# Patient Record
Sex: FEMALE | Race: WHITE | NOT HISPANIC OR LATINO | Employment: OTHER | URBAN - METROPOLITAN AREA
[De-identification: names, ages, dates, MRNs, and addresses within clinical notes are randomized per-mention and may not be internally consistent; named-entity substitution may affect disease eponyms.]

---

## 2017-03-16 DIAGNOSIS — E03.9 HYPOTHYROIDISM (ACQUIRED): ICD-10-CM

## 2017-03-21 ENCOUNTER — HOSPITAL ENCOUNTER (OUTPATIENT)
Dept: LAB | Facility: MEDICAL CENTER | Age: 82
End: 2017-03-21
Attending: INTERNAL MEDICINE
Payer: MEDICARE

## 2017-03-21 DIAGNOSIS — E03.9 HYPOTHYROIDISM (ACQUIRED): ICD-10-CM

## 2017-03-21 LAB
T4 FREE SERPL-MCNC: 1.94 NG/DL (ref 0.53–1.43)
TSH SERPL DL<=0.005 MIU/L-ACNC: 3.45 UIU/ML (ref 0.3–3.7)

## 2017-03-21 PROCEDURE — 36415 COLL VENOUS BLD VENIPUNCTURE: CPT

## 2017-03-21 PROCEDURE — 84443 ASSAY THYROID STIM HORMONE: CPT

## 2017-03-21 PROCEDURE — 84439 ASSAY OF FREE THYROXINE: CPT

## 2017-04-03 ENCOUNTER — OFFICE VISIT (OUTPATIENT)
Dept: MEDICAL GROUP | Facility: CLINIC | Age: 82
End: 2017-04-03
Payer: MEDICARE

## 2017-04-03 VITALS
BODY MASS INDEX: 19.58 KG/M2 | OXYGEN SATURATION: 98 % | WEIGHT: 110.5 LBS | HEART RATE: 62 BPM | HEIGHT: 63 IN | RESPIRATION RATE: 18 BRPM | SYSTOLIC BLOOD PRESSURE: 120 MMHG | TEMPERATURE: 98.4 F | DIASTOLIC BLOOD PRESSURE: 62 MMHG

## 2017-04-03 DIAGNOSIS — H93.13 TINNITUS, BILATERAL: ICD-10-CM

## 2017-04-03 DIAGNOSIS — E03.9 HYPOTHYROIDISM (ACQUIRED): ICD-10-CM

## 2017-04-03 PROCEDURE — 1101F PT FALLS ASSESS-DOCD LE1/YR: CPT | Performed by: INTERNAL MEDICINE

## 2017-04-03 PROCEDURE — G8432 DEP SCR NOT DOC, RNG: HCPCS | Performed by: INTERNAL MEDICINE

## 2017-04-03 PROCEDURE — G8420 CALC BMI NORM PARAMETERS: HCPCS | Performed by: INTERNAL MEDICINE

## 2017-04-03 PROCEDURE — 1036F TOBACCO NON-USER: CPT | Performed by: INTERNAL MEDICINE

## 2017-04-03 PROCEDURE — 4040F PNEUMOC VAC/ADMIN/RCVD: CPT | Performed by: INTERNAL MEDICINE

## 2017-04-03 PROCEDURE — 99213 OFFICE O/P EST LOW 20 MIN: CPT | Performed by: INTERNAL MEDICINE

## 2017-04-03 RX ORDER — VINPOCETINE 100 %
POWDER (GRAM) MISCELLANEOUS
COMMUNITY
End: 2020-02-24

## 2017-04-03 RX ORDER — UBIDECARENONE 75 MG
100 CAPSULE ORAL DAILY
COMMUNITY
End: 2022-09-07

## 2017-04-03 RX ORDER — HUPERZINE SERRATE A 1 %
POWDER (GRAM) MISCELLANEOUS
COMMUNITY
End: 2019-08-26

## 2017-04-03 RX ORDER — CURCUMIN 100 %
POWDER (GRAM) MISCELLANEOUS
COMMUNITY

## 2017-04-03 NOTE — MR AVS SNAPSHOT
"        Ratna Cline   4/3/2017 2:20 PM   Office Visit   MRN: [id number]    Department:  [facility]   Dept Phone:  [phone]    Description:  Female : 1935   Provider:  Rk Hung D.O.           Reason for Visit     Follow-Up review thyroid results       Allergies as of 4/3/2017     Allergen Noted Reactions    Codeine 2013   Vomiting    Sulfa Drugs 2013   Nausea      You were diagnosed with     Hypothyroidism (acquired)   [236423]       Tinnitus, bilateral   [339904]         Vital Signs     Blood Pressure Pulse Temperature Respirations Height Weight    120/62 mmHg 62 36.9 °C (98.4 °F) 18 1.6 m (5' 2.99\") 50.122 kg (110 lb 8 oz)    Body Mass Index Oxygen Saturation Breastfeeding? Smoking Status          19.58 kg/m2 98% No Never Smoker         Basic Information     Date Of Birth Sex Race Ethnicity Preferred Language    1935 Female White Non- English      Problem List              ICD-10-CM Priority Class Noted - Resolved    Hypothyroidism (acquired) E03.9   2015 - Present    Tinnitus H93.19   2015 - Present    Dyslipidemia E78.5   2016 - Present      Health Maintenance        Date Due Completion Dates    IMM DTaP/Tdap/Td Vaccine (1 - Tdap) 1956 ---    COLONOSCOPY 1987 ---    IMM ZOSTER VACCINE 1997 ---    IMM PNEUMOCOCCAL 65+ (ADULT) LOW/MEDIUM RISK SERIES (2 of 2 - PPSV23) 10/21/2016 10/21/2015    MAMMOGRAM 2017 (Declined)    Override on 2016: Patient Declined (declined)    BONE DENSITY 2017, 2007            Current Immunizations     13-VALENT PCV PREVNAR 10/21/2015    Influenza Vaccine Adult HD 10/21/2015      Below and/or attached are the medications your provider expects you to take. Review all of your home medications and newly ordered medications with your provider and/or pharmacist. Follow medication instructions as directed by your provider and/or pharmacist. Please keep your " medication list with you and share with your provider. Update the information when medications are discontinued, doses are changed, or new medications (including over-the-counter products) are added; and carry medication information at all times in the event of emergency situations     Allergies:  CODEINE - Vomiting     SULFA DRUGS - Nausea               Medications  Valid as of: April 03, 2017 -  4:07 PM    Generic Name Brand Name Tablet Size Instructions for use    5-Methyltetrahydrofolate (Powder) Methyl Folate  by Does not apply route.        Benzonatate (Cap) TESSALON 100 MG Take 2 Caps by mouth 3 times a day as needed for Cough.        Calcium Ascorbate   Take  by mouth.        Coenzyme Q10 (Cap) coenzyme Q-10 30 MG Take 60 mg by mouth every day.        Cyanocobalamin (Tab) VITAMIN B-12 100 MCG Take 100 mcg by mouth every day.        Doxycycline Hyclate (Tab) VIBRAMYCIN 100 MG Take 1 Tab by mouth 2 times a day.        Estradiol (Cream) ESTRACE 0.1 MG/GM INSERT 2 GRAMS VAGINALLY EVERY DAY FOR 2 WEEKS THEN INSERT 2 GRAMS VAGINALLY TWICE WEEKLY        Eyebright   Take  by mouth.        Ginger (Zingiber officinalis)   Take  by mouth.        Ginkgo Biloba   Take  by mouth.        Huperzine Serrate A (Powder) Huperzine Serrate A 1 % by Does not apply route.        Levothyroxine Sodium (Tab) SYNTHROID 50 MCG Take 1 Tab by mouth Every morning on an empty stomach.        Magnesium Citrate (Solution) magnesium citrate  Take 300 mL by mouth Once.        Milk Thistle (Cap) Milk Thistle 1000 MG Take  by mouth.        Multiple Vitamins-Minerals   Take  by mouth.        Neomycin-Polymyxin-HC (Suspension) PEDIOTIC HC 3.5-84500-9 Place 4 Drops in ear 3 times a day.        Nutritional Supplements   Take  by mouth.        Nutritional Supplements   Take  by mouth.        Turmeric (Curcuma Longa) (Powder) Curcumin  by Does not apply route.        Vinpocetine (Powder) Vinpocetine  by Does not apply route.        .                    Medicines prescribed today were sent to:     [id number] - [place], [place] - [address]    [address] [place] [address]    Phone: [phone] Fax: [phone]    Open 24 Hours?: No      Medication refill instructions:       If your prescription bottle indicates you have medication refills left, it is not necessary to call your provider’s office. Please contact your pharmacy and they will refill your medication.    If your prescription bottle indicates you do not have any refills left, you may request refills at any time through one of the following ways: The online [employer] system (except Urgent Care), by calling your provider’s office, or by asking your pharmacy to contact your provider’s office with a refill request. Medication refills are processed only during regular business hours and may not be available until the next business day. Your provider may request additional information or to have a follow-up visit with you prior to refilling your medication.   *Please Note: Medication refills are assigned a new Rx number when refilled electronically. Your pharmacy may indicate that no refills were authorized even though a new prescription for the same medication is available at the pharmacy. Please request the medicine by name with the pharmacy before contacting your provider for a refill.        Your To Do List     Future Labs/Procedures Complete By Expires    FREE THYROXINE  As directed 4/4/2018    TSH  As directed 4/4/2018         [employer] Status: Patient Declined

## 2017-04-04 NOTE — PROGRESS NOTES
CC: Ratna Cline is a 81 y.o. female is suffering from   Chief Complaint   Patient presents with   • Follow-Up     review thyroid results          SUBJECTIVE:  1. Hypothyroidism (acquired)  Patient's here for follow-up, we reviewed the fact that she is hypothyroid, but states that she had not been taking her thyroid medication appropriately. I've asked her to go ahead and stay on a consistent dosage, patient's to recheck her thyroid function approximately one month    2. Tinnitus, bilateral  Patient with a history of tinnitus, and inform the patient that this is frequently associated with use of nonsteroidal anti-inflammatories. Patient feels this is due to her being on Nexium.        Past social, family, history:   Social History   Substance Use Topics   • Smoking status: Never Smoker    • Smokeless tobacco: Never Used   • Alcohol Use: No         MEDICATIONS:    Current outpatient prescriptions:   •  Milk Thistle 1000 MG Cap, Take  by mouth., Disp: , Rfl:   •  EYEBRIGHT PO, Take  by mouth., Disp: , Rfl:   •  Nutritional Supplements (PYCNOGENOL PO), Take  by mouth., Disp: , Rfl:   •  Deyanira, Zingiber officinalis, (DEYANIRA PO), Take  by mouth., Disp: , Rfl:   •  Huperzine Serrate A 1 % Powder, by Does not apply route., Disp: , Rfl:   •  5-Methyltetrahydrofolate (METHYL FOLATE) Powder, by Does not apply route., Disp: , Rfl:   •  Vinpocetine Powder, by Does not apply route., Disp: , Rfl:   •  cyanocobalamin (VITAMIN B-12) 100 MCG Tab, Take 100 mcg by mouth every day., Disp: , Rfl:   •  VITAMIN C, CALCIUM ASCORBATE, PO, Take  by mouth., Disp: , Rfl:   •  Turmeric, Curcuma Longa, (CURCUMIN) Powder, by Does not apply route., Disp: , Rfl:   •  magnesium citrate Solution, Take 300 mL by mouth Once., Disp: , Rfl:   •  coenzyme Q-10 30 MG capsule, Take 60 mg by mouth every day., Disp: , Rfl:   •  Ginkgo Biloba (GINKOBA PO), Take  by mouth., Disp: , Rfl:   •  Nutritional Supplements (CALCIUM D-GLUCARATE PO),  "Take  by mouth., Disp: , Rfl:   •  Multiple Vitamins-Minerals (GLUTAMINE MULTIVITAMIN/MINERAL PO), Take  by mouth., Disp: , Rfl:   •  levothyroxine (SYNTHROID) 50 MCG Tab, Take 1 Tab by mouth Every morning on an empty stomach., Disp: 90 Tab, Rfl: 3  •  estradiol (ESTRACE) 0.1 MG/GM vaginal cream, INSERT 2 GRAMS VAGINALLY EVERY DAY FOR 2 WEEKS THEN INSERT 2 GRAMS VAGINALLY TWICE WEEKLY, Disp: 1 Tube, Rfl: 3  •  neomycin-polymyxin-HC (PEDIOTIC HC) 3.5-30285-3 Suspension, Place 4 Drops in ear 3 times a day., Disp: 1 Bottle, Rfl: 0  •  benzonatate (TESSALON) 100 MG Cap, Take 2 Caps by mouth 3 times a day as needed for Cough., Disp: 30 Cap, Rfl: 0  •  doxycycline (VIBRAMYCIN) 100 MG Tab, Take 1 Tab by mouth 2 times a day., Disp: 20 Tab, Rfl: 0    PROBLEMS:  Patient Active Problem List    Diagnosis Date Noted   • Dyslipidemia 07/14/2016   • Hypothyroidism (acquired) 07/13/2015   • Tinnitus 07/13/2015       REVIEW OF SYSTEMS:  Gen.:  No Nausea, Vomiting, fever, Chills.  Heart: No chest pain.  Lungs:  No shortness of Breath.  Psychological: Terence unusual Anxiety depression     PHYSICAL EXAM   Constitutional: Alert, cooperative, not in acute distress.  Cardiovascular:  Rate Rhythm is regular without murmurs rubs clicks.     Thorax & Lungs: Clear to auscultation, no wheezing, rhonchi, or rales  HENT: Normocephalic, Atraumatic.  Eyes: PERRLA, EOMI, Conjunctiva normal.   Neck: Trachia is midline no swelling of the thyroid.   Lymphatic: No lymphadenopathy noted.   Neurologic: Alert & oriented x 3, cranial nerves II through XII are intact, Normal motor function, Normal sensory function, No focal deficits noted.   Psychiatric: Affect normal, Judgment normal, Mood normal.     VITAL SIGNS:/62 mmHg  Pulse 62  Temp(Src) 36.9 °C (98.4 °F)  Resp 18  Ht 1.6 m (5' 2.99\")  Wt 50.122 kg (110 lb 8 oz)  BMI 19.58 kg/m2  SpO2 98%  Breastfeeding? No    Labs: Reviewed    Assessment:                                                   "   Plan:    1. Hypothyroidism (acquired)  Recheck thyroid  - FREE THYROXINE; Future  - TSH; Future    2. Tinnitus, bilateral  Tinnitus likely secondary to the use of nonsteroidal anti-inflammatories, possibly due to the use of Nexium though unlikely

## 2017-05-26 ENCOUNTER — TELEPHONE (OUTPATIENT)
Dept: MEDICAL GROUP | Facility: CLINIC | Age: 82
End: 2017-05-26

## 2017-05-26 ENCOUNTER — HOSPITAL ENCOUNTER (OUTPATIENT)
Dept: LAB | Facility: MEDICAL CENTER | Age: 82
End: 2017-05-26
Attending: INTERNAL MEDICINE
Payer: MEDICARE

## 2017-05-26 DIAGNOSIS — E03.9 HYPOTHYROIDISM (ACQUIRED): ICD-10-CM

## 2017-05-26 LAB
T4 FREE SERPL-MCNC: 1.45 NG/DL (ref 0.53–1.43)
TSH SERPL DL<=0.005 MIU/L-ACNC: 3.83 UIU/ML (ref 0.3–3.7)

## 2017-05-26 PROCEDURE — 84439 ASSAY OF FREE THYROXINE: CPT

## 2017-05-26 PROCEDURE — 84443 ASSAY THYROID STIM HORMONE: CPT

## 2017-05-26 PROCEDURE — 36415 COLL VENOUS BLD VENIPUNCTURE: CPT

## 2017-05-26 RX ORDER — LEVOTHYROXINE SODIUM 0.07 MG/1
75 TABLET ORAL
Qty: 90 TAB | Refills: 3 | OUTPATIENT
Start: 2017-05-26 | End: 2017-05-30 | Stop reason: SDUPTHER

## 2017-05-27 NOTE — TELEPHONE ENCOUNTER
Telephone call the patient verbally notified her that her thyroid is low we will increase her from 50-75 µg prescription sent to the pharmacy she is to repeat her thyroid testing in 6 weeks.

## 2017-05-30 ENCOUNTER — OFFICE VISIT (OUTPATIENT)
Dept: MEDICAL GROUP | Facility: CLINIC | Age: 82
End: 2017-05-30
Payer: MEDICARE

## 2017-05-30 VITALS
RESPIRATION RATE: 18 BRPM | HEART RATE: 60 BPM | TEMPERATURE: 98.1 F | DIASTOLIC BLOOD PRESSURE: 60 MMHG | BODY MASS INDEX: 19.4 KG/M2 | HEIGHT: 63 IN | OXYGEN SATURATION: 97 % | SYSTOLIC BLOOD PRESSURE: 110 MMHG | WEIGHT: 109.5 LBS

## 2017-05-30 DIAGNOSIS — E03.9 HYPOTHYROIDISM (ACQUIRED): ICD-10-CM

## 2017-05-30 PROCEDURE — 1101F PT FALLS ASSESS-DOCD LE1/YR: CPT | Performed by: INTERNAL MEDICINE

## 2017-05-30 PROCEDURE — 1036F TOBACCO NON-USER: CPT | Performed by: INTERNAL MEDICINE

## 2017-05-30 PROCEDURE — G8432 DEP SCR NOT DOC, RNG: HCPCS | Performed by: INTERNAL MEDICINE

## 2017-05-30 PROCEDURE — 4040F PNEUMOC VAC/ADMIN/RCVD: CPT | Performed by: INTERNAL MEDICINE

## 2017-05-30 PROCEDURE — G8420 CALC BMI NORM PARAMETERS: HCPCS | Performed by: INTERNAL MEDICINE

## 2017-05-30 PROCEDURE — 99213 OFFICE O/P EST LOW 20 MIN: CPT | Performed by: INTERNAL MEDICINE

## 2017-05-30 RX ORDER — LEVOTHYROXINE SODIUM 0.07 MG/1
75 TABLET ORAL
Qty: 90 TAB | Refills: 3 | Status: SHIPPED | OUTPATIENT
Start: 2017-05-30 | End: 2017-10-13

## 2017-05-31 NOTE — PROGRESS NOTES
CC: Ratna Cline is a 81 y.o. female is suffering from   Chief Complaint   Patient presents with   • Follow-Up     labs, thyroid and IBS         SUBJECTIVE:  1. Hypothyroidism (acquired)  Patient's here with a history of hypothyroidism, we've discussed also possible IBS. Patient and I have reviewed her labs which actually look reasonably good, she is concerned also about possible problems with adrenal insufficiency        Past social, family, history:   Social History   Substance Use Topics   • Smoking status: Never Smoker    • Smokeless tobacco: Never Used   • Alcohol Use: No         MEDICATIONS:    Current outpatient prescriptions:   •  levothyroxine (SYNTHROID) 75 MCG Tab, Take 1 Tab by mouth Every morning on an empty stomach., Disp: 90 Tab, Rfl: 3  •  Milk Thistle 1000 MG Cap, Take  by mouth., Disp: , Rfl:   •  EYEBRIGHT PO, Take  by mouth., Disp: , Rfl:   •  Nutritional Supplements (PYCNOGENOL PO), Take  by mouth., Disp: , Rfl:   •  Deyanira, Zingiber officinalis, (DEYANIRA PO), Take  by mouth., Disp: , Rfl:   •  Huperzine Serrate A 1 % Powder, by Does not apply route., Disp: , Rfl:   •  5-Methyltetrahydrofolate (METHYL FOLATE) Powder, by Does not apply route., Disp: , Rfl:   •  Vinpocetine Powder, by Does not apply route., Disp: , Rfl:   •  cyanocobalamin (VITAMIN B-12) 100 MCG Tab, Take 100 mcg by mouth every day., Disp: , Rfl:   •  VITAMIN C, CALCIUM ASCORBATE, PO, Take  by mouth., Disp: , Rfl:   •  Turmeric, Curcuma Longa, (CURCUMIN) Powder, by Does not apply route., Disp: , Rfl:   •  magnesium citrate Solution, Take 300 mL by mouth Once., Disp: , Rfl:   •  coenzyme Q-10 30 MG capsule, Take 60 mg by mouth every day., Disp: , Rfl:   •  Ginkgo Biloba (GINKOBA PO), Take  by mouth., Disp: , Rfl:   •  Nutritional Supplements (CALCIUM D-GLUCARATE PO), Take  by mouth., Disp: , Rfl:   •  Multiple Vitamins-Minerals (GLUTAMINE MULTIVITAMIN/MINERAL PO), Take  by mouth., Disp: , Rfl:   •   "neomycin-polymyxin-HC (PEDIOTIC HC) 3.5-55757-2 Suspension, Place 4 Drops in ear 3 times a day., Disp: 1 Bottle, Rfl: 0  •  estradiol (ESTRACE) 0.1 MG/GM vaginal cream, INSERT 2 GRAMS VAGINALLY EVERY DAY FOR 2 WEEKS THEN INSERT 2 GRAMS VAGINALLY TWICE WEEKLY, Disp: 1 Tube, Rfl: 3  •  benzonatate (TESSALON) 100 MG Cap, Take 2 Caps by mouth 3 times a day as needed for Cough., Disp: 30 Cap, Rfl: 0  •  doxycycline (VIBRAMYCIN) 100 MG Tab, Take 1 Tab by mouth 2 times a day., Disp: 20 Tab, Rfl: 0    PROBLEMS:  Patient Active Problem List    Diagnosis Date Noted   • Dyslipidemia 07/14/2016   • Hypothyroidism (acquired) 07/13/2015   • Tinnitus 07/13/2015       REVIEW OF SYSTEMS:  Gen.:  No Nausea, Vomiting, fever, Chills.  Heart: No chest pain.  Lungs:  No shortness of Breath.  Psychological: Terence unusual Anxiety depression     PHYSICAL EXAM   Constitutional: Alert, cooperative, not in acute distress.  Cardiovascular:  Rate Rhythm is regular without murmurs rubs clicks.     Thorax & Lungs: Clear to auscultation, no wheezing, rhonchi, or rales  HENT: Normocephalic, Atraumatic.  Eyes: PERRLA, EOMI, Conjunctiva normal.   Neck: Trachia is midline no swelling of the thyroid.   Neurologic: Alert & oriented x 3, cranial nerves II through XII are intact, Normal motor function, Normal sensory function, No focal deficits noted.   Psychiatric: Affect normal, Judgment normal, Mood normal.     VITAL SIGNS:/60 mmHg  Pulse 60  Temp(Src) 36.7 °C (98.1 °F)  Resp 18  Ht 1.588 m (5' 2.52\")  Wt 49.669 kg (109 lb 8 oz)  BMI 19.70 kg/m2  SpO2 97%  Breastfeeding? No    Labs: Reviewed    Assessment:                                                     Plan:    1. Hypothyroidism (acquired)  Labs reviewed, will have patient check an a.m. cortisol, and asked her also to check CBC CMP  - COMP METABOLIC PANEL; Future  - CBC WITH DIFFERENTIAL; Future  - CORTISOL - AM  - levothyroxine (SYNTHROID) 75 MCG Tab; Take 1 Tab by mouth Every " morning on an empty stomach.  Dispense: 90 Tab; Refill: 3

## 2017-06-01 ENCOUNTER — HOSPITAL ENCOUNTER (OUTPATIENT)
Dept: LAB | Facility: MEDICAL CENTER | Age: 82
End: 2017-06-01
Attending: INTERNAL MEDICINE
Payer: MEDICARE

## 2017-06-01 DIAGNOSIS — E03.9 HYPOTHYROIDISM (ACQUIRED): ICD-10-CM

## 2017-06-01 LAB
ALBUMIN SERPL BCP-MCNC: 3.7 G/DL (ref 3.2–4.9)
ALBUMIN/GLOB SERPL: 1.4 G/DL
ALP SERPL-CCNC: 48 U/L (ref 30–99)
ALT SERPL-CCNC: 14 U/L (ref 2–50)
ANION GAP SERPL CALC-SCNC: 6 MMOL/L (ref 0–11.9)
AST SERPL-CCNC: 16 U/L (ref 12–45)
BASOPHILS # BLD AUTO: 1.4 % (ref 0–1.8)
BASOPHILS # BLD: 0.07 K/UL (ref 0–0.12)
BILIRUB SERPL-MCNC: 0.7 MG/DL (ref 0.1–1.5)
BUN SERPL-MCNC: 10 MG/DL (ref 8–22)
CALCIUM SERPL-MCNC: 9.7 MG/DL (ref 8.5–10.5)
CHLORIDE SERPL-SCNC: 100 MMOL/L (ref 96–112)
CO2 SERPL-SCNC: 29 MMOL/L (ref 20–33)
CORTIS SERPL-MCNC: 15.5 UG/DL (ref 0–23)
CREAT SERPL-MCNC: 0.73 MG/DL (ref 0.5–1.4)
EOSINOPHIL # BLD AUTO: 0.11 K/UL (ref 0–0.51)
EOSINOPHIL NFR BLD: 2.2 % (ref 0–6.9)
ERYTHROCYTE [DISTWIDTH] IN BLOOD BY AUTOMATED COUNT: 45.1 FL (ref 35.9–50)
GFR SERPL CREATININE-BSD FRML MDRD: >60 ML/MIN/1.73 M 2
GLOBULIN SER CALC-MCNC: 2.6 G/DL (ref 1.9–3.5)
GLUCOSE SERPL-MCNC: 78 MG/DL (ref 65–99)
HCT VFR BLD AUTO: 44 % (ref 37–47)
HGB BLD-MCNC: 14.6 G/DL (ref 12–16)
IMM GRANULOCYTES # BLD AUTO: 0.01 K/UL (ref 0–0.11)
IMM GRANULOCYTES NFR BLD AUTO: 0.2 % (ref 0–0.9)
LYMPHOCYTES # BLD AUTO: 1.07 K/UL (ref 1–4.8)
LYMPHOCYTES NFR BLD: 21.2 % (ref 22–41)
MCH RBC QN AUTO: 31.9 PG (ref 27–33)
MCHC RBC AUTO-ENTMCNC: 33.2 G/DL (ref 33.6–35)
MCV RBC AUTO: 96.3 FL (ref 81.4–97.8)
MONOCYTES # BLD AUTO: 0.53 K/UL (ref 0–0.85)
MONOCYTES NFR BLD AUTO: 10.5 % (ref 0–13.4)
NEUTROPHILS # BLD AUTO: 3.25 K/UL (ref 2–7.15)
NEUTROPHILS NFR BLD: 64.5 % (ref 44–72)
NRBC # BLD AUTO: 0 K/UL
NRBC BLD AUTO-RTO: 0 /100 WBC
PLATELET # BLD AUTO: 289 K/UL (ref 164–446)
PMV BLD AUTO: 9.8 FL (ref 9–12.9)
POTASSIUM SERPL-SCNC: 3.9 MMOL/L (ref 3.6–5.5)
PROT SERPL-MCNC: 6.3 G/DL (ref 6–8.2)
RBC # BLD AUTO: 4.57 M/UL (ref 4.2–5.4)
SODIUM SERPL-SCNC: 135 MMOL/L (ref 135–145)
WBC # BLD AUTO: 5 K/UL (ref 4.8–10.8)

## 2017-06-01 PROCEDURE — 82533 TOTAL CORTISOL: CPT

## 2017-06-01 PROCEDURE — 85025 COMPLETE CBC W/AUTO DIFF WBC: CPT

## 2017-06-01 PROCEDURE — 36415 COLL VENOUS BLD VENIPUNCTURE: CPT

## 2017-06-01 PROCEDURE — 80053 COMPREHEN METABOLIC PANEL: CPT

## 2017-08-11 ENCOUNTER — HOSPITAL ENCOUNTER (OUTPATIENT)
Dept: LAB | Facility: MEDICAL CENTER | Age: 82
End: 2017-08-11
Attending: INTERNAL MEDICINE
Payer: MEDICARE

## 2017-08-11 DIAGNOSIS — E03.9 HYPOTHYROIDISM (ACQUIRED): ICD-10-CM

## 2017-08-11 LAB
CORTIS SERPL-MCNC: 13.5 UG/DL (ref 0–23)
T4 FREE SERPL-MCNC: 1.43 NG/DL (ref 0.53–1.43)
TSH SERPL DL<=0.005 MIU/L-ACNC: 1.84 UIU/ML (ref 0.3–3.7)

## 2017-08-11 PROCEDURE — 84439 ASSAY OF FREE THYROXINE: CPT

## 2017-08-11 PROCEDURE — 84443 ASSAY THYROID STIM HORMONE: CPT

## 2017-08-11 PROCEDURE — 36415 COLL VENOUS BLD VENIPUNCTURE: CPT

## 2017-08-11 PROCEDURE — 82533 TOTAL CORTISOL: CPT

## 2017-08-15 ENCOUNTER — OFFICE VISIT (OUTPATIENT)
Dept: MEDICAL GROUP | Facility: CLINIC | Age: 82
End: 2017-08-15
Payer: MEDICARE

## 2017-08-15 VITALS
HEART RATE: 66 BPM | RESPIRATION RATE: 18 BRPM | DIASTOLIC BLOOD PRESSURE: 64 MMHG | HEIGHT: 63 IN | OXYGEN SATURATION: 98 % | SYSTOLIC BLOOD PRESSURE: 120 MMHG | TEMPERATURE: 98.6 F | BODY MASS INDEX: 19.08 KG/M2 | WEIGHT: 107.7 LBS

## 2017-08-15 DIAGNOSIS — R89.9 ABNORMAL LABORATORY TEST: ICD-10-CM

## 2017-08-15 PROCEDURE — 99213 OFFICE O/P EST LOW 20 MIN: CPT | Performed by: INTERNAL MEDICINE

## 2017-08-15 NOTE — MR AVS SNAPSHOT
"        Ratna Cline   8/15/2017 4:00 PM   Office Visit   MRN: [id number]    Department:  [facility]   Dept Phone:  [phone]    Description:  Female : 1935   Provider:  Rk Hung D.O.           Reason for Visit     Follow-Up           Allergies as of 8/15/2017     Allergen Noted Reactions    Codeine 2013   Vomiting    Sulfa Drugs 2013   Nausea      You were diagnosed with     Abnormal laboratory test   [[id number]]         Vital Signs     Blood Pressure Pulse Temperature Respirations Height Weight    120/64 mmHg 66 37 °C (98.6 °F) 18 1.588 m (5' 2.52\") 48.852 kg (107 lb 11.2 oz)    Body Mass Index Oxygen Saturation Breastfeeding? Smoking Status          19.37 kg/m2 98% No Never Smoker         Basic Information     Date Of Birth Sex Race Ethnicity Preferred Language    1935 Female White Non- English      Problem List              ICD-10-CM Priority Class Noted - Resolved    Hypothyroidism (acquired) E03.9   2015 - Present    Tinnitus H93.19   2015 - Present    Dyslipidemia E78.5   2016 - Present      Health Maintenance        Date Due Completion Dates    IMM DTaP/Tdap/Td Vaccine (1 - Tdap) 1956 ---    COLONOSCOPY 1987 ---    IMM ZOSTER VACCINE 1997 ---    IMM PNEUMOCOCCAL 65+ (ADULT) LOW/MEDIUM RISK SERIES (2 of 2 - PPSV23) 10/21/2016 10/21/2015    MAMMOGRAM 2017 (Declined)    Override on 2016: Patient Declined (declined)    IMM INFLUENZA (1) 2017 10/21/2015    BONE DENSITY 2017, 2007            Current Immunizations     13-VALENT PCV PREVNAR 10/21/2015    Influenza Vaccine Adult HD 10/21/2015      Below and/or attached are the medications your provider expects you to take. Review all of your home medications and newly ordered medications with your provider and/or pharmacist. Follow medication instructions as directed by your provider and/or pharmacist. Please keep your medication list " with you and share with your provider. Update the information when medications are discontinued, doses are changed, or new medications (including over-the-counter products) are added; and carry medication information at all times in the event of emergency situations     Allergies:  CODEINE - Vomiting     SULFA DRUGS - Nausea               Medications  Valid as of: August 15, 2017 -  5:28 PM    Generic Name Brand Name Tablet Size Instructions for use    5-Methyltetrahydrofolate (Powder) Methyl Folate  by Does not apply route.        Benzonatate (Cap) TESSALON 100 MG Take 2 Caps by mouth 3 times a day as needed for Cough.        Calcium Ascorbate   Take  by mouth.        Coenzyme Q10 (Cap) coenzyme Q-10 30 MG Take 60 mg by mouth every day.        Cyanocobalamin (Tab) VITAMIN B-12 100 MCG Take 100 mcg by mouth every day.        Doxycycline Hyclate (Tab) VIBRAMYCIN 100 MG Take 1 Tab by mouth 2 times a day.        Estradiol (Cream) ESTRACE 0.1 MG/GM INSERT 2 GRAMS VAGINALLY EVERY DAY FOR 2 WEEKS THEN INSERT 2 GRAMS VAGINALLY TWICE WEEKLY        Eyebright   Take  by mouth.        Ginger (Zingiber officinalis)   Take  by mouth.        Ginkgo Biloba   Take  by mouth.        Huperzine Serrate A (Powder) Huperzine Serrate A 1 % by Does not apply route.        Levothyroxine Sodium (Tab) SYNTHROID 75 MCG Take 1 Tab by mouth Every morning on an empty stomach.        Magnesium Citrate (Solution) magnesium citrate  Take 300 mL by mouth Once.        Milk Thistle (Cap) Milk Thistle 1000 MG Take  by mouth.        Multiple Vitamins-Minerals   Take  by mouth.        Neomycin-Polymyxin-HC (Suspension) PEDIOTIC HC 3.5-16848-5 Place 4 Drops in ear 3 times a day.        Nutritional Supplements   Take  by mouth.        Nutritional Supplements   Take  by mouth.        Turmeric (Curcuma Longa) (Powder) Curcumin  by Does not apply route.        Vinpocetine (Powder) Vinpocetine  by Does not apply route.        .                 Medicines  prescribed today were sent to:     [facility] - [place], [place] - [address]    [address] [place] [address]    Phone: [phone] Fax: [phone]    Open 24 Hours?: No      Medication refill instructions:       If your prescription bottle indicates you have medication refills left, it is not necessary to call your provider’s office. Please contact your pharmacy and they will refill your medication.    If your prescription bottle indicates you do not have any refills left, you may request refills at any time through one of the following ways: The online [employer] system (except Urgent Care), by calling your provider’s office, or by asking your pharmacy to contact your provider’s office with a refill request. Medication refills are processed only during regular business hours and may not be available until the next business day. Your provider may request additional information or to have a follow-up visit with you prior to refilling your medication.   *Please Note: Medication refills are assigned a new Rx number when refilled electronically. Your pharmacy may indicate that no refills were authorized even though a new prescription for the same medication is available at the pharmacy. Please request the medicine by name with the pharmacy before contacting your provider for a refill.        Your To Do List     Future Labs/Procedures Complete By Expires    CBC WITH DIFFERENTIAL  As directed 8/16/2018         [employer] Status: Patient Declined

## 2017-08-16 NOTE — PROGRESS NOTES
CC: Ratna Cline is a 81 y.o. female is suffering from   Chief Complaint   Patient presents with   • Follow-Up         SUBJECTIVE:  1. Abnormal laboratory test  Ratna is here for follow-up, has a history of a mildly abnormal CBC but is otherwise doing extraordinarily well, is suffering from life stresses associated with her  who is 89 years old.        Past social, family, history:   Social History   Substance Use Topics   • Smoking status: Never Smoker    • Smokeless tobacco: Never Used   • Alcohol Use: No         MEDICATIONS:    Current outpatient prescriptions:   •  levothyroxine (SYNTHROID) 75 MCG Tab, Take 1 Tab by mouth Every morning on an empty stomach., Disp: 90 Tab, Rfl: 3  •  Milk Thistle 1000 MG Cap, Take  by mouth., Disp: , Rfl:   •  EYEBRIGHT PO, Take  by mouth., Disp: , Rfl:   •  Nutritional Supplements (PYCNOGENOL PO), Take  by mouth., Disp: , Rfl:   •  Deyanira, Zingiber officinalis, (DEYANIRA PO), Take  by mouth., Disp: , Rfl:   •  Huperzine Serrate A 1 % Powder, by Does not apply route., Disp: , Rfl:   •  5-Methyltetrahydrofolate (METHYL FOLATE) Powder, by Does not apply route., Disp: , Rfl:   •  Vinpocetine Powder, by Does not apply route., Disp: , Rfl:   •  cyanocobalamin (VITAMIN B-12) 100 MCG Tab, Take 100 mcg by mouth every day., Disp: , Rfl:   •  VITAMIN C, CALCIUM ASCORBATE, PO, Take  by mouth., Disp: , Rfl:   •  Turmeric, Curcuma Longa, (CURCUMIN) Powder, by Does not apply route., Disp: , Rfl:   •  magnesium citrate Solution, Take 300 mL by mouth Once., Disp: , Rfl:   •  coenzyme Q-10 30 MG capsule, Take 60 mg by mouth every day., Disp: , Rfl:   •  Ginkgo Biloba (GINKOBA PO), Take  by mouth., Disp: , Rfl:   •  Nutritional Supplements (CALCIUM D-GLUCARATE PO), Take  by mouth., Disp: , Rfl:   •  Multiple Vitamins-Minerals (GLUTAMINE MULTIVITAMIN/MINERAL PO), Take  by mouth., Disp: , Rfl:   •  neomycin-polymyxin-HC (PEDIOTIC HC) 3.5-67400-1 Suspension, Place 4 Drops in ear  "3 times a day., Disp: 1 Bottle, Rfl: 0  •  estradiol (ESTRACE) 0.1 MG/GM vaginal cream, INSERT 2 GRAMS VAGINALLY EVERY DAY FOR 2 WEEKS THEN INSERT 2 GRAMS VAGINALLY TWICE WEEKLY, Disp: 1 Tube, Rfl: 3  •  doxycycline (VIBRAMYCIN) 100 MG Tab, Take 1 Tab by mouth 2 times a day., Disp: 20 Tab, Rfl: 0  •  benzonatate (TESSALON) 100 MG Cap, Take 2 Caps by mouth 3 times a day as needed for Cough., Disp: 30 Cap, Rfl: 0    PROBLEMS:  Patient Active Problem List    Diagnosis Date Noted   • Dyslipidemia 07/14/2016   • Hypothyroidism (acquired) 07/13/2015   • Tinnitus 07/13/2015       REVIEW OF SYSTEMS:  Gen.:  No Nausea, Vomiting, fever, Chills.  Heart: No chest pain.  Lungs:  No shortness of Breath.  Psychological: Terence unusual Anxiety depression     PHYSICAL EXAM   Constitutional: Alert, cooperative, not in acute distress.  Cardiovascular:  Rate Rhythm is regular without murmurs rubs clicks.     Thorax & Lungs: Clear to auscultation, no wheezing, rhonchi, or rales  HENT: Normocephalic, Atraumatic.  Eyes: PERRLA, EOMI, Conjunctiva normal.   Neck: Trachia is midline no swelling of the thyroid.   Lymphatic: No lymphadenopathy noted.   Neurologic: Alert & oriented x 3, cranial nerves II through XII are intact, Normal motor function, Normal sensory function, No focal deficits noted.   Psychiatric: Affect normal, Judgment normal, Mood normal.     VITAL SIGNS:/64 mmHg  Pulse 66  Temp(Src) 37 °C (98.6 °F)  Resp 18  Ht 1.588 m (5' 2.52\")  Wt 48.852 kg (107 lb 11.2 oz)  BMI 19.37 kg/m2  SpO2 98%  Breastfeeding? No    Labs: Reviewed    Assessment:                                                     Plan:    1. Abnormal laboratory test  Very mild changes on her CBC recheck in 6 months  - CBC WITH DIFFERENTIAL; Future          "

## 2017-09-07 ENCOUNTER — PATIENT OUTREACH (OUTPATIENT)
Dept: HEALTH INFORMATION MANAGEMENT | Facility: OTHER | Age: 82
End: 2017-09-07

## 2017-09-07 ENCOUNTER — TELEPHONE (OUTPATIENT)
Dept: HEALTH INFORMATION MANAGEMENT | Facility: OTHER | Age: 82
End: 2017-09-07

## 2017-09-07 DIAGNOSIS — Z12.39 SCREENING FOR BREAST CANCER: ICD-10-CM

## 2017-09-07 NOTE — TELEPHONE ENCOUNTER
Patient is over the recommended age and is showing overdue for Mammogram and Colonoscopy in Health Maintenance.    Please reply to this message as to whether these tests are appropriate and I will update the Health Maintenance Topic or contact the patient to schedule.

## 2017-09-07 NOTE — PROGRESS NOTES
Outcome: No Answer-Phone kept ringing    WebIZ Checked & Epic Updated:  yes    [facility] Verified: yes    Attempt # 1

## 2017-09-08 NOTE — TELEPHONE ENCOUNTER
Spoke to patient, per pt she will no longer do mammograms from now on. Please cancel the order and do not order for future at her age she is not worried about having this testing. The only thing she will have done from now on will be annual wellness exams. Thank you.

## 2017-09-15 NOTE — PROGRESS NOTES
Outcome: Requested Call Back    Please transfer to Patient Outreach Team at [phone] when patient returns call.    Attempt # 2

## 2017-09-19 NOTE — PROGRESS NOTES
Attempt #:3    WebIZ Checked & Epic Updated: yes  HealthConnect Verified: yes  Verify PCP: yes    Communication Preference Obtained: yes     Review Care Team: yes    Annual Wellness Visit Scheduling  1. Scheduling Status:Scheduled      Care Gap Scheduling (Attempt to Schedule EACH Overdue Care Gap!)     Health Maintenance Due   Topic Date Due   • IMM DTaP/Tdap/Td Vaccine (1 - Tdap) Scheduled   • COLONOSCOPY  Excluded    • IMM ZOSTER VACCINE  Scheduled   • IMM PNEUMOCOCCAL 65+ (ADULT) LOW/MEDIUM RISK SERIES (2 of 2 - PPSV23) Scheduled   • MAMMOGRAM  Excluded   • IMM INFLUENZA (1) Scheduled         MyChart Activation: declined  MyChart Renetta: no  Virtual Visits: no  Opt In to Text Messages: no

## 2017-09-22 ENCOUNTER — TELEPHONE (OUTPATIENT)
Dept: MEDICAL GROUP | Facility: CLINIC | Age: 82
End: 2017-09-22

## 2017-09-22 NOTE — LETTER
Request for Medical Records    Patient Name: Ratna Cline    : 1935      Dear Doctor Rj Boston M.D.    The above named patient receives primary care at the [facility] by Rk Hung D.O..  The patient informs us that you are her eye care Provider.    Please fax a copy of the most recent eye exam to [phone] or answer the  questions below and fax this sheet back to us at the above number.  Attached is a signed Release of Information.      Date of last eye exam: _____________    Retinal eye exam summary:        Please select the choice(s) that apply.    ____ No diabetic retinopathy    ____    Diabetic retinopathy present      Printed Name and Credentials: __________________________________    Signature of Eye Care Provider: _________________________________    We appreciate your assistance and collaboration in providing efficient patient care!    Kindest Regards,    [address] [place] [address] [phone]

## 2017-09-22 NOTE — TELEPHONE ENCOUNTER
Future Appointments       Provider Department Center    9/25/2017 3:20 PM Rk Hung D.O.; [facility]          ANNUAL WELLNESS VISIT PRE-VISIT PLANNING     1.  Reviewed note from last office visit with PCP: YES Last office visit: 8/15/17    2.  If any orders were placed at last visit, do we have Results/Consult Notes?        •  Labs - Labs ordered, NOT completed. Patient advised to complete prior to next appointment. CBC       •  Imaging - Imaging was not ordered at last office visit.        •  Referrals - No referrals were ordered at last office visit.     3.  Immunizations were updated in Epic using WebIZ?: Epic matches WebIZ       •  WebIZ Recommendations:  PPSV23   Zoster (Shingles)   Influenza w/preserv.   Tdap            •  Is patient due for Tdap? YES. Patient was notified of copay.       •  Is patient due for Shingles? YES. Patient was notified of copay.     4.  Patient is due for the following Health Maintenance Topics:   Health Maintenance Due   Topic Date Due   • IMM DTaP/Tdap/Td Vaccine (1 - Tdap) 09/14/1956   • COLONOSCOPY  09/14/1987   • IMM ZOSTER VACCINE  09/14/1997   • IMM PNEUMOCOCCAL 65+ (ADULT) LOW/MEDIUM RISK SERIES (2 of 2 - PPSV23) 10/21/2016   • MAMMOGRAM  07/14/2017   • IMM INFLUENZA (1) 09/01/2017           5.  Reviewed/Updated the following with patient:       •   Preferred Pharmacy? YES       •   Preferred Lab? YES       •   Medications? YES. Was Abstract Encounter opened and chart updated? YES       •   Social History? YES. Was Abstract Encounter opened and chart updated? YES       •   Family History? YES. Was Abstract Encounter opened and chart updated? YES    6.  Care Team Updated:       •   DME Company (gait device, O2, CPAP, etc.): NO       •   Other Specialists (eye doctor, derm, GYN, cardiology, endo, etc): YES       •   Last eye exam:     7. DPA/Advanced Directive:  Patient does not have an Advanced Directive.  A packet and  workshop information was given on Advanced Directives.    8.  Patient has the following Care Path diagnoses on Problem List:  NONE    9.  Specialty Comments was updated with diagnosis information provided by SCP: NO No comments regarding your specialty    10.  Patient was advised: “This is a free wellness visit. The provider will screen for medical conditions to help you stay healthy. If you have other concerns to address you may be asked to discuss these at a separate visit or there may be an additional fee.”     11.  Patient was informed to arrive 15 min prior to their scheduled appointment and bring in their medication bottles?  YES

## 2017-09-22 NOTE — LETTER
[facility]  Rk Hung D.O.  [address]  [place] [address]  Fax: [phone]   Authorization for Release/Disclosure of   Protected Health Information   Name: RATNA DUARTE : 1935 SSN: [id number]   Address: [address] [place] [address] Phone:    [phone] (home)    I authorize the entity listed below to release/disclose the PHI below to:   [facility]Rk Hung D.O. and Rk Hung D.O.   Provider or Entity Name: Rj Boston M.D.     Address   City, [facility], [facility]   Phone:      Fax: [phone]     Reason for request: continuity of care   Information to be released:    [  ] LAST COLONOSCOPY,  including any PATH REPORT and follow-up  [  ] LAST FIT/COLOGUARD RESULT [  ] LAST DEXA  [  ] LAST MAMMOGRAM  [  ] LAST PAP  [  ] LAST LABS [ X ] RETINA EXAM REPORT  [  ] IMMUNIZATION RECORDS  [  ] Release all info      [  ] Check here and initial the line next to each item to release ALL health information INCLUDING  _____ Care and treatment for drug and / or alcohol abuse  _____ HIV testing, infection status, or AIDS  _____ Genetic Testing    DATES OF SERVICE OR TIME PERIOD TO BE DISCLOSED: RECENT EYE EXAM  I understand and acknowledge that:  * This Authorization may be revoked at any time by you in writing, except if your health information has already been used or disclosed.  * Your health information that will be used or disclosed as a result of you signing this authorization could be re-disclosed by the recipient. If this occurs, your re-disclosed health information may no longer be protected by State or Federal laws.  * You may refuse to sign this Authorization. Your refusal will not affect your ability to obtain treatment.  * This Authorization becomes effective upon signing and will  on (date) __________.      If no date is indicated, this Authorization will  one (1) year from the signature date.    Name: Ratna Hector  Son    Signature:   Date:     9/22/2017       PLEASE FAX REQUESTED RECORDS BACK TO: [phone]

## 2017-09-25 ENCOUNTER — OFFICE VISIT (OUTPATIENT)
Dept: MEDICAL GROUP | Facility: CLINIC | Age: 82
End: 2017-09-25
Payer: MEDICARE

## 2017-09-25 VITALS
RESPIRATION RATE: 16 BRPM | SYSTOLIC BLOOD PRESSURE: 128 MMHG | DIASTOLIC BLOOD PRESSURE: 64 MMHG | BODY MASS INDEX: 18.61 KG/M2 | WEIGHT: 105 LBS | HEART RATE: 68 BPM | TEMPERATURE: 98.9 F | HEIGHT: 63 IN | OXYGEN SATURATION: 99 %

## 2017-09-25 DIAGNOSIS — E03.9 HYPOTHYROIDISM (ACQUIRED): ICD-10-CM

## 2017-09-25 DIAGNOSIS — Z23 NEED FOR 23-POLYVALENT PNEUMOCOCCAL POLYSACCHARIDE VACCINE: ICD-10-CM

## 2017-09-25 DIAGNOSIS — H93.13 TINNITUS, BILATERAL: ICD-10-CM

## 2017-09-25 DIAGNOSIS — R10.32 LEFT LOWER QUADRANT PAIN: ICD-10-CM

## 2017-09-25 DIAGNOSIS — E78.5 DYSLIPIDEMIA: ICD-10-CM

## 2017-09-25 DIAGNOSIS — Z23 NEED FOR INFLUENZA VACCINATION: ICD-10-CM

## 2017-09-25 PROCEDURE — 90732 PPSV23 VACC 2 YRS+ SUBQ/IM: CPT | Performed by: INTERNAL MEDICINE

## 2017-09-25 PROCEDURE — G0008 ADMIN INFLUENZA VIRUS VAC: HCPCS | Performed by: INTERNAL MEDICINE

## 2017-09-25 PROCEDURE — 90662 IIV NO PRSV INCREASED AG IM: CPT | Performed by: INTERNAL MEDICINE

## 2017-09-25 PROCEDURE — G0439 PPPS, SUBSEQ VISIT: HCPCS | Mod: 25 | Performed by: INTERNAL MEDICINE

## 2017-09-25 PROCEDURE — G0009 ADMIN PNEUMOCOCCAL VACCINE: HCPCS | Performed by: INTERNAL MEDICINE

## 2017-09-25 RX ORDER — AMOXICILLIN AND CLAVULANATE POTASSIUM 875; 125 MG/1; MG/1
1 TABLET, FILM COATED ORAL 2 TIMES DAILY
Qty: 14 TAB | Refills: 0 | Status: SHIPPED | OUTPATIENT
Start: 2017-09-25 | End: 2017-09-26

## 2017-09-25 RX ORDER — LICORICE,DEGLYCYRRHIZINATED
POWDER (GRAM) MISCELLANEOUS
COMMUNITY
End: 2019-08-26

## 2017-09-25 ASSESSMENT — PATIENT HEALTH QUESTIONNAIRE - PHQ9
5. POOR APPETITE OR OVEREATING: 0 - NOT AT ALL
CLINICAL INTERPRETATION OF PHQ2 SCORE: 1
SUM OF ALL RESPONSES TO PHQ QUESTIONS 1-9: 1

## 2017-09-25 ASSESSMENT — PAIN SCALES - GENERAL: PAINLEVEL: 1=MINIMAL PAIN

## 2017-09-25 NOTE — PROGRESS NOTES
Chief Complaint   Patient presents with   • Annual Wellness Visit              HPI:  Ratna is a 82 y.o. here for Medicare Annual Wellness Visit         Patient Active Problem List    Diagnosis Date Noted   • Dyslipidemia 07/14/2016   • Hypothyroidism (acquired) 07/13/2015   • Tinnitus 07/13/2015       Current Outpatient Prescriptions   Medication Sig Dispense Refill   • Simethicone (GAS RELIEF EXTRA STRENGTH PO) Take  by mouth.     • Calcium Carb-Cholecalciferol (CALCIUM 600 + D PO) Take  by mouth.     • PAPAYA PO Take  by mouth.     • Acetylcarnitine HCl (ACETYL L-CARNITINE PO) Take  by mouth.     • Rhodiola rosea (RHODIOLA PO) Take  by mouth.     • Licorice Deglycyrrhizinated Powder by Does not apply route.     • levothyroxine (SYNTHROID) 75 MCG Tab Take 1 Tab by mouth Every morning on an empty stomach. 90 Tab 3   • Milk Thistle 1000 MG Cap Take  by mouth.     • Vinpocetine Powder by Does not apply route.     • cyanocobalamin (VITAMIN B-12) 100 MCG Tab Take 100 mcg by mouth every day.     • magnesium citrate Solution Take 300 mL by mouth Once.     • Nutritional Supplements (CALCIUM D-GLUCARATE PO) Take  by mouth.     • Multiple Vitamins-Minerals (GLUTAMINE MULTIVITAMIN/MINERAL PO) Take  by mouth.     • EYEBRIGHT PO Take  by mouth.     • Nutritional Supplements (PYCNOGENOL PO) Take  by mouth.     • Deyanira, Zingiber officinalis, (DEYANIRA PO) Take  by mouth.     • Huperzine Serrate A 1 % Powder by Does not apply route.     • 5-Methyltetrahydrofolate (METHYL FOLATE) Powder by Does not apply route.     • VITAMIN C, CALCIUM ASCORBATE, PO Take  by mouth.     • Turmeric, Curcuma Longa, (CURCUMIN) Powder by Does not apply route.     • coenzyme Q-10 30 MG capsule Take 60 mg by mouth every day.     • Ginkgo Biloba (GINKOBA PO) Take  by mouth.     • neomycin-polymyxin-HC (PEDIOTIC HC) 3.5-91488-5 Suspension Place 4 Drops in ear 3 times a day. 1 Bottle 0   • estradiol (ESTRACE) 0.1 MG/GM vaginal cream INSERT 2 GRAMS VAGINALLY  EVERY DAY FOR 2 WEEKS THEN INSERT 2 GRAMS VAGINALLY TWICE WEEKLY 1 Tube 3   • benzonatate (TESSALON) 100 MG Cap Take 2 Caps by mouth 3 times a day as needed for Cough. 30 Cap 0   • doxycycline (VIBRAMYCIN) 100 MG Tab Take 1 Tab by mouth 2 times a day. 20 Tab 0     No current facility-administered medications for this visit.         Patient is taking medications as noted in medication list.  Current supplements as per medication list.     Allergies: Codeine and Sulfa drugs    Current social contact/activities: Ratna interacts with family, recent trip to [facility] with son      Is patient current with immunizations? No, due for FLU, PNEUMOVAX (PPSV23), TDAP and ZOSTAVAX (Shingles). Patient is interested in receiving FLU and PNEUMOVAX (PPSV23) today.    She  reports that she has never smoked. She has never used smokeless tobacco. She reports that she does not drink alcohol or use drugs.  Counseling given: Not Answered        DPA/Advanced directive: Patient does not have an Advanced Directive.  A packet and workshop information was given on Advanced Directives.    ROS:    Gait: Uses no assistive device    Ostomy: no    Other tubes: no    Amputations: no    Chronic oxygen use no    Last eye exam 2016    Wears hearing aids: no    : Denies incontinence.             Depression Screening    Little interest or pleasure in doing things?  0 - not at all  Feeling down, depressed, or hopeless? 1 - several days  Trouble falling or staying asleep, or sleeping too much?  0 - not at all  Feeling tired or having little energy?  0 - not at all  Poor appetite or overeating?  0 - not at all  Feeling bad about yourself - or that you are a failure or have let yourself or your family down? 0 - not at all  Trouble concentrating on things, such as reading the newspaper or watching television? 0 - not at all  Moving or speaking so slowly that other people could have noticed.  Or the opposite - being so fidgety or restless that you have  been moving around a lot more than usual?  0 - not at all  Thoughts that you would be better off dead, or of hurting yourself?  0 - not at all  Patient Health Questionnaire Score: 1      If depressive symptoms identified deferred to follow up visit unless specifically addressed in assessment and plan.    Interpretation of PHQ-9 Total Score   Score Severity   1-4 No Depression   5-9 Mild Depression   10-14 Moderate Depression   15-19 Moderately Severe Depression   20-27 Severe Depression      Screening for Cognitive Impairment    Three Minute Recall (apple, watch, charly)  3/3    Draw clock face with all 12 numbers set to the hand to show 10 minutes past 11 o'clock  1 5/5  If cognitive concerns identified, deferred for follow up unless specifically addressed in assessment and plan.    Fall Risk Assessment    Has the patient had two or more falls in the last year or any fall with injury in the last year?  No  If fall risk identified, deferred for follow up unless specifically addressed in assessment and plan.      Safety Assessment    Throw rugs on floor.  Yes  Handrails on all stairs.  No  Good lighting in all hallways.  Yes  Difficulty hearing.  Yes  Patient counseled about all safety risks that were identified.    Functional Assessment ADLs    Are there any barriers preventing you from cooking for yourself or meeting nutritional needs?  No.    Are there any barriers preventing you from driving safely or obtaining transportation?  No.    Are there any barriers preventing you from using a telephone or calling for help?  No.    Are there any barriers preventing you from shopping?  No.    Are there any barriers preventing you from taking care of your own finances?  No.    Are there any barriers preventing you from managing your medications?  No.    Are you currently engaging any exercise or physical activity?  Yes.  Ratna walks on the treadmill daily 20-40 mins     Health Maintenance Summary                IMM  DTaP/Tdap/Td Vaccine Overdue 9/14/1956     COLONOSCOPY Overdue 9/14/1987     IMM ZOSTER VACCINE Overdue 9/14/1997     IMM PNEUMOCOCCAL 65+ (ADULT) LOW/MEDIUM RISK SERIES Overdue 10/21/2016      Done 10/21/2015 Imm Admin: Pneumococcal Conjugate Vaccine (Prevnar/PCV-13)    MAMMOGRAM Overdue 7/14/2017      Patient Declined 7/14/2016 declined    IMM INFLUENZA Overdue 9/1/2017      Done 10/21/2015 Imm Admin: Influenza Vaccine Adult HD    BONE DENSITY Overdue 9/24/2017      Done 9/24/2012 DS-BONE DENSITY STUDY (DEXA)     Patient has more history with this topic...    Annual Wellness Visit Next Due 10/18/2017      Done 10/17/2016 Visit Dx: Medicare annual wellness visit, subsequent          Patient Care Team:  Rk Hung D.O. as PCP - General (Internal Medicine)  Rj Boston M.D. as Consulting Physician (Ophthalmology)  Luciano Kerr D.P.M. as Consulting Physician (Podiatry)  Nazanin Tejeda M.D. as Consulting Physician (Dermatology)      Social History   Substance Use Topics   • Smoking status: Never Smoker   • Smokeless tobacco: Never Used   • Alcohol use No     Family History   Problem Relation Age of Onset   • Cancer Mother      leukemia   • Genetic Mother      osteochondroma   • Stroke Neg Hx    • Hyperlipidemia Neg Hx    • Hypertension Neg Hx    • Heart Disease Neg Hx    • Diabetes Neg Hx    • Lung Disease Neg Hx      She  has a past medical history of Arthritis; Back pain; Bladder infection; Dyslipidemia (7/14/2016); History of hemorrhoids; History of measles; History of pneumonia; Hypothyroidism (acquired) (7/13/2015); Osteochondroma; Osteochondroma; Sinusitis; Thyroid disease; and Tinnitus (7/13/2015). She also has no past medical history of ASTHMA or Diabetes.   Past Surgical History:   Procedure Laterality Date   • HYSTERECTOMY RADICAL     • OTHER      jaw surgery to correct overbite   • TONSILLECTOMY           Exam:     Blood pressure 128/64, pulse 68, temperature 37.2 °C (98.9 °F), resp.  "rate 16, height 1.594 m (5' 2.75\"), weight 47.6 kg (105 lb), SpO2 99 %. Body mass index is 18.75 kg/m².    Hearing poor.    Dentition good  Alert, oriented in no acute distress.  Eye contact is good, speech goal directed, affect calm       Assessment and Plan. The following treatment and monitoring plan is recommended:     1. Dyslipidemia   Reasonably controlled no change in medical therapy    2. Hypothyroidism (acquired)   Stable    3. Tinnitus, bilateral   Patient understands she has poor hearing, does not want to undergo treatment with hearing aids at this time.    4. Left lower quadrant pain  Patient to be started on Augmentin  - amoxicillin-clavulanate (AUGMENTIN) 875-125 MG Tab; Take 1 Tab by mouth 2 times a day.  Dispense: 14 Tab; Refill: 0    5. Need for 23-polyvalent pneumococcal polysaccharide vaccine  Vaccination given  - PneumoVax PPV23 =>1yo    6. Need for influenza vaccination  Vaccination given  - INFLUENZA VACCINE, HIGH DOSE (65+ ONLY)      Services suggested: No services needed at this time    Health Care Screening recommendations as per orders if indicated.  Referrals offered: PT/OT/Nutrition counseling/Behavioral Health/Smoking cessation as per orders if indicated.    Discussion today about general wellness and lifestyle habits:    · Prevent falls and reduce trip hazards; Cautioned about securing or removing rugs.  · Have a working fire alarm and carbon monoxide detector;   · Engage in regular physical activity and social activities       Follow-up: No Follow-up on file.      "

## 2017-09-26 ENCOUNTER — TELEPHONE (OUTPATIENT)
Dept: MEDICAL GROUP | Facility: CLINIC | Age: 82
End: 2017-09-26

## 2017-09-26 DIAGNOSIS — R10.32 LLQ PAIN: ICD-10-CM

## 2017-09-26 RX ORDER — AMOXICILLIN AND CLAVULANATE POTASSIUM 600; 42.9 MG/5ML; MG/5ML
600 POWDER, FOR SUSPENSION ORAL 2 TIMES DAILY
Qty: 100 ML | Refills: 0 | Status: SHIPPED | OUTPATIENT
Start: 2017-09-26 | End: 2018-04-30

## 2017-09-26 NOTE — TELEPHONE ENCOUNTER
1. Name: Ratna Tawny Thompson                                           Call Back Number: [phone] (home)         Patient approves a detailed voicemail message: N\A    Patient came into the office today and reported that she is unable to swallow the amoxicillin-clavulanate (AUGMENTIN) 875-125 MG Tab because the pill is too large. She choked on the medication and is requesting a liquid formulation for the remainder of the treatment. Please send a new prescription to the pharmacy, thank you.

## 2017-09-29 NOTE — TELEPHONE ENCOUNTER
Patient came into the office today and reported that she started having diarrhea this morning with the liquid antibiotic. She reported experiencing an abdominal discomfort last night and would like to know what to do. Dr. Hung verbally advised to have patient stop the antibiotic and schedule a follow up if the diarrhea continues after stopping the antibiotic.     Patient stated that she has been on antibiotics for at least 3 days prior to adverse effects. Patient was advised to schedule an appointment with Dr Hung to follow up in office. Patient verbalized understanding.

## 2017-10-13 ENCOUNTER — OFFICE VISIT (OUTPATIENT)
Dept: MEDICAL GROUP | Facility: CLINIC | Age: 82
End: 2017-10-13
Payer: MEDICARE

## 2017-10-13 VITALS
OXYGEN SATURATION: 96 % | TEMPERATURE: 97.9 F | BODY MASS INDEX: 19.69 KG/M2 | HEART RATE: 79 BPM | WEIGHT: 107 LBS | SYSTOLIC BLOOD PRESSURE: 106 MMHG | HEIGHT: 62 IN | RESPIRATION RATE: 16 BRPM | DIASTOLIC BLOOD PRESSURE: 58 MMHG

## 2017-10-13 DIAGNOSIS — R10.30 LOWER ABDOMINAL PAIN: ICD-10-CM

## 2017-10-13 DIAGNOSIS — E03.9 HYPOTHYROIDISM (ACQUIRED): ICD-10-CM

## 2017-10-13 PROCEDURE — 99213 OFFICE O/P EST LOW 20 MIN: CPT | Performed by: INTERNAL MEDICINE

## 2017-10-13 RX ORDER — LEVOTHYROXINE SODIUM 0.05 MG/1
50 TABLET ORAL
Qty: 90 TAB | Refills: 3 | Status: SHIPPED | OUTPATIENT
Start: 2017-10-13 | End: 2018-05-08

## 2017-10-13 ASSESSMENT — PAIN SCALES - GENERAL: PAINLEVEL: 3=SLIGHT PAIN

## 2017-10-13 NOTE — PROGRESS NOTES
CC: Ratna Cline is a 82 y.o. female is suffering from   Chief Complaint   Patient presents with   • Thyroid Problem   • GI Problem     left side abdoman pain   • Patient Question     medication         SUBJECTIVE:  1. Hypothyroidism (acquired)  Patient's here for follow-up, we discussed that she has a history of problems with her thyroid. Heels or symptoms associated with her stomach have worsened with the use of 75 µg of Synthroid. Patient's last thyroid study appears to show very good control.  I have agreed to going in decrease her Synthroid from 75-50 µg.     2. Lower abdominal pain  Patient and I discussed that a CT scan has been ordered, patient chooses to cancel the study. Patient's concerned about the contrast dye. Feels she's not able to tolerated..         Past social, family, history:    Social History   Substance Use Topics   • Smoking status: Never Smoker   • Smokeless tobacco: Never Used   • Alcohol use No         MEDICATIONS:    Current Outpatient Prescriptions:   •  levothyroxine (SYNTHROID) 50 MCG Tab, Take 1 Tab by mouth Every morning on an empty stomach., Disp: 90 Tab, Rfl: 3  •  Simethicone (GAS RELIEF EXTRA STRENGTH PO), Take  by mouth., Disp: , Rfl:   •  Calcium Carb-Cholecalciferol (CALCIUM 600 + D PO), Take  by mouth., Disp: , Rfl:   •  PAPAYA PO, Take  by mouth., Disp: , Rfl:   •  Acetylcarnitine HCl (ACETYL L-CARNITINE PO), Take  by mouth., Disp: , Rfl:   •  Rhodiola rosea (RHODIOLA PO), Take  by mouth., Disp: , Rfl:   •  Milk Thistle 1000 MG Cap, Take  by mouth., Disp: , Rfl:   •  Deyanira, Zingiber officinalis, (DEYANIRA PO), Take  by mouth., Disp: , Rfl:   •  5-Methyltetrahydrofolate (METHYL FOLATE) Powder, by Does not apply route., Disp: , Rfl:   •  Vinpocetine Powder, by Does not apply route., Disp: , Rfl:   •  cyanocobalamin (VITAMIN B-12) 100 MCG Tab, Take 100 mcg by mouth every day., Disp: , Rfl:   •  magnesium citrate Solution, Take 300 mL by mouth Once., Disp: , Rfl:    •  Ginkgo Biloba (GINKOBA PO), Take  by mouth., Disp: , Rfl:   •  Nutritional Supplements (CALCIUM D-GLUCARATE PO), Take  by mouth., Disp: , Rfl:   •  Multiple Vitamins-Minerals (GLUTAMINE MULTIVITAMIN/MINERAL PO), Take  by mouth., Disp: , Rfl:   •  estradiol (ESTRACE) 0.1 MG/GM vaginal cream, INSERT 2 GRAMS VAGINALLY EVERY DAY FOR 2 WEEKS THEN INSERT 2 GRAMS VAGINALLY TWICE WEEKLY, Disp: 1 Tube, Rfl: 3  •  amoxicillin-clavulanate (AUGMENTIN) 600-42.9 MG/5ML Recon Susp suspension, Take 5 mL by mouth 2 times a day., Disp: 100 mL, Rfl: 0  •  Licorice Deglycyrrhizinated Powder, by Does not apply route., Disp: , Rfl:   •  EYEBRIGHT PO, Take  by mouth., Disp: , Rfl:   •  Nutritional Supplements (PYCNOGENOL PO), Take  by mouth., Disp: , Rfl:   •  Huperzine Serrate A 1 % Powder, by Does not apply route., Disp: , Rfl:   •  VITAMIN C, CALCIUM ASCORBATE, PO, Take  by mouth., Disp: , Rfl:   •  Turmeric, Curcuma Longa, (CURCUMIN) Powder, by Does not apply route., Disp: , Rfl:   •  coenzyme Q-10 30 MG capsule, Take 60 mg by mouth every day., Disp: , Rfl:   •  neomycin-polymyxin-HC (PEDIOTIC HC) 3.5-91742-5 Suspension, Place 4 Drops in ear 3 times a day., Disp: 1 Bottle, Rfl: 0  •  benzonatate (TESSALON) 100 MG Cap, Take 2 Caps by mouth 3 times a day as needed for Cough., Disp: 30 Cap, Rfl: 0    PROBLEMS:  Patient Active Problem List    Diagnosis Date Noted   • Dyslipidemia 07/14/2016   • Hypothyroidism (acquired) 07/13/2015   • Tinnitus 07/13/2015       REVIEW OF SYSTEMS:  Gen.:  No Nausea, Vomiting, fever, Chills.  Heart: No chest pain.  Lungs:  No shortness of Breath.  Psychological: Terence unusual Anxiety depression     PHYSICAL EXAM   Constitutional: Alert, cooperative, not in acute distress.  Cardiovascular:  Rate Rhythm is regular without murmurs rubs clicks.     Thorax & Lungs: Clear to auscultation, no wheezing, rhonchi, or rales  HENT: Normocephalic, Atraumatic.  Eyes: PERRLA, EOMI, Conjunctiva normal.   Neck: Trachia  "is midline no swelling of the thyroid.   Lymphatic: No lymphadenopathy noted.   Abdomin: Soft Mild/moderate left lower quadrant right lower quadrant discomfort to palpation.   Neurologic: Alert & oriented x 3, cranial nerves II through XII are intact, Normal motor function, Normal sensory function, No focal deficits noted.   Psychiatric: Affect normal, Judgment normal, Mood normal.     VITAL SIGNS:/58   Pulse 79   Temp 36.6 °C (97.9 °F)   Resp 16   Ht 1.575 m (5' 2\")   Wt 48.5 kg (107 lb)   SpO2 96%   Breastfeeding? No   BMI 19.57 kg/m²     Labs: Reviewed    Assessment:                                                     Plan:    1. Hypothyroidism (acquired)  Decrease Synthroid at patient request  -  Synthroid patient (SYNTHROID) 50 MCG Tab; Take 1 Tab by mouth Every morning on an empty stomach.  Dispense: 90 Tab; Refill: 3  - H.PYLORI STOOL ANTIGEN; Future  - FREE THYROXINE; Future  - TSH; Future    2. Lower abdominal pain  Patient wishes to cancel CT the abdomen, explained to the patient this may delay a diagnosis. 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          "

## 2017-10-17 ENCOUNTER — HOSPITAL ENCOUNTER (OUTPATIENT)
Facility: MEDICAL CENTER | Age: 82
End: 2017-10-17
Attending: INTERNAL MEDICINE
Payer: MEDICARE

## 2017-10-17 DIAGNOSIS — E03.9 HYPOTHYROIDISM (ACQUIRED): ICD-10-CM

## 2017-10-17 LAB — H PYLORI AG STL QL IA: NOT DETECTED

## 2017-10-17 PROCEDURE — 87338 HPYLORI STOOL AG IA: CPT

## 2017-11-01 RX ORDER — ESTRADIOL 0.1 MG/G
CREAM VAGINAL
Qty: 1 TUBE | Refills: 0 | Status: SHIPPED | OUTPATIENT
Start: 2017-11-01 | End: 2018-04-30

## 2017-11-02 ENCOUNTER — APPOINTMENT (OUTPATIENT)
Dept: RADIOLOGY | Facility: MEDICAL CENTER | Age: 82
End: 2017-11-02
Attending: INTERNAL MEDICINE
Payer: MEDICARE

## 2017-11-13 ENCOUNTER — OFFICE VISIT (OUTPATIENT)
Dept: URGENT CARE | Facility: PHYSICIAN GROUP | Age: 82
End: 2017-11-13
Payer: MEDICARE

## 2017-11-13 VITALS
WEIGHT: 104 LBS | BODY MASS INDEX: 19.14 KG/M2 | HEART RATE: 80 BPM | SYSTOLIC BLOOD PRESSURE: 108 MMHG | TEMPERATURE: 98.9 F | RESPIRATION RATE: 16 BRPM | OXYGEN SATURATION: 98 % | HEIGHT: 62 IN | DIASTOLIC BLOOD PRESSURE: 62 MMHG

## 2017-11-13 DIAGNOSIS — J30.1 ACUTE SEASONAL ALLERGIC RHINITIS DUE TO POLLEN: ICD-10-CM

## 2017-11-13 DIAGNOSIS — J06.9 ACUTE URI: ICD-10-CM

## 2017-11-13 PROCEDURE — 99214 OFFICE O/P EST MOD 30 MIN: CPT | Performed by: PHYSICIAN ASSISTANT

## 2017-11-13 RX ORDER — AZITHROMYCIN 250 MG/1
TABLET, FILM COATED ORAL
Qty: 6 TAB | Refills: 0 | Status: SHIPPED | OUTPATIENT
Start: 2017-11-13 | End: 2018-04-30

## 2017-11-13 RX ORDER — FLUTICASONE PROPIONATE 50 MCG
1 SPRAY, SUSPENSION (ML) NASAL DAILY
Qty: 16 G | Refills: 0 | Status: SHIPPED | OUTPATIENT
Start: 2017-11-13 | End: 2018-04-30

## 2017-11-13 ASSESSMENT — ENCOUNTER SYMPTOMS
HEADACHES: 0
CARDIOVASCULAR NEGATIVE: 1
COUGH: 1
HOARSE VOICE: 1
TROUBLE SWALLOWING: 1
SHORTNESS OF BREATH: 0
FEVER: 0
WHEEZING: 0
GASTROINTESTINAL NEGATIVE: 1
CHILLS: 0
DIZZINESS: 0
SWOLLEN GLANDS: 1
SORE THROAT: 1
MYALGIAS: 0

## 2017-11-13 NOTE — PROGRESS NOTES
Subjective:      Ratna Cline is a 82 y.o. female who presents with Pharyngitis (and ear pain left side, coughing up alot of mucus  )            Pharyngitis    This is a new problem. The current episode started in the past 7 days. The problem has been gradually worsening. The pain is worse on the left side. There has been no fever. The fever has been present for less than 1 day. Associated symptoms include congestion, coughing, ear pain, a hoarse voice, swollen glands and trouble swallowing. Pertinent negatives include no headaches or shortness of breath. She has had no exposure to strep or mono. Treatments tried: OTC cough.       PMH:  has a past medical history of Arthritis; Back pain; Bladder infection; Dyslipidemia (7/14/2016); History of hemorrhoids; History of measles; History of pneumonia; Hypothyroidism (acquired) (7/13/2015); Osteochondroma; Osteochondroma; Sinusitis; Thyroid disease; and Tinnitus (7/13/2015). She also has no past medical history of ASTHMA or Diabetes.  MEDS:   Current Outpatient Prescriptions:   •  estradiol (ESTRACE) 0.1 MG/GM vaginal cream, INSERT 2 GRAMS VAGINALLY EVERY DAY FOR 2 WEEKS THEN INSERT 2 GRAMS VAGINALLY TWICE WEEKLY, Disp: 1 Tube, Rfl: 0  •  levothyroxine (SYNTHROID) 50 MCG Tab, Take 1 Tab by mouth Every morning on an empty stomach., Disp: 90 Tab, Rfl: 3  •  amoxicillin-clavulanate (AUGMENTIN) 600-42.9 MG/5ML Recon Susp suspension, Take 5 mL by mouth 2 times a day., Disp: 100 mL, Rfl: 0  •  Simethicone (GAS RELIEF EXTRA STRENGTH PO), Take  by mouth., Disp: , Rfl:   •  Calcium Carb-Cholecalciferol (CALCIUM 600 + D PO), Take  by mouth., Disp: , Rfl:   •  PAPAYA PO, Take  by mouth., Disp: , Rfl:   •  Acetylcarnitine HCl (ACETYL L-CARNITINE PO), Take  by mouth., Disp: , Rfl:   •  Rhodiola rosea (RHODIOLA PO), Take  by mouth., Disp: , Rfl:   •  Licorice Deglycyrrhizinated Powder, by Does not apply route., Disp: , Rfl:   •  Milk Thistle 1000 MG Cap, Take  by mouth., Disp:  , Rfl:   •  EYEBRIGHT PO, Take  by mouth., Disp: , Rfl:   •  Nutritional Supplements (PYCNOGENOL PO), Take  by mouth., Disp: , Rfl:   •  Ginger, Zingiber officinalis, (GINGER PO), Take  by mouth., Disp: , Rfl:   •  Huperzine Serrate A 1 % Powder, by Does not apply route., Disp: , Rfl:   •  5-Methyltetrahydrofolate (METHYL FOLATE) Powder, by Does not apply route., Disp: , Rfl:   •  Vinpocetine Powder, by Does not apply route., Disp: , Rfl:   •  cyanocobalamin (VITAMIN B-12) 100 MCG Tab, Take 100 mcg by mouth every day., Disp: , Rfl:   •  VITAMIN C, CALCIUM ASCORBATE, PO, Take  by mouth., Disp: , Rfl:   •  Turmeric, Curcuma Longa, (CURCUMIN) Powder, by Does not apply route., Disp: , Rfl:   •  magnesium citrate Solution, Take 300 mL by mouth Once., Disp: , Rfl:   •  coenzyme Q-10 30 MG capsule, Take 60 mg by mouth every day., Disp: , Rfl:   •  Ginkgo Biloba (GINKOBA PO), Take  by mouth., Disp: , Rfl:   •  Nutritional Supplements (CALCIUM D-GLUCARATE PO), Take  by mouth., Disp: , Rfl:   •  Multiple Vitamins-Minerals (GLUTAMINE MULTIVITAMIN/MINERAL PO), Take  by mouth., Disp: , Rfl:   •  neomycin-polymyxin-HC (PEDIOTIC HC) 3.5-67210-9 Suspension, Place 4 Drops in ear 3 times a day., Disp: 1 Bottle, Rfl: 0  •  benzonatate (TESSALON) 100 MG Cap, Take 2 Caps by mouth 3 times a day as needed for Cough., Disp: 30 Cap, Rfl: 0  ALLERGIES:   Allergies   Allergen Reactions   • Codeine Vomiting   • Sulfa Drugs Nausea     SURGHX:   Past Surgical History:   Procedure Laterality Date   • HYSTERECTOMY RADICAL     • OTHER      jaw surgery to correct overbite   • TONSILLECTOMY       SOCHX:  reports that she has never smoked. She has never used smokeless tobacco. She reports that she does not drink alcohol or use drugs.  FH: family history includes Cancer in her mother; Genetic in her mother.    Review of Systems   Constitutional: Negative for chills and fever.   HENT: Positive for congestion, ear pain, hoarse voice, sore throat and  "trouble swallowing.    Respiratory: Positive for cough. Negative for shortness of breath and wheezing.    Cardiovascular: Negative.    Gastrointestinal: Negative.    Musculoskeletal: Negative for joint pain and myalgias.   Neurological: Negative for dizziness and headaches.       Medications, Allergies, and current problem list reviewed today in Epic     Objective:     /62   Pulse 80   Temp 37.2 °C (98.9 °F)   Resp 16   Ht 1.575 m (5' 2\")   Wt 47.2 kg (104 lb)   SpO2 98%   BMI 19.02 kg/m²      Physical Exam   Constitutional: She is oriented to person, place, and time. She appears well-developed and well-nourished. No distress.   HENT:   Head: Normocephalic and atraumatic.   Right Ear: Tympanic membrane and external ear normal.   Left Ear: Tympanic membrane and external ear normal.   Nose: Mucosal edema and rhinorrhea present.   Mouth/Throat: Oropharynx is clear and moist. No oropharyngeal exudate.   Eyes: Conjunctivae and EOM are normal. Right eye exhibits no discharge. Left eye exhibits no discharge.   Neck: Normal range of motion. Neck supple.   Cardiovascular: Normal rate, regular rhythm and normal heart sounds.    Pulmonary/Chest: Effort normal and breath sounds normal. No respiratory distress. She has no wheezes.   Musculoskeletal: Normal range of motion.   Lymphadenopathy:     She has no cervical adenopathy.   Neurological: She is alert and oriented to person, place, and time.   Skin: Skin is warm and dry. She is not diaphoretic.   Psychiatric: She has a normal mood and affect. Her behavior is normal. Judgment and thought content normal.   Nursing note and vitals reviewed.              Assessment/Plan:     1. Acute URI  azithromycin (ZITHROMAX) 250 MG Tab   2. Acute seasonal allergic rhinitis due to pollen  fluticasone (FLONASE) 50 MCG/ACT nasal spray     PO2 adequate, no signs of respiratory involvement. Respiratory exam within normal limits.  Z-hayley, Use as directed.  OTC meds and conservative " measures as discussed  Return to clinic or go to ED if symptoms worsen or persist. Indications for ED discussed at length. Patient voices understanding. Follow-up with your primary care provider in 3-5 days. Red flags discussed.    Please note that this dictation was created using voice recognition software. I have made every reasonable attempt to correct obvious errors, but I expect that there are errors of grammar and possibly content that I did not discover before finalizing the note.

## 2017-11-16 ENCOUNTER — OFFICE VISIT (OUTPATIENT)
Dept: MEDICAL GROUP | Facility: CLINIC | Age: 82
End: 2017-11-16
Payer: MEDICARE

## 2017-11-16 VITALS
RESPIRATION RATE: 16 BRPM | TEMPERATURE: 97.4 F | HEIGHT: 63 IN | BODY MASS INDEX: 18.78 KG/M2 | SYSTOLIC BLOOD PRESSURE: 100 MMHG | WEIGHT: 106 LBS | HEART RATE: 84 BPM | DIASTOLIC BLOOD PRESSURE: 56 MMHG | OXYGEN SATURATION: 95 %

## 2017-11-16 DIAGNOSIS — J40 BRONCHITIS: ICD-10-CM

## 2017-11-16 PROCEDURE — 99212 OFFICE O/P EST SF 10 MIN: CPT | Performed by: NURSE PRACTITIONER

## 2017-11-16 ASSESSMENT — ENCOUNTER SYMPTOMS
CHILLS: 0
MYALGIAS: 0
WHEEZING: 0
SINUS PAIN: 0
FEVER: 0
COUGH: 1
SHORTNESS OF BREATH: 0
SORE THROAT: 1
SPUTUM PRODUCTION: 1

## 2017-11-16 NOTE — PROGRESS NOTES
Chief Complaint   Patient presents with   • URI     productive cough/sore throat/hoarseness x week        HISTORY OF PRESENT ILLNESS: Patient is a 82 y.o. female established patient who presents today due to a week hx of productive coughing and sore throat hoarseness.    She actually went to urgent care Sunday and was given the zpak already. She is going to finish her azithromycin this coming Friday. She reports that she is here because her  got admitted to hospital due to pneumonia so that she would like someone to listen to her lung again to make sure every thing is ok. She does no have any new symptoms after that urgent care visit and her sore throat and hoarseness has actually gone better already.       Patient Active Problem List    Diagnosis Date Noted   • Dyslipidemia 07/14/2016   • Hypothyroidism (acquired) 07/13/2015   • Tinnitus 07/13/2015       Allergies:Codeine and Sulfa drugs    Current Outpatient Prescriptions   Medication Sig Dispense Refill   • azithromycin (ZITHROMAX) 250 MG Tab Z-hayley, Use as directed. 6 Tab 0   • fluticasone (FLONASE) 50 MCG/ACT nasal spray Spray 1 Spray in nose every day. 16 g 0   • estradiol (ESTRACE) 0.1 MG/GM vaginal cream INSERT 2 GRAMS VAGINALLY EVERY DAY FOR 2 WEEKS THEN INSERT 2 GRAMS VAGINALLY TWICE WEEKLY 1 Tube 0   • levothyroxine (SYNTHROID) 50 MCG Tab Take 1 Tab by mouth Every morning on an empty stomach. 90 Tab 3   • amoxicillin-clavulanate (AUGMENTIN) 600-42.9 MG/5ML Recon Susp suspension Take 5 mL by mouth 2 times a day. 100 mL 0   • Simethicone (GAS RELIEF EXTRA STRENGTH PO) Take  by mouth.     • Calcium Carb-Cholecalciferol (CALCIUM 600 + D PO) Take  by mouth.     • PAPAYA PO Take  by mouth.     • Acetylcarnitine HCl (ACETYL L-CARNITINE PO) Take  by mouth.     • Rhodiola rosea (RHODIOLA PO) Take  by mouth.     • Licorice Deglycyrrhizinated Powder by Does not apply route.     • Milk Thistle 1000 MG Cap Take  by mouth.     • EYEBRIGHT PO Take  by mouth.     •  Nutritional Supplements (PYCNOGENOL PO) Take  by mouth.     • Ginger, Zingiber officinalis, (GINGER PO) Take  by mouth.     • Huperzine Serrate A 1 % Powder by Does not apply route.     • 5-Methyltetrahydrofolate (METHYL FOLATE) Powder by Does not apply route.     • Vinpocetine Powder by Does not apply route.     • cyanocobalamin (VITAMIN B-12) 100 MCG Tab Take 100 mcg by mouth every day.     • VITAMIN C, CALCIUM ASCORBATE, PO Take  by mouth.     • Turmeric, Curcuma Longa, (CURCUMIN) Powder by Does not apply route.     • magnesium citrate Solution Take 300 mL by mouth Once.     • coenzyme Q-10 30 MG capsule Take 60 mg by mouth every day.     • Ginkgo Biloba (GINKOBA PO) Take  by mouth.     • Nutritional Supplements (CALCIUM D-GLUCARATE PO) Take  by mouth.     • Multiple Vitamins-Minerals (GLUTAMINE MULTIVITAMIN/MINERAL PO) Take  by mouth.     • neomycin-polymyxin-HC (PEDIOTIC HC) 3.5-58456-2 Suspension Place 4 Drops in ear 3 times a day. 1 Bottle 0   • benzonatate (TESSALON) 100 MG Cap Take 2 Caps by mouth 3 times a day as needed for Cough. 30 Cap 0     No current facility-administered medications for this visit.        Social History   Substance Use Topics   • Smoking status: Never Smoker   • Smokeless tobacco: Never Used   • Alcohol use No       Family Status   Relation Status   • Mother  at age 70   • Father  at age 80   • Neg Hx      Family History   Problem Relation Age of Onset   • Cancer Mother      leukemia   • Genetic Mother      osteochondroma   • Stroke Neg Hx    • Hyperlipidemia Neg Hx    • Hypertension Neg Hx    • Heart Disease Neg Hx    • Diabetes Neg Hx    • Lung Disease Neg Hx          ROS:  Review of Systems   Constitutional: Negative for chills and fever.   HENT: Positive for sore throat (better). Negative for congestion and sinus pain. Ear pain: pressure but no pain.    Respiratory: Positive for cough and sputum production. Negative for shortness of breath and wheezing.   "  Musculoskeletal: Negative for myalgias.        Objective:     Blood pressure 100/56, pulse 84, temperature 36.3 °C (97.4 °F), resp. rate 16, height 1.588 m (5' 2.5\"), weight 48.1 kg (106 lb), SpO2 95 %.     Physical Exam:  Physical Exam   Constitutional: She is oriented to person, place, and time and well-developed, well-nourished, and in no distress.   HENT:   Head: Normocephalic and atraumatic.   Right Ear: Tympanic membrane is bulging.   Left Ear: Hearing, tympanic membrane and external ear normal.   Nose: Right sinus exhibits no maxillary sinus tenderness and no frontal sinus tenderness. Left sinus exhibits no maxillary sinus tenderness and no frontal sinus tenderness.   Mouth/Throat: No oropharyngeal exudate, posterior oropharyngeal edema, posterior oropharyngeal erythema or tonsillar abscesses.   Eyes: Conjunctivae are normal.   Neck: Neck supple. No JVD present.   Cardiovascular: Normal rate.    Pulmonary/Chest: Effort normal and breath sounds normal. No respiratory distress. She has no wheezes. She has no rales.   Musculoskeletal: Normal range of motion. She exhibits no edema.   Neurological: She is alert and oriented to person, place, and time.   Vitals reviewed.        Assessment/Plan:  1. Bronchitis  Continue azithromycin to finish the whole course.   Lung sounds clear, no sign of respiratory distress, vital sign stable.     Differential diagnoses and indications for immediate follow-up discussed with patient.    Instructed to return to clinic or nearest emergency department for any change in condition, further concerns, or worsening of symptoms.    The patient demonstrated a good understanding and agreed with the treatment plan.       "

## 2017-12-27 ENCOUNTER — TELEPHONE (OUTPATIENT)
Dept: MEDICAL GROUP | Facility: CLINIC | Age: 82
End: 2017-12-27

## 2017-12-27 DIAGNOSIS — Z20.828 EXPOSURE TO INFLUENZA: ICD-10-CM

## 2017-12-27 RX ORDER — OSELTAMIVIR PHOSPHATE 75 MG/1
75 CAPSULE ORAL DAILY
Qty: 10 CAP | Refills: 0 | Status: SHIPPED | OUTPATIENT
Start: 2017-12-27 | End: 2018-04-30

## 2017-12-27 NOTE — TELEPHONE ENCOUNTER
Patient's  was seen in the office by Dr. 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

## 2017-12-27 NOTE — TELEPHONE ENCOUNTER
Please call Ratna I would like her on Tamiflu 75 mg each day for 10 days due to her  having the flu

## 2018-04-30 ENCOUNTER — OFFICE VISIT (OUTPATIENT)
Dept: OTHER | Facility: IMAGING CENTER | Age: 83
End: 2018-04-30
Payer: MEDICARE

## 2018-04-30 VITALS
RESPIRATION RATE: 12 BRPM | OXYGEN SATURATION: 97 % | HEIGHT: 63 IN | DIASTOLIC BLOOD PRESSURE: 60 MMHG | TEMPERATURE: 98.6 F | BODY MASS INDEX: 18.43 KG/M2 | WEIGHT: 104 LBS | HEART RATE: 70 BPM | SYSTOLIC BLOOD PRESSURE: 100 MMHG

## 2018-04-30 DIAGNOSIS — H93.13 TINNITUS OF BOTH EARS: ICD-10-CM

## 2018-04-30 DIAGNOSIS — E78.5 DYSLIPIDEMIA: ICD-10-CM

## 2018-04-30 DIAGNOSIS — E03.9 HYPOTHYROIDISM (ACQUIRED): ICD-10-CM

## 2018-04-30 PROBLEM — K58.1 IRRITABLE BOWEL SYNDROME WITH CONSTIPATION: Status: ACTIVE | Noted: 2018-04-30

## 2018-04-30 PROCEDURE — 99214 OFFICE O/P EST MOD 30 MIN: CPT | Performed by: INTERNAL MEDICINE

## 2018-04-30 ASSESSMENT — PAIN SCALES - GENERAL: PAINLEVEL: NO PAIN

## 2018-04-30 NOTE — ASSESSMENT & PLAN NOTE
Chronic condition. Dosage has been fluctuating, increased most recently. Stopped taking thyroid supplement in December as she was concerned of possible side effects from higher thyroid dose with her body weight. She is taking a thyroid support supplement from [employer] now. She would like to know if she should have thyroid antibodies checked - does have a family history of thyroid condition. Denies palpitations, skin changes, temperature intolerance, changes in bowel habits (does have chronic constipation due to IBS).

## 2018-04-30 NOTE — ASSESSMENT & PLAN NOTE
10 yrs duration. Bilateral. Started after Nexium prescription (per patient it was higher dose than usual). High pitched noise - sometimes able to ignore it. Some pressure in left ear. Some associated hearing loss - had hearing test several years ago with Dr. Radford. Some itchiness of outer and inner ear. No past ear trauma to loud noises.

## 2018-04-30 NOTE — PROGRESS NOTES
Chief Complaint   Patient presents with   • Hypothyroidism   • Hearing Loss   • Neck Pain   • Gastrophageal Reflux     barretts esophagus     Ratna Cline is a 82 y.o. female who usually sees Rk Hung D.O. presents today to establish care at [facility] and to discuss hypothyroidism and tinnitus.    HPI:  Tinnitus  10 yrs duration. Bilateral. Started after Nexium prescription (per patient it was higher dose than usual). High pitched noise - sometimes able to ignore it. Some pressure in left ear. Some associated hearing loss - had hearing test several years ago with Dr. Radford. Some itchiness of outer and inner ear. No past ear trauma to loud noises.     Hypothyroidism (acquired)  Chronic condition. Dosage has been fluctuating, increased most recently. Stopped taking thyroid supplement in December as she was concerned of possible side effects from higher thyroid dose with her body weight. Denies palpitations, skin changes, temperature intolerance, changes in bowel habits (does have chronic constipation due to IBS).      Healthcare maintenance:  Due for DEXA, last one was in 2012 showing osteopenia.  Patient states she used to have issues with sleep-this has overall improved. She goes to bed between 8 and 9 and denies daytime fatigue. States that her overall energy is good since she had started vitamin D, B complex vitamin, vitamin C. Patient does take multiple supplements.    Patient voiced concerns for her 's health - he has multiple medical problems, is on chronic anticoagulation, had pneumonia and the flu in the winter months.    Current medicines (including changes today)  Current Outpatient Prescriptions   Medication Sig Dispense Refill   • Non Formulary Request 1 Tab. thriple action thyroid     • Cholecalciferol (VITAMIN D PO) Take  by mouth.     • LUTEIN PO Take  by mouth.     • Calcium-Magnesium-Vitamin D (CALCIUM MAGNESIUM PO) Take  by mouth.     • Acetylcarnitine HCl (ACETYL L-CARNITINE  PO) Take  by mouth.     • Licorice Deglycyrrhizinated Powder by Does not apply route.     • Milk Thistle 1000 MG Cap Take  by mouth.     • Huperzine Serrate A 1 % Powder by Does not apply route.     • Vinpocetine Powder by Does not apply route.     • cyanocobalamin (VITAMIN B-12) 100 MCG Tab Take 100 mcg by mouth every day.     • Turmeric, Curcuma Longa, (CURCUMIN) Powder by Does not apply route.     • magnesium citrate Solution Take 300 mL by mouth Once.     • levothyroxine (SYNTHROID) 50 MCG Tab Take 1 Tab by mouth Every morning on an empty stomach. 90 Tab 3   • Simethicone (GAS RELIEF EXTRA STRENGTH PO) Take  by mouth.     • Calcium Carb-Cholecalciferol (CALCIUM 600 + D PO) Take  by mouth.     • PAPAYA PO Take  by mouth.     • Rhodiola rosea (RHODIOLA PO) Take  by mouth.     • Nutritional Supplements (PYCNOGENOL PO) Take  by mouth.     • VITAMIN C, CALCIUM ASCORBATE, PO Take  by mouth.     • coenzyme Q-10 30 MG capsule Take 60 mg by mouth every day.     • Ginkgo Biloba (GINKOBA PO) Take  by mouth.     • Nutritional Supplements (CALCIUM D-GLUCARATE PO) Take  by mouth.     • Multiple Vitamins-Minerals (GLUTAMINE MULTIVITAMIN/MINERAL PO) Take  by mouth.       No current facility-administered medications for this visit.      She  has a past medical history of Arthritis; Back pain; Bladder infection; Dyslipidemia (7/14/2016); History of hemorrhoids; History of measles; History of pneumonia; Hypothyroidism (acquired) (7/13/2015); Osteochondroma; Osteochondroma; Sinusitis; Thyroid disease; and Tinnitus (7/13/2015). She also has no past medical history of ASTHMA or Diabetes.  She  has a past surgical history that includes tonsillectomy; hysterectomy radical; and other.  Social History   Substance Use Topics   • Smoking status: Never Smoker   • Smokeless tobacco: Never Used   • Alcohol use No     Family History   Problem Relation Age of Onset   • Cancer Mother      leukemia   • Genetic Mother      osteochondroma   •  Stroke Neg Hx    • Hyperlipidemia Neg Hx    • Hypertension Neg Hx    • Heart Disease Neg Hx    • Diabetes Neg Hx    • Lung Disease Neg Hx      Family Status   Relation Status   • Mother  at age 70   • Father  at age 80   • Neg Hx      Social History     Social History Narrative   • No narrative on file     ROS  Constitutional: Negative for fever, chills, weight loss and malaise/fatigue.   HENT: Negative for ear pain, nosebleeds, congestion, sore throat and neck pain - occasional neck pain.   Eyes: Negative for blurred vision.   Respiratory: Negative for cough, sputum production, shortness of breath and wheezing.    Cardiovascular: Negative for chest pain, palpitations, orthopnea and leg swelling.   Gastrointestinal: Negative for nausea, vomiting and abdominal pain. Does have occasional heartburn. History of IBS with constipation. Feels she is also sensitive to gluten-improved when she takes a gluten enzyme prior to eating.  Genitourinary: Negative for dysuria, urgency and frequency.   Musculoskeletal: Negative for myalgias, back pain and joint pain. Right-sided neck pain overall improved since starting alpha lipoic acid, inositol and Bosellia.   Skin: Negative for rash and itching.   Neurological: Negative for dizziness, tingling, tremors, sensory change, focal weakness and headaches.   Endo/Heme/Allergies: Does not bruise/bleed easily.   Psychiatric/Behavioral: Negative for depression, anxiety, or memory loss.     All other systems reviewed and are negative except as in HPI.    Labs reviewed with patient:  Lab Results   Component Value Date/Time    WBC 5.0 2017 07:04 AM    RBC 4.57 2017 07:04 AM    HEMOGLOBIN 14.6 2017 07:04 AM    HEMATOCRIT 44.0 2017 07:04 AM    MCV 96.3 2017 07:04 AM    MCH 31.9 2017 07:04 AM    MCHC 33.2 (L) 2017 07:04 AM    MPV 9.8 2017 07:04 AM    NEUTSPOLYS 64.50 2017 07:04 AM    LYMPHOCYTES 21.20 (L) 2017 07:04 AM     "MONOCYTES 10.50 06/01/2017 07:04 AM    EOSINOPHILS 2.20 06/01/2017 07:04 AM    BASOPHILS 1.40 06/01/2017 07:04 AM      Lab Results   Component Value Date/Time    SODIUM 135 06/01/2017 07:04 AM    POTASSIUM 3.9 06/01/2017 07:04 AM    CHLORIDE 100 06/01/2017 07:04 AM    CO2 29 06/01/2017 07:04 AM    GLUCOSE 78 06/01/2017 07:04 AM    BUN 10 06/01/2017 07:04 AM    CREATININE 0.73 06/01/2017 07:04 AM    ALKPHOSPHAT 48 06/01/2017 07:04 AM    ASTSGOT 16 06/01/2017 07:04 AM    ALTSGPT 14 06/01/2017 07:04 AM    TBILIRUBIN 0.7 06/01/2017 07:04 AM     Lab Results   Component Value Date/Time    CHOLSTRLTOT 225 (H) 10/13/2016 07:04 AM    CHOLSTRLTOT 221 (H) 05/31/2016 06:13 AM     Lab Results   Component Value Date/Time     (H) 10/13/2016 07:04 AM     (H) 05/31/2016 06:13 AM     Lab Results   Component Value Date/Time    HDL 51 10/13/2016 07:04 AM    HDL 51 05/31/2016 06:13 AM     Lab Results   Component Value Date/Time    TRIGLYCERIDE 89 10/13/2016 07:04 AM    TRIGLYCERIDE 99 05/31/2016 06:13 AM     No results found for: HBA1C  Lab Results   Component Value Date/Time    TSHULTRASEN 1.840 08/11/2017 08:04 AM     Lab Results   Component Value Date/Time    FREET4 1.43 08/11/2017 08:04 AM    FREET4 1.45 (H) 05/26/2017 08:36 AM     Lab Results   Component Value Date/Time    25HYDROXY 33 12/12/2016 07:02 AM    25HYDROXY 41 07/02/2012 07:58 AM     No components found for: VITB12  No results found for: FOLATE  Lab Results   Component Value Date/Time    25HYDROXY 33 12/12/2016 07:02 AM    25HYDROXY 41 07/02/2012 07:58 AM        Objective:   Blood pressure 100/60, pulse 70, temperature 37 °C (98.6 °F), resp. rate 12, height 1.588 m (5' 2.5\"), weight 47.2 kg (104 lb), SpO2 97 %. Body mass index is 18.72 kg/m².  Physical Exam:  Constitutional: Alert, no distress.  Skin: Warm, dry, good turgor, no rashes in visible areas.  Eye: Equal, round and reactive, conjunctiva clear, lids normal.  ENMT: Lips without lesions, good " dentition, oropharynx clear. TM: normal  Neck: Trachea midline, no masses, no thyromegaly.  Respiratory: Unlabored respiratory effort, lungs clear to auscultation, no wheezes, no ronchi.  Cardiovascular: Normal S1, S2, no murmur, no edema.  Abdomen: Soft, mild diffuse tenderness, no masses, no hepatosplenomegaly.  Psych: Alert and oriented x3, normal affect and mood.    Assessment and Plan:   The following treatment plan was discussed  1. Hypothyroidism (acquired)   Patient has self-discontinued levothyroxine - advise rechecking baseline labs. Reasonable to check for autoimmune condition with family history of hypothyroidism which may progress toward hypothyroidism. Can continue thyroid supplement for now. TSH    FREE THYROXINE    THYROID PEROXIDASE  (TPO) AB   2. Tinnitus of both ears  May also have presbycusis. Pt states she is not interested in another hearing test now. Acknowledged that PPI's can exacerbate tinnitus and that we should avoid prescribing other medications that could contribute to it. Treatment should be based on effects of tinnitus on quality of life - at this point, she states it is more of an annoyance when she is aware of it. Does not seem to be getting worse. Discussed possibility of trying acupuncture to help ameliorate symptoms.    3. Dyslipidemia   No recent labs - recheck. Advised attending Food is [employer] lectures - intro lecture and healthy lifestyle, cooking for cardiovascular health. LIPID PROFILE     Records requested.  Followup: Return for Annual wellness visit, Lab review. To review supplements and GERD at another visit as well.     Spent 60 minutes in face-to-tace patient contact in which greater than 50% of the visit was spent in counseling and coordination of care including acknowledging her stress from worrying about her 's health and difficulty with interactions between other healthcare providers.  We also reviewed PMH, family history, and each of her chronic diagnoses  in regards to current management, specialty physicians, symptom control, and future planning.  Please refer to assessment and plan/discussion/recommendations for additional details.          I have worked with consultants from the vendor as well as technical experts from [employer] to optimize the interface. I have made every reasonable attempt to correct obvious errors, but I expect that there are errors of grammar and possibly content that I did not discover before finalizing the note.

## 2018-05-04 ENCOUNTER — HOSPITAL ENCOUNTER (OUTPATIENT)
Dept: LAB | Facility: MEDICAL CENTER | Age: 83
End: 2018-05-04
Attending: INTERNAL MEDICINE
Payer: MEDICARE

## 2018-05-04 DIAGNOSIS — E78.5 DYSLIPIDEMIA: ICD-10-CM

## 2018-05-04 DIAGNOSIS — E03.9 HYPOTHYROIDISM (ACQUIRED): ICD-10-CM

## 2018-05-04 LAB
CHOLEST SERPL-MCNC: 199 MG/DL (ref 100–199)
HDLC SERPL-MCNC: 56 MG/DL
LDLC SERPL CALC-MCNC: 123 MG/DL
T4 FREE SERPL-MCNC: 1.25 NG/DL (ref 0.53–1.43)
THYROPEROXIDASE AB SERPL-ACNC: 1 IU/ML (ref 0–9)
TRIGL SERPL-MCNC: 100 MG/DL (ref 0–149)
TSH SERPL DL<=0.005 MIU/L-ACNC: 7.08 UIU/ML (ref 0.38–5.33)

## 2018-05-04 PROCEDURE — 84439 ASSAY OF FREE THYROXINE: CPT

## 2018-05-04 PROCEDURE — 36415 COLL VENOUS BLD VENIPUNCTURE: CPT

## 2018-05-04 PROCEDURE — 80061 LIPID PANEL: CPT

## 2018-05-04 PROCEDURE — 84443 ASSAY THYROID STIM HORMONE: CPT

## 2018-05-04 PROCEDURE — 86376 MICROSOMAL ANTIBODY EACH: CPT

## 2018-05-08 ENCOUNTER — OFFICE VISIT (OUTPATIENT)
Dept: OTHER | Facility: IMAGING CENTER | Age: 83
End: 2018-05-08
Payer: MEDICARE

## 2018-05-08 VITALS
HEIGHT: 63 IN | DIASTOLIC BLOOD PRESSURE: 62 MMHG | SYSTOLIC BLOOD PRESSURE: 124 MMHG | WEIGHT: 105.38 LBS | TEMPERATURE: 99.1 F | HEART RATE: 72 BPM | BODY MASS INDEX: 18.67 KG/M2 | RESPIRATION RATE: 16 BRPM | OXYGEN SATURATION: 96 %

## 2018-05-08 DIAGNOSIS — K58.1 IRRITABLE BOWEL SYNDROME WITH CONSTIPATION: ICD-10-CM

## 2018-05-08 DIAGNOSIS — H93.13 TINNITUS OF BOTH EARS: ICD-10-CM

## 2018-05-08 DIAGNOSIS — K21.9 GASTROESOPHAGEAL REFLUX DISEASE, ESOPHAGITIS PRESENCE NOT SPECIFIED: ICD-10-CM

## 2018-05-08 DIAGNOSIS — E03.8 SUBCLINICAL HYPOTHYROIDISM: ICD-10-CM

## 2018-05-08 DIAGNOSIS — E78.5 DYSLIPIDEMIA: ICD-10-CM

## 2018-05-08 DIAGNOSIS — M85.80 OSTEOPENIA, UNSPECIFIED LOCATION: ICD-10-CM

## 2018-05-08 PROCEDURE — 99214 OFFICE O/P EST MOD 30 MIN: CPT | Performed by: INTERNAL MEDICINE

## 2018-05-08 NOTE — ASSESSMENT & PLAN NOTE
Off thyroid replacement since December - she was concerned about long-term side effects. Taking a thyroid supplement from [facility]. Does have history of chronic constipation but this has improved with another supplement she takes for colon health (Ultimate Colon), which has some probiotics and demulcent botanicals. Some fatigue.

## 2018-05-08 NOTE — ASSESSMENT & PLAN NOTE
Chronic condition x 10 yrs. Per patient, started after she was on high dose Nexium. High-pitched sound which can occasionally be bothersome and interfere with her ability to hear. Not interested in testing for hearing aids again. Still has occasional itchiness inside canal.

## 2018-05-08 NOTE — ASSESSMENT & PLAN NOTE
Chronic condition. Last DEXA scan was in 2012 which showed osteopenia. Patient states she had 2 rounds of Reclast after first DEXA. She also takes vitamin D and calcium supplements.

## 2018-05-08 NOTE — PATIENT INSTRUCTIONS
Use 4-7-8 Relaxing Breath Exercise:  1. Place tip of your tongue against the ridge behind your front teeth and exhale completely through your mouth.  2. Inhale through your nose for a count of 4.  3. Hold your breath for a count of 7.  4. Exhale through your mouth with a swooshing sound of the count of 8.  5. Repeat this cycle three more times for a total of four breaths.

## 2018-05-08 NOTE — PROGRESS NOTES
Chief Complaint   Patient presents with   • Follow-Up     labwork    • Thyroid Problem     discuss thyroid medication vs supplements     Subjective:   Ratna Cline is a 82 y.o. female here today for multiple problems as listed below.      Tinnitus  Chronic condition x 10 yrs. Per patient, started after she was on high dose Nexium. High-pitched sound which can occasionally be bothersome and interfere with her ability to hear. Not interested in testing for hearing aids again. Still has occasional itchiness inside canal.    Subclinical hypothyroidism  Off thyroid replacement since December - she was concerned about long-term side effects. Taking a thyroid supplement from "[employer]. Does have history of chronic constipation but this has improved with another supplement she takes for colon health (Ultimate Colon), which has some probiotics and demulcent botanicals. Some fatigue.      Osteopenia  Chronic condition. Last DEXA scan was in 2012 which showed osteopenia. Patient states she had 2 rounds of Reclast after first DEXA. She also takes vitamin D and calcium supplements.    Dyslipidemia  Chronic condition. 10-yr ASCVD risk score is %.  Patients most recent cholesterol was reviewed:  Lab Results   Component Value Date/Time    CHOLSTRLTOT 199 05/04/2018 07:01 AM    CHOLSTRLTOT 225 (H) 10/13/2016 07:04 AM     Lab Results   Component Value Date/Time     (H) 05/04/2018 07:01 AM     (H) 10/13/2016 07:04 AM     Lab Results   Component Value Date/Time    HDL 56 05/04/2018 07:01 AM    HDL 51 10/13/2016 07:04 AM     Lab Results   Component Value Date/Time    TRIGLYCERIDE 100 05/04/2018 07:01 AM    TRIGLYCERIDE 89 10/13/2016 07:04 AM       Irritable bowel syndrome with constipation  Chronic condition. Reports she had chronic constipation when she was younger - 2-3 bm/wk. Feeling of incomplete evacuation. Generalized left lower abdominal quadrant discomfort after eating. Since using a supplement called  Ultimate colon, she feels symptom are improved.     Current medicines (including changes today)  Current Outpatient Prescriptions   Medication Sig Dispense Refill   • Cholecalciferol (VITAMIN D PO) Take  by mouth.     • LUTEIN PO Take  by mouth.     • Calcium-Magnesium-Vitamin D (CALCIUM MAGNESIUM PO) Take  by mouth.     • Simethicone (GAS RELIEF EXTRA STRENGTH PO) Take  by mouth.     • Calcium Carb-Cholecalciferol (CALCIUM 600 + D PO) Take  by mouth.     • PAPAYA PO Take  by mouth.     • Acetylcarnitine HCl (ACETYL L-CARNITINE PO) Take  by mouth.     • Rhodiola rosea (RHODIOLA PO) Take  by mouth.     • Milk Thistle 1000 MG Cap Take  by mouth.     • Nutritional Supplements (PYCNOGENOL PO) Take  by mouth.     • Huperzine Serrate A 1 % Powder by Does not apply route.     • Vinpocetine Powder by Does not apply route.     • cyanocobalamin (VITAMIN B-12) 100 MCG Tab Take 100 mcg by mouth every day.     • VITAMIN C, CALCIUM ASCORBATE, PO Take  by mouth.     • Turmeric, Curcuma Longa, (CURCUMIN) Powder by Does not apply route.     • magnesium citrate Solution Take 300 mL by mouth Once.     • coenzyme Q-10 30 MG capsule Take 60 mg by mouth every day.     • Ginkgo Biloba (GINKOBA PO) Take  by mouth.     • Nutritional Supplements (CALCIUM D-GLUCARATE PO) Take  by mouth.     • Multiple Vitamins-Minerals (GLUTAMINE MULTIVITAMIN/MINERAL PO) Take  by mouth.     • [START ON 5/18/2018] triamcinolone acetonide (KENALOG) 0.025 % Cream Apply 0.05 Applications to affected area(s) 2 times a day for 5 days. 1 Tube 0   • Licorice Deglycyrrhizinated Powder by Does not apply route.       No current facility-administered medications for this visit.      She  has a past medical history of Arthritis; Back pain; Pisano's esophagus; Bladder infection; Dyslipidemia (7/14/2016); History of hemorrhoids; History of measles; History of pneumonia; Hypothyroidism (acquired) (7/13/2015); Osteochondroma; Osteochondroma; Sinusitis; Thyroid disease; and  "Tinnitus (7/13/2015). She also has no past medical history of ASTHMA or Diabetes.    ROS   No chest pain, no shortness of breath, no abdominal pain, no diarrhea/constipation, no urinary symptoms.     Objective:     Blood pressure 124/62, pulse 72, temperature 37.3 °C (99.1 °F), resp. rate 16, height 1.588 m (5' 2.5\"), weight 47.8 kg (105 lb 6.1 oz), SpO2 96 %, not currently breastfeeding. Body mass index is 18.97 kg/m².   Physical Exam:  Alert, oriented in no acute distress.  Eye contact is good, speech goal directed, affect calm  HEENT: conjunctiva non-injected, sclera non-icteric.  Pinna normal. TM pearly gray.   Oral mucous membranes pink and moist with no lesions.  Neck No adenopathy or masses in the neck or supraclavicular regions.  Lungs: clear to auscultation bilaterally with good excursion.  CV: regular rate and rhythm.  Abdomen: soft, nontender, No CVAT  Ext: no edema, color normal, vascularity normal, temperature normal    Assessment and Plan:   The following treatment plan was discussed   1. Subclinical hypothyroidism  As she does not have significant symptoms and TSH is not significantly elevated, will continue to monitor. There is also an absence of treatment trials showing a benefit of treatment in older patients in the analyses of large databases and the her legitimate concern that older patients may have complications from unintended overtreatment. Can continue taking thyroid supplement vitamins at this time.    2. Dyslipidemia  Discussed LDL goals and decreasing saturated fat which are found in meats that come from a cow or pig, and found in creams, cheeses, butter, irving, and fried foods. Patient is avoiding most of these. Advised attending Food is Medicine lectures - intro lecture and Cooking for Cardiovascular Health, which her  can benefit from as well. Due to her age, patient may not have benefit in statin therapy given as primary prevention (according to post-hoc analysis of the 2017 " ALLHAT trial).    3. Gastroesophageal reflux disease, esophagitis presence not specified  Reports history of Pisano's esophagus in the 1990's. Reflux symptoms are only occasional. To discuss surveillance and management based on patient's goals and past work-up when records obtained.    4. Osteopenia, unspecified location  Last DEXA on 2012. Rechecking DEXA now unlikely to  as patient prefers not to take prescription medications.   Reports she is currently supplementing vitamin D and calcium.  She reports she  is engaging in routine weightbearing exercise.     5. Tinnitus of both ears  Advised her to consider repeat hearing test to see if she could benefit from hearing aids as this in patients with presbycusis usually result in an improvement in tinnitus symptoms. Conflicting data on acupuncture but patient may consider trying a few sessions to see if she can get some improvement. To discuss other behavioral therapy options if she still finds it bothersome.     6. Irritable bowel syndrome with constipation  Improved on constipation supplement.     Followup: Return in about 2 months (around 7/8/2018) for follow-up with PCP.    Spent 25 minutes in face-to-tace patient contact in which greater than 50% of the visit was spent in counseling and coordination of care regarding guidelines for treatment of subclinical hypothyroidism and dyslipidemia.  Please refer to assessment and plan/discussion/recommendations for additional details.        Please note that dictation has been dictated using voice recognition soft ware. I have made every reasonable attempt to correct obvious errors, but I expect that there are errors of grammar and possibly content that I did not discover before finalizing the note.

## 2018-05-08 NOTE — ASSESSMENT & PLAN NOTE
Chronic condition. Not taking prescription cholesterol medication.   Patients most recent cholesterol was reviewed:  Lab Results   Component Value Date/Time    CHOLSTRLTOT 199 05/04/2018 07:01 AM    CHOLSTRLTOT 225 (H) 10/13/2016 07:04 AM     Lab Results   Component Value Date/Time     (H) 05/04/2018 07:01 AM     (H) 10/13/2016 07:04 AM     Lab Results   Component Value Date/Time    HDL 56 05/04/2018 07:01 AM    HDL 51 10/13/2016 07:04 AM     Lab Results   Component Value Date/Time    TRIGLYCERIDE 100 05/04/2018 07:01 AM    TRIGLYCERIDE 89 10/13/2016 07:04 AM

## 2018-05-16 ENCOUNTER — OFFICE VISIT (OUTPATIENT)
Dept: OTHER | Facility: IMAGING CENTER | Age: 83
End: 2018-05-16
Payer: MEDICARE

## 2018-05-16 VITALS
RESPIRATION RATE: 14 BRPM | HEIGHT: 63 IN | DIASTOLIC BLOOD PRESSURE: 54 MMHG | HEART RATE: 76 BPM | BODY MASS INDEX: 18.5 KG/M2 | SYSTOLIC BLOOD PRESSURE: 110 MMHG | OXYGEN SATURATION: 96 % | TEMPERATURE: 98 F | WEIGHT: 104.4 LBS

## 2018-05-16 DIAGNOSIS — L23.7 ALLERGIC CONTACT DERMATITIS DUE TO PLANTS, EXCEPT FOOD: ICD-10-CM

## 2018-05-16 PROCEDURE — 99213 OFFICE O/P EST LOW 20 MIN: CPT | Performed by: FAMILY MEDICINE

## 2018-05-16 RX ORDER — TRIAMCINOLONE ACETONIDE 0.25 MG/G
0.05 CREAM TOPICAL 2 TIMES DAILY
Qty: 1 TUBE | Refills: 0 | Status: SHIPPED | OUTPATIENT
Start: 2018-05-18 | End: 2018-05-23

## 2018-05-16 NOTE — ASSESSMENT & PLAN NOTE
Patient had 3 days history of increasing itch and burning over the skin around nasal ridge and one small area at forehead that is erythematous.  There are no weeping and no tenderness.  Over the last weekend, she was gardening in the yard ans has a itch at nose that she uses her gloved hand to rub over the bridge of her nose and she noted the glove was soiled with dirt from her garden.  She started to have itch over the rubbed surface and burning that afternoon.  She started off to clean the area with peroxide and rubbing alcohol and she reports burning sensation.  After that she uses Neosporin gel over the affected area at least twice a day since 5/13/2018.  She had some relief in the burning after Tuesday morning also noticing some additional paranasal related itch.

## 2018-05-16 NOTE — PROGRESS NOTES
Chief Complaint   Patient presents with   • Rash     working in the garden over the weekend, scratched nose and now it itches and burns      Subjective:   Ratna Cline is a 82 y.o. female here today for multiple problems as listed below.      Allergic contact dermatitis due to plants, except food  Patient had 3 days history of increasing itch and burning over the skin around nasal ridge and one small area at forehead that is erythematous.  There are no weeping and no tenderness.  Over the last weekend, she was gardening in the yard ans has a itch at nose that she uses her gloved hand to rub over the bridge of her nose and she noted the glove was soiled with dirt from her garden.  She started to have itch over the rubbed surface and burning that afternoon.  She started off to clean the area with peroxide and rubbing alcohol and she reports burning sensation.  After that she uses Neosporin gel over the affected area at least twice a day since 5/13/2018.  She had some relief in the burning after Tuesday morning also noticing some additional paranasal related itch.       Current medicines (including changes today)  Current Outpatient Prescriptions   Medication Sig Dispense Refill   • [START ON 5/18/2018] triamcinolone acetonide (KENALOG) 0.025 % Cream Apply 0.05 Applications to affected area(s) 2 times a day for 5 days. 1 Tube 0   • Cholecalciferol (VITAMIN D PO) Take  by mouth.     • LUTEIN PO Take  by mouth.     • Calcium-Magnesium-Vitamin D (CALCIUM MAGNESIUM PO) Take  by mouth.     • Simethicone (GAS RELIEF EXTRA STRENGTH PO) Take  by mouth.     • Calcium Carb-Cholecalciferol (CALCIUM 600 + D PO) Take  by mouth.     • PAPAYA PO Take  by mouth.     • Acetylcarnitine HCl (ACETYL L-CARNITINE PO) Take  by mouth.     • Rhodiola rosea (RHODIOLA PO) Take  by mouth.     • Licorice Deglycyrrhizinated Powder by Does not apply route.     • Milk Thistle 1000 MG Cap Take  by mouth.     • Nutritional Supplements  "(PYCNOGENOL PO) Take  by mouth.     • Huperzine Serrate A 1 % Powder by Does not apply route.     • Vinpocetine Powder by Does not apply route.     • cyanocobalamin (VITAMIN B-12) 100 MCG Tab Take 100 mcg by mouth every day.     • VITAMIN C, CALCIUM ASCORBATE, PO Take  by mouth.     • Turmeric, Curcuma Longa, (CURCUMIN) Powder by Does not apply route.     • magnesium citrate Solution Take 300 mL by mouth Once.     • coenzyme Q-10 30 MG capsule Take 60 mg by mouth every day.     • Ginkgo Biloba (GINKOBA PO) Take  by mouth.     • Nutritional Supplements (CALCIUM D-GLUCARATE PO) Take  by mouth.     • Multiple Vitamins-Minerals (GLUTAMINE MULTIVITAMIN/MINERAL PO) Take  by mouth.       No current facility-administered medications for this visit.      She  has a past medical history of Arthritis; Back pain; Pisano's esophagus; Bladder infection; Dyslipidemia (7/14/2016); History of hemorrhoids; History of measles; History of pneumonia; Hypothyroidism (acquired) (7/13/2015); Osteochondroma; Osteochondroma; Sinusitis; Thyroid disease; and Tinnitus (7/13/2015). She also has no past medical history of ASTHMA or Diabetes.    ROS  No chest pain, no shortness of breath, no abdominal pain, no diarrhea/constipation, no urinary symptoms.     Objective:     Blood pressure 110/54, pulse 76, temperature 36.7 °C (98 °F), resp. rate 14, height 1.588 m (5' 2.5\"), weight 47.4 kg (104 lb 6.4 oz), SpO2 96 %, not currently breastfeeding. Body mass index is 18.79 kg/m².   Physical Exam:  Alert, oriented in no acute distress.  Eye contact is good, speech goal directed, affect calm  HEENT: conjunctiva non-injected, sclera non-icteric.  Pinna normal. TM pearly gray. Oral mucous membranes pink and moist with no lesions.  Nasal ridge erythema 47 mm x 3 mm without blister or weeping without excoriation.  Neck No adenopathy or masses in the neck or supraclavicular regions.  Lungs: clear to auscultation bilaterally with good excursion.  CV: regular " rate and rhythm.  Abdomen: soft, nontender, No CVAT  Ext: no edema, color normal, vascularity normal, temperature normal    Assessment and Plan:   The following treatment plan was discussed  1. Allergic contact dermatitis due to plants, except food  triamcinolone acetonide (KENALOG) 0.025 % Cream    Recommend stopping use of peroxide and rubbing ETOH use.  Stop Neosporin use at affected area as well. Use three times daily the glycyrric acid compounding with calendula and chammomile as well as barrier cream such as look no x ma brand. Can add use low potency steriodal cream triamcinolone twice a day for up to 5 days if the symptoms are not improving by 5/18/2018.  Discussed of her vitamin use and reduce B6 and folate over-supplementation of 2000% and 400% of daily recommended values perspectively.     Followup: Return in about 1 week (around 5/23/2018), or if symptoms worsen or fail to improve.    Spent 25 minutes in face-to-tace patient contact in which greater than 50% of the visit was spent in counseling and coordination of care including  medication management options.  Concerns and potential risks related to topical steroid medications.  Answering patient questions about her vitamin supplementations.  Discussing the nature of current skin irritations with her sinusitis and ENT disease.  Discussing in depth the importance of primary prevention of skin thinning and sensitivity toward topical antibiotics.  Patient is also educated about lifestyle modifications which may improve her atopic skin and related mucosal reactions.  Please refer to assessment and plan/discussion/recommendations for additional details.        Please note that dictation has been dictated using voice recognition soft ware. I have made every reasonable attempt to correct obvious errors, but I expect that there are errors of grammar and possibly content that I did not discover before finalizing the note.

## 2018-05-17 NOTE — ASSESSMENT & PLAN NOTE
Chronic condition. Reports she had chronic constipation when she was younger - 2-3 bm/wk. Feeling of incomplete evacuation. Generalized left lower abdominal quadrant discomfort after eating. Since using a supplement called Ultimate colon, she feels symptom are improved.

## 2018-05-21 ENCOUNTER — APPOINTMENT (OUTPATIENT)
Dept: OTHER | Facility: IMAGING CENTER | Age: 83
End: 2018-05-21

## 2018-06-01 ENCOUNTER — OFFICE VISIT (OUTPATIENT)
Dept: OTHER | Facility: IMAGING CENTER | Age: 83
End: 2018-06-01
Payer: MEDICARE

## 2018-06-01 VITALS
WEIGHT: 104.6 LBS | OXYGEN SATURATION: 97 % | SYSTOLIC BLOOD PRESSURE: 114 MMHG | HEIGHT: 63 IN | DIASTOLIC BLOOD PRESSURE: 68 MMHG | TEMPERATURE: 98.1 F | BODY MASS INDEX: 18.54 KG/M2 | HEART RATE: 68 BPM | RESPIRATION RATE: 14 BRPM

## 2018-06-01 DIAGNOSIS — E03.8 SUBCLINICAL HYPOTHYROIDISM: ICD-10-CM

## 2018-06-01 DIAGNOSIS — R10.32 LLQ ABDOMINAL PAIN: ICD-10-CM

## 2018-06-01 DIAGNOSIS — K58.1 IRRITABLE BOWEL SYNDROME WITH CONSTIPATION: ICD-10-CM

## 2018-06-01 LAB
APPEARANCE UR: NORMAL
BILIRUB UR STRIP-MCNC: NORMAL MG/DL
COLOR UR AUTO: YELLOW
GLUCOSE UR STRIP.AUTO-MCNC: NORMAL MG/DL
KETONES UR STRIP.AUTO-MCNC: NORMAL MG/DL
LEUKOCYTE ESTERASE UR QL STRIP.AUTO: NORMAL
NITRITE UR QL STRIP.AUTO: NORMAL
PH UR STRIP.AUTO: 7 [PH] (ref 5–8)
PROT UR QL STRIP: NORMAL MG/DL
RBC UR QL AUTO: NORMAL
SP GR UR STRIP.AUTO: 1.01
UROBILINOGEN UR STRIP-MCNC: 0.2 MG/DL

## 2018-06-01 PROCEDURE — 81002 URINALYSIS NONAUTO W/O SCOPE: CPT | Performed by: FAMILY MEDICINE

## 2018-06-01 PROCEDURE — 99214 OFFICE O/P EST MOD 30 MIN: CPT | Performed by: FAMILY MEDICINE

## 2018-06-01 NOTE — PROGRESS NOTES
SUBJECTIVE:        Chief Complaint   Patient presents with   • GI Problem     ongoing for 20 years, this week has been bad, left lower quadrant pain, treating with Lomatium dissectum and prune juice with some improvement, struggles with constipation       HPI:     Ratna Cline is a 82 y.o. female here for symptoms of LLQ abdominal pain. Patient with hx of IBS with constipation and Pisano's. Patient of Dr. Lowe.   States she has been evaluated and treated for this in the past. Has declined abdominal CT previously.   Has had symptoms for past 20 years on and off.   Has had worse symptoms this last week.   She has issues with constipation and has been taking prune juice.   Having gas and some discomfort of left lower quadrant.    Started probiotic therapy a while ago.   Taking lomatium dissectum- feels like it has helped which is an antibacterial supplement.   Relieved pain some, but still has some discomfort.   Has helped about 60% with her symptoms. Feels her symptoms are overall improving.   However, she was unsure if she was doing the correct thing so she thought she should come in and get it checked out.   No nausea/vomiting. At times feels tired. Stable appetite.   Usually has improvement after BM, but this week it was more persistent. No fevers/chills, but feels always a bit cold.   LLQ discomfort 5-6/10. She has had antibiotic therapy in past which helped.   Saw Dr. Clark in the past. Had injection therapy for this issue in the past but had reaction on 4th day.   Hx of hysterectomy, no other abdominal surgery.   No burning with urination currently. Hx of UTI in past. No hematuria.   Her current LLQ symptoms are similar to symptoms of constipation she used to have previously.   Has had history of colonoscopy in past but not sure about having divericulosis.   1 week with symptoms this current episode.   Took tea bags with curcumin. Feels this triggered her current symptoms.   Yesterday had a bowel  movements. Sometimes may have 1-3 a day. Appeared normal. Normal color, but sometimes lighter color. Not dark. No blood.   Normal size. Took ultimate colon relief, had diarrhea with it so stopped.   States she gets LLQ pain with constipation.   Hx of low thyroid but gave up her medication. Her last TSH was slightly elevated which was reviewed with her PCP.   Started a homeopathy supplement.   Saw GI in past- was prescribed nexium, then got tinnitus after one week. She has declined abdominal CT imaging in the past. No known history of diverticular disease.   2004- abdominal u/s completed.       ROS:  Denies any recent fevers or chills. No nausea or vomiting. No chest pains or shortness of breath. No lower extremity edema.    Current Outpatient Prescriptions on File Prior to Visit   Medication Sig Dispense Refill   • Cholecalciferol (VITAMIN D PO) Take  by mouth.     • LUTEIN PO Take  by mouth.     • Calcium-Magnesium-Vitamin D (CALCIUM MAGNESIUM PO) Take  by mouth.     • Simethicone (GAS RELIEF EXTRA STRENGTH PO) Take  by mouth.     • Calcium Carb-Cholecalciferol (CALCIUM 600 + D PO) Take  by mouth.     • PAPAYA PO Take  by mouth.     • Acetylcarnitine HCl (ACETYL L-CARNITINE PO) Take  by mouth.     • Rhodiola rosea (RHODIOLA PO) Take  by mouth.     • Licorice Deglycyrrhizinated Powder by Does not apply route.     • Milk Thistle 1000 MG Cap Take  by mouth.     • Nutritional Supplements (PYCNOGENOL PO) Take  by mouth.     • Huperzine Serrate A 1 % Powder by Does not apply route.     • Vinpocetine Powder by Does not apply route.     • cyanocobalamin (VITAMIN B-12) 100 MCG Tab Take 100 mcg by mouth every day.     • VITAMIN C, CALCIUM ASCORBATE, PO Take  by mouth.     • Turmeric, Curcuma Longa, (CURCUMIN) Powder by Does not apply route.     • magnesium citrate Solution Take 300 mL by mouth Once.     • coenzyme Q-10 30 MG capsule Take 60 mg by mouth every day.     • Ginkgo Biloba (GINKOBA PO) Take  by mouth.     •  "Nutritional Supplements (CALCIUM D-GLUCARATE PO) Take  by mouth.     • Multiple Vitamins-Minerals (GLUTAMINE MULTIVITAMIN/MINERAL PO) Take  by mouth.       No current facility-administered medications on file prior to visit.        Allergies   Allergen Reactions   • Codeine Vomiting   • Sulfa Drugs Nausea       Patient Active Problem List    Diagnosis Date Noted   • Allergic contact dermatitis due to plants, except food 05/16/2018   • Osteopenia 05/08/2018   • Irritable bowel syndrome with constipation 04/30/2018   • Dyslipidemia 07/14/2016   • Subclinical hypothyroidism 07/13/2015   • Tinnitus 07/13/2015       Past Medical History:   Diagnosis Date   • Arthritis    • Back pain    • Pisano's esophagus     Dr. Anaya, in late 1990's   • Bladder infection    • Dyslipidemia 7/14/2016   • History of hemorrhoids    • History of measles    • History of pneumonia    • Hypothyroidism (acquired) 7/13/2015   • Osteochondroma    • Osteochondroma    • Sinusitis    • Thyroid disease    • Tinnitus 7/13/2015       OBJECTIVE:   /68   Pulse 68   Temp 36.7 °C (98.1 °F)   Resp 14   Ht 1.588 m (5' 2.5\")   Wt 47.4 kg (104 lb 9.6 oz)   SpO2 97%   Breastfeeding? No   BMI 18.83 kg/m²   General: Well-developed well-nourished female, no acute distress  Neck: supple, no lymphadenopathy- cervical or supraclavicular, no thyromegaly  Cardiovascular: regular rate and rhythm, no murmurs, gallops, rubs  Lungs: clear to auscultation bilaterally, no wheezes, crackles, or rhonchi  Abdomen: +bowel sounds, soft, very minimal discomfort with palpation of LLQ, nondistended, no rebound, no guarding, no hepatosplenomegaly, no palpable masses, no cvat  Extremities: no cyanosis, clubbing, edema  Skin: Warm and dry  Psych: appropriate mood and affect    Urine: small leuk    ASSESSMENT/PLAN:    82 y.o.female with irritable bowel syndrome with constipation with long term hx of LLQ abdominal pain.     1. LLQ abdominal pain- long term history of " symptoms intermittently. Recent episode for past week with some improvements. Declined abdominal CT scan.  Consider due to inflammatory condition, due to component of diverticular disease or colitis although no clear history. Prior colonoscopy report not available. Hx of LLQ pain with constipation which could contribute although patient appears to be having some regular bowel movements lately. Unclear if component associated with IBS. Currently notes some improvement in symptoms.   -Will continue to monitor carefully as patient appears stable, vitals stable and has been improved. Modify diet, she will continue her current therapy with Lomatium dissectum as this has been helping. Continue probiotic therapy.   If any worsening symptoms, patient advised to contact office or follow up sooner. May consider antibiotic therapy if needed. Obtain KUB as patient is agreeable and declined CT. Obtain labs. Consider referral to GI, obtain prior records if possible. Adequate fluids and fiber recommended as tolerated.   POCT Urinalysis    [id number] VIEW    CBC WITH DIFFERENTIAL   2. Irritable bowel syndrome with constipation- as above.     3. Subclinical hypothyroidism- patient reviewed prior labs within past month with PCP.   -Consider recheck and restarting medication therapy. Recommend follow up with PCP.         Return in about 1 week (around 6/8/2018).    This medical record contains text that has been entered with the assistance of computer voice recognition and dictation software.  Therefore, it may contain unintended errors in text, spelling, punctuation, or grammar.

## 2018-06-04 ENCOUNTER — TELEPHONE (OUTPATIENT)
Dept: OTHER | Facility: IMAGING CENTER | Age: 83
End: 2018-06-04

## 2018-06-04 NOTE — TELEPHONE ENCOUNTER
Phone Number Called: [phone] (home)     Message: Dr Dao verbally requested to call the patient for follow up on abdominal pain. Spoke with patient and she stated that she is feeling better today with no pain or discomfort which has improved from yesterday's discomfort. She has not had the blood work nor the X-Ray done yet but will plan on completing these tests tomorrow. Still taking the Lomatium dissectum. She stated that this problem has been going on since 1988 after taking an oral progesterone pill, Periodically the same pain in the same location. Encouraged patient to contact the office if her symptoms worsen or persist.     Left Message for patient to call back: N\A

## 2018-06-05 ENCOUNTER — HOSPITAL ENCOUNTER (OUTPATIENT)
Dept: LAB | Facility: MEDICAL CENTER | Age: 83
End: 2018-06-05
Attending: FAMILY MEDICINE
Payer: MEDICARE

## 2018-06-05 ENCOUNTER — HOSPITAL ENCOUNTER (OUTPATIENT)
Dept: RADIOLOGY | Facility: MEDICAL CENTER | Age: 83
End: 2018-06-05
Attending: FAMILY MEDICINE
Payer: MEDICARE

## 2018-06-05 DIAGNOSIS — R10.32 LLQ ABDOMINAL PAIN: ICD-10-CM

## 2018-06-05 LAB
BASOPHILS # BLD AUTO: 0.7 % (ref 0–1.8)
BASOPHILS # BLD: 0.06 K/UL (ref 0–0.12)
EOSINOPHIL # BLD AUTO: 0.06 K/UL (ref 0–0.51)
EOSINOPHIL NFR BLD: 0.7 % (ref 0–6.9)
ERYTHROCYTE [DISTWIDTH] IN BLOOD BY AUTOMATED COUNT: 44.9 FL (ref 35.9–50)
HCT VFR BLD AUTO: 41.5 % (ref 37–47)
HGB BLD-MCNC: 13.8 G/DL (ref 12–16)
IMM GRANULOCYTES # BLD AUTO: 0.01 K/UL (ref 0–0.11)
IMM GRANULOCYTES NFR BLD AUTO: 0.1 % (ref 0–0.9)
LYMPHOCYTES # BLD AUTO: 1.23 K/UL (ref 1–4.8)
LYMPHOCYTES NFR BLD: 13.4 % (ref 22–41)
MCH RBC QN AUTO: 31.7 PG (ref 27–33)
MCHC RBC AUTO-ENTMCNC: 33.3 G/DL (ref 33.6–35)
MCV RBC AUTO: 95.2 FL (ref 81.4–97.8)
MONOCYTES # BLD AUTO: 1.1 K/UL (ref 0–0.85)
MONOCYTES NFR BLD AUTO: 12 % (ref 0–13.4)
NEUTROPHILS # BLD AUTO: 6.69 K/UL (ref 2–7.15)
NEUTROPHILS NFR BLD: 73.1 % (ref 44–72)
NRBC # BLD AUTO: 0 K/UL
NRBC BLD-RTO: 0 /100 WBC
PLATELET # BLD AUTO: 237 K/UL (ref 164–446)
PMV BLD AUTO: 10.3 FL (ref 9–12.9)
RBC # BLD AUTO: 4.36 M/UL (ref 4.2–5.4)
WBC # BLD AUTO: 9.2 K/UL (ref 4.8–10.8)

## 2018-06-05 PROCEDURE — 85025 COMPLETE CBC W/AUTO DIFF WBC: CPT

## 2018-06-05 PROCEDURE — 74018 RADEX ABDOMEN 1 VIEW: CPT

## 2018-06-05 PROCEDURE — 36415 COLL VENOUS BLD VENIPUNCTURE: CPT

## 2018-06-06 ENCOUNTER — TELEPHONE (OUTPATIENT)
Dept: OTHER | Facility: IMAGING CENTER | Age: 83
End: 2018-06-06

## 2018-06-06 ENCOUNTER — APPOINTMENT (OUTPATIENT)
Dept: OTHER | Facility: IMAGING CENTER | Age: 83
End: 2018-06-06

## 2018-06-06 DIAGNOSIS — R10.32 LLQ ABDOMINAL PAIN: ICD-10-CM

## 2018-06-06 RX ORDER — CIPROFLOXACIN 500 MG/1
500 TABLET, FILM COATED ORAL 2 TIMES DAILY
Qty: 14 TAB | Refills: 0 | Status: SHIPPED | OUTPATIENT
Start: 2018-06-06 | End: 2018-06-18

## 2018-06-06 RX ORDER — METRONIDAZOLE 500 MG/1
500 TABLET ORAL 3 TIMES DAILY
Qty: 21 TAB | Refills: 0 | Status: SHIPPED | OUTPATIENT
Start: 2018-06-06 | End: 2018-06-18

## 2018-06-06 NOTE — TELEPHONE ENCOUNTER
Spoke with pt. Pt declines CT at this time. Notified of antibiotic rx from Dr. Dao. Advised to follow bland diet and take probiotic along with antibiotics per Dr. Dao's recommendation. Instructed pt to call with any issues. 1 week follow up scheduled.

## 2018-06-06 NOTE — TELEPHONE ENCOUNTER
Patient here with , requests results.   KUB with unclear findings- recommend further imaging with abdominal CT to clarify which patient previously declined.   Labs with WBC upper end of normal but increased from patient's baseline with increase in neutrophils.   Patient on herbal therapy, but symptoms recur when she missed doses.   Will treat with antibiotic therapy at this time for possible diverticular etiology.

## 2018-06-18 ENCOUNTER — OFFICE VISIT (OUTPATIENT)
Dept: OTHER | Facility: IMAGING CENTER | Age: 83
End: 2018-06-18
Payer: MEDICARE

## 2018-06-18 VITALS
OXYGEN SATURATION: 97 % | BODY MASS INDEX: 18.48 KG/M2 | HEART RATE: 66 BPM | HEIGHT: 63 IN | DIASTOLIC BLOOD PRESSURE: 64 MMHG | WEIGHT: 104.28 LBS | SYSTOLIC BLOOD PRESSURE: 120 MMHG | TEMPERATURE: 98.4 F | RESPIRATION RATE: 12 BRPM

## 2018-06-18 DIAGNOSIS — E03.8 SUBCLINICAL HYPOTHYROIDISM: ICD-10-CM

## 2018-06-18 DIAGNOSIS — M85.80 OSTEOPENIA, UNSPECIFIED LOCATION: ICD-10-CM

## 2018-06-18 DIAGNOSIS — E55.9 VITAMIN D DEFICIENCY: ICD-10-CM

## 2018-06-18 DIAGNOSIS — K58.1 IRRITABLE BOWEL SYNDROME WITH CONSTIPATION: ICD-10-CM

## 2018-06-18 DIAGNOSIS — H93.13 TINNITUS OF BOTH EARS: ICD-10-CM

## 2018-06-18 PROCEDURE — 99214 OFFICE O/P EST MOD 30 MIN: CPT | Performed by: INTERNAL MEDICINE

## 2018-06-18 RX ORDER — 3,5-DIIODOTHYRONINE 100 %
POWDER (GRAM) MISCELLANEOUS
COMMUNITY
End: 2019-08-26

## 2018-06-18 NOTE — ASSESSMENT & PLAN NOTE
Patient was previously on thyroid replacement therapy last December-had stopped it at the time as she was concerned about long-term side effects of the medication. Recent labs checked at her baseline on medications

## 2018-06-18 NOTE — PROGRESS NOTES
Chief Complaint   Patient presents with   • Follow-Up   • GI Problem     worse with antibiotics, but improved with DGL and Licorice Deglycyrrhizinated Powder   • Ringing in Ear     treating with ginko biloba, worse over the last few days      Subjective:   Ratna Cline is a 82 y.o. female here today for multiple problems as listed below.      Patient reports LLQ abdominal pain has resolved. She did take cipro x 3 days but felt worsening in abdominal pain and had new discomfort (bilateral lower quadrant discomfort worse with standing and sitting). Patient stopped the medication. She only took the flagyl for 1 day. She notes that she was concerned about the possible side effects on the drug inserts. She did start taking a supplement with DGL licorice and L-glutamine - reports that after 2 days, symptoms subsided. She denies pain today, no nausea/vomiting. Does have some mild gas/bloating but may be related to what she has been eating.  Plans to stop taking the supplements today.     Continues to have tinnitus - possibly worsened today. Has had 2 group acupuncture treatments.  Started taking ginkgo biloba as she had read somewhere that it may help with her symptoms.     Irritable bowel syndrome with constipation  Chronic condition. She has been using prune juice which seems to keep her regular - having a bm 2-3x a day. Using some preparation H at night which has helped some rectal discomfort - may have had hemorrhoids in the past. Not currently using ultimate colon supplement.     Osteopenia  Last DEXA scan was in 2012 which showed osteopenia.  Previously had 2 rounds of Reclast.  Currently taking vitamin D and calcium supplements.  No recent vitamin D level has been checked.  Was low normal in 2016.    Subclinical hypothyroidism  Patient was previously on thyroid replacement therapy last December-had stopped it at the time as she was concerned about long-term side effects of the medication. Recent labs  checked at her baseline on medications     Last thyroid labs:  Lab Results   Component Value Date/Time    TSHULTRASEN 7.080 (H) 05/04/2018 07:01 AM     Lab Results   Component Value Date/Time    FREET4 1.25 05/04/2018 07:01 AM    FREET4 1.43 08/11/2017 08:04 AM       Current medicines (including changes today)  Current Outpatient Prescriptions   Medication Sig Dispense Refill   • BIOTIN PO Take  by mouth.     • MISC NATURAL PRODUCTS PO Take  by mouth.     • MISC NATURAL PRODUCTS PO Take  by mouth.     • SELENIUM PO Take  by mouth.     • 3,5-Diiodothyronine (DIIODO-L-THYRONINE 3,5) Powder by Does not apply route.     • ALPHA LIPOIC ACID PO Take  by mouth.     • Riboflavin (VITAMIN B2 PO) Take  by mouth.     • Misc Natural Products (CORTISOL PO) Take  by mouth.     • Calcium Carb-Cholecalciferol (CALCIUM 600 + D PO) Take  by mouth.     • Licorice Deglycyrrhizinated Powder by Does not apply route.     • Milk Thistle 1000 MG Cap Take  by mouth.     • Huperzine Serrate A 1 % Powder by Does not apply route.     • Vinpocetine Powder by Does not apply route.     • Turmeric, Curcuma Longa, (CURCUMIN) Powder by Does not apply route.     • magnesium citrate Solution Take 300 mL by mouth Once.     • Ginkgo Biloba (GINKOBA PO) Take  by mouth.     • Nutritional Supplements (CALCIUM D-GLUCARATE PO) Take  by mouth.     • Multiple Vitamins-Minerals (GLUTAMINE MULTIVITAMIN/MINERAL PO) Take  by mouth.     • MISC NATURAL PRODUCTS PO Take  by mouth.     • Cholecalciferol (VITAMIN D PO) Take  by mouth.     • LUTEIN PO Take  by mouth.     • Calcium-Magnesium-Vitamin D (CALCIUM MAGNESIUM PO) Take  by mouth.     • Simethicone (GAS RELIEF EXTRA STRENGTH PO) Take  by mouth.     • PAPAYA PO Take  by mouth.     • Acetylcarnitine HCl (ACETYL L-CARNITINE PO) Take  by mouth.     • Rhodiola rosea (RHODIOLA PO) Take  by mouth.     • Nutritional Supplements (PYCNOGENOL PO) Take  by mouth.     • cyanocobalamin (VITAMIN B-12) 100 MCG Tab Take 100 mcg  "by mouth every day.     • VITAMIN C, CALCIUM ASCORBATE, PO Take  by mouth.     • coenzyme Q-10 30 MG capsule Take 60 mg by mouth every day.       No current facility-administered medications for this visit.      She  has a past medical history of Arthritis; Back pain; Pisano's esophagus; Bladder infection; Dyslipidemia (7/14/2016); History of hemorrhoids; History of measles; History of pneumonia; Hypothyroidism (acquired) (7/13/2015); Osteochondroma; Osteochondroma; Sinusitis; Thyroid disease; and Tinnitus (7/13/2015). She also has no past medical history of ASTHMA or Diabetes.    ROS  No chest pain, no shortness of breath, no abdominal pain, no diarrhea/constipation, no urinary symptoms.     Objective:     Blood pressure 120/64, pulse 66, temperature 36.9 °C (98.4 °F), resp. rate 12, height 1.588 m (5' 2.5\"), weight 47.3 kg (104 lb 4.4 oz), SpO2 97 %, not currently breastfeeding. Body mass index is 18.77 kg/m².   Physical Exam:  Alert, oriented in no acute distress.  Eye contact is good, speech goal directed, affect calm  HEENT: conjunctiva non-injected, sclera non-icteric.  Neck No adenopathy or masses in the neck or supraclavicular regions.  Lungs: clear to auscultation bilaterally with good excursion.  CV: regular rate and rhythm.  Abdomen: soft, nontender, No CVAT  Ext: no edema, color normal, vascularity normal, temperature normal    Assessment and Plan:   The following treatment plan was discussed  1. Tinnitus of both ears  Slightly worsened. Difficult to say if cipro has contributed as this has been a chronic condition and there have been reports of tinnitus as an adverse effect. Reviewed indications for ginkgo and use in treatment for tinnitus in [employer] database, and it has not been reported as effective, so she may consider stopping it for this indication. Also discussed common side effects of ginkgo.     2. Irritable bowel syndrome with constipation  Constipation is overall managed with intake " of prune juice. Patient feels her diet could improve - has not scheduled her attendance in [employer] courses but plans to. Discussed addition of soluble fiber to her diet such as flax seeds or psyllium husks - may also have beneficial effects on cholesterol. Soluble fibers should also be taken with plenty of water.   - COMP METABOLIC PANEL; Future    3. Vitamin D deficiency  Low normal in 2016. Recheck.   - VITAMIN D,25 HYDROXY; Future    4. Subclinical hypothyroidism  As discussed previously, TSH is mildly elevated and no significant hypothyroid symptoms. Plan to recheck in 6 months and monitor symptoms.    5. Osteopenia, unspecified location  Check vitamin D levels. Encouraged regular physical activity.   - VITAMIN D,25 HYDROXY; Future    Followup: Return in about 2 months (around 8/18/2018) for follow-up with PCP. RTO if abdominal pain recurs or is worsening.          Please note that dictation has been dictated using voice recognition soft ware. I have made every reasonable attempt to correct obvious errors, but I expect that there are errors of grammar and possibly content that I did not discover before finalizing the note.

## 2018-06-18 NOTE — ASSESSMENT & PLAN NOTE
Chronic condition. She has been using prune juice which seems to keep her regular - having a bm 2-3x a day. Using some preparation H at night which has helped some rectal discomfort - may have had hemorrhoids in the past. Not currently using ultimate colon supplement.

## 2018-06-18 NOTE — ASSESSMENT & PLAN NOTE
Last DEXA scan was in 2012 which showed osteopenia.  Previously had 2 rounds of Reclast.  Currently taking vitamin D and calcium supplements.  No recent vitamin D level has been checked.  Was low normal in 2016.

## 2018-06-22 ENCOUNTER — HOSPITAL ENCOUNTER (OUTPATIENT)
Dept: LAB | Facility: MEDICAL CENTER | Age: 83
End: 2018-06-22
Attending: INTERNAL MEDICINE
Payer: MEDICARE

## 2018-06-22 DIAGNOSIS — E55.9 VITAMIN D DEFICIENCY: ICD-10-CM

## 2018-06-22 DIAGNOSIS — K58.1 IRRITABLE BOWEL SYNDROME WITH CONSTIPATION: ICD-10-CM

## 2018-06-22 DIAGNOSIS — M85.80 OSTEOPENIA, UNSPECIFIED LOCATION: ICD-10-CM

## 2018-06-22 DIAGNOSIS — E03.9 HYPOTHYROIDISM (ACQUIRED): ICD-10-CM

## 2018-06-22 LAB
25(OH)D3 SERPL-MCNC: 33 NG/ML (ref 30–100)
ALBUMIN SERPL BCP-MCNC: 3.9 G/DL (ref 3.2–4.9)
ALBUMIN/GLOB SERPL: 1.5 G/DL
ALP SERPL-CCNC: 56 U/L (ref 30–99)
ALT SERPL-CCNC: 15 U/L (ref 2–50)
ANION GAP SERPL CALC-SCNC: 8 MMOL/L (ref 0–11.9)
AST SERPL-CCNC: 18 U/L (ref 12–45)
BILIRUB SERPL-MCNC: 0.3 MG/DL (ref 0.1–1.5)
BUN SERPL-MCNC: 15 MG/DL (ref 8–22)
CALCIUM SERPL-MCNC: 9.3 MG/DL (ref 8.5–10.5)
CHLORIDE SERPL-SCNC: 101 MMOL/L (ref 96–112)
CO2 SERPL-SCNC: 27 MMOL/L (ref 20–33)
CREAT SERPL-MCNC: 0.91 MG/DL (ref 0.5–1.4)
GLOBULIN SER CALC-MCNC: 2.6 G/DL (ref 1.9–3.5)
GLUCOSE SERPL-MCNC: 115 MG/DL (ref 65–99)
POTASSIUM SERPL-SCNC: 4.1 MMOL/L (ref 3.6–5.5)
PROT SERPL-MCNC: 6.5 G/DL (ref 6–8.2)
SODIUM SERPL-SCNC: 136 MMOL/L (ref 135–145)
T4 FREE SERPL-MCNC: 1.42 NG/DL (ref 0.53–1.43)
TSH SERPL DL<=0.005 MIU/L-ACNC: 9.22 UIU/ML (ref 0.38–5.33)

## 2018-06-22 PROCEDURE — 82306 VITAMIN D 25 HYDROXY: CPT

## 2018-06-22 PROCEDURE — 84443 ASSAY THYROID STIM HORMONE: CPT

## 2018-06-22 PROCEDURE — 80053 COMPREHEN METABOLIC PANEL: CPT

## 2018-06-22 PROCEDURE — 84439 ASSAY OF FREE THYROXINE: CPT

## 2018-06-22 PROCEDURE — 36415 COLL VENOUS BLD VENIPUNCTURE: CPT

## 2018-06-27 ENCOUNTER — TELEPHONE (OUTPATIENT)
Dept: OTHER | Facility: IMAGING CENTER | Age: 83
End: 2018-06-27

## 2018-06-27 NOTE — TELEPHONE ENCOUNTER
Pt notified. Reports she started taking 500 IU of vitamin D3 daily. She has only been taking this for 4 days. Pt cannot recall if she had adequate hydration the day of the lab draw, but she reports she was not fasting. Her current DGL dose is 800mg TID.

## 2018-06-27 NOTE — TELEPHONE ENCOUNTER
----- Message from Destiny Lowe D.O. sent at 6/26/2018  3:57 PM PDT -----  Vitamin D levels are low normal - advise what current dosage in D3 supplement and/or MVI before recommending how much to increase. Kidney function tests are slightly reduced this lab - not seen on prior lab, may possibly be due to dehydration day of lab draw. Not advised to take excessive DGL (>5g/day) continuously. Other labs/electrolytes look okay.

## 2018-08-16 ENCOUNTER — HOSPITAL ENCOUNTER (OUTPATIENT)
Dept: LAB | Facility: MEDICAL CENTER | Age: 83
End: 2018-08-16
Attending: FAMILY MEDICINE
Payer: MEDICARE

## 2018-08-16 LAB
25(OH)D3 SERPL-MCNC: 44 NG/ML (ref 30–100)
ALBUMIN SERPL BCP-MCNC: 4.3 G/DL (ref 3.2–4.9)
ALBUMIN/GLOB SERPL: 1.6 G/DL
ALP SERPL-CCNC: 58 U/L (ref 30–99)
ALT SERPL-CCNC: 19 U/L (ref 2–50)
ANION GAP SERPL CALC-SCNC: 6 MMOL/L (ref 0–11.9)
AST SERPL-CCNC: 26 U/L (ref 12–45)
BASOPHILS # BLD AUTO: 0.8 % (ref 0–1.8)
BASOPHILS # BLD: 0.04 K/UL (ref 0–0.12)
BILIRUB SERPL-MCNC: 0.7 MG/DL (ref 0.1–1.5)
BUN SERPL-MCNC: 14 MG/DL (ref 8–22)
CALCIUM SERPL-MCNC: 9.8 MG/DL (ref 8.5–10.5)
CHLORIDE SERPL-SCNC: 103 MMOL/L (ref 96–112)
CO2 SERPL-SCNC: 29 MMOL/L (ref 20–33)
CREAT SERPL-MCNC: 0.9 MG/DL (ref 0.5–1.4)
CRP SERPL HS-MCNC: 2.2 MG/L (ref 0–7.5)
EOSINOPHIL # BLD AUTO: 0.11 K/UL (ref 0–0.51)
EOSINOPHIL NFR BLD: 2.3 % (ref 0–6.9)
ERYTHROCYTE [DISTWIDTH] IN BLOOD BY AUTOMATED COUNT: 45.9 FL (ref 35.9–50)
EST. AVERAGE GLUCOSE BLD GHB EST-MCNC: 120 MG/DL
ESTRADIOL SERPL-MCNC: <20 PG/ML
GLOBULIN SER CALC-MCNC: 2.7 G/DL (ref 1.9–3.5)
GLUCOSE SERPL-MCNC: 91 MG/DL (ref 65–99)
HBA1C MFR BLD: 5.8 % (ref 0–5.6)
HCT VFR BLD AUTO: 42.7 % (ref 37–47)
HCYS SERPL-SCNC: 10.3 UMOL/L
HGB BLD-MCNC: 14.4 G/DL (ref 12–16)
IMM GRANULOCYTES # BLD AUTO: 0.02 K/UL (ref 0–0.11)
IMM GRANULOCYTES NFR BLD AUTO: 0.4 % (ref 0–0.9)
LYMPHOCYTES # BLD AUTO: 1.01 K/UL (ref 1–4.8)
LYMPHOCYTES NFR BLD: 20.8 % (ref 22–41)
MCH RBC QN AUTO: 32.1 PG (ref 27–33)
MCHC RBC AUTO-ENTMCNC: 33.7 G/DL (ref 33.6–35)
MCV RBC AUTO: 95.1 FL (ref 81.4–97.8)
MONOCYTES # BLD AUTO: 0.51 K/UL (ref 0–0.85)
MONOCYTES NFR BLD AUTO: 10.5 % (ref 0–13.4)
NEUTROPHILS # BLD AUTO: 3.17 K/UL (ref 2–7.15)
NEUTROPHILS NFR BLD: 65.2 % (ref 44–72)
NRBC # BLD AUTO: 0 K/UL
NRBC BLD-RTO: 0 /100 WBC
PLATELET # BLD AUTO: 247 K/UL (ref 164–446)
PMV BLD AUTO: 10.7 FL (ref 9–12.9)
POTASSIUM SERPL-SCNC: 4.4 MMOL/L (ref 3.6–5.5)
PROGEST SERPL-MCNC: 0.13 NG/ML
PROT SERPL-MCNC: 7 G/DL (ref 6–8.2)
RBC # BLD AUTO: 4.49 M/UL (ref 4.2–5.4)
SODIUM SERPL-SCNC: 138 MMOL/L (ref 135–145)
T3FREE SERPL-MCNC: 3.47 PG/ML (ref 2.4–4.2)
T4 FREE SERPL-MCNC: 1.11 NG/DL (ref 0.53–1.43)
THYROPEROXIDASE AB SERPL-ACNC: 1.2 IU/ML (ref 0–9)
TSH SERPL DL<=0.005 MIU/L-ACNC: 5.3 UIU/ML (ref 0.38–5.33)
URATE SERPL-MCNC: 5.4 MG/DL (ref 1.9–8.2)
WBC # BLD AUTO: 4.9 K/UL (ref 4.8–10.8)

## 2018-08-16 PROCEDURE — 83090 ASSAY OF HOMOCYSTEINE: CPT

## 2018-08-16 PROCEDURE — 80053 COMPREHEN METABOLIC PANEL: CPT

## 2018-08-16 PROCEDURE — 84443 ASSAY THYROID STIM HORMONE: CPT

## 2018-08-16 PROCEDURE — 83036 HEMOGLOBIN GLYCOSYLATED A1C: CPT

## 2018-08-16 PROCEDURE — 86376 MICROSOMAL ANTIBODY EACH: CPT

## 2018-08-16 PROCEDURE — 84305 ASSAY OF SOMATOMEDIN: CPT

## 2018-08-16 PROCEDURE — 86141 C-REACTIVE PROTEIN HS: CPT

## 2018-08-16 PROCEDURE — 83525 ASSAY OF INSULIN: CPT

## 2018-08-16 PROCEDURE — 84481 FREE ASSAY (FT-3): CPT

## 2018-08-16 PROCEDURE — 36415 COLL VENOUS BLD VENIPUNCTURE: CPT

## 2018-08-16 PROCEDURE — 82306 VITAMIN D 25 HYDROXY: CPT

## 2018-08-16 PROCEDURE — 82670 ASSAY OF TOTAL ESTRADIOL: CPT

## 2018-08-16 PROCEDURE — 84270 ASSAY OF SEX HORMONE GLOBUL: CPT

## 2018-08-16 PROCEDURE — 84140 ASSAY OF PREGNENOLONE: CPT

## 2018-08-16 PROCEDURE — 85025 COMPLETE CBC W/AUTO DIFF WBC: CPT

## 2018-08-16 PROCEDURE — 84403 ASSAY OF TOTAL TESTOSTERONE: CPT

## 2018-08-16 PROCEDURE — 84550 ASSAY OF BLOOD/URIC ACID: CPT

## 2018-08-16 PROCEDURE — 84144 ASSAY OF PROGESTERONE: CPT

## 2018-08-16 PROCEDURE — 84482 T3 REVERSE: CPT

## 2018-08-16 PROCEDURE — 82679 ASSAY OF ESTRONE: CPT

## 2018-08-16 PROCEDURE — 86800 THYROGLOBULIN ANTIBODY: CPT

## 2018-08-16 PROCEDURE — 84439 ASSAY OF FREE THYROXINE: CPT

## 2018-08-17 LAB
IGF-I SERPL-MCNC: 94 NG/ML (ref 18–200)
IGF-I Z-SCORE SERPL: 0.2
INSULIN P FAST SERPL-ACNC: 19 UIU/ML (ref 3–19)
THYROGLOB AB SERPL-ACNC: <0.9 IU/ML (ref 0–4)

## 2018-08-18 LAB — ESTRONE SERPL-MCNC: 13.9 PG/ML

## 2018-08-20 LAB
PREG SERPL-MCNC: 48 NG/DL (ref 15–132)
SHBG SERPL-SCNC: 119 NMOL/L (ref 30–135)
TESTOST FREE SERPL-MCNC: 1.1 PG/ML (ref 0.6–3.8)
TESTOST SERPL-MCNC: 16 NG/DL (ref 5–32)

## 2018-08-21 LAB — T3REVERSE SERPL-MCNC: 21.4 NG/DL (ref 9–27)

## 2018-09-09 ENCOUNTER — OFFICE VISIT (OUTPATIENT)
Dept: URGENT CARE | Facility: PHYSICIAN GROUP | Age: 83
End: 2018-09-09
Payer: MEDICARE

## 2018-09-09 VITALS
DIASTOLIC BLOOD PRESSURE: 64 MMHG | WEIGHT: 107 LBS | SYSTOLIC BLOOD PRESSURE: 108 MMHG | OXYGEN SATURATION: 97 % | TEMPERATURE: 99.1 F | HEART RATE: 69 BPM | RESPIRATION RATE: 15 BRPM | BODY MASS INDEX: 18.96 KG/M2 | HEIGHT: 63 IN

## 2018-09-09 DIAGNOSIS — H60.392 OTHER INFECTIVE ACUTE OTITIS EXTERNA OF LEFT EAR: ICD-10-CM

## 2018-09-09 PROCEDURE — 99214 OFFICE O/P EST MOD 30 MIN: CPT | Performed by: NURSE PRACTITIONER

## 2018-09-09 RX ORDER — CIPROFLOXACIN AND DEXAMETHASONE 3; 1 MG/ML; MG/ML
4 SUSPENSION/ DROPS AURICULAR (OTIC) 2 TIMES DAILY
Qty: 1 BOTTLE | Refills: 0 | Status: SHIPPED | OUTPATIENT
Start: 2018-09-09 | End: 2018-09-16

## 2018-09-09 ASSESSMENT — ENCOUNTER SYMPTOMS
SINUS PAIN: 0
CHILLS: 0
FEVER: 0
WEAKNESS: 0
DIAPHORESIS: 0

## 2018-09-09 NOTE — PROGRESS NOTES
"Subjective:      Ratna Cline is a 82 y.o. female who presents with Otalgia (left ear pain x 1 day )            Patient comes in today with left otalgia since yesterday.  She notes a \"greasy\" yellow drainage from the ear.  She denies any fever, chills, nasal congestion or sore throat.  She drove home from [facility] yesterday and elevation changes worsened the pain.  No dizziness or tinnitus.          Review of Systems   Constitutional: Negative for chills, diaphoresis, fever and malaise/fatigue.   HENT: Positive for ear discharge and ear pain. Negative for congestion and sinus pain.    Neurological: Negative for weakness.     Medications, Allergies, and current problem list reviewed today in Epic     Objective:     /64   Pulse 69   Temp 37.3 °C (99.1 °F)   Resp 15   Ht 1.588 m (5' 2.5\")   Wt 48.5 kg (107 lb)   SpO2 97%   BMI 19.26 kg/m²      Physical Exam   Constitutional: She is oriented to person, place, and time. She appears well-developed and well-nourished. No distress.   HENT:   Head: Normocephalic.   Right Ear: External ear normal.   Nose: Nose normal.   Mouth/Throat: Oropharynx is clear and moist. No oropharyngeal exudate.   Left ear canal demonstrates purulent green discharge with foul smell.  No ear canal swelling; minimal erythema.  TM is intact with faint erythema ringing edges.  No effusion or purulence behind the TM.  Landmarks intact.     Eyes: Pupils are equal, round, and reactive to light. Conjunctivae are normal. Right eye exhibits no discharge. Left eye exhibits no discharge. No scleral icterus.   Neck: Neck supple. No JVD present. No tracheal deviation present. No thyromegaly present.   Cardiovascular: Normal rate, regular rhythm and normal heart sounds.  Exam reveals no gallop and no friction rub.    No murmur heard.  Pulmonary/Chest: Effort normal and breath sounds normal. No stridor. No respiratory distress. She has no wheezes. She has no rales. She exhibits no " tenderness.   Lymphadenopathy:     She has no cervical adenopathy.   Neurological: She is alert and oriented to person, place, and time.   Skin: Skin is warm and dry. No rash noted. She is not diaphoretic. No erythema.   Vitals reviewed.              Assessment/Plan:     1. Other infective acute otitis externa of left ear  - ciprofloxacin/dexamethasone (CIPRODEX) 0.3-0.1 % Suspension; Place 4 Drops in left ear 2 times a day for 7 days.  Dispense: 1 Bottle; Refill: 0    Discussed exam findings with patient.  Differential reviewed.  Take full course of antibiotics.  OTC NSAIDs or tylenol prn pain.  Warm compress prn symptom management.  Maintain adequate po hydration.  RTC in 2-3 days if symptoms persist, sooner if worse.  ED precautions discussed.  Patient verbalized understanding of and agreed with plan of care.

## 2018-09-17 ENCOUNTER — OFFICE VISIT (OUTPATIENT)
Dept: URGENT CARE | Facility: PHYSICIAN GROUP | Age: 83
End: 2018-09-17
Payer: MEDICARE

## 2018-09-17 VITALS
HEIGHT: 63 IN | WEIGHT: 107 LBS | BODY MASS INDEX: 18.96 KG/M2 | HEART RATE: 90 BPM | TEMPERATURE: 99 F | SYSTOLIC BLOOD PRESSURE: 90 MMHG | DIASTOLIC BLOOD PRESSURE: 56 MMHG | OXYGEN SATURATION: 98 %

## 2018-09-17 DIAGNOSIS — H92.02 LEFT EAR PAIN: ICD-10-CM

## 2018-09-17 DIAGNOSIS — R30.0 DYSURIA: ICD-10-CM

## 2018-09-17 DIAGNOSIS — R35.0 URINARY FREQUENCY: ICD-10-CM

## 2018-09-17 DIAGNOSIS — H65.92 LEFT NON-SUPPURATIVE OTITIS MEDIA: Primary | ICD-10-CM

## 2018-09-17 LAB
APPEARANCE UR: CLEAR
BILIRUB UR STRIP-MCNC: NEGATIVE MG/DL
COLOR UR AUTO: YELLOW
GLUCOSE UR STRIP.AUTO-MCNC: NEGATIVE MG/DL
KETONES UR STRIP.AUTO-MCNC: NEGATIVE MG/DL
LEUKOCYTE ESTERASE UR QL STRIP.AUTO: NEGATIVE
NITRITE UR QL STRIP.AUTO: NEGATIVE
PH UR STRIP.AUTO: 6 [PH] (ref 5–8)
PROT UR QL STRIP: NEGATIVE MG/DL
RBC UR QL AUTO: NEGATIVE
SP GR UR STRIP.AUTO: 1.02
UROBILINOGEN UR STRIP-MCNC: 0.2 MG/DL

## 2018-09-17 PROCEDURE — 99214 OFFICE O/P EST MOD 30 MIN: CPT | Performed by: PHYSICIAN ASSISTANT

## 2018-09-17 PROCEDURE — 81002 URINALYSIS NONAUTO W/O SCOPE: CPT | Performed by: PHYSICIAN ASSISTANT

## 2018-09-17 RX ORDER — AMOXICILLIN 875 MG/1
875 TABLET, COATED ORAL 2 TIMES DAILY
Qty: 14 TAB | Refills: 0 | Status: SHIPPED | OUTPATIENT
Start: 2018-09-17 | End: 2018-09-24

## 2018-09-17 ASSESSMENT — ENCOUNTER SYMPTOMS
COUGH: 0
BLOOD IN STOOL: 0
SHORTNESS OF BREATH: 0
CHILLS: 0
NAUSEA: 0
ABDOMINAL PAIN: 0
FEVER: 0
DIARRHEA: 0
VOMITING: 0
CONSTIPATION: 0

## 2018-09-17 NOTE — PROGRESS NOTES
Subjective:   Ratna Cline is a 83 y.o. female who presents for Otalgia (Rt ear, stabbing pain and fullness for over 1 wk. Tx on 9/9 - symptoms never completely resolved. )    Pt was tx with a 7 day course of Ciprodex on 9/9/18. Today pt presents with L ear fullness. Pt is having tenderness around ear and posterior scalp 4/10. Pt has tried taking OTC supplements like white willow for pain with improvement. Pt states pain was constant on Saturday. Pain has improved today but still intermittently present. She denies any rash or lesions. No pain associated with canal. Denies fever, chill, n/v/d. No of recurrent ear infection. Pt does have hx of seasonal allergies. Denies hx DM or shingles.       Review of Systems   Constitutional: Negative for chills, fever and malaise/fatigue.   HENT: Positive for hearing loss. Negative for ear discharge and ear pain.    Respiratory: Negative for cough and shortness of breath.    Gastrointestinal: Negative for abdominal pain, blood in stool, constipation, diarrhea, melena, nausea and vomiting.   Genitourinary: Positive for frequency. Negative for dysuria and hematuria.   Skin: Negative for itching and rash.   All other systems reviewed and are negative.      PMH:  has a past medical history of Arthritis; Back pain; Pisano's esophagus; Bladder infection; Dyslipidemia (7/14/2016); History of hemorrhoids; History of measles; History of pneumonia; Hypothyroidism (acquired) (7/13/2015); Osteochondroma; Osteochondroma; Sinusitis; Thyroid disease; and Tinnitus (7/13/2015). She also has no past medical history of ASTHMA or Diabetes.  MEDS:   Current Outpatient Prescriptions:   •  amoxicillin (AMOXIL) 875 MG tablet, Take 1 Tab by mouth 2 times a day for 7 days., Disp: 14 Tab, Rfl: 0  •  BIOTIN PO, Take  by mouth., Disp: , Rfl:   •  MISC NATURAL PRODUCTS PO, Take  by mouth., Disp: , Rfl:   •  MISC NATURAL PRODUCTS PO, Take  by mouth., Disp: , Rfl:   •  SELENIUM PO, Take  by mouth.,  Disp: , Rfl:   •  3,5-Diiodothyronine (DIIODO-L-THYRONINE 3,5) Powder, by Does not apply route., Disp: , Rfl:   •  ALPHA LIPOIC ACID PO, Take  by mouth., Disp: , Rfl:   •  Riboflavin (VITAMIN B2 PO), Take  by mouth., Disp: , Rfl:   •  Misc Natural Products (CORTISOL PO), Take  by mouth., Disp: , Rfl:   •  MISC NATURAL PRODUCTS PO, Take  by mouth., Disp: , Rfl:   •  Cholecalciferol (VITAMIN D PO), Take  by mouth., Disp: , Rfl:   •  LUTEIN PO, Take  by mouth., Disp: , Rfl:   •  Calcium-Magnesium-Vitamin D (CALCIUM MAGNESIUM PO), Take  by mouth., Disp: , Rfl:   •  Simethicone (GAS RELIEF EXTRA STRENGTH PO), Take  by mouth., Disp: , Rfl:   •  Calcium Carb-Cholecalciferol (CALCIUM 600 + D PO), Take  by mouth., Disp: , Rfl:   •  PAPAYA PO, Take  by mouth., Disp: , Rfl:   •  Acetylcarnitine HCl (ACETYL L-CARNITINE PO), Take  by mouth., Disp: , Rfl:   •  Rhodiola rosea (RHODIOLA PO), Take  by mouth., Disp: , Rfl:   •  Licorice Deglycyrrhizinated Powder, by Does not apply route., Disp: , Rfl:   •  Milk Thistle 1000 MG Cap, Take  by mouth., Disp: , Rfl:   •  Nutritional Supplements (PYCNOGENOL PO), Take  by mouth., Disp: , Rfl:   •  Huperzine Serrate A 1 % Powder, by Does not apply route., Disp: , Rfl:   •  Vinpocetine Powder, by Does not apply route., Disp: , Rfl:   •  cyanocobalamin (VITAMIN B-12) 100 MCG Tab, Take 100 mcg by mouth every day., Disp: , Rfl:   •  VITAMIN C, CALCIUM ASCORBATE, PO, Take  by mouth., Disp: , Rfl:   •  Turmeric, Curcuma Longa, (CURCUMIN) Powder, by Does not apply route., Disp: , Rfl:   •  magnesium citrate Solution, Take 300 mL by mouth Once., Disp: , Rfl:   •  coenzyme Q-10 30 MG capsule, Take 60 mg by mouth every day., Disp: , Rfl:   •  Ginkgo Biloba (GINKOBA PO), Take  by mouth., Disp: , Rfl:   •  Nutritional Supplements (CALCIUM D-GLUCARATE PO), Take  by mouth., Disp: , Rfl:   •  Multiple Vitamins-Minerals (GLUTAMINE MULTIVITAMIN/MINERAL PO), Take  by mouth., Disp: , Rfl:   ALLERGIES:  "  Allergies   Allergen Reactions   • Codeine Vomiting   • Sulfa Drugs Nausea     SURGHX:   Past Surgical History:   Procedure Laterality Date   • HYSTERECTOMY RADICAL     • OTHER      jaw surgery to correct overbite   • TONSILLECTOMY       SOCHX:  reports that she has never smoked. She has never used smokeless tobacco. She reports that she does not drink alcohol or use drugs.  Family History   Problem Relation Age of Onset   • Cancer Mother         leukemia   • Genetic Mother         osteochondroma   • Stroke Neg Hx    • Hyperlipidemia Neg Hx    • Hypertension Neg Hx    • Heart Disease Neg Hx    • Diabetes Neg Hx    • Lung Disease Neg Hx         Objective:   BP (!) 90/56   Pulse 90   Temp 37.2 °C (99 °F)   Ht 1.588 m (5' 2.5\")   Wt 48.5 kg (107 lb)   SpO2 98%   BMI 19.26 kg/m²     Physical Exam   Constitutional: She is oriented to person, place, and time. She appears well-developed and well-nourished. No distress.   HENT:   Head: Normocephalic and atraumatic.       Right Ear: Tympanic membrane, external ear and ear canal normal. No tenderness. Tympanic membrane is not perforated, not erythematous and not retracted.   Left Ear: External ear and ear canal normal. No tenderness. Tympanic membrane is erythematous and retracted. Tympanic membrane is not perforated.   Clear fluid behind bilateral TMs   Eyes: Pupils are equal, round, and reactive to light. Conjunctivae are normal.   Cardiovascular: Normal rate and regular rhythm.    Pulmonary/Chest: Effort normal and breath sounds normal. No respiratory distress. She has no wheezes. She has no rales.   Neurological: She is alert and oriented to person, place, and time.   Skin: Skin is warm and dry. No rash noted. No erythema.   Psychiatric: She has a normal mood and affect. Her behavior is normal.   Vitals reviewed.     Repeat BP: 110/60      Lab Results   Component Value Date/Time    POCCOLOR yellow 09/17/2018 08:48 AM    POCAPPEAR clear 09/17/2018 08:48 AM    " POCLEUKEST negative 09/17/2018 08:48 AM    POCNITRITE negative 09/17/2018 08:48 AM    POCUROBILIGE 0.2 09/17/2018 08:48 AM    POCPROTEIN negative 09/17/2018 08:48 AM    POCURPH 6.0 09/17/2018 08:48 AM    POCBLOOD negative 09/17/2018 08:48 AM    POCSPGRV 1.020 09/17/2018 08:48 AM    POCKETONES negative 09/17/2018 08:48 AM    POCBILIRUBIN negative 09/17/2018 08:48 AM    POCGLUCUA negative 09/17/2018 08:48 AM         Assessment/Plan:     1. Left non-suppurative otitis media  amoxicillin (AMOXIL) 875 MG tablet   2. Left ear pain  amoxicillin (AMOXIL) 875 MG tablet   3. Dysuria     4. Urinary frequency  POCT Urinalysis       Patient directed to take full course of abx regardless of sx resolution. If sx worsen or persist patient directed to return to clinic for reevaluation. Monitor area for changes or rash. Supportive care reviewed including: ibuprofen as needed for pain.      Differential diagnosis including: Otitis media/estachian tube dysfunction v zoster v occipital neuralgia. Follow-up with primary care provider within 7-10 days. Red flags and emergency room precautions discussed.  Patient appears understanding of information.

## 2018-09-26 ENCOUNTER — APPOINTMENT (RX ONLY)
Dept: URBAN - METROPOLITAN AREA CLINIC 4 | Facility: CLINIC | Age: 83
Setting detail: DERMATOLOGY
End: 2018-09-26

## 2018-09-26 DIAGNOSIS — Z85.828 PERSONAL HISTORY OF OTHER MALIGNANT NEOPLASM OF SKIN: ICD-10-CM

## 2018-09-26 DIAGNOSIS — L81.4 OTHER MELANIN HYPERPIGMENTATION: ICD-10-CM

## 2018-09-26 DIAGNOSIS — L57.0 ACTINIC KERATOSIS: ICD-10-CM

## 2018-09-26 DIAGNOSIS — L82.1 OTHER SEBORRHEIC KERATOSIS: ICD-10-CM

## 2018-09-26 PROCEDURE — 99203 OFFICE O/P NEW LOW 30 MIN: CPT | Mod: 25

## 2018-09-26 PROCEDURE — ? COUNSELING

## 2018-09-26 PROCEDURE — ? LIQUID NITROGEN

## 2018-09-26 PROCEDURE — 17000 DESTRUCT PREMALG LESION: CPT

## 2018-09-26 ASSESSMENT — LOCATION SIMPLE DESCRIPTION DERM
LOCATION SIMPLE: RIGHT UPPER BACK
LOCATION SIMPLE: LEFT UPPER BACK
LOCATION SIMPLE: LEFT UPPER ARM
LOCATION SIMPLE: RIGHT CHEEK
LOCATION SIMPLE: LEFT ZYGOMA

## 2018-09-26 ASSESSMENT — LOCATION DETAILED DESCRIPTION DERM
LOCATION DETAILED: LEFT ANTERIOR DISTAL UPPER ARM
LOCATION DETAILED: LEFT INFERIOR UPPER BACK
LOCATION DETAILED: RIGHT MID-UPPER BACK
LOCATION DETAILED: RIGHT SUPERIOR UPPER BACK
LOCATION DETAILED: RIGHT MEDIAL BUCCAL CHEEK
LOCATION DETAILED: LEFT LATERAL ZYGOMA

## 2018-09-26 ASSESSMENT — LOCATION ZONE DERM
LOCATION ZONE: FACE
LOCATION ZONE: ARM
LOCATION ZONE: TRUNK

## 2018-10-01 ENCOUNTER — PATIENT OUTREACH (OUTPATIENT)
Dept: HEALTH INFORMATION MANAGEMENT | Facility: OTHER | Age: 83
End: 2018-10-01

## 2018-10-01 NOTE — PROGRESS NOTES
Outcome: Left Message    Please transfer to Patient Outreach Team at [phone] when patient returns call.    Web[employer] Checked & Epic Updated:  yes    [facility] Verified: yes    Attempt # 1

## 2018-10-03 ENCOUNTER — OFFICE VISIT (OUTPATIENT)
Dept: URGENT CARE | Facility: PHYSICIAN GROUP | Age: 83
End: 2018-10-03
Payer: MEDICARE

## 2018-10-03 ENCOUNTER — HOSPITAL ENCOUNTER (OUTPATIENT)
Dept: RADIOLOGY | Facility: MEDICAL CENTER | Age: 83
End: 2018-10-03
Attending: PHYSICIAN ASSISTANT
Payer: MEDICARE

## 2018-10-03 VITALS
OXYGEN SATURATION: 97 % | HEIGHT: 62 IN | TEMPERATURE: 99.7 F | WEIGHT: 108 LBS | HEART RATE: 92 BPM | BODY MASS INDEX: 19.88 KG/M2 | SYSTOLIC BLOOD PRESSURE: 100 MMHG | DIASTOLIC BLOOD PRESSURE: 74 MMHG

## 2018-10-03 DIAGNOSIS — R35.0 FREQUENCY OF URINATION: ICD-10-CM

## 2018-10-03 DIAGNOSIS — R10.32 LLQ PAIN: ICD-10-CM

## 2018-10-03 DIAGNOSIS — K57.92 DIVERTICULITIS: ICD-10-CM

## 2018-10-03 LAB
APPEARANCE UR: CLEAR
BILIRUB UR STRIP-MCNC: NEGATIVE MG/DL
COLOR UR AUTO: YELLOW
GLUCOSE UR STRIP.AUTO-MCNC: NEGATIVE MG/DL
KETONES UR STRIP.AUTO-MCNC: NEGATIVE MG/DL
LEUKOCYTE ESTERASE UR QL STRIP.AUTO: NORMAL
NITRITE UR QL STRIP.AUTO: NEGATIVE
PH UR STRIP.AUTO: 8 [PH] (ref 5–8)
PROT UR QL STRIP: NEGATIVE MG/DL
RBC UR QL AUTO: NEGATIVE
SP GR UR STRIP.AUTO: 1.01
UROBILINOGEN UR STRIP-MCNC: 0.2 MG/DL

## 2018-10-03 PROCEDURE — 81002 URINALYSIS NONAUTO W/O SCOPE: CPT | Performed by: PHYSICIAN ASSISTANT

## 2018-10-03 PROCEDURE — 74176 CT ABD & PELVIS W/O CONTRAST: CPT

## 2018-10-03 PROCEDURE — 99214 OFFICE O/P EST MOD 30 MIN: CPT | Performed by: PHYSICIAN ASSISTANT

## 2018-10-03 RX ORDER — AMOXICILLIN AND CLAVULANATE POTASSIUM 875; 125 MG/1; MG/1
1 TABLET, FILM COATED ORAL 2 TIMES DAILY
Qty: 20 TAB | Refills: 0 | Status: SHIPPED | OUTPATIENT
Start: 2018-10-03 | End: 2018-10-13

## 2018-10-03 RX ORDER — METRONIDAZOLE 500 MG/1
500 TABLET ORAL 3 TIMES DAILY
Qty: 30 TAB | Refills: 0 | Status: SHIPPED | OUTPATIENT
Start: 2018-10-03 | End: 2018-10-03 | Stop reason: CLARIF

## 2018-10-03 ASSESSMENT — ENCOUNTER SYMPTOMS
ANOREXIA: 0
HEMATOCHEZIA: 0
BLOOD IN STOOL: 0
DIARRHEA: 0
HEADACHES: 0
ABDOMINAL PAIN: 1
RESPIRATORY NEGATIVE: 1
NAUSEA: 1
CARDIOVASCULAR NEGATIVE: 1
CONSTIPATION: 0
FLANK PAIN: 0
MYALGIAS: 0
VOMITING: 0
FEVER: 0

## 2018-10-03 NOTE — PROGRESS NOTES
Subjective:      Ratna Cline is a 83 y.o. female who presents with LLQ Pain (Lower back pain, urinary frequency - onset yesterday )            LLQ Pain   This is a new problem. The current episode started yesterday. The onset quality is sudden. The problem occurs 2 to 4 times per day. The problem has been unchanged. The pain is located in the LLQ. The quality of the pain is sharp. The abdominal pain does not radiate. Associated symptoms include frequency and nausea. Pertinent negatives include no anorexia, constipation, diarrhea, dysuria, fever, headaches, hematochezia, hematuria, melena, myalgias or vomiting. The pain is aggravated by movement. The pain is relieved by nothing. She has tried nothing for the symptoms. The treatment provided no relief. Her past medical history is significant for abdominal surgery and irritable bowel syndrome. There is no history of pancreatitis or PUD.   History of IBS with constipation well-controlled with prune juice. Patient states in the last 2 days she has had increasing left lower quadrant pain. She does have a history of diverticulitis. No previous CT scan in her chart.    PMH:  has a past medical history of Arthritis; Back pain; Pisano's esophagus; Bladder infection; Dyslipidemia (7/14/2016); History of hemorrhoids; History of measles; History of pneumonia; Hypothyroidism (acquired) (7/13/2015); Osteochondroma; Osteochondroma; Sinusitis; Thyroid disease; and Tinnitus (7/13/2015). She also has no past medical history of ASTHMA or Diabetes.  MEDS:   Current Outpatient Prescriptions:   •  BIOTIN PO, Take  by mouth., Disp: , Rfl:   •  MISC NATURAL PRODUCTS PO, Take  by mouth., Disp: , Rfl:   •  MISC NATURAL PRODUCTS PO, Take  by mouth., Disp: , Rfl:   •  SELENIUM PO, Take  by mouth., Disp: , Rfl:   •  3,5-Diiodothyronine (DIIODO-L-THYRONINE 3,5) Powder, by Does not apply route., Disp: , Rfl:   •  ALPHA LIPOIC ACID PO, Take  by mouth., Disp: , Rfl:   •  Riboflavin  (VITAMIN B2 PO), Take  by mouth., Disp: , Rfl:   •  Misc Natural Products (CORTISOL PO), Take  by mouth., Disp: , Rfl:   •  MISC NATURAL PRODUCTS PO, Take  by mouth., Disp: , Rfl:   •  Cholecalciferol (VITAMIN D PO), Take  by mouth., Disp: , Rfl:   •  LUTEIN PO, Take  by mouth., Disp: , Rfl:   •  Calcium-Magnesium-Vitamin D (CALCIUM MAGNESIUM PO), Take  by mouth., Disp: , Rfl:   •  Simethicone (GAS RELIEF EXTRA STRENGTH PO), Take  by mouth., Disp: , Rfl:   •  Calcium Carb-Cholecalciferol (CALCIUM 600 + D PO), Take  by mouth., Disp: , Rfl:   •  PAPAYA PO, Take  by mouth., Disp: , Rfl:   •  Acetylcarnitine HCl (ACETYL L-CARNITINE PO), Take  by mouth., Disp: , Rfl:   •  Rhodiola rosea (RHODIOLA PO), Take  by mouth., Disp: , Rfl:   •  Licorice Deglycyrrhizinated Powder, by Does not apply route., Disp: , Rfl:   •  Milk Thistle 1000 MG Cap, Take  by mouth., Disp: , Rfl:   •  Nutritional Supplements (PYCNOGENOL PO), Take  by mouth., Disp: , Rfl:   •  Huperzine Serrate A 1 % Powder, by Does not apply route., Disp: , Rfl:   •  Vinpocetine Powder, by Does not apply route., Disp: , Rfl:   •  cyanocobalamin (VITAMIN B-12) 100 MCG Tab, Take 100 mcg by mouth every day., Disp: , Rfl:   •  VITAMIN C, CALCIUM ASCORBATE, PO, Take  by mouth., Disp: , Rfl:   •  Turmeric, Curcuma Longa, (CURCUMIN) Powder, by Does not apply route., Disp: , Rfl:   •  magnesium citrate Solution, Take 300 mL by mouth Once., Disp: , Rfl:   •  coenzyme Q-10 30 MG capsule, Take 60 mg by mouth every day., Disp: , Rfl:   •  Ginkgo Biloba (GINKOBA PO), Take  by mouth., Disp: , Rfl:   •  Nutritional Supplements (CALCIUM D-GLUCARATE PO), Take  by mouth., Disp: , Rfl:   •  Multiple Vitamins-Minerals (GLUTAMINE MULTIVITAMIN/MINERAL PO), Take  by mouth., Disp: , Rfl:   ALLERGIES:   Allergies   Allergen Reactions   • Codeine Vomiting   • Sulfa Drugs Nausea     SURGHX:   Past Surgical History:   Procedure Laterality Date   • HYSTERECTOMY RADICAL     • OTHER      jaw  "surgery to correct overbite   • TONSILLECTOMY       SOCHX:  reports that she has never smoked. She has never used smokeless tobacco. She reports that she does not drink alcohol or use drugs.  FH: family history includes Cancer in her mother; Genetic in her mother.      Review of Systems   Constitutional: Negative for fever.   HENT: Negative.    Respiratory: Negative.    Cardiovascular: Negative.    Gastrointestinal: Positive for abdominal pain and nausea. Negative for anorexia, blood in stool, constipation, diarrhea, hematochezia, melena and vomiting.   Genitourinary: Positive for frequency. Negative for dysuria, flank pain, hematuria and urgency.   Musculoskeletal: Negative for joint pain and myalgias.   Neurological: Negative for headaches.       Medications, Allergies, and current problem list reviewed today in Epic     Objective:     /74 (BP Location: Left arm, Patient Position: Sitting, BP Cuff Size: Adult)   Pulse 92   Temp 37.6 °C (99.7 °F) (Temporal)   Ht 1.575 m (5' 2\")   Wt 49 kg (108 lb)   SpO2 97%   BMI 19.75 kg/m²      Physical Exam   Constitutional: She is oriented to person, place, and time. She appears well-developed and well-nourished. No distress.   HENT:   Head: Normocephalic and atraumatic.   Right Ear: Tympanic membrane and external ear normal.   Left Ear: Tympanic membrane and external ear normal.   Nose: Nose normal.   Mouth/Throat: Oropharynx is clear and moist. No oropharyngeal exudate.   Eyes: Pupils are equal, round, and reactive to light. Conjunctivae and EOM are normal. Right eye exhibits no discharge. Left eye exhibits no discharge.   Neck: Normal range of motion. Neck supple.   Cardiovascular: Normal rate, regular rhythm and normal heart sounds.    Pulmonary/Chest: Effort normal and breath sounds normal. No respiratory distress. She has no wheezes.   Abdominal: Soft. Normal appearance and bowel sounds are normal. There is tenderness in the left lower quadrant. There is " guarding. There is no rigidity, no rebound, no CVA tenderness, no tenderness at McBurney's point and negative Mason's sign.       Musculoskeletal: Normal range of motion.   Lymphadenopathy:     She has no cervical adenopathy.   Neurological: She is alert and oriented to person, place, and time.   Skin: Skin is warm and dry. She is not diaphoretic.   Psychiatric: She has a normal mood and affect. Her behavior is normal. Judgment and thought content normal.   Nursing note and vitals reviewed.         Urinalysis: Negative    Impression         Findings most consistent with diverticulitis involving the distal descending colon without evidence of abscess or free air. An occult mass is difficult to entirely exclude. Recommend follow-up examination (CT, barium enema or colonoscopy) in 4-6 weeks   after acute illness resolves.   Reading Provider Reading Date   Basil Ramey M.D. Oct 3, 2018        Assessment/Plan:     1. Frequency of urination  POCT Urinalysis   2. LLQ pain  CT-ABDOMEN-PELVIS W/O   3. Diverticulitis  amoxicillin-clavulanate (AUGMENTIN) 875-125 MG Tab    DISCONTINUED: metroNIDAZOLE (FLAGYL) 500 MG Tab     83-year-old female with sharp left lower quadrant pain. History of diverticulitis. A CT scan was ordered which showed findings consistent with diverticulitis without perforation or abscess. An occult mass cannot be excluded and a repeat exam or close follow-up is recommended in 4-6 weeks. These results were relayed to the patient. Patient states she has not tolerated Cipro or Metro in the past and is requesting something else. Per up-to-date Augmentin twice a day ×10-14 days is a appropriate substitute. This was sent to her pharmacy. She is instructed to follow up or go to ER with any acute changes or worsening symptoms. If she is not improving on treatment she will immediately. Dietary restrictions discussed at length.  OTC meds and conservative measures as discussed  Return to clinic or go to ED if  symptoms worsen or persist. Indications for ED discussed at length. Patient voices understanding. Follow-up with your primary care provider in 3-5 days. Red flags discussed. All side effects of medication discussed including allergic response, GI upset, tendon injury, etc.    Please note that this dictation was created using voice recognition software. I have made every reasonable attempt to correct obvious errors, but I expect that there are errors of grammar and possibly content that I did not discover before finalizing the note.

## 2018-12-20 ENCOUNTER — HOSPITAL ENCOUNTER (OUTPATIENT)
Dept: LAB | Facility: MEDICAL CENTER | Age: 83
End: 2018-12-20
Attending: FAMILY MEDICINE
Payer: MEDICARE

## 2018-12-20 LAB
ALBUMIN SERPL BCP-MCNC: 4.1 G/DL (ref 3.2–4.9)
ALBUMIN/GLOB SERPL: 1.4 G/DL
ALP SERPL-CCNC: 58 U/L (ref 30–99)
ALT SERPL-CCNC: 13 U/L (ref 2–50)
ANION GAP SERPL CALC-SCNC: 7 MMOL/L (ref 0–11.9)
AST SERPL-CCNC: 16 U/L (ref 12–45)
BASOPHILS # BLD AUTO: 0.9 % (ref 0–1.8)
BASOPHILS # BLD: 0.06 K/UL (ref 0–0.12)
BILIRUB SERPL-MCNC: 0.6 MG/DL (ref 0.1–1.5)
BUN SERPL-MCNC: 15 MG/DL (ref 8–22)
CALCIUM SERPL-MCNC: 10.3 MG/DL (ref 8.5–10.5)
CHLORIDE SERPL-SCNC: 102 MMOL/L (ref 96–112)
CHOLEST SERPL-MCNC: 219 MG/DL (ref 100–199)
CO2 SERPL-SCNC: 28 MMOL/L (ref 20–33)
CREAT SERPL-MCNC: 0.88 MG/DL (ref 0.5–1.4)
CRP SERPL HS-MCNC: 2.1 MG/L (ref 0–7.5)
EOSINOPHIL # BLD AUTO: 0.1 K/UL (ref 0–0.51)
EOSINOPHIL NFR BLD: 1.6 % (ref 0–6.9)
ERYTHROCYTE [DISTWIDTH] IN BLOOD BY AUTOMATED COUNT: 47.2 FL (ref 35.9–50)
EST. AVERAGE GLUCOSE BLD GHB EST-MCNC: 105 MG/DL
GLOBULIN SER CALC-MCNC: 2.9 G/DL (ref 1.9–3.5)
GLUCOSE SERPL-MCNC: 87 MG/DL (ref 65–99)
HBA1C MFR BLD: 5.3 % (ref 0–5.6)
HCT VFR BLD AUTO: 46.2 % (ref 37–47)
HDLC SERPL-MCNC: 52 MG/DL
HGB BLD-MCNC: 15 G/DL (ref 12–16)
IMM GRANULOCYTES # BLD AUTO: 0.01 K/UL (ref 0–0.11)
IMM GRANULOCYTES NFR BLD AUTO: 0.2 % (ref 0–0.9)
LDLC SERPL CALC-MCNC: 149 MG/DL
LYMPHOCYTES # BLD AUTO: 0.98 K/UL (ref 1–4.8)
LYMPHOCYTES NFR BLD: 15.3 % (ref 22–41)
MCH RBC QN AUTO: 31 PG (ref 27–33)
MCHC RBC AUTO-ENTMCNC: 32.5 G/DL (ref 33.6–35)
MCV RBC AUTO: 95.5 FL (ref 81.4–97.8)
MONOCYTES # BLD AUTO: 0.56 K/UL (ref 0–0.85)
MONOCYTES NFR BLD AUTO: 8.7 % (ref 0–13.4)
NEUTROPHILS # BLD AUTO: 4.7 K/UL (ref 2–7.15)
NEUTROPHILS NFR BLD: 73.3 % (ref 44–72)
NRBC # BLD AUTO: 0 K/UL
NRBC BLD-RTO: 0 /100 WBC
PLATELET # BLD AUTO: 260 K/UL (ref 164–446)
PMV BLD AUTO: 10.1 FL (ref 9–12.9)
POTASSIUM SERPL-SCNC: 4.5 MMOL/L (ref 3.6–5.5)
PROT SERPL-MCNC: 7 G/DL (ref 6–8.2)
RBC # BLD AUTO: 4.84 M/UL (ref 4.2–5.4)
SODIUM SERPL-SCNC: 137 MMOL/L (ref 135–145)
T3FREE SERPL-MCNC: 4.61 PG/ML (ref 2.4–4.2)
T4 FREE SERPL-MCNC: 1.31 NG/DL (ref 0.53–1.43)
TRIGL SERPL-MCNC: 89 MG/DL (ref 0–149)
TSH SERPL DL<=0.005 MIU/L-ACNC: 6.99 UIU/ML (ref 0.38–5.33)
WBC # BLD AUTO: 6.4 K/UL (ref 4.8–10.8)

## 2018-12-20 PROCEDURE — 84439 ASSAY OF FREE THYROXINE: CPT

## 2018-12-20 PROCEDURE — 85025 COMPLETE CBC W/AUTO DIFF WBC: CPT

## 2018-12-20 PROCEDURE — 36415 COLL VENOUS BLD VENIPUNCTURE: CPT

## 2018-12-20 PROCEDURE — 84481 FREE ASSAY (FT-3): CPT

## 2018-12-20 PROCEDURE — 80053 COMPREHEN METABOLIC PANEL: CPT

## 2018-12-20 PROCEDURE — 84443 ASSAY THYROID STIM HORMONE: CPT

## 2018-12-20 PROCEDURE — 86141 C-REACTIVE PROTEIN HS: CPT

## 2018-12-20 PROCEDURE — 83036 HEMOGLOBIN GLYCOSYLATED A1C: CPT

## 2018-12-20 PROCEDURE — 80061 LIPID PANEL: CPT

## 2019-01-22 ENCOUNTER — APPOINTMENT (RX ONLY)
Dept: URBAN - METROPOLITAN AREA CLINIC 20 | Facility: CLINIC | Age: 84
Setting detail: DERMATOLOGY
End: 2019-01-22

## 2019-01-22 DIAGNOSIS — D18.0 HEMANGIOMA: ICD-10-CM

## 2019-01-22 DIAGNOSIS — L57.0 ACTINIC KERATOSIS: ICD-10-CM

## 2019-01-22 DIAGNOSIS — Z71.89 OTHER SPECIFIED COUNSELING: ICD-10-CM

## 2019-01-22 DIAGNOSIS — L81.4 OTHER MELANIN HYPERPIGMENTATION: ICD-10-CM

## 2019-01-22 DIAGNOSIS — Z85.828 PERSONAL HISTORY OF OTHER MALIGNANT NEOPLASM OF SKIN: ICD-10-CM

## 2019-01-22 DIAGNOSIS — D22 MELANOCYTIC NEVI: ICD-10-CM

## 2019-01-22 DIAGNOSIS — L82.1 OTHER SEBORRHEIC KERATOSIS: ICD-10-CM

## 2019-01-22 PROBLEM — D22.5 MELANOCYTIC NEVI OF TRUNK: Status: ACTIVE | Noted: 2019-01-22

## 2019-01-22 PROBLEM — D18.01 HEMANGIOMA OF SKIN AND SUBCUTANEOUS TISSUE: Status: ACTIVE | Noted: 2019-01-22

## 2019-01-22 PROBLEM — D22.61 MELANOCYTIC NEVI OF RIGHT UPPER LIMB, INCLUDING SHOULDER: Status: ACTIVE | Noted: 2019-01-22

## 2019-01-22 PROBLEM — D23.61 OTHER BENIGN NEOPLASM OF SKIN OF RIGHT UPPER LIMB, INCLUDING SHOULDER: Status: ACTIVE | Noted: 2019-01-22

## 2019-01-22 PROBLEM — D22.62 MELANOCYTIC NEVI OF LEFT UPPER LIMB, INCLUDING SHOULDER: Status: ACTIVE | Noted: 2019-01-22

## 2019-01-22 PROCEDURE — 99213 OFFICE O/P EST LOW 20 MIN: CPT | Mod: 25

## 2019-01-22 PROCEDURE — ? COUNSELING

## 2019-01-22 PROCEDURE — ? LIQUID NITROGEN

## 2019-01-22 PROCEDURE — ? ADDITIONAL NOTES

## 2019-01-22 PROCEDURE — ? OBSERVATION AND MEASURE

## 2019-01-22 PROCEDURE — 17000 DESTRUCT PREMALG LESION: CPT

## 2019-01-22 PROCEDURE — 17003 DESTRUCT PREMALG LES 2-14: CPT

## 2019-01-22 PROCEDURE — ? SUNSCREEN RECOMMENDATIONS

## 2019-01-22 ASSESSMENT — LOCATION DETAILED DESCRIPTION DERM
LOCATION DETAILED: RIGHT SUPERIOR CENTRAL MALAR CHEEK
LOCATION DETAILED: LEFT LATERAL ZYGOMA
LOCATION DETAILED: RIGHT POSTERIOR SHOULDER
LOCATION DETAILED: LEFT PROXIMAL DORSAL FOREARM
LOCATION DETAILED: RIGHT MEDIAL UPPER BACK
LOCATION DETAILED: SUPERIOR THORACIC SPINE
LOCATION DETAILED: RIGHT PROXIMAL DORSAL FOREARM
LOCATION DETAILED: RIGHT RADIAL DORSAL HAND
LOCATION DETAILED: LEFT VENTRAL PROXIMAL FOREARM
LOCATION DETAILED: RIGHT INFERIOR UPPER BACK
LOCATION DETAILED: INFERIOR THORACIC SPINE
LOCATION DETAILED: RIGHT VENTRAL DISTAL FOREARM
LOCATION DETAILED: RIGHT INFERIOR MEDIAL FOREHEAD
LOCATION DETAILED: LEFT RADIAL DORSAL HAND

## 2019-01-22 ASSESSMENT — LOCATION SIMPLE DESCRIPTION DERM
LOCATION SIMPLE: RIGHT FOREHEAD
LOCATION SIMPLE: LEFT ZYGOMA
LOCATION SIMPLE: RIGHT SHOULDER
LOCATION SIMPLE: UPPER BACK
LOCATION SIMPLE: RIGHT FOREARM
LOCATION SIMPLE: LEFT HAND
LOCATION SIMPLE: RIGHT HAND
LOCATION SIMPLE: RIGHT UPPER BACK
LOCATION SIMPLE: RIGHT CHEEK
LOCATION SIMPLE: LEFT FOREARM

## 2019-01-22 ASSESSMENT — LOCATION ZONE DERM
LOCATION ZONE: ARM
LOCATION ZONE: TRUNK
LOCATION ZONE: HAND
LOCATION ZONE: FACE

## 2019-01-22 NOTE — PROCEDURE: ADDITIONAL NOTES
Additional Notes: Appears benign in nature. Advised pt to RTC if lesion grows/changes.
Detail Level: Detailed

## 2019-01-22 NOTE — PROCEDURE: LIQUID NITROGEN
Consent: The patient's consent was obtained including but not limited to risks of crusting, scabbing, blistering, scarring, darker or lighter pigmentary change, recurrence, incomplete removal and infection. RTC in 2 months if lesion(s) persistent.
Render Post-Care Instructions In Note?: yes
Number Of Freeze-Thaw Cycles: 2 freeze-thaw cycles
Detail Level: Detailed
Post-Care Instructions: I reviewed with the patient in detail post-care instructions. Patient is to wear sunprotection, and avoid picking at any of the treated lesions. Pt may apply Vaseline to crusted or scabbing areas.
Duration Of Freeze Thaw-Cycle (Seconds): 10

## 2019-04-18 ENCOUNTER — OFFICE VISIT (OUTPATIENT)
Dept: URGENT CARE | Facility: PHYSICIAN GROUP | Age: 84
End: 2019-04-18
Payer: MEDICARE

## 2019-04-18 VITALS
HEIGHT: 62 IN | RESPIRATION RATE: 16 BRPM | WEIGHT: 104 LBS | TEMPERATURE: 98.2 F | SYSTOLIC BLOOD PRESSURE: 106 MMHG | HEART RATE: 74 BPM | OXYGEN SATURATION: 98 % | BODY MASS INDEX: 19.14 KG/M2 | DIASTOLIC BLOOD PRESSURE: 68 MMHG

## 2019-04-18 DIAGNOSIS — J06.9 UPPER RESPIRATORY TRACT INFECTION, UNSPECIFIED TYPE: ICD-10-CM

## 2019-04-18 PROCEDURE — 99214 OFFICE O/P EST MOD 30 MIN: CPT | Performed by: PHYSICIAN ASSISTANT

## 2019-04-18 RX ORDER — BENZONATATE 100 MG/1
100 CAPSULE ORAL 3 TIMES DAILY PRN
Qty: 30 CAP | Refills: 0 | Status: SHIPPED | OUTPATIENT
Start: 2019-04-18 | End: 2019-06-03

## 2019-04-18 RX ORDER — LEVOTHYROXINE, LIOTHYRONINE 9.5; 2.25 UG/1; UG/1
TABLET ORAL
COMMUNITY
Start: 2019-03-13 | End: 2019-07-02

## 2019-04-18 ASSESSMENT — ENCOUNTER SYMPTOMS
DIARRHEA: 0
EYE DISCHARGE: 0
FEVER: 0
HEADACHES: 0
WHEEZING: 0
NECK PAIN: 0
VOMITING: 0
COUGH: 1
MYALGIAS: 0
SORE THROAT: 1
SWOLLEN GLANDS: 0
SPUTUM PRODUCTION: 1
EYE REDNESS: 0
CHILLS: 0
SHORTNESS OF BREATH: 0
RHINORRHEA: 1

## 2019-04-18 NOTE — PROGRESS NOTES
"Subjective:      Ratna Cline is a 83 y.o. female who presents with Cough (sore throat, congestion X 4 days )            URI    This is a new problem. Episode onset: 4 days ago. There has been no fever. Associated symptoms include congestion, coughing, rhinorrhea and a sore throat. Pertinent negatives include no diarrhea, dysuria, headaches, neck pain, rash, swollen glands, vomiting or wheezing. Treatments tried: Nyquil  The treatment provided mild relief.       Review of Systems   Constitutional: Positive for malaise/fatigue. Negative for chills and fever.   HENT: Positive for congestion, rhinorrhea and sore throat. Negative for ear discharge.    Eyes: Negative for discharge and redness.   Respiratory: Positive for cough and sputum production. Negative for shortness of breath and wheezing.    Gastrointestinal: Negative for diarrhea and vomiting.   Genitourinary: Negative for dysuria.   Musculoskeletal: Negative for myalgias and neck pain.   Skin: Negative for itching and rash.   Neurological: Negative for headaches.   All other systems reviewed and are negative.         Objective:     /68   Pulse 74   Temp 36.8 °C (98.2 °F)   Resp 16   Ht 1.575 m (5' 2\")   Wt 47.2 kg (104 lb)   SpO2 98%   BMI 19.02 kg/m²    PMH:  has a past medical history of Arthritis; Back pain; Pisano's esophagus; Bladder infection; Dyslipidemia (7/14/2016); History of hemorrhoids; History of measles; History of pneumonia; Hypothyroidism (acquired) (7/13/2015); Osteochondroma; Osteochondroma; Sinusitis; Thyroid disease; and Tinnitus (7/13/2015). She also has no past medical history of ASTHMA or Diabetes.  MEDS:   Current Outpatient Prescriptions:   •  NP THYROID 15 MG Tab, , Disp: , Rfl:   •  benzonatate (TESSALON) 100 MG Cap, Take 1 Cap by mouth 3 times a day as needed for Cough., Disp: 30 Cap, Rfl: 0  •  BIOTIN PO, Take  by mouth., Disp: , Rfl:   •  MISC NATURAL PRODUCTS PO, Take  by mouth., Disp: , Rfl:   •  MISC " NATURAL PRODUCTS PO, Take  by mouth., Disp: , Rfl:   •  SELENIUM PO, Take  by mouth., Disp: , Rfl:   •  3,5-Diiodothyronine (DIIODO-L-THYRONINE 3,5) Powder, by Does not apply route., Disp: , Rfl:   •  ALPHA LIPOIC ACID PO, Take  by mouth., Disp: , Rfl:   •  Riboflavin (VITAMIN B2 PO), Take  by mouth., Disp: , Rfl:   •  Misc Natural Products (CORTISOL PO), Take  by mouth., Disp: , Rfl:   •  MISC NATURAL PRODUCTS PO, Take  by mouth., Disp: , Rfl:   •  Cholecalciferol (VITAMIN D PO), Take  by mouth., Disp: , Rfl:   •  LUTEIN PO, Take  by mouth., Disp: , Rfl:   •  Calcium-Magnesium-Vitamin D (CALCIUM MAGNESIUM PO), Take  by mouth., Disp: , Rfl:   •  Simethicone (GAS RELIEF EXTRA STRENGTH PO), Take  by mouth., Disp: , Rfl:   •  Calcium Carb-Cholecalciferol (CALCIUM 600 + D PO), Take  by mouth., Disp: , Rfl:   •  PAPAYA PO, Take  by mouth., Disp: , Rfl:   •  Acetylcarnitine HCl (ACETYL L-CARNITINE PO), Take  by mouth., Disp: , Rfl:   •  Rhodiola rosea (RHODIOLA PO), Take  by mouth., Disp: , Rfl:   •  Licorice Deglycyrrhizinated Powder, by Does not apply route., Disp: , Rfl:   •  Milk Thistle 1000 MG Cap, Take  by mouth., Disp: , Rfl:   •  Nutritional Supplements (PYCNOGENOL PO), Take  by mouth., Disp: , Rfl:   •  Huperzine Serrate A 1 % Powder, by Does not apply route., Disp: , Rfl:   •  Vinpocetine Powder, by Does not apply route., Disp: , Rfl:   •  cyanocobalamin (VITAMIN B-12) 100 MCG Tab, Take 100 mcg by mouth every day., Disp: , Rfl:   •  VITAMIN C, CALCIUM ASCORBATE, PO, Take  by mouth., Disp: , Rfl:   •  Turmeric, Curcuma Longa, (CURCUMIN) Powder, by Does not apply route., Disp: , Rfl:   •  magnesium citrate Solution, Take 300 mL by mouth Once., Disp: , Rfl:   •  coenzyme Q-10 30 MG capsule, Take 60 mg by mouth every day., Disp: , Rfl:   •  Ginkgo Biloba (GINKOBA PO), Take  by mouth., Disp: , Rfl:   •  Nutritional Supplements (CALCIUM D-GLUCARATE PO), Take  by mouth., Disp: , Rfl:   •  Multiple Vitamins-Minerals  (GLUTAMINE MULTIVITAMIN/MINERAL PO), Take  by mouth., Disp: , Rfl:   ALLERGIES:   Allergies   Allergen Reactions   • Codeine Vomiting   • Sulfa Drugs Nausea     SURGHX:   Past Surgical History:   Procedure Laterality Date   • HYSTERECTOMY RADICAL     • OTHER      jaw surgery to correct overbite   • TONSILLECTOMY       SOCHX:  reports that she has never smoked. She has never used smokeless tobacco. She reports that she does not drink alcohol or use drugs.  FH: Family history was reviewed, no pertinent findings to report    Physical Exam   Constitutional: She is oriented to person, place, and time. She appears well-developed and well-nourished.   HENT:   Head: Normocephalic and atraumatic.   Mouth/Throat: No oropharyngeal exudate.   Ears- Canals clear- TM- with clear fluid effusions bilaterally.   Pos. PND, with slight erythema- without tonsillar edema or exudate.   Mild discharge noted bilaterally- to nares.      Eyes: Pupils are equal, round, and reactive to light. EOM are normal.   Neck: Normal range of motion. Neck supple.   Cardiovascular: Normal rate and regular rhythm.    No murmur heard.  Pulmonary/Chest: Effort normal and breath sounds normal. No respiratory distress.   Musculoskeletal: Normal range of motion. She exhibits no edema or tenderness.   Lymphadenopathy:     She has no cervical adenopathy.   Neurological: She is alert and oriented to person, place, and time.   Skin: Skin is warm. No rash noted.   Psychiatric: She has a normal mood and affect. Her behavior is normal.   Vitals reviewed.              Assessment/Plan:     1. Upper respiratory tract infection, unspecified type  - benzonatate (TESSALON) 100 MG Cap; Take 1 Cap by mouth 3 times a day as needed for Cough.  Dispense: 30 Cap; Refill: 0    It was explained to the pt. Today that due to the viral nature of the pt's illness, we will treat symptomatically today. Encouraged OTC supportive meds PRN. Humidification, increase fluids, avoid night time  dairy.   Patient given precautionary s/sx that mandate immediate follow up and evaluation in the ED. Advised of risks of not doing so.    DDX, Supportive care, and indications for immediate follow-up discussed with patient.    Instructed to return to clinic or nearest emergency department if we are not available for any change in condition, further concerns, or worsening of symptoms.    The patient demonstrated a good understanding and agreed with the treatment plan.

## 2019-06-03 ENCOUNTER — OFFICE VISIT (OUTPATIENT)
Dept: MEDICAL GROUP | Facility: PHYSICIAN GROUP | Age: 84
End: 2019-06-03
Payer: MEDICARE

## 2019-06-03 ENCOUNTER — HOSPITAL ENCOUNTER (OUTPATIENT)
Dept: LAB | Facility: MEDICAL CENTER | Age: 84
End: 2019-06-03
Attending: FAMILY MEDICINE
Payer: MEDICARE

## 2019-06-03 VITALS
OXYGEN SATURATION: 98 % | DIASTOLIC BLOOD PRESSURE: 52 MMHG | SYSTOLIC BLOOD PRESSURE: 98 MMHG | BODY MASS INDEX: 19.32 KG/M2 | TEMPERATURE: 97.8 F | HEIGHT: 62 IN | HEART RATE: 74 BPM | WEIGHT: 105 LBS

## 2019-06-03 DIAGNOSIS — R53.83 OTHER FATIGUE: ICD-10-CM

## 2019-06-03 DIAGNOSIS — E03.8 SUBCLINICAL HYPOTHYROIDISM: ICD-10-CM

## 2019-06-03 DIAGNOSIS — E78.5 DYSLIPIDEMIA: ICD-10-CM

## 2019-06-03 DIAGNOSIS — K22.70 BARRETT'S ESOPHAGUS WITHOUT DYSPLASIA: ICD-10-CM

## 2019-06-03 DIAGNOSIS — J30.2 SEASONAL ALLERGIES: ICD-10-CM

## 2019-06-03 PROBLEM — K90.41 NON-CELIAC GLUTEN SENSITIVITY: Status: ACTIVE | Noted: 2019-06-03

## 2019-06-03 PROBLEM — D16.9 OSTEOCHONDROMA: Status: ACTIVE | Noted: 2019-06-03

## 2019-06-03 PROBLEM — Z80.6 FAMILY HISTORY OF LEUKEMIA: Status: ACTIVE | Noted: 2019-06-03

## 2019-06-03 LAB
ALBUMIN SERPL BCP-MCNC: 4.1 G/DL (ref 3.2–4.9)
ALBUMIN/GLOB SERPL: 1.6 G/DL
ALP SERPL-CCNC: 49 U/L (ref 30–99)
ALT SERPL-CCNC: 17 U/L (ref 2–50)
ANION GAP SERPL CALC-SCNC: 6 MMOL/L (ref 0–11.9)
AST SERPL-CCNC: 21 U/L (ref 12–45)
BASOPHILS # BLD AUTO: 0.6 % (ref 0–1.8)
BASOPHILS # BLD: 0.04 K/UL (ref 0–0.12)
BILIRUB SERPL-MCNC: 0.5 MG/DL (ref 0.1–1.5)
BUN SERPL-MCNC: 18 MG/DL (ref 8–22)
CALCIUM SERPL-MCNC: 9.5 MG/DL (ref 8.5–10.5)
CHLORIDE SERPL-SCNC: 100 MMOL/L (ref 96–112)
CO2 SERPL-SCNC: 31 MMOL/L (ref 20–33)
CREAT SERPL-MCNC: 0.79 MG/DL (ref 0.5–1.4)
EOSINOPHIL # BLD AUTO: 0.06 K/UL (ref 0–0.51)
EOSINOPHIL NFR BLD: 1 % (ref 0–6.9)
ERYTHROCYTE [DISTWIDTH] IN BLOOD BY AUTOMATED COUNT: 46.6 FL (ref 35.9–50)
GLOBULIN SER CALC-MCNC: 2.5 G/DL (ref 1.9–3.5)
GLUCOSE SERPL-MCNC: 74 MG/DL (ref 65–99)
HCT VFR BLD AUTO: 44.9 % (ref 37–47)
HGB BLD-MCNC: 14.8 G/DL (ref 12–16)
IMM GRANULOCYTES # BLD AUTO: 0.02 K/UL (ref 0–0.11)
IMM GRANULOCYTES NFR BLD AUTO: 0.3 % (ref 0–0.9)
LYMPHOCYTES # BLD AUTO: 0.92 K/UL (ref 1–4.8)
LYMPHOCYTES NFR BLD: 14.9 % (ref 22–41)
MCH RBC QN AUTO: 31.7 PG (ref 27–33)
MCHC RBC AUTO-ENTMCNC: 33 G/DL (ref 33.6–35)
MCV RBC AUTO: 96.1 FL (ref 81.4–97.8)
MONOCYTES # BLD AUTO: 0.75 K/UL (ref 0–0.85)
MONOCYTES NFR BLD AUTO: 12.2 % (ref 0–13.4)
NEUTROPHILS # BLD AUTO: 4.38 K/UL (ref 2–7.15)
NEUTROPHILS NFR BLD: 71 % (ref 44–72)
NRBC # BLD AUTO: 0 K/UL
NRBC BLD-RTO: 0 /100 WBC
PLATELET # BLD AUTO: 261 K/UL (ref 164–446)
PMV BLD AUTO: 10.1 FL (ref 9–12.9)
POTASSIUM SERPL-SCNC: 4.3 MMOL/L (ref 3.6–5.5)
PROT SERPL-MCNC: 6.6 G/DL (ref 6–8.2)
RBC # BLD AUTO: 4.67 M/UL (ref 4.2–5.4)
SODIUM SERPL-SCNC: 137 MMOL/L (ref 135–145)
T3FREE SERPL-MCNC: 9.76 PG/ML (ref 2.4–4.2)
T4 FREE SERPL-MCNC: 2.89 NG/DL (ref 0.53–1.43)
TSH SERPL DL<=0.005 MIU/L-ACNC: 3.68 UIU/ML (ref 0.38–5.33)
WBC # BLD AUTO: 6.2 K/UL (ref 4.8–10.8)

## 2019-06-03 PROCEDURE — 84481 FREE ASSAY (FT-3): CPT

## 2019-06-03 PROCEDURE — 36415 COLL VENOUS BLD VENIPUNCTURE: CPT

## 2019-06-03 PROCEDURE — 99214 OFFICE O/P EST MOD 30 MIN: CPT | Performed by: FAMILY MEDICINE

## 2019-06-03 PROCEDURE — 85025 COMPLETE CBC W/AUTO DIFF WBC: CPT

## 2019-06-03 PROCEDURE — 84439 ASSAY OF FREE THYROXINE: CPT

## 2019-06-03 PROCEDURE — 84443 ASSAY THYROID STIM HORMONE: CPT

## 2019-06-03 PROCEDURE — 80053 COMPREHEN METABOLIC PANEL: CPT

## 2019-06-03 RX ORDER — LEVOTHYROXINE SODIUM 0.03 MG/1
25 TABLET ORAL
COMMUNITY
End: 2019-07-02

## 2019-06-03 ASSESSMENT — PATIENT HEALTH QUESTIONNAIRE - PHQ9: CLINICAL INTERPRETATION OF PHQ2 SCORE: 0

## 2019-06-03 NOTE — PROGRESS NOTES
"CC: Hypothyroidism    HISTORY OF THE PRESENT ILLNESS: Patient is a 83 y.o. female. This pleasant patient is here today to establish care and discuss health problems below.    Hypothyroidism: Chronic issue for the patient.  She was previously seeing a physician who was prescribing a \"holistic thyroid supplement\".  She actually ran out of the prescription for that supplements, and has since that time been using an old prescription for Synthroid, which she thinks is 25 mg.  She is been using this prescription for about 3 weeks.  She does report some brain fog and fatigue.  However, as below she lost her  just under 2 weeks ago.  She would like to get her thyroid checked today.    Seasonal allergies: Patient concerned today with some sinus drainage and mucus production.  She took some NyQuil last night and it did seem to help quite a lot.  No cough, shortness of breath.    Pisano's esophagus: Noted on health history.  Patient does report fairly frequent GERD symptoms, mostly at night around 1 AM.  Happening several days of the week.  She is not currently taking any medications for this.  She has not seen gastroenterology in she thinks maybe 20 years.    Hyperlipidemia: Chronic issue for the patient.  She desires not to get her lipids checked today, she does not plan to get on a cholesterol medication at her age.    Of note, patient does feel quite out of sorts today.  She recently lost her  less than 2 weeks ago to likely dementia.  She is been dealing with a lot of paperwork related to insurance and financial issues.  She is unsure of her fatigue as noted above is related to hypothyroid or all the stress of losing her spouse.    Discussion also had today, about continuing preventative care and cancer screening in her age group.  At this time, patient reports she no longer would like to undergo routine cancer screening including colonoscopy, mammography, EGD due to Pisano's esophagus.      Allergies: " Codeine; Soy allergy; and Sulfa drugs    Current Outpatient Prescriptions Ordered in [facility]   Medication Sig Dispense Refill   • levothyroxine (SYNTHROID) 25 MCG Tab Take 25 mcg by mouth Every morning on an empty stomach.     • NP THYROID 15 MG Tab      • BIOTIN PO Take  by mouth.     • MISC NATURAL PRODUCTS PO Take  by mouth.     • MISC NATURAL PRODUCTS PO Take  by mouth.     • SELENIUM PO Take  by mouth.     • 3,5-Diiodothyronine (DIIODO-L-THYRONINE 3,5) Powder by Does not apply route.     • ALPHA LIPOIC ACID PO Take  by mouth.     • Riboflavin (VITAMIN B2 PO) Take  by mouth.     • Misc Natural Products (CORTISOL PO) Take  by mouth.     • MISC NATURAL PRODUCTS PO Take  by mouth.     • Cholecalciferol (VITAMIN D PO) Take  by mouth.     • LUTEIN PO Take  by mouth.     • Calcium-Magnesium-Vitamin D (CALCIUM MAGNESIUM PO) Take  by mouth.     • Simethicone (GAS RELIEF EXTRA STRENGTH PO) Take  by mouth.     • Calcium Carb-Cholecalciferol (CALCIUM 600 + D PO) Take  by mouth.     • PAPAYA PO Take  by mouth.     • Acetylcarnitine HCl (ACETYL L-CARNITINE PO) Take  by mouth.     • Rhodiola rosea (RHODIOLA PO) Take  by mouth.     • Licorice Deglycyrrhizinated Powder by Does not apply route.     • Milk Thistle 1000 MG Cap Take  by mouth.     • Nutritional Supplements (PYCNOGENOL PO) Take  by mouth.     • Huperzine Serrate A 1 % Powder by Does not apply route.     • Vinpocetine Powder by Does not apply route.     • cyanocobalamin (VITAMIN B-12) 100 MCG Tab Take 100 mcg by mouth every day.     • VITAMIN C, CALCIUM ASCORBATE, PO Take  by mouth.     • Turmeric, Curcuma Longa, (CURCUMIN) Powder by Does not apply route.     • magnesium citrate Solution Take 300 mL by mouth Once.     • coenzyme Q-10 30 MG capsule Take 60 mg by mouth every day.     • Ginkgo Biloba (GINKOBA PO) Take  by mouth.     • Nutritional Supplements (CALCIUM D-GLUCARATE PO) Take  by mouth.     • Multiple Vitamins-Minerals (GLUTAMINE MULTIVITAMIN/MINERAL PO) Take   "by mouth.       No current Epic-ordered facility-administered medications on file.        Past Medical History:   Diagnosis Date   • Arthritis    • Back pain    • Pisano's esophagus     Dr. Anaya, in late 1990's   • Bladder infection    • Dyslipidemia 7/14/2016   • History of hemorrhoids    • History of measles    • History of pneumonia    • Hypothyroidism (acquired) 7/13/2015   • Osteochondroma    • Osteochondroma    • Sinusitis    • Thyroid disease    • Tinnitus 7/13/2015       Past Surgical History:   Procedure Laterality Date   • HYSTERECTOMY RADICAL     • OTHER      jaw surgery to correct overbite   • TONSILLECTOMY         Social History   Substance Use Topics   • Smoking status: Never Smoker   • Smokeless tobacco: Never Used   • Alcohol use No       Social History     Social History Narrative   • No narrative on file       Family History   Problem Relation Age of Onset   • Cancer Mother         leukemia   • Genetic Mother         osteochondroma   • Stroke Neg Hx    • Hyperlipidemia Neg Hx    • Hypertension Neg Hx    • Heart Disease Neg Hx    • Diabetes Neg Hx    • Lung Disease Neg Hx        ROS:   See HPI.    Exam: BP (!) 98/52 (BP Location: Left arm, Patient Position: Sitting, BP Cuff Size: Adult)   Pulse 74   Temp 36.6 °C (97.8 °F) (Temporal)   Ht 1.575 m (5' 2\")   Wt 47.6 kg (105 lb)   SpO2 98%  Body mass index is 19.2 kg/m².    General: Well appearing, NAD  HEENT: Normocephalic. Conjunctiva clear, lids without ptosis, pupils equal and reactive to light accommodation, ears normal shape and contour, canals are clear bilaterally, tympanic membranes without bulging or erythema and normal cone of light,  oropharynx is without erythema, edema or exudates but with some postnasal drip noted.  Neck: Supple without JVD. No thyromegaly or nodules  Pulmonary: Clear to ausculation.  Normal effort. No rales, ronchi, or wheezing.  Cardiovascular: Regular rate and rhythm without murmur, rubs or gallop. "   Abdomen: Soft, nontender, nondistended. Normal bowel sounds. Liver and spleen are not palpable. No rebound or guarding  Neurologic:  normal gait  Lymph: No cervical, supraclavicular lymph nodes are palpable  Skin: Warm and dry.  No obvious lesions.  Musculoskeletal:  No extremity cyanosis, clubbing, or edema.  Psych: Normal mood and affect. Alert and oriented. Judgment and insight is normal.      Please note that this dictation was created using voice recognition software. I have made every reasonable attempt to correct obvious errors, but I expect that there are errors of grammar and possibly content that I did not discover before finalizing the note.      Assessment/Plan  Ratna was seen today for hypothyroidism.    Diagnoses and all orders for this visit:    Seasonal allergies  New uncontrolled problem for the patient.  Recommend trialing Zyrtec over-the-counter.  She did have questions today about which allergy medications and sinus medications were appropriate for her age group and this was discussed with her today.    Subclinical hypothyroidism  Chronic problem for the patient.  Upon review of her chart, she did have thyroid labs in December 2018 which actually showed elevated free T3 and TSH.  Now that she is on the Synthroid, we will go ahead and recheck labs.  -     T3 FREE; Future  -     TSH+FREE T4    Other fatigue  Fairly new issue, but may be due to recent loss of .  Will check CBC and CMP in addition to thyroid labs.  -     CBC WITH DIFFERENTIAL; Future  -     Comp Metabolic Panel; Future    Pisano's esophagus without dysplasia  Chronic issue.  Discussed risks of Pisano's esophagus with uncontrolled GERD occluding increased risk for esophageal cancer.  Patient prefers at this time to no longer go through cancer screenings at her age.  She defers referral to gastroenterology at this time.    Dyslipidemia  Chronic issue for the patient, uncontrolled.  Patient declines lipid screening now due  to age, and would not go on cholesterol medication anyhow.    Follow-up in 6 months or sooner if needed.     Counseling: Patient was seen for a total of 25 minutes face-to-face by myself, with more than half of the time spent counseling on treatment of hypothyroidism as well as age-appropriate cancer screening risks and benefits today.      Shu Norwood DO  [facility]

## 2019-06-04 DIAGNOSIS — E03.8 SUBCLINICAL HYPOTHYROIDISM: ICD-10-CM

## 2019-06-05 ENCOUNTER — TELEPHONE (OUTPATIENT)
Dept: MEDICAL GROUP | Facility: PHYSICIAN GROUP | Age: 84
End: 2019-06-05

## 2019-06-05 NOTE — TELEPHONE ENCOUNTER
----- Message from Shu Norwood D.O. sent at 6/4/2019  5:50 PM PDT -----  In reviewing patient's labs, it does actually show that her thyroid is currently overactive at this time.  I would like for her to actually stop taking the Synthroid for about 4 weeks and then recheck her thyroid to see where her baseline is at.  Please let me know if she has any concerns or questions about this.  I have already ordered the repeat thyroid labs.  All other labs were normal.

## 2019-07-02 ENCOUNTER — HOSPITAL ENCOUNTER (OUTPATIENT)
Dept: LAB | Facility: MEDICAL CENTER | Age: 84
End: 2019-07-02
Attending: FAMILY MEDICINE
Payer: MEDICARE

## 2019-07-02 ENCOUNTER — HOSPITAL ENCOUNTER (OUTPATIENT)
Facility: MEDICAL CENTER | Age: 84
End: 2019-07-02
Attending: FAMILY MEDICINE
Payer: MEDICARE

## 2019-07-02 ENCOUNTER — OFFICE VISIT (OUTPATIENT)
Dept: MEDICAL GROUP | Facility: PHYSICIAN GROUP | Age: 84
End: 2019-07-02
Payer: MEDICARE

## 2019-07-02 VITALS
RESPIRATION RATE: 18 BRPM | SYSTOLIC BLOOD PRESSURE: 110 MMHG | TEMPERATURE: 98 F | BODY MASS INDEX: 19.32 KG/M2 | WEIGHT: 105 LBS | DIASTOLIC BLOOD PRESSURE: 70 MMHG | OXYGEN SATURATION: 97 % | HEIGHT: 62 IN | HEART RATE: 78 BPM

## 2019-07-02 DIAGNOSIS — E03.8 SUBCLINICAL HYPOTHYROIDISM: ICD-10-CM

## 2019-07-02 DIAGNOSIS — R32 URINARY INCONTINENCE, UNSPECIFIED TYPE: ICD-10-CM

## 2019-07-02 DIAGNOSIS — N89.8 VAGINAL DISCHARGE: ICD-10-CM

## 2019-07-02 DIAGNOSIS — S40.812A ABRASION OF LEFT UPPER EXTREMITY, INITIAL ENCOUNTER: ICD-10-CM

## 2019-07-02 LAB
APPEARANCE UR: CLEAR
BILIRUB UR STRIP-MCNC: NORMAL MG/DL
CANDIDA DNA VAG QL PROBE+SIG AMP: NEGATIVE
COLOR UR AUTO: YELLOW
G VAGINALIS DNA VAG QL PROBE+SIG AMP: NEGATIVE
GLUCOSE UR STRIP.AUTO-MCNC: NORMAL MG/DL
KETONES UR STRIP.AUTO-MCNC: NORMAL MG/DL
LEUKOCYTE ESTERASE UR QL STRIP.AUTO: NORMAL
NITRITE UR QL STRIP.AUTO: NORMAL
PH UR STRIP.AUTO: 7 [PH] (ref 5–8)
PROT UR QL STRIP: NORMAL MG/DL
RBC UR QL AUTO: NORMAL
SP GR UR STRIP.AUTO: 1.01
T VAGINALIS DNA VAG QL PROBE+SIG AMP: NEGATIVE
T3FREE SERPL-MCNC: 4.04 PG/ML (ref 2.4–4.2)
T4 FREE SERPL-MCNC: 1.12 NG/DL (ref 0.53–1.43)
TSH SERPL DL<=0.005 MIU/L-ACNC: 5.28 UIU/ML (ref 0.38–5.33)
UROBILINOGEN UR STRIP-MCNC: 0.2 MG/DL

## 2019-07-02 PROCEDURE — 87086 URINE CULTURE/COLONY COUNT: CPT

## 2019-07-02 PROCEDURE — 84443 ASSAY THYROID STIM HORMONE: CPT

## 2019-07-02 PROCEDURE — 81002 URINALYSIS NONAUTO W/O SCOPE: CPT | Performed by: FAMILY MEDICINE

## 2019-07-02 PROCEDURE — 84481 FREE ASSAY (FT-3): CPT

## 2019-07-02 PROCEDURE — 87660 TRICHOMONAS VAGIN DIR PROBE: CPT

## 2019-07-02 PROCEDURE — 99214 OFFICE O/P EST MOD 30 MIN: CPT | Performed by: FAMILY MEDICINE

## 2019-07-02 PROCEDURE — 36415 COLL VENOUS BLD VENIPUNCTURE: CPT

## 2019-07-02 PROCEDURE — 84439 ASSAY OF FREE THYROXINE: CPT

## 2019-07-02 PROCEDURE — 87480 CANDIDA DNA DIR PROBE: CPT

## 2019-07-02 PROCEDURE — 87510 GARDNER VAG DNA DIR PROBE: CPT

## 2019-07-02 NOTE — PROGRESS NOTES
"CC: Vaginal discharge    HISTORY OF THE PRESENT ILLNESS: Patient is a 83 y.o. female. This pleasant patient is here today to discuss the following health issues.    Patient is here today with concerns for possible UTI or yeast infection.  She notes that for a couple of weeks now, she has had some yellow vaginal discharge which has been somewhat irritating.  She is also had what she calls \"leakage\".  This is not necessarily urinary leakage, but it does seem to be more related to vaginal discharge.  Other than that she has no dysuria, abdominal pain, fevers, chills, flank pain.  She has no genital rash.  She is not sexually active and recently lost her  a couple of months ago and she is not concerned about STIs.  She did try a homeopathic antibacterial remedy from her homeopath, but this did not help.    Patient would also like to follow-up on thyroid labs today.  She was previously taking Synthroid as well as a homeopathic thyroid supplement due to a history of subclinical hypothyroidism.  Recent labs about a month ago suggested that she was hyperthyroid on the Synthroid.  She had repeat labs today to see if she still needs to be on this medication.    Patient also noted that she scratched her arm while working outside.  Its on the posterior surface of her left forearm.  It is scabbed over and healing.  She reports no drainage from the area, and states that the redness around the area seems to be decreasing.    Allergies: Codeine; Soy allergy; and Sulfa drugs    Current Outpatient Prescriptions Ordered in [facility]   Medication Sig Dispense Refill   • mupirocin (BACTROBAN) 2 % Ointment Apply to affected area twice daily. 1 Tube 2   • BIOTIN PO Take  by mouth.     • MISC NATURAL PRODUCTS PO Take  by mouth.     • MISC NATURAL PRODUCTS PO Take  by mouth.     • SELENIUM PO Take  by mouth.     • 3,5-Diiodothyronine (DIIODO-L-THYRONINE 3,5) Powder by Does not apply route.     • ALPHA LIPOIC ACID PO Take  by mouth.     • " Riboflavin (VITAMIN B2 PO) Take  by mouth.     • Misc Natural Products (CORTISOL PO) Take  by mouth.     • MISC NATURAL PRODUCTS PO Take  by mouth.     • Cholecalciferol (VITAMIN D PO) Take  by mouth.     • LUTEIN PO Take  by mouth.     • Calcium-Magnesium-Vitamin D (CALCIUM MAGNESIUM PO) Take  by mouth.     • Simethicone (GAS RELIEF EXTRA STRENGTH PO) Take  by mouth.     • Calcium Carb-Cholecalciferol (CALCIUM 600 + D PO) Take  by mouth.     • PAPAYA PO Take  by mouth.     • Acetylcarnitine HCl (ACETYL L-CARNITINE PO) Take  by mouth.     • Rhodiola rosea (RHODIOLA PO) Take  by mouth.     • Licorice Deglycyrrhizinated Powder by Does not apply route.     • Milk Thistle 1000 MG Cap Take  by mouth.     • Nutritional Supplements (PYCNOGENOL PO) Take  by mouth.     • Huperzine Serrate A 1 % Powder by Does not apply route.     • Vinpocetine Powder by Does not apply route.     • cyanocobalamin (VITAMIN B-12) 100 MCG Tab Take 100 mcg by mouth every day.     • VITAMIN C, CALCIUM ASCORBATE, PO Take  by mouth.     • Turmeric, Curcuma Longa, (CURCUMIN) Powder by Does not apply route.     • magnesium citrate Solution Take 300 mL by mouth Once.     • coenzyme Q-10 30 MG capsule Take 60 mg by mouth every day.     • Ginkgo Biloba (GINKOBA PO) Take  by mouth.     • Nutritional Supplements (CALCIUM D-GLUCARATE PO) Take  by mouth.     • Multiple Vitamins-Minerals (GLUTAMINE MULTIVITAMIN/MINERAL PO) Take  by mouth.       No current Epic-ordered facility-administered medications on file.        Past Medical History:   Diagnosis Date   • Arthritis    • Back pain    • Pisano's esophagus     Dr. Anaya, in late 1990's   • Bladder infection    • Dyslipidemia 7/14/2016   • History of hemorrhoids    • History of measles    • History of pneumonia    • Hypothyroidism (acquired) 7/13/2015   • Osteochondroma    • Osteochondroma    • Sinusitis    • Thyroid disease    • Tinnitus 7/13/2015       Past Surgical History:   Procedure Laterality Date  "  • HYSTERECTOMY RADICAL     • OTHER      jaw surgery to correct overbite   • TONSILLECTOMY         Social History   Substance Use Topics   • Smoking status: Never Smoker   • Smokeless tobacco: Never Used   • Alcohol use No       Social History     Social History Narrative   • No narrative on file       Family History   Problem Relation Age of Onset   • Cancer Mother         leukemia   • Genetic Mother         osteochondroma   • Stroke Neg Hx    • Hyperlipidemia Neg Hx    • Hypertension Neg Hx    • Heart Disease Neg Hx    • Diabetes Neg Hx    • Lung Disease Neg Hx        ROS:     - Constitutional: Negative for fever, chills, unexpected weight change, and fatigue/generalized weakness.  constipation, and greasy/foul-smelling stools.     - Genitourinary: Negative for dysuria, polyuria, hematuria, pyuria, urinary urgency, and urinary incontinence.     - Musculoskeletal: Negative for myalgias, back pain, and joint pain.     - Skin: Negative for rash, itching, cyanotic skin color change.       Labs: Labs reviewed from July 2, 2019 showing normal thyroid labs.  Point-of-care urinalysis was positive today for leukocyte esterase, but otherwise normal.    Exam: /70 (BP Location: Left arm, Patient Position: Sitting, BP Cuff Size: Adult)   Pulse 78   Temp 36.7 °C (98 °F) (Temporal)   Resp 18   Ht 1.575 m (5' 2\")   Wt 47.6 kg (105 lb)   SpO2 97%  Body mass index is 19.2 kg/m².    General: Well appearing, NAD  Pulmonary: Clear to ausculation.  Normal effort. No rales, ronchi, or wheezing.  Cardiovascular: Regular rate and rhythm without murmur, rubs or gallop.   Abdomen: Soft, nontender, nondistended. Normal bowel sounds. Liver and spleen are not palpable. No rebound or guarding  Skin: Normal external genitalia.  Abrasion noted on left posterior forearm.  There is some minimal surrounding erythema but no purulent drainage.  It appears to be healing well.  Psych: Normal mood and affect. Alert and oriented. Judgment and " insight is normal.      Please note that this dictation was created using voice recognition software. I have made every reasonable attempt to correct obvious errors, but I expect that there are errors of grammar and possibly content that I did not discover before finalizing the note.      Assessment/Plan  Ratna was seen today for urinary frequency and other.    Diagnoses and all orders for this visit:    Vaginal discharge  Urinary incontinence, unspecified type  New uncontrolled issues for the patient.  Point-of-care urinalysis with positive leukocyte esterase.  May also be possible yeast infection.  Vaginal swab performed today as well and urine sent for culture.  -     URINE CULTURE(NEW); Future  -     POCT Urinalysis  -     VAGINAL PATHOGENS DNA PANEL; Future    Abrasion of left upper extremity, initial encounter  New uncontrolled problem for the patient.  Does not appear to be infected.  We will go ahead and prescribe mupirocin antibacterial ointment which she can use twice daily until the abrasion resolves.  Red flags reviewed including worsening surrounding redness or purulent drainage.  -     mupirocin (BACTROBAN) 2 % Ointment; Apply to affected area twice daily.    Subclinical hypothyroidism  Previous issue for the patient.  Recent thyroid labs suggest that she is euthyroid.  Patient agrees to stop her thyroid medications at this time.    Follow-up in about 6 months or sooner if needed.    Shu Norwood,   [facility]

## 2019-07-04 LAB
BACTERIA UR CULT: NORMAL
SIGNIFICANT IND 70042: NORMAL
SITE SITE: NORMAL
SOURCE SOURCE: NORMAL

## 2019-07-16 ENCOUNTER — OFFICE VISIT (OUTPATIENT)
Dept: URGENT CARE | Facility: PHYSICIAN GROUP | Age: 84
End: 2019-07-16
Payer: MEDICARE

## 2019-07-16 VITALS
HEIGHT: 62 IN | TEMPERATURE: 98.9 F | BODY MASS INDEX: 19.32 KG/M2 | WEIGHT: 105 LBS | OXYGEN SATURATION: 96 % | SYSTOLIC BLOOD PRESSURE: 110 MMHG | DIASTOLIC BLOOD PRESSURE: 50 MMHG | HEART RATE: 64 BPM

## 2019-07-16 DIAGNOSIS — H65.92 FLUID LEVEL BEHIND TYMPANIC MEMBRANE OF LEFT EAR: ICD-10-CM

## 2019-07-16 DIAGNOSIS — H92.02 OTALGIA OF LEFT EAR: ICD-10-CM

## 2019-07-16 DIAGNOSIS — H69.92 DYSFUNCTION OF LEFT EUSTACHIAN TUBE: ICD-10-CM

## 2019-07-16 PROCEDURE — 99214 OFFICE O/P EST MOD 30 MIN: CPT | Performed by: PHYSICIAN ASSISTANT

## 2019-07-16 ASSESSMENT — ENCOUNTER SYMPTOMS
VOMITING: 0
RHINORRHEA: 1
SORE THROAT: 0
COUGH: 0
EYE DISCHARGE: 0
HEADACHES: 0
DIZZINESS: 0
EYE REDNESS: 0
FEVER: 0
NECK PAIN: 0
DIARRHEA: 0
CHILLS: 0

## 2019-07-16 NOTE — PROGRESS NOTES
"Subjective:      Ratna Cline is a 83 y.o. female who presents with Otalgia (Stabbing ear pain in Lt ear - onset yesterday. )            Otalgia    There is pain in the left ear. This is a new problem. The current episode started yesterday. The problem occurs constantly. The problem has been waxing and waning. There has been no fever. Pain scale: Mild. The pain is mild. Associated symptoms include hearing loss and rhinorrhea. Pertinent negatives include no coughing, diarrhea, ear discharge, headaches, neck pain, rash, sore throat or vomiting. Associated symptoms comments: Post nasal drainage    . Treatments tried: heating pad. The treatment provided mild relief.   Denies any drainage.       Review of Systems   Constitutional: Negative for chills and fever.   HENT: Positive for ear pain, hearing loss and rhinorrhea. Negative for ear discharge and sore throat.    Eyes: Negative for discharge and redness.   Respiratory: Negative for cough.    Gastrointestinal: Negative for diarrhea and vomiting.   Musculoskeletal: Negative for neck pain.   Skin: Negative for rash.   Neurological: Negative for dizziness and headaches.   All other systems reviewed and are negative.         Objective:     /50 (BP Location: Left arm, Patient Position: Sitting, BP Cuff Size: Adult)   Pulse 64   Temp 37.2 °C (98.9 °F) (Temporal)   Ht 1.575 m (5' 2\")   Wt 47.6 kg (105 lb)   SpO2 96%   BMI 19.20 kg/m²    PMH:  has a past medical history of Arthritis; Back pain; Pisano's esophagus; Bladder infection; Dyslipidemia (7/14/2016); History of hemorrhoids; History of measles; History of pneumonia; Hypothyroidism (acquired) (7/13/2015); Osteochondroma; Osteochondroma; Sinusitis; Thyroid disease; and Tinnitus (7/13/2015). She also has no past medical history of ASTHMA or Diabetes.  MEDS:   Current Outpatient Prescriptions:   •  mupirocin (BACTROBAN) 2 % Ointment, Apply to affected area twice daily., Disp: 1 Tube, Rfl: 2  •  BIOTIN " PO, Take  by mouth., Disp: , Rfl:   •  MISC NATURAL PRODUCTS PO, Take  by mouth., Disp: , Rfl:   •  MISC NATURAL PRODUCTS PO, Take  by mouth., Disp: , Rfl:   •  SELENIUM PO, Take  by mouth., Disp: , Rfl:   •  3,5-Diiodothyronine (DIIODO-L-THYRONINE 3,5) Powder, by Does not apply route., Disp: , Rfl:   •  ALPHA LIPOIC ACID PO, Take  by mouth., Disp: , Rfl:   •  Riboflavin (VITAMIN B2 PO), Take  by mouth., Disp: , Rfl:   •  Misc Natural Products (CORTISOL PO), Take  by mouth., Disp: , Rfl:   •  MISC NATURAL PRODUCTS PO, Take  by mouth., Disp: , Rfl:   •  Cholecalciferol (VITAMIN D PO), Take  by mouth., Disp: , Rfl:   •  LUTEIN PO, Take  by mouth., Disp: , Rfl:   •  Calcium-Magnesium-Vitamin D (CALCIUM MAGNESIUM PO), Take  by mouth., Disp: , Rfl:   •  Simethicone (GAS RELIEF EXTRA STRENGTH PO), Take  by mouth., Disp: , Rfl:   •  Calcium Carb-Cholecalciferol (CALCIUM 600 + D PO), Take  by mouth., Disp: , Rfl:   •  PAPAYA PO, Take  by mouth., Disp: , Rfl:   •  Acetylcarnitine HCl (ACETYL L-CARNITINE PO), Take  by mouth., Disp: , Rfl:   •  Rhodiola rosea (RHODIOLA PO), Take  by mouth., Disp: , Rfl:   •  Licorice Deglycyrrhizinated Powder, by Does not apply route., Disp: , Rfl:   •  Milk Thistle 1000 MG Cap, Take  by mouth., Disp: , Rfl:   •  Nutritional Supplements (PYCNOGENOL PO), Take  by mouth., Disp: , Rfl:   •  Huperzine Serrate A 1 % Powder, by Does not apply route., Disp: , Rfl:   •  Vinpocetine Powder, by Does not apply route., Disp: , Rfl:   •  cyanocobalamin (VITAMIN B-12) 100 MCG Tab, Take 100 mcg by mouth every day., Disp: , Rfl:   •  VITAMIN C, CALCIUM ASCORBATE, PO, Take  by mouth., Disp: , Rfl:   •  Turmeric, Curcuma Longa, (CURCUMIN) Powder, by Does not apply route., Disp: , Rfl:   •  magnesium citrate Solution, Take 300 mL by mouth Once., Disp: , Rfl:   •  coenzyme Q-10 30 MG capsule, Take 60 mg by mouth every day., Disp: , Rfl:   •  Ginkgo Biloba (GINKOBA PO), Take  by mouth., Disp: , Rfl:   •   Nutritional Supplements (CALCIUM D-GLUCARATE PO), Take  by mouth., Disp: , Rfl:   •  Multiple Vitamins-Minerals (GLUTAMINE MULTIVITAMIN/MINERAL PO), Take  by mouth., Disp: , Rfl:   ALLERGIES:   Allergies   Allergen Reactions   • Codeine Vomiting   • Soy Allergy    • Sulfa Drugs Nausea     SURGHX:   Past Surgical History:   Procedure Laterality Date   • HYSTERECTOMY RADICAL     • OTHER      jaw surgery to correct overbite   • TONSILLECTOMY       SOCHX:  reports that she has never smoked. She has never used smokeless tobacco. She reports that she does not drink alcohol or use drugs.  FH: Family history was reviewed, no pertinent findings to report    Physical Exam   Constitutional: She is oriented to person, place, and time. She appears well-developed and well-nourished. No distress.   HENT:   Head: Normocephalic and atraumatic.   Mouth/Throat: Oropharynx is clear and moist. No oropharyngeal exudate.   Left auricle and tragus NT.   Canal- clear. Clear middle ear effusion.      Eyes: Pupils are equal, round, and reactive to light. Conjunctivae and EOM are normal.   Neck: Normal range of motion. Neck supple. No tracheal deviation present.   Cardiovascular: Normal rate.    Pulmonary/Chest: Effort normal. No respiratory distress.   Musculoskeletal: Normal range of motion.   Neurological: She is alert and oriented to person, place, and time. Coordination normal.   Skin: Skin is warm. No rash noted.   Psychiatric: She has a normal mood and affect. Her behavior is normal. Judgment and thought content normal.   Vitals reviewed.              Assessment/Plan:     1. Otalgia of left ear  2. Dysfunction of left eustachian tube  3. Fluid level behind tympanic membrane of left ear    No evidence of infection- clear middle ear effusion- discussed use of non-drowsy antihistamine at this time.   Continue with warm hot compresses.   Patient given precautionary s/sx that mandate immediate follow up and evaluation in the ED. Advised of  risks of not doing so.    DDX, Supportive care, and indications for immediate follow-up discussed with patient.    Instructed to return to clinic or nearest emergency department if we are not available for any change in condition, further concerns, or worsening of symptoms.    The patient demonstrated a good understanding and agreed with the treatment plan.  Please note that this dictation was created using voice recognition software. I have made every reasonable attempt to correct obvious errors, but I expect that there are errors of grammar and possibly content that I did not discover before finalizing the note.

## 2019-07-19 ENCOUNTER — OFFICE VISIT (OUTPATIENT)
Dept: MEDICAL GROUP | Facility: PHYSICIAN GROUP | Age: 84
End: 2019-07-19
Payer: MEDICARE

## 2019-07-19 VITALS
DIASTOLIC BLOOD PRESSURE: 60 MMHG | OXYGEN SATURATION: 98 % | TEMPERATURE: 98.9 F | BODY MASS INDEX: 19.32 KG/M2 | WEIGHT: 105 LBS | SYSTOLIC BLOOD PRESSURE: 102 MMHG | HEIGHT: 62 IN | HEART RATE: 72 BPM

## 2019-07-19 DIAGNOSIS — H66.90 ACUTE OTITIS MEDIA, UNSPECIFIED OTITIS MEDIA TYPE: ICD-10-CM

## 2019-07-19 PROCEDURE — 99214 OFFICE O/P EST MOD 30 MIN: CPT | Performed by: FAMILY MEDICINE

## 2019-07-19 RX ORDER — AMOXICILLIN 875 MG/1
875 TABLET, COATED ORAL 2 TIMES DAILY
Qty: 10 TAB | Refills: 0 | Status: SHIPPED | OUTPATIENT
Start: 2019-07-19 | End: 2019-07-24

## 2019-07-19 NOTE — PROGRESS NOTES
CC: Left ear pain    HISTORY OF THE PRESENT ILLNESS: Patient is a 83 y.o. female. This pleasant patient is here today to discuss the following health issue.    Left ear pain: This is a new issue for the patient.  She was seen by urgent care earlier this week who felt it to be allergic in nature.  She was told to take Zyrtec but never actually started this.  She did have a leftover prescription for Ciprodex drops from the ear infection last year, decided to do that instead.  She reports the ear to be minimally better at this time.  She reports some sharp pain within the ear and a sensation of pressure as well as some mild pain down the left side of the throat and neck as well.  He does also note some mild nasal congestion.  She is hoping to get an antibiotic today.    Allergies: Codeine; Soy allergy; and Sulfa drugs    Current Outpatient Prescriptions Ordered in [facility]   Medication Sig Dispense Refill   • amoxicillin (AMOXIL) 875 MG tablet Take 1 Tab by mouth 2 times a day for 5 days. 10 Tab 0   • mupirocin (BACTROBAN) 2 % Ointment Apply to affected area twice daily. 1 Tube 2   • BIOTIN PO Take  by mouth.     • MISC NATURAL PRODUCTS PO Take  by mouth.     • MISC NATURAL PRODUCTS PO Take  by mouth.     • SELENIUM PO Take  by mouth.     • 3,5-Diiodothyronine (DIIODO-L-THYRONINE 3,5) Powder by Does not apply route.     • ALPHA LIPOIC ACID PO Take  by mouth.     • Riboflavin (VITAMIN B2 PO) Take  by mouth.     • Misc Natural Products (CORTISOL PO) Take  by mouth.     • MISC NATURAL PRODUCTS PO Take  by mouth.     • Cholecalciferol (VITAMIN D PO) Take  by mouth.     • LUTEIN PO Take  by mouth.     • Calcium-Magnesium-Vitamin D (CALCIUM MAGNESIUM PO) Take  by mouth.     • Simethicone (GAS RELIEF EXTRA STRENGTH PO) Take  by mouth.     • Calcium Carb-Cholecalciferol (CALCIUM 600 + D PO) Take  by mouth.     • PAPAYA PO Take  by mouth.     • Acetylcarnitine HCl (ACETYL L-CARNITINE PO) Take  by mouth.     • Rhodiola rosea  (RHODIOLA PO) Take  by mouth.     • Licorice Deglycyrrhizinated Powder by Does not apply route.     • Milk Thistle 1000 MG Cap Take  by mouth.     • Nutritional Supplements (PYCNOGENOL PO) Take  by mouth.     • Huperzine Serrate A 1 % Powder by Does not apply route.     • Vinpocetine Powder by Does not apply route.     • cyanocobalamin (VITAMIN B-12) 100 MCG Tab Take 100 mcg by mouth every day.     • VITAMIN C, CALCIUM ASCORBATE, PO Take  by mouth.     • Turmeric, Curcuma Longa, (CURCUMIN) Powder by Does not apply route.     • magnesium citrate Solution Take 300 mL by mouth Once.     • coenzyme Q-10 30 MG capsule Take 60 mg by mouth every day.     • Ginkgo Biloba (GINKOBA PO) Take  by mouth.     • Nutritional Supplements (CALCIUM D-GLUCARATE PO) Take  by mouth.     • Multiple Vitamins-Minerals (GLUTAMINE MULTIVITAMIN/MINERAL PO) Take  by mouth.       No current Epic-ordered facility-administered medications on file.        Past Medical History:   Diagnosis Date   • Arthritis    • Back pain    • Pisano's esophagus     Dr. Anaya, in late 1990's   • Bladder infection    • Dyslipidemia 7/14/2016   • History of hemorrhoids    • History of measles    • History of pneumonia    • Hypothyroidism (acquired) 7/13/2015   • Osteochondroma    • Osteochondroma    • Sinusitis    • Thyroid disease    • Tinnitus 7/13/2015       Past Surgical History:   Procedure Laterality Date   • HYSTERECTOMY RADICAL     • OTHER      jaw surgery to correct overbite   • TONSILLECTOMY         Social History   Substance Use Topics   • Smoking status: Never Smoker   • Smokeless tobacco: Never Used   • Alcohol use No       Social History     Social History Narrative   • No narrative on file       Family History   Problem Relation Age of Onset   • Cancer Mother         leukemia   • Genetic Mother         osteochondroma   • Stroke Neg Hx    • Hyperlipidemia Neg Hx    • Hypertension Neg Hx    • Heart Disease Neg Hx    • Diabetes Neg Hx    • Lung  "Disease Neg Hx        ROS:     - Constitutional: Negative for fever, chills, unexpected weight change, and fatigue/generalized weakness.     - HEENTSee HPI.      - Respiratory: Negative for cough, sputum production, chest congestion, dyspnea, wheezing, and crackles.      - Cardiovascular: Negative for chest pain, palpitations, orthopnea, PND, and bilateral lower extremity edema.     Exam: /60 (BP Location: Left arm, Patient Position: Sitting, BP Cuff Size: Adult)   Pulse 72   Temp 37.2 °C (98.9 °F) (Temporal)   Ht 1.575 m (5' 2\")   Wt 47.6 kg (105 lb)   SpO2 98%  Body mass index is 19.2 kg/m².    General: Well appearing, NAD  HEENT: Normocephalic. Conjunctiva clear, lids without ptosis, pupils equal and reactive to light accommodation, ears normal shape and contour, canals are clear bilaterally, right tympanic membrane without bulging or erythema and normal cone of light, left tympanic membrane more opaque than right and bulging slightly., nasal mucosa normal, oropharynx is without erythema, edema or exudates.   Lymph: No cervical, supraclavicular lymph nodes are palpable  Skin: Warm and dry.  No obvious lesions.  Musculoskeletal:  No extremity cyanosis, clubbing, or edema.  Psych: Normal mood and affect. Alert and oriented. Judgment and insight is normal.      Please note that this dictation was created using voice recognition software. I have made every reasonable attempt to correct obvious errors, but I expect that there are errors of grammar and possibly content that I did not discover before finalizing the note.      Assessment/Plan  Ratna was seen today for otalgia.    Diagnoses and all orders for this visit:    Acute otitis media, unspecified otitis media type  New issue for the patient.  She definitely has some serous fluid and a more opaque tympanic membrane on the left than the right, though no ear erythema.  Given her pain, we will go ahead and treat with 5 days of amoxicillin.  I have gone " ahead and asked her to continue to use Zyrtec as well to help reabsorb the serous fluid.  She is in agreement with the plan.  -     amoxicillin (AMOXIL) 875 MG tablet; Take 1 Tab by mouth 2 times a day for 5 days.      Follow-up if symptoms not improving.    Shu Norwood DO  [facility]

## 2019-08-17 ENCOUNTER — PATIENT OUTREACH (OUTPATIENT)
Dept: HEALTH INFORMATION MANAGEMENT | Facility: OTHER | Age: 84
End: 2019-08-17

## 2019-08-17 NOTE — PROGRESS NOTES
1. Attempt #:1    2. HealthConnect Verified: yes    3. Verify PCP: yes    4. Review Care Team: yes    5. WebIZ Checked & Epic Updated: Yes  · WebIZ Recommendations: SHINGRIX (Shingles)  · Is patient due for Tdap? NO  · Is patient due for Shingles? YES. Patient was not notified of copay/out of pocket cost.    6. Reviewed/Updated the following with patient:       •   Communication Preference Obtained? YES       •   Preferred Pharmacy? YES       •   Preferred Lab? YES       •   Family History (document living status of immediate family members and if + hx of cancer, diabetes, hypertension, hyperlipidemia, heart attack, stroke) YES    7. Annual Wellness Visit Scheduling  · Scheduling Status:Scheduled     8. Care Gap Scheduling (Attempt to Schedule EACH Overdue Care Gap!)     Health Maintenance Due   Topic Date Due   • IMM ZOSTER VACCINES (1 of 2) 09/14/1987   • BONE DENSITY  09/24/2017   • Annual Wellness Visit  10/18/2017        Scheduled patient for Annual Wellness Visit     9. [employer] Activation: declined    10. [employer] Renetta: no    11. Virtual Visits: no    12. Opt In to Text Messages: no    13. Patient was advised: “This is a free wellness visit. The provider will screen for medical conditions to help you stay healthy. If you have other concerns to address you may be asked to discuss these at a separate visit or there may be an additional fee.”     14. Patient was informed to arrive 15 min prior to their scheduled appointment and bring in their medication bottles.

## 2019-08-19 ENCOUNTER — TELEPHONE (OUTPATIENT)
Dept: MEDICAL GROUP | Facility: PHYSICIAN GROUP | Age: 84
End: 2019-08-19

## 2019-08-19 NOTE — TELEPHONE ENCOUNTER
Future Appointments       Provider Department Center    8/26/2019 10:30 AM Shu Norwood D.O.; [facility]        ANNUAL WELLNESS VISIT PRE-VISIT PLANNING WITH OUTREACH    1.  If any orders were placed at last visit, do we have Results/Consult Notes?        •  Labs - Labs were not ordered at last office visit.  Note: If patient appointment is for lab review and patient did not complete labs, check with provider if OK to reschedule patient until labs completed.       •  Imaging - Imaging was not ordered at last office visit.       •  Referrals - No referrals were ordered at last office visit.    2.  Immunizations were updated in Epic using WebIZ?:Yes       •  WebIZ Recommendations: FLU, TD, SHINGRIX (Shingles) and Varicella        •  Is patient due for Tdap? NO       •  Is patient due for Shingrx? YES. Patient was not notified of copay/out of pocket cost.    3.  Patient has the following Care Path diagnoses on Problem List:  NONE    4.  Orders for overdue Health Maintenance topics pended in Pre-Charting? YES

## 2019-08-26 ENCOUNTER — OFFICE VISIT (OUTPATIENT)
Dept: MEDICAL GROUP | Facility: PHYSICIAN GROUP | Age: 84
End: 2019-08-26
Payer: MEDICARE

## 2019-08-26 VITALS
WEIGHT: 105 LBS | HEIGHT: 62 IN | TEMPERATURE: 97.4 F | DIASTOLIC BLOOD PRESSURE: 64 MMHG | OXYGEN SATURATION: 97 % | BODY MASS INDEX: 19.32 KG/M2 | HEART RATE: 64 BPM | SYSTOLIC BLOOD PRESSURE: 100 MMHG

## 2019-08-26 DIAGNOSIS — D16.9 OSTEOCHONDROMA: ICD-10-CM

## 2019-08-26 DIAGNOSIS — L23.7 ALLERGIC CONTACT DERMATITIS DUE TO PLANTS, EXCEPT FOOD: ICD-10-CM

## 2019-08-26 DIAGNOSIS — J30.2 SEASONAL ALLERGIES: ICD-10-CM

## 2019-08-26 DIAGNOSIS — F43.21 GRIEF: ICD-10-CM

## 2019-08-26 DIAGNOSIS — K22.70 BARRETT'S ESOPHAGUS WITHOUT DYSPLASIA: ICD-10-CM

## 2019-08-26 DIAGNOSIS — Z78.0 POSTMENOPAUSAL: ICD-10-CM

## 2019-08-26 DIAGNOSIS — H93.13 TINNITUS OF BOTH EARS: ICD-10-CM

## 2019-08-26 DIAGNOSIS — Z00.00 MEDICARE ANNUAL WELLNESS VISIT, SUBSEQUENT: Primary | ICD-10-CM

## 2019-08-26 DIAGNOSIS — Z80.6 FAMILY HISTORY OF LEUKEMIA: ICD-10-CM

## 2019-08-26 DIAGNOSIS — R53.83 OTHER FATIGUE: ICD-10-CM

## 2019-08-26 DIAGNOSIS — M85.80 OSTEOPENIA, UNSPECIFIED LOCATION: ICD-10-CM

## 2019-08-26 DIAGNOSIS — E03.8 SUBCLINICAL HYPOTHYROIDISM: ICD-10-CM

## 2019-08-26 DIAGNOSIS — K58.1 IRRITABLE BOWEL SYNDROME WITH CONSTIPATION: ICD-10-CM

## 2019-08-26 DIAGNOSIS — E78.5 DYSLIPIDEMIA: ICD-10-CM

## 2019-08-26 DIAGNOSIS — K90.41 NON-CELIAC GLUTEN SENSITIVITY: ICD-10-CM

## 2019-08-26 PROCEDURE — 8041 PR SCP AHA: Performed by: FAMILY MEDICINE

## 2019-08-26 PROCEDURE — G0439 PPPS, SUBSEQ VISIT: HCPCS | Performed by: FAMILY MEDICINE

## 2019-08-26 RX ORDER — RANITIDINE 150 MG/1
150 TABLET ORAL 2 TIMES DAILY
Qty: 60 TAB | Refills: 11 | Status: SHIPPED
Start: 2019-08-26 | End: 2020-02-24

## 2019-08-26 ASSESSMENT — PATIENT HEALTH QUESTIONNAIRE - PHQ9: CLINICAL INTERPRETATION OF PHQ2 SCORE: 0

## 2019-08-26 ASSESSMENT — ENCOUNTER SYMPTOMS: GENERAL WELL-BEING: GOOD

## 2019-08-26 ASSESSMENT — ACTIVITIES OF DAILY LIVING (ADL): BATHING_REQUIRES_ASSISTANCE: 0

## 2019-08-26 NOTE — PROGRESS NOTES
Chief Complaint   Patient presents with   • Annual Wellness Visit         HPI:  Ratna is a 83 y.o. here for Medicare Annual Wellness Visit        Patient Active Problem List    Diagnosis Date Noted   • Seasonal allergies 06/03/2019   • Osteochondroma 06/03/2019   • Pisano's esophagus 06/03/2019   • Non-celiac gluten sensitivity 06/03/2019   • Family history of leukemia 06/03/2019   • Other fatigue 06/03/2019   • Allergic contact dermatitis due to plants, except food 05/16/2018   • Osteopenia 05/08/2018   • Irritable bowel syndrome with constipation 04/30/2018   • Dyslipidemia 07/14/2016   • Subclinical hypothyroidism 07/13/2015   • Tinnitus 07/13/2015       Current Outpatient Medications   Medication Sig Dispense Refill   • ASHWAGANDHA PO Take  by mouth.     • raNITidine (ZANTAC) 150 MG Tab Take 1 Tab by mouth 2 times a day. 60 Tab 11   • BIOTIN PO Take  by mouth.     • SELENIUM PO Take  by mouth.     • ALPHA LIPOIC ACID PO Take  by mouth.     • LUTEIN PO Take  by mouth.     • Calcium Carb-Cholecalciferol (CALCIUM 600 + D PO) Take  by mouth.     • PAPAYA PO Take  by mouth.     • Acetylcarnitine HCl (ACETYL L-CARNITINE PO) Take  by mouth.     • Milk Thistle 1000 MG Cap Take  by mouth.     • Nutritional Supplements (PYCNOGENOL PO) Take  by mouth.     • Vinpocetine Powder by Does not apply route.     • cyanocobalamin (VITAMIN B-12) 100 MCG Tab Take 100 mcg by mouth every day.     • Turmeric, Curcuma Longa, (CURCUMIN) Powder by Does not apply route.     • magnesium citrate Solution Take 300 mL by mouth Once.     • coenzyme Q-10 30 MG capsule Take 60 mg by mouth every day.     • Ginkgo Biloba (GINKOBA PO) Take  by mouth.     • Nutritional Supplements (CALCIUM D-GLUCARATE PO) Take  by mouth.       No current facility-administered medications for this visit.         Patient is taking medications as noted in medication list.  Current supplements as per medication list.     Allergies: Codeine; Soy allergy; and Sulfa  drugs    Current social contact/activities: dancing      Is patient current with immunizations? No, due for SHINGRIX (Shingles). Patient is interested in receiving NONE today.    She  reports that she has never smoked. She has never used smokeless tobacco. She reports that she does not drink alcohol or use drugs.  Counseling given: Not Answered        DPA/Advanced directive: Patient does not have an Advanced Directive.  A packet and workshop information was given on Advanced Directives.    ROS:    Gait: Uses no assistive device   Ostomy: No   Other tubes: No   Amputations: No   Chronic oxygen use No   Last eye exam 2 years ago   Wears hearing aids: No   : Reports urinary leakage during the last 6 months that has somewhat interfered with their daily activities or sleep.      Depression Screening    Little interest or pleasure in doing things?  0 - not at all  Feeling down, depressed, or hopeless? 0 - not at all  Patient Health Questionnaire Score: 0    If depressive symptoms identified deferred to follow up visit unless specifically addressed in assessment and plan.    Interpretation of PHQ-9 Total Score   Score Severity   1-4 No Depression   5-9 Mild Depression   10-14 Moderate Depression   15-19 Moderately Severe Depression   20-27 Severe Depression    Screening for Cognitive Impairment    Three Minute Recall (village, kitchen, baby)  3/3 Village kitchen baby   Anton clock face with all 12 numbers and set the hands to show 10 past 10.  Yes 10:10 4/5  If cognitive concerns identified, deferred for follow up unless specifically addressed in assessment and plan.    Fall Risk Assessment    Has the patient had two or more falls in the last year or any fall with injury in the last year?  No  If fall risk identified, deferred for follow up unless specifically addressed in assessment and plan.    Safety Assessment    Throw rugs on floor.  Yes  Handrails on all stairs.  Yes  Good lighting in all hallways.  Yes  Difficulty  hearing.  Yes  Patient counseled about all safety risks that were identified.    Functional Assessment ADLs    Are there any barriers preventing you from cooking for yourself or meeting nutritional needs?  No.    Are there any barriers preventing you from driving safely or obtaining transportation?  No.    Are there any barriers preventing you from using a telephone or calling for help?  No.    Are there any barriers preventing you from shopping?  No.    Are there any barriers preventing you from taking care of your own finances?  No.    Are there any barriers preventing you from managing your medications?  No.    Are there any barriers preventing you from showering, bathing or dressing yourself?  No.    Are you currently engaging in any exercise or physical activity?  Yes.  Square dance   What is your perception of your health?  Good.    Health Maintenance Summary                BONE DENSITY Overdue 9/24/2017      Done 9/24/2012 DS-BONE DENSITY STUDY (DEXA)     Patient has more history with this topic...    Annual Wellness Visit Overdue 10/18/2017      Done 10/17/2016 Visit Dx: Medicare annual wellness visit, subsequent    IMM ZOSTER VACCINES Postponed 1/26/2020 Originally 9/14/1985. System: vaccine not available, other system reasons    IMM INFLUENZA Next Due 9/1/2019      Done 11/5/2018 Imm Admin: Influenza Vaccine Adult HD     Patient has more history with this topic...    IMM DTaP/Tdap/Td Vaccine Next Due 10/4/2027      Done 10/4/2017 Imm Admin: Tdap Vaccine          Patient Care Team:  Shu Norwood D.O. as PCP - General (Family Medicine)  Rj Boston M.D. as Consulting Physician (Ophthalmology)  Luciano Kerr D.P.M. as Consulting Physician (Podiatry)  Nazanin Tejeda M.D. as Consulting Physician (Dermatology)  Armando Weir M.D. (Gastroenterology)  Schuyler Radford M.D. as Consulting Physician (Otolaryngology)    Social History     Tobacco Use   • Smoking status: Never Smoker   •  "Smokeless tobacco: Never Used   Substance Use Topics   • Alcohol use: No     Alcohol/week: 0.0 oz   • Drug use: No     Family History   Problem Relation Age of Onset   • Cancer Mother         leukemia   • Genetic Disorder Mother         osteochondroma   • Stroke Neg Hx    • Hyperlipidemia Neg Hx    • Hypertension Neg Hx    • Heart Disease Neg Hx    • Diabetes Neg Hx    • Lung Disease Neg Hx      She  has a past medical history of Arthritis, Back pain, Pisano's esophagus, Bladder infection, Dyslipidemia (7/14/2016), History of hemorrhoids, History of measles, History of pneumonia, Hypothyroidism (acquired) (7/13/2015), Osteochondroma, Osteochondroma, Sinusitis, Thyroid disease, and Tinnitus (7/13/2015). She also has no past medical history of ASTHMA or Diabetes.   Past Surgical History:   Procedure Laterality Date   • HYSTERECTOMY RADICAL     • OTHER      jaw surgery to correct overbite   • TONSILLECTOMY             Exam:     /64 (BP Location: Left arm, Patient Position: Sitting, BP Cuff Size: Adult)   Pulse 64   Temp 36.3 °C (97.4 °F)   Ht 1.575 m (5' 2\")   Wt 47.6 kg (105 lb)   SpO2 97%  Body mass index is 19.2 kg/m².    Hearing excellent.    Dentition good  Alert, oriented in no acute distress.  Eye contact is good, speech goal directed, affect calm      Assessment and Plan. The following treatment and monitoring plan is recommended:    1. Medicare annual wellness visit, subsequent     2. Osteopenia, unspecified location   chronic issue for the patient.  Due for DEXA scan and ordered as below.   3. Allergic contact dermatitis due to plants, except food   resolved.   4. Pisano's esophagus without dysplasia   chronic issue.  Patient prefers not to continue monitoring with EGD.  She does complain of some heartburn today and wants to be on a medication which is safe to take on a daily basis.  RaNITidine (ZANTAC) 150 MG Tab prescribed today.   5. Dyslipidemia   chronic well-controlled problem for the " patient.  No current concerns.   6. Family history of leukemia   no current concerns.   7. Irritable bowel syndrome with constipation   chronic stable problem.  No current concerns.   8. Non-celiac gluten sensitivity   chronic issue, following gluten-free diet.   9. Osteochondroma   chronic stable problem.  No current concerns.   10. Other fatigue   resolved.   11. Seasonal allergies   chronic stable problem.  No current concerns.   12. Subclinical hypothyroidism   previous issue for the patient, not currently on any medication.  We will continue to monitor thyroid labs.   13. Tinnitus of both ears   chronic issue for the patient, uncontrolled but has been told nothing can be done about it.   14. Postmenopausal  DS-BONE DENSITY STUDY (DEXA)   15. Grief   patient lost her  last May, and she is currently going through issues with grief.  She does not want to be on any medications at this time and she does have a very supportive family who lives here in [place].         Services suggested: No services needed at this time  Health Care Screening recommendations as per orders if indicated.  Referrals offered: PT/OT/Nutrition counseling/Behavioral Health/Smoking cessation as per orders if indicated.    Discussion today about general wellness and lifestyle habits:    · Prevent falls and reduce trip hazards; Cautioned about securing or removing rugs.  · Have a working fire alarm and carbon monoxide detector;   · Engage in regular physical activity and social activities       Follow-up: Return in about 6 months (around 2/26/2020).

## 2019-08-30 ENCOUNTER — OFFICE VISIT (OUTPATIENT)
Dept: MEDICAL GROUP | Facility: PHYSICIAN GROUP | Age: 84
End: 2019-08-30
Payer: MEDICARE

## 2019-08-30 ENCOUNTER — HOSPITAL ENCOUNTER (OUTPATIENT)
Facility: MEDICAL CENTER | Age: 84
End: 2019-08-30
Attending: PHYSICIAN ASSISTANT
Payer: MEDICARE

## 2019-08-30 VITALS
HEART RATE: 56 BPM | OXYGEN SATURATION: 98 % | HEIGHT: 62 IN | TEMPERATURE: 98.3 F | BODY MASS INDEX: 19.32 KG/M2 | SYSTOLIC BLOOD PRESSURE: 122 MMHG | WEIGHT: 105 LBS | DIASTOLIC BLOOD PRESSURE: 68 MMHG

## 2019-08-30 DIAGNOSIS — N89.8 VAGINAL DISCHARGE: ICD-10-CM

## 2019-08-30 PROCEDURE — 87660 TRICHOMONAS VAGIN DIR PROBE: CPT

## 2019-08-30 PROCEDURE — 99214 OFFICE O/P EST MOD 30 MIN: CPT | Performed by: PHYSICIAN ASSISTANT

## 2019-08-30 PROCEDURE — 87510 GARDNER VAG DNA DIR PROBE: CPT

## 2019-08-30 PROCEDURE — 87480 CANDIDA DNA DIR PROBE: CPT

## 2019-08-30 NOTE — PROGRESS NOTES
"Subjective:   Ratna Cline is a 83 y.o. female here today for possible yeast infection. Is an established patient of Shu Norwood DO.    HPI:    Here today with complaints of vaginal discharge. Describes as \"sticky.\" Has had this discharge for \"years\" off and on. Denies associated pain/burning, itching. Denies any urinary symptoms including dysuria, urinary frequency/urgency. She is no longer sexually active.  has passed away. Was seen in July by PCP and had vaginal pathogen testing which came back negative. Urine culture was also negative. Tried miconazole vaginal cream the last 2 nights with improvement in symptoms. Is going on trip and requesting refills in case this is a fungal infection.      Current medicines (including changes today)  Current Outpatient Medications   Medication Sig Dispense Refill   • miconazole (MICOTIN) 2 % Cream Apply  to affected area(s) 2 times a day.     • miconazole (MICONAZOLE 7) 2 % Cream Insert 1 Applicator in vagina every evening for 7 days. 1 Tube 0   • ASHWAGANDHA PO Take  by mouth.     • raNITidine (ZANTAC) 150 MG Tab Take 1 Tab by mouth 2 times a day. 60 Tab 11   • BIOTIN PO Take  by mouth.     • SELENIUM PO Take  by mouth.     • ALPHA LIPOIC ACID PO Take  by mouth.     • Calcium Carb-Cholecalciferol (CALCIUM 600 + D PO) Take  by mouth.     • Milk Thistle 1000 MG Cap Take  by mouth.     • Vinpocetine Powder by Does not apply route.     • Turmeric, Curcuma Longa, (CURCUMIN) Powder by Does not apply route.     • magnesium citrate Solution Take 300 mL by mouth Once.     • Ginkgo Biloba (GINKOBA PO) Take  by mouth.     • Nutritional Supplements (CALCIUM D-GLUCARATE PO) Take  by mouth.     • LUTEIN PO Take  by mouth.     • PAPAYA PO Take  by mouth.     • Acetylcarnitine HCl (ACETYL L-CARNITINE PO) Take  by mouth.     • Nutritional Supplements (PYCNOGENOL PO) Take  by mouth.     • cyanocobalamin (VITAMIN B-12) 100 MCG Tab Take 100 mcg by mouth every day.     • " "coenzyme Q-10 30 MG capsule Take 60 mg by mouth every day.       No current facility-administered medications for this visit.      She  has a past medical history of Arthritis, Back pain, Pisano's esophagus, Bladder infection, Dyslipidemia (7/14/2016), History of hemorrhoids, History of measles, History of pneumonia, Hypothyroidism (acquired) (7/13/2015), Osteochondroma, Osteochondroma, Sinusitis, Thyroid disease, and Tinnitus (7/13/2015). She also has no past medical history of ASTHMA or Diabetes.    ROS  As per HPI.       Objective:     /68 (BP Location: Right arm, Patient Position: Sitting, BP Cuff Size: Adult)   Pulse (!) 56   Temp 36.8 °C (98.3 °F)   Ht 1.575 m (5' 2\")   Wt 47.6 kg (105 lb)   SpO2 98%  Body mass index is 19.2 kg/m².     Physical Exam:  Constitutional: Alert, well-appearing, very pleasant, no distress.  Skin: No rashes in visible areas.  Eye: Pupils are equal and round, conjunctiva clear, lids normal.  ENMT: Lips without lesions, moist mucus membranes.      Assessment and Plan:   The following treatment plan was discussed    1. Vaginal discharge  New problem, uncontrolled. Vaginal pathogens testing repeated today with vaginal swab. Full pelvic exam deferred per patient preference. I have refilled her miconazole cream given recent subjective improvement in symptoms with cream. If vaginal pathogen testing is negative for Candida or other infectious etiology, recommend evaluation by gynecology for pelvic exam/evaluation for other causes of vaginitis including atrophic vaginitis. Patient agreeable with this plan.  - miconazole (MICONAZOLE 7) 2 % Cream; Insert 1 Applicator in vagina every evening for 7 days.  Dispense: 1 Tube; Refill: 0  - VAGINAL PATHOGENS DNA PANEL; Future  - REFERRAL TO GYNECOLOGY      Followup: Return if symptoms worsen or fail to improve.    Diana Cline P.A.-C.             "

## 2019-08-31 DIAGNOSIS — N89.8 VAGINAL DISCHARGE: ICD-10-CM

## 2019-08-31 LAB
CANDIDA DNA VAG QL PROBE+SIG AMP: NEGATIVE
G VAGINALIS DNA VAG QL PROBE+SIG AMP: NEGATIVE
T VAGINALIS DNA VAG QL PROBE+SIG AMP: NEGATIVE

## 2019-09-03 ENCOUNTER — TELEPHONE (OUTPATIENT)
Dept: MEDICAL GROUP | Facility: PHYSICIAN GROUP | Age: 84
End: 2019-09-03

## 2019-09-03 NOTE — TELEPHONE ENCOUNTER
----- Message from Diana Cline P.A.-C. sent at 9/2/2019  9:42 PM PDT -----  Please inform patient that all vaginal testing came back negative.  Diana Cline P.A.-C.

## 2019-09-03 NOTE — TELEPHONE ENCOUNTER
Phone Number Called: [phone] (home)     Call outcome: left message for patient to call back regarding message below

## 2019-09-04 NOTE — TELEPHONE ENCOUNTER
Phone Number Called: [phone] (home)     Call outcome: left message for patient to call back regarding message below

## 2019-09-20 ENCOUNTER — OFFICE VISIT (OUTPATIENT)
Dept: URGENT CARE | Facility: PHYSICIAN GROUP | Age: 84
End: 2019-09-20
Payer: MEDICARE

## 2019-09-20 VITALS
RESPIRATION RATE: 16 BRPM | DIASTOLIC BLOOD PRESSURE: 78 MMHG | SYSTOLIC BLOOD PRESSURE: 120 MMHG | WEIGHT: 105 LBS | TEMPERATURE: 98.6 F | HEART RATE: 74 BPM | OXYGEN SATURATION: 96 % | BODY MASS INDEX: 19.32 KG/M2 | HEIGHT: 62 IN

## 2019-09-20 DIAGNOSIS — J01.10 ACUTE NON-RECURRENT FRONTAL SINUSITIS: Primary | ICD-10-CM

## 2019-09-20 PROCEDURE — 99214 OFFICE O/P EST MOD 30 MIN: CPT | Performed by: PHYSICIAN ASSISTANT

## 2019-09-20 RX ORDER — AMOXICILLIN AND CLAVULANATE POTASSIUM 875; 125 MG/1; MG/1
1 TABLET, FILM COATED ORAL 2 TIMES DAILY
Qty: 10 TAB | Refills: 0 | Status: SHIPPED | OUTPATIENT
Start: 2019-09-20 | End: 2019-09-25

## 2019-09-20 ASSESSMENT — ENCOUNTER SYMPTOMS
SORE THROAT: 0
DIARRHEA: 0
FEVER: 0
NAUSEA: 0
CONSTIPATION: 0
SHORTNESS OF BREATH: 0
HOARSE VOICE: 1
COUGH: 0
CHILLS: 0
SPUTUM PRODUCTION: 0
ABDOMINAL PAIN: 0
VOMITING: 0
SINUS PAIN: 1
SINUS PRESSURE: 1

## 2019-09-20 NOTE — PROGRESS NOTES
Subjective:   Ratna Cline is a 84 y.o. female who presents for Sinus Pain (head feels plugged, congestion, mucus, ear pain, )        Sinusitis   This is a new problem. The current episode started yesterday. The problem is unchanged. There has been no fever. Associated symptoms include congestion, ear pain, a hoarse voice, sinus pressure and sneezing. Pertinent negatives include no chills, coughing, shortness of breath or sore throat. Past treatments include nothing.     Review of Systems   Constitutional: Negative for chills, fever and malaise/fatigue.   HENT: Positive for congestion, ear pain, hoarse voice, sinus pressure, sinus pain and sneezing. Negative for sore throat.    Respiratory: Negative for cough, sputum production and shortness of breath.    Gastrointestinal: Negative for abdominal pain, constipation, diarrhea, nausea and vomiting.   All other systems reviewed and are negative.      PMH:  has a past medical history of Arthritis, Back pain, Pisano's esophagus, Bladder infection, Dyslipidemia (7/14/2016), History of hemorrhoids, History of measles, History of pneumonia, Hypothyroidism (acquired) (7/13/2015), Osteochondroma, Osteochondroma, Sinusitis, Thyroid disease, and Tinnitus (7/13/2015). She also has no past medical history of ASTHMA or Diabetes.  MEDS:   Current Outpatient Medications:   •  amoxicillin-clavulanate (AUGMENTIN) 875-125 MG Tab, Take 1 Tab by mouth 2 times a day for 5 days., Disp: 10 Tab, Rfl: 0  •  miconazole (MICOTIN) 2 % Cream, Apply  to affected area(s) 2 times a day., Disp: , Rfl:   •  ASHWAGANDHA PO, Take  by mouth., Disp: , Rfl:   •  raNITidine (ZANTAC) 150 MG Tab, Take 1 Tab by mouth 2 times a day., Disp: 60 Tab, Rfl: 11  •  BIOTIN PO, Take  by mouth., Disp: , Rfl:   •  SELENIUM PO, Take  by mouth., Disp: , Rfl:   •  ALPHA LIPOIC ACID PO, Take  by mouth., Disp: , Rfl:   •  LUTEIN PO, Take  by mouth., Disp: , Rfl:   •  Calcium Carb-Cholecalciferol (CALCIUM 600 + D  "PO), Take  by mouth., Disp: , Rfl:   •  PAPAYA PO, Take  by mouth., Disp: , Rfl:   •  Acetylcarnitine HCl (ACETYL L-CARNITINE PO), Take  by mouth., Disp: , Rfl:   •  Milk Thistle 1000 MG Cap, Take  by mouth., Disp: , Rfl:   •  Nutritional Supplements (PYCNOGENOL PO), Take  by mouth., Disp: , Rfl:   •  Vinpocetine Powder, by Does not apply route., Disp: , Rfl:   •  cyanocobalamin (VITAMIN B-12) 100 MCG Tab, Take 100 mcg by mouth every day., Disp: , Rfl:   •  Turmeric, Curcuma Longa, (CURCUMIN) Powder, by Does not apply route., Disp: , Rfl:   •  magnesium citrate Solution, Take 300 mL by mouth Once., Disp: , Rfl:   •  coenzyme Q-10 30 MG capsule, Take 60 mg by mouth every day., Disp: , Rfl:   •  Ginkgo Biloba (GINKOBA PO), Take  by mouth., Disp: , Rfl:   •  Nutritional Supplements (CALCIUM D-GLUCARATE PO), Take  by mouth., Disp: , Rfl:   ALLERGIES:   Allergies   Allergen Reactions   • Codeine Vomiting   • Soy Allergy    • Sulfa Drugs Nausea     SURGHX:   Past Surgical History:   Procedure Laterality Date   • HYSTERECTOMY RADICAL     • OTHER      jaw surgery to correct overbite   • TONSILLECTOMY       SOCHX:  reports that she has never smoked. She has never used smokeless tobacco. She reports that she does not drink alcohol or use drugs.  Family History   Problem Relation Age of Onset   • Cancer Mother         leukemia   • Genetic Disorder Mother         osteochondroma   • Stroke Neg Hx    • Hyperlipidemia Neg Hx    • Hypertension Neg Hx    • Heart Disease Neg Hx    • Diabetes Neg Hx    • Lung Disease Neg Hx         Objective:   /78   Pulse 74   Temp 37 °C (98.6 °F)   Resp 16   Ht 1.575 m (5' 2\")   Wt 47.6 kg (105 lb)   SpO2 96%   BMI 19.20 kg/m²     Physical Exam   Constitutional: She is oriented to person, place, and time. She appears well-developed and well-nourished. No distress.   HENT:   Head: Normocephalic and atraumatic.   Right Ear: Tympanic membrane and ear canal normal.   Left Ear: Tympanic " membrane and ear canal normal.   Nose: Mucosal edema, rhinorrhea and sinus tenderness present.   Mouth/Throat: Uvula is midline and mucous membranes are normal. Posterior oropharyngeal erythema present. No tonsillar exudate.   Eyes: Pupils are equal, round, and reactive to light. Conjunctivae are normal.   Neck: Normal range of motion. Neck supple. No tracheal deviation present.   Cardiovascular: Normal rate and regular rhythm.   Pulmonary/Chest: Effort normal and breath sounds normal. No stridor. No respiratory distress. She has no wheezes. She has no rales.   Lymphadenopathy:     She has cervical adenopathy.   Neurological: She is alert and oriented to person, place, and time.   Skin: Skin is warm and dry. Capillary refill takes less than 2 seconds.   Psychiatric: She has a normal mood and affect. Her behavior is normal.   Vitals reviewed.        Assessment/Plan:     1. Acute non-recurrent frontal sinusitis  amoxicillin-clavulanate (AUGMENTIN) 875-125 MG Tab     We discussed sx are most commonly due to viral etiology. Supportive care reviewed. Patient was given a contingent antibiotic prescription to fill and use as directed if symptoms progressed as discussed and agreed upon.    Follow-up with primary care provider.  If symptoms worsen or persist patient can return to clinic for reevaluation. All side effects of medication discussed including allergic response, GI upset, tendon injury, etc. Patient confirmed understanding of information.    Please note that this dictation was created using voice recognition software. I have made every reasonable attempt to correct obvious errors, but I expect that there are errors of grammar and possibly content that I did not discover before finalizing the note.

## 2019-09-20 NOTE — PATIENT INSTRUCTIONS

## 2019-10-04 ENCOUNTER — GYNECOLOGY VISIT (OUTPATIENT)
Dept: OBGYN | Facility: CLINIC | Age: 84
End: 2019-10-04
Payer: MEDICARE

## 2019-10-04 VITALS — DIASTOLIC BLOOD PRESSURE: 62 MMHG | SYSTOLIC BLOOD PRESSURE: 110 MMHG | BODY MASS INDEX: 19.57 KG/M2 | WEIGHT: 107 LBS

## 2019-10-04 DIAGNOSIS — N95.2 ATROPHIC VAGINITIS: ICD-10-CM

## 2019-10-04 PROCEDURE — 99203 OFFICE O/P NEW LOW 30 MIN: CPT | Performed by: OBSTETRICS & GYNECOLOGY

## 2019-10-04 NOTE — PROGRESS NOTES
Chief complaint; new patient    Ratna Cline is a 84 y.o.  who presents complaining of vaginal discharge over the last year..  Has no itching no foul odor had a hysterectomy for menorrhagia many years ago.  Patient has bilateral breast implants and does not perform mammograms.  She states she does not need to have mammograms.    Review of systems; denies fever chills abdominal pain, denies chest pain shortness of breath or urinary symptoms  Past medical history-  Past Medical History:   Diagnosis Date   • Arthritis    • Back pain    • Pisano's esophagus     Dr. Anyaa, in late    • Bladder infection    • Dyslipidemia 2016   • History of hemorrhoids    • History of measles    • History of pneumonia    • Hypothyroidism (acquired) 2015   • Osteochondroma    • Osteochondroma    • Sinusitis    • Thyroid disease    • Tinnitus 2015     Past surgical history-  Past Surgical History:   Procedure Laterality Date   • HYSTERECTOMY RADICAL     • OTHER      jaw surgery to correct overbite   • TONSILLECTOMY       Allergies-Codeine; Soy allergy; and Sulfa drugs  Medications-  Current Outpatient Medications on File Prior to Visit   Medication Sig Dispense Refill   • miconazole (MICOTIN) 2 % Cream Apply  to affected area(s) 2 times a day.     • ASHWAGANDHA PO Take  by mouth.     • raNITidine (ZANTAC) 150 MG Tab Take 1 Tab by mouth 2 times a day. 60 Tab 11   • BIOTIN PO Take  by mouth.     • SELENIUM PO Take  by mouth.     • ALPHA LIPOIC ACID PO Take  by mouth.     • LUTEIN PO Take  by mouth.     • Calcium Carb-Cholecalciferol (CALCIUM 600 + D PO) Take  by mouth.     • PAPAYA PO Take  by mouth.     • Acetylcarnitine HCl (ACETYL L-CARNITINE PO) Take  by mouth.     • Milk Thistle 1000 MG Cap Take  by mouth.     • Nutritional Supplements (PYCNOGENOL PO) Take  by mouth.     • Vinpocetine Powder by Does not apply route.     • cyanocobalamin (VITAMIN B-12) 100 MCG Tab Take 100 mcg by mouth every day.      • Turmeric, Curcuma Longa, (CURCUMIN) Powder by Does not apply route.     • magnesium citrate Solution Take 300 mL by mouth Once.     • coenzyme Q-10 30 MG capsule Take 60 mg by mouth every day.     • Ginkgo Biloba (GINKOBA PO) Take  by mouth.     • Nutritional Supplements (CALCIUM D-GLUCARATE PO) Take  by mouth.       No current facility-administered medications on file prior to visit.      Social history-  Social History     Socioeconomic History   • Marital status: Single     Spouse name: Not on file   • Number of children: Not on file   • Years of education: Not on file   • Highest education level: Not on file   Occupational History   • Not on file   Social Needs   • Financial resource strain: Not on file   • Food insecurity:     Worry: Not on file     Inability: Not on file   • Transportation needs:     Medical: Not on file     Non-medical: Not on file   Tobacco Use   • Smoking status: Never Smoker   • Smokeless tobacco: Never Used   Substance and Sexual Activity   • Alcohol use: No     Alcohol/week: 0.0 oz   • Drug use: No   • Sexual activity: Never     Partners: Male   Lifestyle   • Physical activity:     Days per week: Not on file     Minutes per session: Not on file   • Stress: Not on file   Relationships   • Social connections:     Talks on phone: Not on file     Gets together: Not on file     Attends [employer] service: Not on file     Active member of club or organization: Not on file     Attends meetings of clubs or organizations: Not on file     Relationship status: Not on file   • Intimate partner violence:     Fear of current or ex partner: Not on file     Emotionally abused: Not on file     Physically abused: Not on file     Forced sexual activity: Not on file   Other Topics Concern   • Not on file   Social History Narrative   • Not on file     Past Family History-no history of breast or ovarian cancer    Physical examination;  Alert and oriented x3  General a thin well-developed well-nourished  female in no apparent distress  Vitals:    10/04/19 0916   BP: 110/62   Weight: 48.5 kg (107 lb)     Skin is warm and dry  Neck-supple  HEENT-head-atraumatic, normocephalic, EOMI, PERRLA  Cardiovascular-regular rate and rhythm, normal S1-S2, no murmurs or gallops  Lungs-clear to auscultation bilaterally  Back-negative CVA tenderness  Abdomen-nondistended positive bowel sounds soft nontender no masses or hepatosplenomegaly  Pelvic examination;  External genitalia-no visible lesions   Vagina-no blood or discharge KOH and saline prep are negative  Cervix-vaginal cuff without lesions and surgically absent  Uterus surgically absent  Adnexa no palpable masses  Extremities without cyanosis clubbing or edema  Neurologic exam grossly intact    Impression;  Atrophic vaginitis    Plan;  Would not recommend  patient start estrogen vaginal cream because she is not willing to perform mammograms-there is always a likelihood she has a small latent breast cancer which is nonpalpable on physical examination and mammogram would be the best modality to elucidate this.  If the patient does have an undiagnosed breast cancer surgeon vaginal cream would stimulate the tumor growth.  No treatment or further diagnosis is warranted at the present time.  The patient has been counseled to perform mammogram as soon as possible and the risks of breast cancer were discussed.      30  Minutes spent with the patient in face-to-face contact, greater than 50% of the time spent on counseling and coordination of care. All questions answered in detail.

## 2019-10-04 NOTE — NON-PROVIDER
Pt here NP referral vaginal discharge  Pt states yellowish discharge that feels sticky, jason odor, or itching, burning  Good#[phone]  Pharmacy verified

## 2019-10-15 ENCOUNTER — OFFICE VISIT (OUTPATIENT)
Dept: MEDICAL GROUP | Facility: PHYSICIAN GROUP | Age: 84
End: 2019-10-15
Payer: MEDICARE

## 2019-10-15 VITALS
TEMPERATURE: 98.3 F | WEIGHT: 106 LBS | BODY MASS INDEX: 19.51 KG/M2 | SYSTOLIC BLOOD PRESSURE: 122 MMHG | DIASTOLIC BLOOD PRESSURE: 60 MMHG | OXYGEN SATURATION: 97 % | HEIGHT: 62 IN | HEART RATE: 69 BPM

## 2019-10-15 DIAGNOSIS — Z23 NEED FOR VACCINATION: ICD-10-CM

## 2019-10-15 DIAGNOSIS — T14.8XXA ABRASION: Primary | ICD-10-CM

## 2019-10-15 PROCEDURE — 99213 OFFICE O/P EST LOW 20 MIN: CPT | Mod: 25 | Performed by: FAMILY MEDICINE

## 2019-10-15 PROCEDURE — G0008 ADMIN INFLUENZA VIRUS VAC: HCPCS | Performed by: FAMILY MEDICINE

## 2019-10-15 PROCEDURE — 90662 IIV NO PRSV INCREASED AG IM: CPT | Performed by: FAMILY MEDICINE

## 2019-10-15 NOTE — PROGRESS NOTES
CC: Abrasion    HISTORY OF THE PRESENT ILLNESS: Patient is a 84 y.o. female. This pleasant patient is here today to discuss the following health issue.    Patient is here today with concerns of abrasion on extensor surface of right forearm.  It happened about exactly 1 day ago.  Patient reports that she hit her arm on a sagebrush branch and ended up with a fairly significant abrasion in that area.  She does note that she has issues with very thin skin and easy bruising and bleeding.  Since this time, she has had some pain in the area but bleeding has ceased.  She has been using antibiotic ointment and a Band-Aid on it as well as an antiseptic spray.  She wanted to come in and make sure that she was appropriately caring for the wound so that it would heal okay.    She would also like flu vaccine today.    Allergies: Codeine; Soy allergy; and Sulfa drugs    Current Outpatient Medications Ordered in Epic   Medication Sig Dispense Refill   • miconazole (MICOTIN) 2 % Cream Apply  to affected area(s) 2 times a day.     • ASHWAGANDHA PO Take  by mouth.     • BIOTIN PO Take  by mouth.     • SELENIUM PO Take  by mouth.     • ALPHA LIPOIC ACID PO Take  by mouth.     • LUTEIN PO Take  by mouth.     • Calcium Carb-Cholecalciferol (CALCIUM 600 + D PO) Take  by mouth.     • PAPAYA PO Take  by mouth.     • Acetylcarnitine HCl (ACETYL L-CARNITINE PO) Take  by mouth.     • Milk Thistle 1000 MG Cap Take  by mouth.     • Nutritional Supplements (PYCNOGENOL PO) Take  by mouth.     • Vinpocetine Powder by Does not apply route.     • cyanocobalamin (VITAMIN B-12) 100 MCG Tab Take 100 mcg by mouth every day.     • Turmeric, Curcuma Longa, (CURCUMIN) Powder by Does not apply route.     • magnesium citrate Solution Take 300 mL by mouth Once.     • coenzyme Q-10 30 MG capsule Take 60 mg by mouth every day.     • Ginkgo Biloba (GINKOBA PO) Take  by mouth.     • Nutritional Supplements (CALCIUM D-GLUCARATE PO) Take  by mouth.     • raNITidine  "(ZANTAC) 150 MG Tab Take 1 Tab by mouth 2 times a day. 60 Tab 11     No current Epic-ordered facility-administered medications on file.        Past Medical History:   Diagnosis Date   • Arthritis    • Back pain    • Pisano's esophagus     Dr. Anaya, in late 1990's   • Bladder infection    • Dyslipidemia 7/14/2016   • History of hemorrhoids    • History of measles    • History of pneumonia    • Hypothyroidism (acquired) 7/13/2015   • Osteochondroma    • Osteochondroma    • Sinusitis    • Thyroid disease    • Tinnitus 7/13/2015       Past Surgical History:   Procedure Laterality Date   • HYSTERECTOMY RADICAL     • OTHER      jaw surgery to correct overbite   • TONSILLECTOMY         Social History     Tobacco Use   • Smoking status: Never Smoker   • Smokeless tobacco: Never Used   Substance Use Topics   • Alcohol use: No     Alcohol/week: 0.0 oz   • Drug use: No       Social History     Social History Narrative   • Not on file       Family History   Problem Relation Age of Onset   • Cancer Mother         leukemia   • Genetic Disorder Mother         osteochondroma   • Stroke Neg Hx    • Hyperlipidemia Neg Hx    • Hypertension Neg Hx    • Heart Disease Neg Hx    • Diabetes Neg Hx    • Lung Disease Neg Hx        ROS:     - Constitutional: Negative for fever, chills, unexpected weight change, and fatigue/generalized weakness.     - Skin: Negative for rash, itching, cyanotic skin color change.     Exam: /60 (BP Location: Right arm, Patient Position: Sitting, BP Cuff Size: Adult)   Pulse 69   Temp 36.8 °C (98.3 °F) (Temporal)   Ht 1.575 m (5' 2\")   Wt 48.1 kg (106 lb)   SpO2 97%  Body mass index is 19.39 kg/m².    General: Well appearing, NAD  Skin: Warm and dry.  Extensor surface of right forearm with abrasion approximately 1 inch x 1 inch in diameter with associated skin flap present.  Some minimal surrounding erythema, but no signs of infection or drainage.  Musculoskeletal:  No extremity cyanosis, " clubbing, or edema.  Psych: Normal mood and affect. Alert and oriented. Judgment and insight is normal.    Please note that this dictation was created using voice recognition software. I have made every reasonable attempt to correct obvious errors, but I expect that there are errors of grammar and possibly content that I did not discover before finalizing the note.      Assessment/Plan  Ratna was seen today for laceration and immunizations.    Diagnoses and all orders for this visit:    Abrasion  New uncontrolled issue for the patient.  We went over wound care today including cleaning area with gentle cleanser such as saline solution, application of antibiotic ointment and dressing of the wound.  She was given wound care items to take at home with her.  Red flags reviewed with patient including worsening redness surrounding the area, fevers, chills or drainage, in which case she will make sure to let me know right away.    Need for vaccination  -     INFLUENZA VACCINE, HIGH DOSE (65+ ONLY)    Follow-up if symptoms not improving.    Shu Norwood, DO  [facility]

## 2019-10-22 ENCOUNTER — OFFICE VISIT (OUTPATIENT)
Dept: URGENT CARE | Facility: PHYSICIAN GROUP | Age: 84
End: 2019-10-22
Payer: MEDICARE

## 2019-10-22 VITALS
TEMPERATURE: 98.4 F | RESPIRATION RATE: 16 BRPM | OXYGEN SATURATION: 98 % | DIASTOLIC BLOOD PRESSURE: 54 MMHG | WEIGHT: 106 LBS | BODY MASS INDEX: 19.51 KG/M2 | SYSTOLIC BLOOD PRESSURE: 98 MMHG | HEART RATE: 66 BPM | HEIGHT: 62 IN

## 2019-10-22 DIAGNOSIS — S40.811D: ICD-10-CM

## 2019-10-22 DIAGNOSIS — R68.89 SUSPECTED SOFT TISSUE INFECTION: ICD-10-CM

## 2019-10-22 PROCEDURE — 99214 OFFICE O/P EST MOD 30 MIN: CPT | Performed by: NURSE PRACTITIONER

## 2019-10-22 RX ORDER — CLINDAMYCIN HYDROCHLORIDE 300 MG/1
300 CAPSULE ORAL 3 TIMES DAILY
Qty: 30 CAP | Refills: 0 | Status: SHIPPED | OUTPATIENT
Start: 2019-10-22 | End: 2019-11-01

## 2019-10-22 ASSESSMENT — ENCOUNTER SYMPTOMS
SWOLLEN GLANDS: 0
JOINT SWELLING: 0
WEAKNESS: 0
NUMBNESS: 0
MYALGIAS: 0
FEVER: 0
CHILLS: 0

## 2019-10-22 NOTE — PROGRESS NOTES
Subjective:      Ratna Cline is a 84 y.o. female who presents with Abrasion (skin tear 10 days ago.  Has not healed and tender to the touch.)            Abrasion   This is a recurrent problem. Episode onset: 10/15/2019. The problem occurs constantly. The problem has been gradually worsening. Pertinent negatives include no chills, fever, joint swelling, myalgias, numbness, swollen glands or weakness. Associated symptoms comments: Patient reports redness and soreness around wound. . Nothing aggravates the symptoms. Treatments tried: polysporin and saline. The treatment provided no relief.       Review of Systems   Constitutional: Negative for chills and fever.   Musculoskeletal: Negative for joint pain, joint swelling and myalgias.   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     Past Medical History:   Diagnosis Date   • Arthritis    • Back pain    • Pisano's esophagus     Dr. Anaya, in late 1990's   • Bladder infection    • Dyslipidemia 7/14/2016   • History of hemorrhoids    • History of measles    • History of pneumonia    • Hypothyroidism (acquired) 7/13/2015   • Osteochondroma    • Osteochondroma    • Sinusitis    • Thyroid disease    • Tinnitus 7/13/2015      Past Surgical History:   Procedure Laterality Date   • HYSTERECTOMY RADICAL     • OTHER      jaw surgery to correct overbite   • TONSILLECTOMY        Social History     Socioeconomic History   • Marital status: Single     Spouse name: Not on file   • Number of children: Not on file   • Years of education: Not on file   • Highest education level: Not on file   Occupational History   • Not on file   Social Needs   • Financial resource strain: Not on file   • Food insecurity:     Worry: Not on file     Inability: Not on file   • Transportation needs:     Medical: Not on file     Non-medical: Not on file   Tobacco Use   • Smoking status: Never  "Smoker   • Smokeless tobacco: Never Used   Substance and Sexual Activity   • Alcohol use: No     Alcohol/week: 0.0 oz   • Drug use: No   • Sexual activity: Never     Partners: Male   Lifestyle   • Physical activity:     Days per week: Not on file     Minutes per session: Not on file   • Stress: Not on file   Relationships   • Social connections:     Talks on phone: Not on file     Gets together: Not on file     Attends Anglican service: Not on file     Active member of club or organization: Not on file     Attends meetings of clubs or organizations: Not on file     Relationship status: Not on file   • Intimate partner violence:     Fear of current or ex partner: Not on file     Emotionally abused: Not on file     Physically abused: Not on file     Forced sexual activity: Not on file   Other Topics Concern   • Not on file   Social History Narrative   • Not on file    allergies: Codeine; Soy allergy; and Sulfa drugs         Objective:     BP (!) 98/54   Pulse 66   Temp 36.9 °C (98.4 °F) (Temporal)   Resp 16   Ht 1.575 m (5' 2\")   Wt 48.1 kg (106 lb)   SpO2 98%   BMI 19.39 kg/m²      Physical Exam   Constitutional: She is oriented to person, place, and time. Vital signs are normal. She appears well-developed and well-nourished.   HENT:   Head: Atraumatic.   Cardiovascular: Normal rate, regular rhythm and normal heart sounds.   Pulmonary/Chest: Effort normal and breath sounds normal.   Neurological: She is alert and oriented to person, place, and time.   Skin: Skin is warm and dry. Capillary refill takes less than 2 seconds.        Psychiatric: She has a normal mood and affect. Her behavior is normal. Judgment and thought content normal.   Vitals reviewed.              Assessment/Plan:     1. Abrasion of arm, right, subsequent encounter  - clindamycin (CLEOCIN) 300 MG Cap; Take 1 Cap by mouth 3 times a day for 10 days.  Dispense: 30 Cap; Refill: 0    2. Suspected soft tissue infection  - clindamycin (CLEOCIN) " 300 MG Cap; Take 1 Cap by mouth 3 times a day for 10 days.  Dispense: 30 Cap; Refill: 0    Keep wound clean and dry and covered with loose dressing.   Do not place ointment on wound as it needs to dry out at this point    Watch for signs and symptoms of infection and return to clinic if present or sooner if symptoms worsen    Supportive care, differential diagnoses, and indications for immediate follow-up discussed with patient.    Pathogenesis of diagnosis discussed including typical length and natural progression of injury. Patient expresses understanding and agrees to plan.

## 2019-12-06 ENCOUNTER — OFFICE VISIT (OUTPATIENT)
Dept: MEDICAL GROUP | Facility: PHYSICIAN GROUP | Age: 84
End: 2019-12-06
Payer: MEDICARE

## 2019-12-06 VITALS
WEIGHT: 105 LBS | OXYGEN SATURATION: 97 % | SYSTOLIC BLOOD PRESSURE: 100 MMHG | HEIGHT: 62 IN | BODY MASS INDEX: 19.32 KG/M2 | HEART RATE: 64 BPM | TEMPERATURE: 97.6 F | DIASTOLIC BLOOD PRESSURE: 62 MMHG

## 2019-12-06 DIAGNOSIS — H93.13 TINNITUS OF BOTH EARS: ICD-10-CM

## 2019-12-06 DIAGNOSIS — J30.2 SEASONAL ALLERGIES: ICD-10-CM

## 2019-12-06 DIAGNOSIS — E78.5 DYSLIPIDEMIA: ICD-10-CM

## 2019-12-06 DIAGNOSIS — M85.80 OSTEOPENIA, UNSPECIFIED LOCATION: ICD-10-CM

## 2019-12-06 DIAGNOSIS — K22.70 BARRETT'S ESOPHAGUS WITHOUT DYSPLASIA: ICD-10-CM

## 2019-12-06 DIAGNOSIS — D16.9 OSTEOCHONDROMA: ICD-10-CM

## 2019-12-06 PROCEDURE — 99214 OFFICE O/P EST MOD 30 MIN: CPT | Performed by: PHYSICIAN ASSISTANT

## 2019-12-06 NOTE — PATIENT INSTRUCTIONS
-Recommend either Zyrtec, Claritin, or Allegra once daily for allergies  -Can also try Flonase which will help with sneezing and runny nose

## 2019-12-06 NOTE — PROGRESS NOTES
"Subjective:   Ratna Cline is a 84 y.o. female here today for seasonal allergies, f/u chronic conditions. Is a new patient to me and is also establishing care today.    Previous PCP: Shu Norwood,     HPI:    Patient presents to the office today to establish care with me. Current medical problems/concerns include the following:    Has been having issues with seasonal allergies which she attributes to micheline brush. Describes frequent sneezing, mild rhinorrhea. Denies itchy/watery eyes. Has gotten better with time, but still present.      In October was doing yard work loading brush onto pile. Got stick stuck in right arm. States was not healing so went to  later that month. Was treated with 10-day course of antibiotic which resolved the issue.    She endorses history of \"osteochondroma\" which her mother also had. Has periodic flare-ups of pain with physical activity. Has known osteopenia treated with OTC calcium and vitamin D supplementation. Last DEXA in 2012. Per review, previously did treatment with Reclast.    She has Pisano's esophagus. Was previously on Nexium years ago but states it gave her tinnitus so she stopped taking it. She is not currently on any medication by choice.    Has known dyslipidemia with no current pharmacologic treatment. Last lipid profile in 12/2018.      Current medicines (including changes today)  Current Outpatient Medications   Medication Sig Dispense Refill   • miconazole (MICOTIN) 2 % Cream Apply  to affected area(s) 2 times a day.     • ASHWAGANDHA PO Take  by mouth.     • raNITidine (ZANTAC) 150 MG Tab Take 1 Tab by mouth 2 times a day. 60 Tab 11   • BIOTIN PO Take  by mouth.     • SELENIUM PO Take  by mouth.     • ALPHA LIPOIC ACID PO Take  by mouth.     • LUTEIN PO Take  by mouth.     • Calcium Carb-Cholecalciferol (CALCIUM 600 + D PO) Take  by mouth.     • PAPAYA PO Take  by mouth.     • Acetylcarnitine HCl (ACETYL L-CARNITINE PO) Take  by mouth.     • Milk " "Thistle 1000 MG Cap Take  by mouth.     • Nutritional Supplements (PYCNOGENOL PO) Take  by mouth.     • Vinpocetine Powder by Does not apply route.     • cyanocobalamin (VITAMIN B-12) 100 MCG Tab Take 100 mcg by mouth every day.     • Turmeric, Curcuma Longa, (CURCUMIN) Powder by Does not apply route.     • magnesium citrate Solution Take 300 mL by mouth Once.     • coenzyme Q-10 30 MG capsule Take 60 mg by mouth every day.     • Ginkgo Biloba (GINKOBA PO) Take  by mouth.     • Nutritional Supplements (CALCIUM D-GLUCARATE PO) Take  by mouth.       No current facility-administered medications for this visit.      She  has a past medical history of Arthritis, Back pain, Pisano's esophagus, Bladder infection, Dyslipidemia (7/14/2016), History of hemorrhoids, History of measles, History of pneumonia, Hypothyroidism (acquired) (7/13/2015), Osteochondroma, Osteochondroma, Sinusitis, Thyroid disease, and Tinnitus (7/13/2015). She also has no past medical history of ASTHMA or Diabetes.    ROS  As per HPI.       Objective:     /62 (BP Location: Left arm, Patient Position: Sitting, BP Cuff Size: Adult)   Pulse 64   Temp 36.4 °C (97.6 °F) (Temporal)   Ht 1.575 m (5' 2\")   Wt 47.6 kg (105 lb)   SpO2 97%  Body mass index is 19.2 kg/m².     Physical Exam:  Constitutional: Alert, no distress.  Skin: Warm, dry, good turgor, no rashes in visible areas.  Eye: Pupils are equal and round, conjunctiva clear, lids normal.  ENMT: Lips without lesions, moist mucus membranes.  Neck: No masses. No submandibular or cervical lymphadenopathy.  Respiratory: Unlabored respiratory effort, lungs clear to auscultation, no wheezes, no rhonchi.  Cardiovascular: Normal S1, S2, no murmur, no lower extremity edema.      Assessment and Plan:   The following treatment plan was discussed    1. Seasonal allergies  Established problem, stable. Discussed typical recommended OTC treatment options for allergies including antihistamine like Zyrtec, " Claritin, or Allegra along with nasal fluticasone spray.     2. Osteopenia, unspecified location  Established problem, stable on calcium/vitamin D which I advised her to continue. Previously completed treatment with Reclast.     3. Osteochondroma  Established problem, patient-reported. Denies significant concern. Recommend continuation of calcium/vitamin D as noted above.    4. Pisano's esophagus without dysplasia  Established problem, unclear level of control. She declines PPI treatment and no longer wishes to follow with GI. Will continue to monitor.     5. Tinnitus of both ears  Established problem, stable. States has had tinnitus since previous Nexium treatment for her Pisano's esophagus. She is not overly bothered by this. Will continue to monitor.    6. Dyslipidemia  Established problem, fairly stable elevation per review. Not currently on statin. Will continue to monitor. Repeat labs ordered.  - Lipid Profile; Future  - Comp Metabolic Panel; Future      Total >25 minutes face-to-face time spent with patient, with greater than 50% of the total time discussing patient's issues and symptoms as listed above in assessment and plan, as well as managing coordination of care for future evaluation and treatment.    Followup: Return in about 8 months (around 8/6/2020) for AWV.    Diana Cline P.A.-C.

## 2019-12-10 PROBLEM — L23.7 ALLERGIC CONTACT DERMATITIS DUE TO PLANTS, EXCEPT FOOD: Status: RESOLVED | Noted: 2018-05-16 | Resolved: 2019-12-10

## 2019-12-16 ENCOUNTER — APPOINTMENT (RX ONLY)
Dept: URBAN - METROPOLITAN AREA CLINIC 22 | Facility: CLINIC | Age: 84
Setting detail: DERMATOLOGY
End: 2019-12-16

## 2019-12-16 DIAGNOSIS — Z85.828 PERSONAL HISTORY OF OTHER MALIGNANT NEOPLASM OF SKIN: ICD-10-CM

## 2019-12-16 DIAGNOSIS — L81.4 OTHER MELANIN HYPERPIGMENTATION: ICD-10-CM

## 2019-12-16 DIAGNOSIS — R20.8 OTHER DISTURBANCES OF SKIN SENSATION: ICD-10-CM

## 2019-12-16 DIAGNOSIS — L82.1 OTHER SEBORRHEIC KERATOSIS: ICD-10-CM

## 2019-12-16 DIAGNOSIS — D18.0 HEMANGIOMA: ICD-10-CM

## 2019-12-16 DIAGNOSIS — D22 MELANOCYTIC NEVI: ICD-10-CM

## 2019-12-16 DIAGNOSIS — L57.0 ACTINIC KERATOSIS: ICD-10-CM

## 2019-12-16 DIAGNOSIS — L91.0 HYPERTROPHIC SCAR: ICD-10-CM

## 2019-12-16 PROBLEM — D22.5 MELANOCYTIC NEVI OF TRUNK: Status: ACTIVE | Noted: 2019-12-16

## 2019-12-16 PROBLEM — D18.01 HEMANGIOMA OF SKIN AND SUBCUTANEOUS TISSUE: Status: ACTIVE | Noted: 2019-12-16

## 2019-12-16 PROCEDURE — ? COUNSELING

## 2019-12-16 PROCEDURE — ? LIQUID NITROGEN

## 2019-12-16 PROCEDURE — ? ORDER TESTS

## 2019-12-16 PROCEDURE — 17000 DESTRUCT PREMALG LESION: CPT

## 2019-12-16 PROCEDURE — 17003 DESTRUCT PREMALG LES 2-14: CPT

## 2019-12-16 PROCEDURE — 99214 OFFICE O/P EST MOD 30 MIN: CPT | Mod: 25

## 2019-12-16 ASSESSMENT — LOCATION SIMPLE DESCRIPTION DERM
LOCATION SIMPLE: LEFT PRETIBIAL REGION
LOCATION SIMPLE: LEFT ZYGOMA
LOCATION SIMPLE: UPPER BACK
LOCATION SIMPLE: LEFT HAND
LOCATION SIMPLE: CHEST
LOCATION SIMPLE: RIGHT CHEEK
LOCATION SIMPLE: RIGHT FOREARM
LOCATION SIMPLE: LEFT FOREARM
LOCATION SIMPLE: RIGHT PRETIBIAL REGION
LOCATION SIMPLE: RIGHT UPPER BACK
LOCATION SIMPLE: LEFT LIP
LOCATION SIMPLE: LEFT FOREHEAD
LOCATION SIMPLE: RIGHT FOREARM
LOCATION SIMPLE: UPPER BACK
LOCATION SIMPLE: NOSE

## 2019-12-16 ASSESSMENT — LOCATION DETAILED DESCRIPTION DERM
LOCATION DETAILED: LEFT INFERIOR MEDIAL FOREHEAD
LOCATION DETAILED: NASAL SUPRATIP
LOCATION DETAILED: RIGHT VENTRAL PROXIMAL FOREARM
LOCATION DETAILED: RIGHT DISTAL PRETIBIAL REGION
LOCATION DETAILED: LEFT DISTAL PRETIBIAL REGION
LOCATION DETAILED: SUPERIOR THORACIC SPINE
LOCATION DETAILED: RIGHT LATERAL BUCCAL CHEEK
LOCATION DETAILED: LEFT NASAL DORSUM
LOCATION DETAILED: LEFT RADIAL DORSAL HAND
LOCATION DETAILED: RIGHT MEDIAL UPPER BACK
LOCATION DETAILED: RIGHT VENTRAL PROXIMAL FOREARM
LOCATION DETAILED: STERNAL NOTCH
LOCATION DETAILED: LEFT LATERAL ZYGOMA
LOCATION DETAILED: RIGHT INFERIOR CENTRAL MALAR CHEEK
LOCATION DETAILED: INFERIOR THORACIC SPINE
LOCATION DETAILED: LOWER STERNUM
LOCATION DETAILED: LEFT VENTRAL PROXIMAL FOREARM
LOCATION DETAILED: LEFT UPPER CUTANEOUS LIP
LOCATION DETAILED: SUPERIOR THORACIC SPINE

## 2019-12-16 ASSESSMENT — LOCATION ZONE DERM
LOCATION ZONE: LEG
LOCATION ZONE: NOSE
LOCATION ZONE: ARM
LOCATION ZONE: LIP
LOCATION ZONE: ARM
LOCATION ZONE: FACE
LOCATION ZONE: HAND
LOCATION ZONE: TRUNK
LOCATION ZONE: TRUNK

## 2019-12-16 NOTE — PROCEDURE: ORDER TESTS
Bill For Surgical Tray: no
Performing Laboratory: [id number]
Billing Type: Third-Party Bill
Expected Date Of Service: 12/16/2019

## 2019-12-17 ENCOUNTER — HOSPITAL ENCOUNTER (OUTPATIENT)
Dept: LAB | Facility: MEDICAL CENTER | Age: 84
End: 2019-12-17
Attending: DERMATOLOGY
Payer: MEDICARE

## 2019-12-17 LAB
BASOPHILS # BLD AUTO: 0.6 % (ref 0–1.8)
BASOPHILS # BLD: 0.04 K/UL (ref 0–0.12)
EOSINOPHIL # BLD AUTO: 0.09 K/UL (ref 0–0.51)
EOSINOPHIL NFR BLD: 1.4 % (ref 0–6.9)
ERYTHROCYTE [DISTWIDTH] IN BLOOD BY AUTOMATED COUNT: 45.1 FL (ref 35.9–50)
HCT VFR BLD AUTO: 42.1 % (ref 37–47)
HGB BLD-MCNC: 13.8 G/DL (ref 12–16)
IMM GRANULOCYTES # BLD AUTO: 0.01 K/UL (ref 0–0.11)
IMM GRANULOCYTES NFR BLD AUTO: 0.2 % (ref 0–0.9)
LYMPHOCYTES # BLD AUTO: 1.11 K/UL (ref 1–4.8)
LYMPHOCYTES NFR BLD: 17.8 % (ref 22–41)
MCH RBC QN AUTO: 32.1 PG (ref 27–33)
MCHC RBC AUTO-ENTMCNC: 32.8 G/DL (ref 33.6–35)
MCV RBC AUTO: 97.9 FL (ref 81.4–97.8)
MONOCYTES # BLD AUTO: 0.67 K/UL (ref 0–0.85)
MONOCYTES NFR BLD AUTO: 10.7 % (ref 0–13.4)
NEUTROPHILS # BLD AUTO: 4.33 K/UL (ref 2–7.15)
NEUTROPHILS NFR BLD: 69.3 % (ref 44–72)
NRBC # BLD AUTO: 0 K/UL
NRBC BLD-RTO: 0 /100 WBC
PLATELET # BLD AUTO: 247 K/UL (ref 164–446)
PMV BLD AUTO: 10.2 FL (ref 9–12.9)
RBC # BLD AUTO: 4.3 M/UL (ref 4.2–5.4)
WBC # BLD AUTO: 6.3 K/UL (ref 4.8–10.8)

## 2019-12-17 PROCEDURE — 82607 VITAMIN B-12: CPT

## 2019-12-17 PROCEDURE — 82746 ASSAY OF FOLIC ACID SERUM: CPT

## 2019-12-17 PROCEDURE — 85025 COMPLETE CBC W/AUTO DIFF WBC: CPT

## 2019-12-17 PROCEDURE — 36415 COLL VENOUS BLD VENIPUNCTURE: CPT

## 2019-12-18 LAB
FOLATE SERPL-MCNC: >24 NG/ML
VIT B12 SERPL-MCNC: 1107 PG/ML (ref 211–911)

## 2020-02-19 ENCOUNTER — PATIENT OUTREACH (OUTPATIENT)
Dept: HEALTH INFORMATION MANAGEMENT | Facility: OTHER | Age: 85
End: 2020-02-19

## 2020-02-19 ENCOUNTER — TELEPHONE (OUTPATIENT)
Dept: MEDICAL GROUP | Facility: PHYSICIAN GROUP | Age: 85
End: 2020-02-19

## 2020-02-19 NOTE — PROGRESS NOTES
Ratna asking for 2020 Provider Directory, I have placed order with [facility]. Ratna informed.  Scheduled AWV AHA and SCPPA Introduced    1. HealthConnect Verified: yes    2. Verify PCP: yes    3. Review and add  to Care Team: yes    4. WebIZ Checked & Epic Updated: No WebIZ record  WebIZ Recommendations: SHINGRIX (Shingles)  Is patient due for Tdap? NO  Is patient due for Shingles? YES. Patient was not notified of copay/out of pocket cost.    5. Reviewed/Updated the following with patient:       •   Communication Preference Obtained? YES  • MyChart Activation: declined       •   E-Mail Address Obtained? NO       •   Appointment Day and Time Preferences? YES       •   Preferred Pharmacy? YES       •   Preferred Lab? YES    6. Care Gap Scheduling (Attempt to Schedule EACH Overdue Care Gap!)    Scheduled patient for Annual Wellness Visit AHA

## 2020-02-19 NOTE — TELEPHONE ENCOUNTER
ANNUAL WELLNESS VISIT PRE-VISIT PLANNING WITH OUTREACH    1.  If any orders were placed at last visit, do we have Results/Consult Notes?        •  Labs - Labs ordered, but not to be completed until 8/6/2020.       •  Imaging - Imaging was not ordered at last office visit.       •  Referrals - No referrals were ordered at last office visit.    2.  Immunizations were updated in [facility] using WebIZ?:Yes       •  WebIZ Recommendations: TD, SHINGRIX (Shingles) and Varicella       •  Is patient due for Tdap? NO       •  Is patient due for Shingrx? YES. Patient was not notified of copay/out of pocket cost.    3.  Patient has the following Care Path diagnoses on Problem List:  NONE    4.  Orders for overdue Health Maintenance topics pended in Pre-Charting? NO

## 2020-02-24 ENCOUNTER — OFFICE VISIT (OUTPATIENT)
Dept: MEDICAL GROUP | Facility: PHYSICIAN GROUP | Age: 85
End: 2020-02-24
Payer: MEDICARE

## 2020-02-24 VITALS
DIASTOLIC BLOOD PRESSURE: 56 MMHG | BODY MASS INDEX: 20.06 KG/M2 | TEMPERATURE: 97 F | HEART RATE: 80 BPM | HEIGHT: 62 IN | OXYGEN SATURATION: 97 % | WEIGHT: 109 LBS | SYSTOLIC BLOOD PRESSURE: 114 MMHG

## 2020-02-24 DIAGNOSIS — Z00.00 MEDICARE ANNUAL WELLNESS VISIT, SUBSEQUENT: Primary | ICD-10-CM

## 2020-02-24 DIAGNOSIS — H93.13 TINNITUS OF BOTH EARS: ICD-10-CM

## 2020-02-24 DIAGNOSIS — E03.8 SUBCLINICAL HYPOTHYROIDISM: ICD-10-CM

## 2020-02-24 DIAGNOSIS — E78.5 DYSLIPIDEMIA: ICD-10-CM

## 2020-02-24 DIAGNOSIS — D16.9 OSTEOCHONDROMA: ICD-10-CM

## 2020-02-24 DIAGNOSIS — K58.1 IRRITABLE BOWEL SYNDROME WITH CONSTIPATION: ICD-10-CM

## 2020-02-24 DIAGNOSIS — F43.21 GRIEF: ICD-10-CM

## 2020-02-24 DIAGNOSIS — J30.2 SEASONAL ALLERGIES: ICD-10-CM

## 2020-02-24 DIAGNOSIS — K90.41 NON-CELIAC GLUTEN SENSITIVITY: ICD-10-CM

## 2020-02-24 DIAGNOSIS — K22.70 BARRETT'S ESOPHAGUS WITHOUT DYSPLASIA: ICD-10-CM

## 2020-02-24 DIAGNOSIS — M85.80 OSTEOPENIA, UNSPECIFIED LOCATION: ICD-10-CM

## 2020-02-24 PROBLEM — R53.83 OTHER FATIGUE: Status: RESOLVED | Noted: 2019-06-03 | Resolved: 2020-02-24

## 2020-02-24 PROCEDURE — 8041 PR SCP AHA: Performed by: PHYSICIAN ASSISTANT

## 2020-02-24 PROCEDURE — G0439 PPPS, SUBSEQ VISIT: HCPCS | Performed by: PHYSICIAN ASSISTANT

## 2020-02-24 ASSESSMENT — PATIENT HEALTH QUESTIONNAIRE - PHQ9
5. POOR APPETITE OR OVEREATING: 0 - NOT AT ALL
SUM OF ALL RESPONSES TO PHQ QUESTIONS 1-9: 13
CLINICAL INTERPRETATION OF PHQ2 SCORE: 6

## 2020-02-24 ASSESSMENT — ENCOUNTER SYMPTOMS: GENERAL WELL-BEING: GOOD

## 2020-02-24 ASSESSMENT — ACTIVITIES OF DAILY LIVING (ADL): BATHING_REQUIRES_ASSISTANCE: 0

## 2020-02-24 NOTE — ASSESSMENT & PLAN NOTE
Established problem, unclear level of control. Has not had DEXA since 2012. Updated DEXA previously ordered but not yet completed. She states she doesn't want to complete this. She is compliant with daily calcium and vitamin D supplementation which I recommend she continue. Has undergone Reclast therapy in the past but not currently.

## 2020-02-24 NOTE — ASSESSMENT & PLAN NOTE
Chronic issue for many years which she attributes to previous Nexium use. Uncontrolled with slight worsening over time per patient. She does feel her hearing is affected. Offered referral to audiology for exam but she declines. Will continue to monitor.

## 2020-02-24 NOTE — PROGRESS NOTES
Chief Complaint   Patient presents with   • Annual Wellness Visit         HPI:  Ratna is a 84 y.o. here for Medicare Annual Wellness Visit. Is an established patient of mine.        Patient Active Problem List    Diagnosis Date Noted   • Seasonal allergies 06/03/2019   • Osteochondroma 06/03/2019   • Pisano's esophagus 06/03/2019   • Non-celiac gluten sensitivity 06/03/2019   • Osteopenia 05/08/2018   • Irritable bowel syndrome with constipation 04/30/2018   • Dyslipidemia 07/14/2016   • Subclinical hypothyroidism 07/13/2015   • Tinnitus 07/13/2015       Current Outpatient Medications   Medication Sig Dispense Refill   • Multiple Vitamins-Minerals (MULTIVITAMIN ADULT PO) Take  by mouth.     • Hyaluronic Acid-Vitamin C (HYALURONIC ACID PO) Take  by mouth.     • FOLIC ACID PO Take  by mouth.     • Pyridoxine HCl (VITAMIN B-6 PO) Take  by mouth.     • ELDERBERRY PO Take  by mouth as needed.     • Multiple Vitamins-Minerals (ZINC PO) Take  by mouth.     • BIOTIN PO Take  by mouth.     • ALPHA LIPOIC ACID PO Take  by mouth.     • LUTEIN PO Take  by mouth.     • Calcium Carb-Cholecalciferol (CALCIUM 600 + D PO) Take  by mouth.     • cyanocobalamin (VITAMIN B-12) 100 MCG Tab Take 100 mcg by mouth every day.     • Turmeric, Curcuma Longa, (CURCUMIN) Powder by Does not apply route.     • magnesium citrate Solution Take 300 mL by mouth Once.     • coenzyme Q-10 30 MG capsule Take 60 mg by mouth every day.     • Ginkgo Biloba (GINKOBA PO) Take  by mouth.     • SELENIUM PO Take  by mouth.     • Nutritional Supplements (PYCNOGENOL PO) Take  by mouth.     • Nutritional Supplements (CALCIUM D-GLUCARATE PO) Take  by mouth.       No current facility-administered medications for this visit.         Patient is taking medications as noted in medication list.  Current supplements as per medication list.     Allergies: Codeine; Soy allergy; and Sulfa drugs    Current social contact/activities: square-dancing    Is patient current with  immunizations? No, due for SHINGRIX (Shingles). Patient is interested in receiving NONE today. Would like to defer this to her next appointment.    She  reports that she has never smoked. She has never used smokeless tobacco. She reports that she does not drink alcohol or use drugs.  Counseling given: Not Answered        DPA/Advanced directive: Patient has Living Will, but it is not on file. Instructed to bring in a copy to scan into their chart.    ROS:    Gait: Uses no assistive device   Ostomy: No   Other tubes: No   Amputations: No   Chronic oxygen use No   Last eye exam was two years ago   Wears hearing aids: No   : Denies any urinary leakage during the last 6 months    Annual Health Assessment Questions:    1.  Are you currently engaging in any exercise or physical activity? Yes    2.  How would you describe your mood or emotional well-being today? Normal/Average    3.  Have you had any falls in the last year? No    4.  Have you noticed any problems with your balance or had difficulty walking? Yes    5.  In the last six months have you experienced any leakage of urine? No    6. DPA/Advanced Directive: Patient has Living Will, but it is not on file. Instructed to bring in a copy to scan into their chart.      Screening:        Depression Screening    Little interest or pleasure in doing things?  3 - nearly every day  Feeling down, depressed, or hopeless? 3 - nearly every day  Trouble falling or staying asleep, or sleeping too much?  3 - nearly every day  Feeling tired or having little energy?  1 - several days  Poor appetite or overeating?  0 - not at all  Feeling bad about yourself - or that you are a failure or have let yourself or your family down? 0 - not at all  Trouble concentrating on things, such as reading the newspaper or watching television? 3 - nearly every day  Moving or speaking so slowly that other people could have noticed.  Or the opposite - being so fidgety or restless that you have been  moving around a lot more than usual?  0 - not at all  Thoughts that you would be better off dead, or of hurting yourself?  0 - not at all  Patient Health Questionnaire Score: 13      If depressive symptoms identified deferred to follow up visit unless specifically addressed in assessment and plan.    Interpretation of PHQ-9 Total Score   Score Severity   1-4 No Depression   5-9 Mild Depression   10-14 Moderate Depression   15-19 Moderately Severe Depression   20-27 Severe Depression      Screening for Cognitive Impairment    Three Minute Recall (village, kitchen, baby)  32/3    Draw clock face with all 12 numbers and set the hands to show 10 past 10.  YesYes    If cognitive concerns identified, deferred for follow up unless specifically addressed in assessment and plan.    Fall Risk Assessment    Has the patient had two or more falls in the last year or any fall with injury in the last year?  No  If fall risk identified, deferred for follow up unless specifically addressed in assessment and plan.      Safety Assessment    Throw rugs on floor.  Yes  Handrails on all stairs.  Yes  Good lighting in all hallways.  Yes  Difficulty hearing.  Yes  Patient counseled about all safety risks that were identified.    Functional Assessment ADLs    Are there any barriers preventing you from cooking for yourself or meeting nutritional needs?  No.    Are there any barriers preventing you from driving safely or obtaining transportation?  No.    Are there any barriers preventing you from using a telephone or calling for help?  No.    Are there any barriers preventing you from shopping?  No.    Are there any barriers preventing you from taking care of your own finances?  No.    Are there any barriers preventing you from managing your medications?    No.    Are there any barriers preventing you from showering, bathing or dressing yourself?  No.    Are you currently engaging in any exercise or physical activity?  Yes.     What is your  perception of your health?  Good.    Health Maintenance Summary                IMM ZOSTER VACCINES Overdue 9/14/1987     BONE DENSITY Overdue 9/24/2017      Done 9/24/2012 DS-BONE DENSITY STUDY (DEXA)     Patient has more history with this topic...    Annual Wellness Visit Next Due 8/26/2020      Done 8/26/2019 LOS: [place] ANNUAL WELLNESS VISIT-INCLUDES PPPS SUBSEQUE*     Patient has more history with this topic...    IMM DTaP/Tdap/Td Vaccine Next Due 10/4/2027      Done 10/4/2017 Imm Admin: Tdap Vaccine          Patient Care Team:  Diana Cline P.A.-C. as PCP - General (Physician Assistant)  Rj Boston M.D. as Consulting Physician (Ophthalmology)  Luciano Kerr D.P.M. as Consulting Physician (Podiatry)  Nazanin Tejeda M.D. as Consulting Physician (Dermatology)  Schuyler Radford M.D. as Consulting Physician (Otolaryngology)      Social History     Tobacco Use   • Smoking status: Never Smoker   • Smokeless tobacco: Never Used   Substance Use Topics   • Alcohol use: No     Alcohol/week: 0.0 oz   • Drug use: No     Family History   Problem Relation Age of Onset   • Cancer Mother         leukemia   • Genetic Disorder Mother         osteochondroma   • Stroke Neg Hx    • Hyperlipidemia Neg Hx    • Hypertension Neg Hx    • Heart Disease Neg Hx    • Diabetes Neg Hx    • Lung Disease Neg Hx      She  has a past medical history of Arthritis, Back pain, Pisano's esophagus, Bladder infection, Dyslipidemia (7/14/2016), Family history of leukemia (6/3/2019), History of hemorrhoids, History of measles, History of pneumonia, Hypothyroidism (acquired) (7/13/2015), Osteochondroma, Osteochondroma, Sinusitis, Thyroid disease, and Tinnitus (7/13/2015). She also has no past medical history of ASTHMA or Diabetes.   Past Surgical History:   Procedure Laterality Date   • HYSTERECTOMY RADICAL     • OTHER      jaw surgery to correct overbite   • TONSILLECTOMY           Exam:     /56 (BP Location: Left arm, Patient  "Position: Sitting, BP Cuff Size: Adult)   Pulse 80   Temp 36.1 °C (97 °F) (Tympanic)   Ht 1.575 m (5' 2\")   Wt 49.4 kg (109 lb)   SpO2 97%  Body mass index is 19.94 kg/m².    Hearing satisfactory.    Dentition good  Alert, oriented in no acute distress.  Eye contact is good, speech goal directed, affect calm      Assessment and Plan. The following treatment and monitoring plan is recommended:      Tinnitus  Chronic issue for many years which she attributes to previous Nexium use. Uncontrolled with slight worsening over time per patient. She does feel her hearing is affected. Offered referral to audiology for exam but she declines. Will continue to monitor.    Subclinical hypothyroidism  Established problem, well-controlled without medication per review. Last two TSH, most recently in 7/2019 were normal. Will continue to monitor annually.    Seasonal allergies  Established problem, well-controlled with OTC allergy medication including Flonase. Continue current management.    Osteopenia  Established problem, unclear level of control. Has not had DEXA since 2012. Updated DEXA previously ordered but not yet completed. She states she doesn't want to complete this. She is compliant with daily calcium and vitamin D supplementation which I recommend she continue. Has undergone Reclast therapy in the past but not currently.    Non-celiac gluten sensitivity  Established problem endorsed by patient. She takes OTC \"Gluten-Ease\" when she goes out to eat which she believes to be helpful. Continue current management.    Irritable bowel syndrome with constipation  Established problem, well-controlled at present and denies concern. Will continue to monitor.    Dyslipidemia  Established problem, uncontrolled per review of last lipid profile in 12/2018 which showed elevation of both TC and LDL. She is not currently on Rx cholesterol medication but does take OTC Niacin. Recommend re-check of lipids which was previously ordered. " Reminded to have done.    Pisano's esophagus  Established problem, diagnosed 15-20+ years ago per patient. Has not seen GI anytime in the recent past and has no interest in seeing someone now. States prior gastroenterologist prescribed Nexium which she believes caused her tinnitus so she stopped taking. Not interested in PPI medication due to concern about side effects. States she prefers homeopathic remedies. Will continue to monitor clinically.    Osteochondroma  Established problem endorsed by patient. States has had for many years. Presumed stable. Unable to find any clinical documentation confirming diagnosis.     Grief  Noted to have high PHQ-9 screening today. She attributes positive responses to grief at death of  of 62 years less than 1 year ago. She does not feel that her depression is affecting her daily functioning at this time. Offered referral for outpatient behavioral therapy which she declines at this time. Will continue to monitor.    Patient mentioned to me at the end of the appointment that she has been having a lot of pain in her right hip and leg as well as tingling in both of her ankles.  Discussed that we would not have time to adequately address this today so recommend that she follow-up with one of my colleagues in my absence to have this formally evaluated.  She expresses some interest in physical therapy which can be ordered at that time if appropriate.    Services suggested: No services needed at this time  Health Care Screening recommendations as per orders if indicated.  Referrals offered: PT/OT/Nutrition counseling/Behavioral Health/Smoking cessation as per orders if indicated.    Discussion today about general wellness and lifestyle habits:    · Prevent falls and reduce trip hazards; Cautioned about securing or removing rugs.  · Have a working fire alarm and carbon monoxide detector;   · Engage in regular physical activity and social activities       Follow-up: Return for eval  R leg pain, ankle tingling, Short.    Diana Cline P.A.-C.

## 2020-02-24 NOTE — ASSESSMENT & PLAN NOTE
"Established problem endorsed by patient. She takes OTC \"Gluten-Ease\" when she goes out to eat which she believes to be helpful. Continue current management.  "

## 2020-02-24 NOTE — ASSESSMENT & PLAN NOTE
Established problem, well-controlled without medication per review. Last two TSH, most recently in 7/2019 were normal. Will continue to monitor annually.

## 2020-02-24 NOTE — ASSESSMENT & PLAN NOTE
Established problem endorsed by patient. States has had for many years. Presumed stable. Unable to find any clinical documentation confirming diagnosis.

## 2020-02-24 NOTE — ASSESSMENT & PLAN NOTE
Established problem, diagnosed 15-20+ years ago per patient. Has not seen GI anytime in the recent past and has no interest in seeing someone now. States prior gastroenterologist prescribed Nexium which she believes caused her tinnitus so she stopped taking. Not interested in PPI medication due to concern about side effects. States she prefers homeopathic remedies. Will continue to monitor clinically.

## 2020-02-24 NOTE — ASSESSMENT & PLAN NOTE
Established problem, well-controlled with OTC allergy medication including Flonase. Continue current management.

## 2020-02-24 NOTE — ASSESSMENT & PLAN NOTE
Established problem, uncontrolled per review of last lipid profile in 12/2018 which showed elevation of both TC and LDL. She is not currently on Rx cholesterol medication but does take OTC Niacin. Recommend re-check of lipids which was previously ordered. Reminded to have done.

## 2020-02-24 NOTE — ASSESSMENT & PLAN NOTE
Noted to have high PHQ-9 screening today. She attributes positive responses to grief at death of  of 64 years less than 1 year ago. She does not feel that her depression is affecting her daily functioning at this time. Offered referral for outpatient behavioral therapy which she declines at this time. Will continue to monitor.

## 2020-03-03 ENCOUNTER — TELEPHONE (OUTPATIENT)
Dept: MEDICAL GROUP | Facility: PHYSICIAN GROUP | Age: 85
End: 2020-03-03

## 2020-03-03 NOTE — TELEPHONE ENCOUNTER
ESTABLISHED PATIENT PRE-VISIT PLANNING     Patient was NOT contacted to complete PVP.    1.  Reviewed notes from the last few office visits within the medical group: Yes    2.  If any orders were placed at last visit or intended to be done for this visit (i.e. 6 mos follow-up), do we have Results/Consult Notes?        •  Labs - Labs ordered, NOT completed. Patient advised to complete prior to next appointment.       •  Imaging - Imaging was not ordered at last office visit.       •  Referrals - No referrals were ordered at last office visit.    3. Is this appointment scheduled as a Hospital Follow-Up? No    4.  Immunizations were updated in hive[facility] using WebIZ?: Yes       •  Web Iz Recommendations: TD, VARICELLA (Chicken Pox)  and SHINGRIX (Shingles)    5.  Patient is due for the following Health Maintenance Topics:   Health Maintenance Due   Topic Date Due   • IMM ZOSTER VACCINES (1 of 2) 09/14/1987   • BONE DENSITY  09/24/2017     6. Orders for overdue Health Maintenance topics pended in Pre-Charting? NO    7.  AHA (MDX) form printed for Provider? No, already completed    8.  Patient was NOT informed to arrive 15 min prior to their scheduled appointment and bring in their medication bottles.

## 2020-03-09 ENCOUNTER — OFFICE VISIT (OUTPATIENT)
Dept: MEDICAL GROUP | Facility: PHYSICIAN GROUP | Age: 85
End: 2020-03-09
Payer: MEDICARE

## 2020-03-09 VITALS
OXYGEN SATURATION: 96 % | BODY MASS INDEX: 18.77 KG/M2 | HEIGHT: 62 IN | TEMPERATURE: 97.8 F | DIASTOLIC BLOOD PRESSURE: 58 MMHG | WEIGHT: 102 LBS | HEART RATE: 66 BPM | SYSTOLIC BLOOD PRESSURE: 116 MMHG

## 2020-03-09 DIAGNOSIS — G89.29 CHRONIC RIGHT-SIDED LOW BACK PAIN WITH RIGHT-SIDED SCIATICA: ICD-10-CM

## 2020-03-09 DIAGNOSIS — L53.9 REDNESS OF SKIN: ICD-10-CM

## 2020-03-09 DIAGNOSIS — M54.41 CHRONIC RIGHT-SIDED LOW BACK PAIN WITH RIGHT-SIDED SCIATICA: ICD-10-CM

## 2020-03-09 DIAGNOSIS — K22.70 BARRETT'S ESOPHAGUS WITHOUT DYSPLASIA: ICD-10-CM

## 2020-03-09 DIAGNOSIS — M54.2 NECK PAIN: ICD-10-CM

## 2020-03-09 DIAGNOSIS — M25.511 CHRONIC RIGHT SHOULDER PAIN: ICD-10-CM

## 2020-03-09 DIAGNOSIS — G89.29 CHRONIC RIGHT SHOULDER PAIN: ICD-10-CM

## 2020-03-09 PROCEDURE — 99214 OFFICE O/P EST MOD 30 MIN: CPT | Performed by: FAMILY MEDICINE

## 2020-03-09 ASSESSMENT — FIBROSIS 4 INDEX: FIB4 SCORE: 1.69

## 2020-03-09 NOTE — PROGRESS NOTES
"CC:  Back pain, neck pain, shoulder pain, skin redness, Pisano's esophagus     HISTORY OF THE PRESENT ILLNESS: Patient is a 84 y.o. female patient of Diana JARAMILLO) seeing me today as her PCP is currently out of the office. This pleasant patient is here today for back pain, neck pain, shoulder pain, skin redness, Pisano's esophagus    Chronic right-sided low back pain with right-sided sciatica  Neck pain  Chronic right shoulder pain  Patient states she has history of bone disease and chronic neck, back and shoulder pain. States the original pain was to her left shoulder with \"shooting pains\" to her posterior head area. She also has chronic right shoulder and neck pain as well. In 2003, she went to \"[facility] for chronic pain to the right side of her lower back triggered when walking. She still does get this pain every now and then. She reports having intermittent pain down the left side of her leg. Patient states she underwent physical therapy back in 2003. She has been doing some of the exercises that she could remember for these symptoms. Patient came in today because yesterday morning when she got up, she experienced pain across her lower back and right buttock area. She then developed pain to the left side which radiates to the left side of her torso. Due to her symptoms, she is requesting for referral to physical therapy so that they can teach her how to do the exercises again. States she has been doing massages to the painful areas as well.    Redness of skin  Patient states 6 days ago, someone at one of her clubs pointed out a red skin spot on her right cheek. Patient states she did not notice the spot until her friend informed her, so she is unsure how long it has been there. States the area is not sore or itchy, but it is quite red. She has not noticed it becoming scaly. She has applied OTC cream to the area this morning. States the area was non-tender when she showered this morning.    Pisano's " esophagus without dysplasia  Patient states she has known chronic Pisano's esophagus.  States sometimes at night, she will experience a burning sensation in her epigastric area. She recently read a news article regarding people using probiotics for this. She is wondering if probiotics would be beneficial for her.      Allergies: Codeine; Soy allergy; and Sulfa drugs    Current Outpatient Medications Ordered in Epic   Medication Sig Dispense Refill   • Multiple Vitamins-Minerals (MULTIVITAMIN ADULT PO) Take  by mouth.     • Hyaluronic Acid-Vitamin C (HYALURONIC ACID PO) Take  by mouth.     • FOLIC ACID PO Take  by mouth.     • Pyridoxine HCl (VITAMIN B-6 PO) Take  by mouth.     • ELDERBERRY PO Take  by mouth as needed.     • Multiple Vitamins-Minerals (ZINC PO) Take  by mouth.     • BIOTIN PO Take  by mouth.     • SELENIUM PO Take  by mouth.     • ALPHA LIPOIC ACID PO Take  by mouth.     • LUTEIN PO Take  by mouth.     • Calcium Carb-Cholecalciferol (CALCIUM 600 + D PO) Take  by mouth.     • Nutritional Supplements (PYCNOGENOL PO) Take  by mouth.     • cyanocobalamin (VITAMIN B-12) 100 MCG Tab Take 100 mcg by mouth every day.     • Turmeric, Curcuma Longa, (CURCUMIN) Powder by Does not apply route.     • magnesium citrate Solution Take 300 mL by mouth Once.     • coenzyme Q-10 30 MG capsule Take 60 mg by mouth every day.     • Ginkgo Biloba (GINKOBA PO) Take  by mouth.     • Nutritional Supplements (CALCIUM D-GLUCARATE PO) Take  by mouth.       No current Epic-ordered facility-administered medications on file.        Past Medical History:   Diagnosis Date   • Arthritis    • Back pain    • Pisano's esophagus     Dr. Anaya, in late 1990's   • Bladder infection    • Dyslipidemia 7/14/2016   • Family history of leukemia 6/3/2019   • History of hemorrhoids    • History of measles    • History of pneumonia    • Hypothyroidism (acquired) 7/13/2015   • Osteochondroma    • Osteochondroma    • Sinusitis    • Thyroid  "disease    • Tinnitus 7/13/2015       Past Surgical History:   Procedure Laterality Date   • HYSTERECTOMY RADICAL     • OTHER      jaw surgery to correct overbite   • TONSILLECTOMY         Social History     Tobacco Use   • Smoking status: Never Smoker   • Smokeless tobacco: Never Used   Substance Use Topics   • Alcohol use: No     Alcohol/week: 0.0 oz   • Drug use: No       Social History     Social History Narrative   • Not on file       Family History   Problem Relation Age of Onset   • Cancer Mother         leukemia   • Genetic Disorder Mother         osteochondroma   • Stroke Neg Hx    • Hyperlipidemia Neg Hx    • Hypertension Neg Hx    • Heart Disease Neg Hx    • Diabetes Neg Hx    • Lung Disease Neg Hx        ROS:     - Constitutional: Negative for fever, chills, unexpected weight change, and fatigue/generalized weakness.     - Respiratory: Negative for cough, sputum production, chest congestion, dyspnea, wheezing, and crackles.      - Cardiovascular: Negative for chest pain, palpitations, orthopnea, PND, and bilateral lower extremity edema.     - Gastrointestinal: Acid reflux. Negative for nausea, vomiting, hematochezia, melena, diarrhea, constipation, and greasy/foul-smelling stools.     - Genitourinary: Negative for dysuria, polyuria, hematuria, pyuria, urinary urgency, and urinary incontinence.     - Musculoskeletal: Neck pain, back pain, bilateral shoulder pain, left leg pain.     - Skin: Red skin spot right cheek (non-tender, not scaly, not itchy).       - NOTE: All other systems reviewed and are negative, except as in HPI.       Exam: /58 (BP Location: Left arm, Patient Position: Sitting, BP Cuff Size: Adult)   Pulse 66   Temp 36.6 °C (97.8 °F) (Temporal)   Ht 1.575 m (5' 2\")   Wt 46.3 kg (102 lb)   SpO2 96%  Body mass index is 18.66 kg/m².    General: Well appearing, NAD  Pulmonary: Clear to ausculation.  Normal effort. No rales, ronchi, or wheezing.  Cardiovascular: Regular rate and rhythm " without murmur, rubs or gallop.   Skin: Warm and dry.  Erythematous patch noted on right cheek.  No scaling.  Musculoskeletal:  No extremity cyanosis, clubbing, or edema.  Psych: Normal mood and affect. Alert and oriented. Judgment and insight is normal.    Please note that this dictation was created using voice recognition software. I have made every reasonable attempt to correct obvious errors, but I expect that there are errors of grammar and possibly content that I did not discover before finalizing the note.      Assessment/Plan  Ratna was seen today for referral needed, pain and other.    Diagnoses and all orders for this visit:    Chronic right-sided low back pain with right-sided sciatica  Neck pain  Chronic right shoulder pain  - Patient requests for referral to physical therapy for her pains described above. States she underwent physical therapy in the past for similar symptoms. Referral was placed. Supportive care instructions and return precautions given.   -     REFERRAL TO PHYSICAL THERAPY Reason for Therapy: Eval/Treat/Report    Redness of skin  -Suspect possible ruptured blood vessel.  There is no signs of scaling or scabbing at this time.  I have asked her to keep an eye on it over the next couple of weeks and if it does not resolve, to let me know and we will get her referred to dermatology.  Based on appearance of the area, advised further monitoring for now. Discussed that we may refer her to dermatology in the future if the area does become scaly or exhibits concerning characteristics.    Pisano's esophagus without dysplasia  - Patient asked if probiotics would be beneficial for her acid reflux symptoms.  Discussed with her that probiotics for Pisano's esophagus is not an evidence-based intervention.  If she wants to try probiotics though that is fine.  I personally recommend Sanjeev.      Shu Norwood, DO  [facility]     I, Dixon Ruvalcaba (Scribtorres), am scribing  for, and in the presence of, Shu Norwood DO.    Electronically signed by: Dixon Ruvalcaba (Scribe), 3/9/2020     I, Shu Norwood DO personally performed the services described in this documentation, as scribed by Dixon Ruvalcaba in my presence, and it is both accurate and complete.

## 2020-04-16 ENCOUNTER — TELEPHONE (OUTPATIENT)
Dept: HEALTH INFORMATION MANAGEMENT | Facility: OTHER | Age: 85
End: 2020-04-16

## 2020-04-16 ENCOUNTER — OFFICE VISIT (OUTPATIENT)
Dept: URGENT CARE | Facility: CLINIC | Age: 85
End: 2020-04-16
Payer: MEDICARE

## 2020-04-16 VITALS
WEIGHT: 107 LBS | HEART RATE: 78 BPM | OXYGEN SATURATION: 95 % | DIASTOLIC BLOOD PRESSURE: 72 MMHG | BODY MASS INDEX: 19.69 KG/M2 | RESPIRATION RATE: 14 BRPM | SYSTOLIC BLOOD PRESSURE: 126 MMHG | HEIGHT: 62 IN | TEMPERATURE: 97.9 F

## 2020-04-16 DIAGNOSIS — K12.0 CANKER SORE: ICD-10-CM

## 2020-04-16 DIAGNOSIS — H66.002 NON-RECURRENT ACUTE SUPPURATIVE OTITIS MEDIA OF LEFT EAR WITHOUT SPONTANEOUS RUPTURE OF TYMPANIC MEMBRANE: ICD-10-CM

## 2020-04-16 PROCEDURE — 99214 OFFICE O/P EST MOD 30 MIN: CPT | Performed by: PHYSICIAN ASSISTANT

## 2020-04-16 RX ORDER — AMOXICILLIN AND CLAVULANATE POTASSIUM 875; 125 MG/1; MG/1
1 TABLET, FILM COATED ORAL 2 TIMES DAILY
Qty: 14 TAB | Refills: 0 | Status: SHIPPED | OUTPATIENT
Start: 2020-04-16 | End: 2020-04-23

## 2020-04-16 ASSESSMENT — ENCOUNTER SYMPTOMS
VOMITING: 0
FEVER: 0
HEADACHES: 0
CHILLS: 0
RHINORRHEA: 0
DIARRHEA: 0
PALPITATIONS: 0
SINUS PAIN: 0
COUGH: 0
SORE THROAT: 0
EYE DISCHARGE: 0
SHORTNESS OF BREATH: 0
DIZZINESS: 0
NECK PAIN: 0
NAUSEA: 0
EYE REDNESS: 0
ABDOMINAL PAIN: 0

## 2020-04-16 ASSESSMENT — FIBROSIS 4 INDEX: FIB4 SCORE: 1.69

## 2020-04-16 NOTE — PATIENT INSTRUCTIONS
Canker Sores  Introduction  Canker sores are small, painful sores that develop inside your mouth. They may also be called aphthous ulcers. You can get canker sores on the inside of your lips or cheeks, on your tongue, or anywhere inside your mouth. You can have just one canker sore or several of them. Canker sores cannot be passed from one person to another (noncontagious). These sores are different than the sores that you may get on the outside of your lips (cold sores or fever blisters).  Canker sores usually start as painful red bumps. Then they turn into small white, yellow, or gray ulcers that have red borders. The ulcers may be quite painful. The pain may be worse when you eat or drink.  What are the causes?  The cause of this condition is not known.  What increases the risk?  This condition is more likely to develop in:  · Women.  · People in their teens or 20s.  · Women who are having their menstrual period.  · People who are under a lot of emotional stress.  · People who do not get enough iron or B vitamins.  · People who have poor oral hygiene.  · People who have an injury inside the mouth. This can happen after having dental work or from chewing something hard.  What are the signs or symptoms?  Along with the canker sore, symptoms may also include:  · Fever.  · Fatigue.  · Swollen lymph nodes in your neck.  How is this diagnosed?  This condition can be diagnosed based on your symptoms. Your health care provider will also examine your mouth. Your health care provider may also do tests if you get canker sores often or if they are very bad. Tests may include:  · Blood tests to rule out other causes of canker sores.  · Taking swabs from the sore to check for infection.  · Taking a small piece of skin from the sore (biopsy) to test it for cancer.  How is this treated?  Most canker sores clear up without treatment in about 10 days. Home care is usually the only treatment that you will need. Over-the-counter  medicines can relieve discomfort. If you have severe canker sores, your health care provider may prescribe:  · Numbing ointment to relieve pain.  · Vitamins.  · Steroid medicines. These may be given as:  ¨ Oral pills.  ¨ Mouth rinses.  ¨ Gels.  · Antibiotic mouth rinse.  Follow these instructions at home:  · Apply, take, or use medicines only as directed by your health care provider. These include vitamins.  · If you were prescribed an antibiotic mouth rinse, finish all of it even if you start to feel better.  · Until the sores are healed:  ¨ Do not drink coffee or citrus juices.  ¨ Do not eat spicy or salty foods.  · Use a mild, over-the-counter mouth rinse as directed by your health care provider.  · Practice good oral hygiene.  ¨ Floss your teeth every day.  ¨ Brush your teeth with a soft brush twice each day.  Contact a health care provider if:  · Your symptoms do not get better after two weeks.  · You also have a fever or swollen glands.  · You get canker sores often.  · You have a canker sore that is getting larger.  · You cannot eat or drink due to your canker sores.  This information is not intended to replace advice given to you by your health care provider. Make sure you discuss any questions you have with your health care provider.  Document Released: 04/13/2012 Document Revised: 05/25/2017 Document Reviewed: 11/18/2015  © 2017 [employer]

## 2020-04-16 NOTE — PROGRESS NOTES
Subjective:      Ratna Cline is a 84 y.o. female who presents with Otalgia ((L) ear ache and soreness on roof of mouth )      Otalgia    There is pain in the left ear. This is a new problem. The current episode started in the past 7 days. The problem occurs constantly. The problem has been gradually worsening. There has been no fever. The pain is moderate. Pertinent negatives include no abdominal pain, coughing, diarrhea, ear discharge, headaches, hearing loss, neck pain, rash, rhinorrhea, sore throat or vomiting. Associated symptoms comments: Sores on roof of mouth on left side. Treatments tried: Chloraseptic spray for mouth sores. The treatment provided mild relief. There is no history of a chronic ear infection or hearing loss.       Review of Systems   Constitutional: Negative for chills, fever and malaise/fatigue.   HENT: Positive for ear pain. Negative for congestion, ear discharge, hearing loss, rhinorrhea, sinus pain and sore throat.    Eyes: Negative for discharge and redness.   Respiratory: Negative for cough and shortness of breath.    Cardiovascular: Negative for chest pain and palpitations.   Gastrointestinal: Negative for abdominal pain, diarrhea, nausea and vomiting.   Musculoskeletal: Negative for neck pain.   Skin: Negative for rash.   Neurological: Negative for dizziness and headaches.   Endo/Heme/Allergies: Positive for environmental allergies.       PMH:  has a past medical history of Arthritis, Back pain, Pisano's esophagus, Bladder infection, Dyslipidemia (7/14/2016), Family history of leukemia (6/3/2019), History of hemorrhoids, History of measles, History of pneumonia, Hypothyroidism (acquired) (7/13/2015), Osteochondroma, Osteochondroma, Sinusitis, Thyroid disease, and Tinnitus (7/13/2015). She also has no past medical history of ASTHMA or Diabetes.  MEDS:   Current Outpatient Medications:   •  amoxicillin-clavulanate (AUGMENTIN) 875-125 MG Tab, Take 1 Tab by mouth 2 times a day  for 7 days., Disp: 14 Tab, Rfl: 0  •  Multiple Vitamins-Minerals (MULTIVITAMIN ADULT PO), Take  by mouth., Disp: , Rfl:   •  Hyaluronic Acid-Vitamin C (HYALURONIC ACID PO), Take  by mouth., Disp: , Rfl:   •  FOLIC ACID PO, Take  by mouth., Disp: , Rfl:   •  Pyridoxine HCl (VITAMIN B-6 PO), Take  by mouth., Disp: , Rfl:   •  ELDERBERRY PO, Take  by mouth as needed., Disp: , Rfl:   •  Multiple Vitamins-Minerals (ZINC PO), Take  by mouth., Disp: , Rfl:   •  BIOTIN PO, Take  by mouth., Disp: , Rfl:   •  SELENIUM PO, Take  by mouth., Disp: , Rfl:   •  ALPHA LIPOIC ACID PO, Take  by mouth., Disp: , Rfl:   •  LUTEIN PO, Take  by mouth., Disp: , Rfl:   •  Calcium Carb-Cholecalciferol (CALCIUM 600 + D PO), Take  by mouth., Disp: , Rfl:   •  Nutritional Supplements (PYCNOGENOL PO), Take  by mouth., Disp: , Rfl:   •  cyanocobalamin (VITAMIN B-12) 100 MCG Tab, Take 100 mcg by mouth every day., Disp: , Rfl:   •  Turmeric, Curcuma Longa, (CURCUMIN) Powder, by Does not apply route., Disp: , Rfl:   •  magnesium citrate Solution, Take 300 mL by mouth Once., Disp: , Rfl:   •  coenzyme Q-10 30 MG capsule, Take 60 mg by mouth every day., Disp: , Rfl:   •  Ginkgo Biloba (GINKOBA PO), Take  by mouth., Disp: , Rfl:   •  Nutritional Supplements (CALCIUM D-GLUCARATE PO), Take  by mouth., Disp: , Rfl:   ALLERGIES:   Allergies   Allergen Reactions   • Codeine Vomiting   • Soy Allergy    • Sulfa Drugs Nausea     SURGHX:   Past Surgical History:   Procedure Laterality Date   • HYSTERECTOMY RADICAL     • OTHER      jaw surgery to correct overbite   • TONSILLECTOMY       SOCHX:  reports that she has never smoked. She has never used smokeless tobacco. She reports that she does not drink alcohol or use drugs.  FH: Family history was reviewed, no pertinent findings to report     Objective:     /72 (BP Location: Left arm, Patient Position: Sitting, BP Cuff Size: Adult)   Pulse 78   Temp 36.6 °C (97.9 °F) (Temporal)   Resp 14   Ht 1.575 m  "(5' 2\")   Wt 48.5 kg (107 lb)   SpO2 95%   BMI 19.57 kg/m²      Physical Exam  Constitutional:       Appearance: She is well-developed.   HENT:      Head: Normocephalic and atraumatic.      Right Ear: Tympanic membrane, ear canal and external ear normal.      Left Ear: Ear canal and external ear normal. Tympanic membrane is erythematous and retracted.      Nose: Nose normal.      Mouth/Throat:      Lips: Pink.      Mouth: Oral lesions present.      Pharynx: Oropharynx is clear.      Comments: Cluster of canker sores (white/grey lesions on erythematous base) on roof of mouth  Eyes:      Conjunctiva/sclera: Conjunctivae normal.      Pupils: Pupils are equal, round, and reactive to light.   Neck:      Musculoskeletal: Normal range of motion.   Cardiovascular:      Rate and Rhythm: Normal rate and regular rhythm.      Heart sounds: Normal heart sounds. No murmur.   Pulmonary:      Effort: Pulmonary effort is normal.      Breath sounds: Normal breath sounds. No wheezing.   Lymphadenopathy:      Cervical: Cervical adenopathy present.      Left cervical: Superficial cervical adenopathy present.   Skin:     General: Skin is warm and dry.      Capillary Refill: Capillary refill takes less than 2 seconds.   Neurological:      Mental Status: She is alert and oriented to person, place, and time.   Psychiatric:         Behavior: Behavior normal.         Judgment: Judgment normal.            Assessment/Plan:       1. Non-recurrent acute suppurative otitis media of left ear without spontaneous rupture of tympanic membrane  - amoxicillin-clavulanate (AUGMENTIN) 875-125 MG Tab; Take 1 Tab by mouth 2 times a day for 7 days.  Dispense: 14 Tab; Refill: 0    2. Canker sore  - Supportive care discussed          Differential Diagnosis, natural history, and supportive care discussed. Return to the Urgent Care or follow up with your PCP if symptoms fail to resolve, or for any new or worsening symptoms. Emergency room precautions " discussed. Patient and/or family appears understanding of information.

## 2020-04-16 NOTE — TELEPHONE ENCOUNTER
1. Caller Name: Ratna Thompson                  Call Back Number: [phone]  [facility] PCP or Specialty Provider: Yes Shu Norwood        2.  Does patient have any active symptoms of respiratory illness (fever OR cough OR shortness of breath OR sore throat)? No. Patient is complaining of an earache and a sore on the roof of her mouth    3.  Does patient have any comoribidities? None     4.  Has the patient traveled in the last 14 days OR had any known contact with someone who is suspected or confirmed to have COVID-19?  No.    5. Disposition: Advised to go to     Note routed to [facility] Provider: KAYLAN only.

## 2020-04-24 ENCOUNTER — OFFICE VISIT (OUTPATIENT)
Dept: URGENT CARE | Facility: CLINIC | Age: 85
End: 2020-04-24
Payer: MEDICARE

## 2020-04-24 VITALS
DIASTOLIC BLOOD PRESSURE: 62 MMHG | WEIGHT: 106.8 LBS | OXYGEN SATURATION: 95 % | HEART RATE: 75 BPM | HEIGHT: 62 IN | BODY MASS INDEX: 19.65 KG/M2 | RESPIRATION RATE: 12 BRPM | TEMPERATURE: 98.4 F | SYSTOLIC BLOOD PRESSURE: 120 MMHG

## 2020-04-24 DIAGNOSIS — K13.79 MOUTH PAIN: ICD-10-CM

## 2020-04-24 DIAGNOSIS — H92.02 EAR PAIN, LEFT: ICD-10-CM

## 2020-04-24 PROCEDURE — 99214 OFFICE O/P EST MOD 30 MIN: CPT | Performed by: NURSE PRACTITIONER

## 2020-04-24 RX ORDER — VALACYCLOVIR HYDROCHLORIDE 1 G/1
1000 TABLET, FILM COATED ORAL 3 TIMES DAILY
Qty: 21 TAB | Refills: 0 | Status: SHIPPED | OUTPATIENT
Start: 2020-04-24 | End: 2020-05-01

## 2020-04-24 ASSESSMENT — ENCOUNTER SYMPTOMS
CHILLS: 0
FEVER: 0
SORE THROAT: 1

## 2020-04-24 ASSESSMENT — FIBROSIS 4 INDEX: FIB4 SCORE: 1.69

## 2020-04-24 NOTE — PROGRESS NOTES
Subjective:      Ratna Cline is a 84 y.o. female who presents with Pharyngitis (mild sore throat, sore inside mouth) and Otalgia (ear pain)    Past Medical History:   Diagnosis Date   • Arthritis    • Back pain    • Pisano's esophagus     Dr. Anaya, in late 1990's   • Bladder infection    • Dyslipidemia 7/14/2016   • Family history of leukemia 6/3/2019   • History of hemorrhoids    • History of measles    • History of pneumonia    • Hypothyroidism (acquired) 7/13/2015   • Osteochondroma    • Osteochondroma    • Sinusitis    • Thyroid disease    • Tinnitus 7/13/2015     Social History     Socioeconomic History   • Marital status: Single     Spouse name: Not on file   • Number of children: Not on file   • Years of education: Not on file   • Highest education level: Not on file   Occupational History   • Not on file   Social Needs   • Financial resource strain: Not on file   • Food insecurity     Worry: Never true     Inability: Never true   • Transportation needs     Medical: No     Non-medical: No   Tobacco Use   • Smoking status: Never Smoker   • Smokeless tobacco: Never Used   Substance and Sexual Activity   • Alcohol use: No     Alcohol/week: 0.0 oz   • Drug use: No   • Sexual activity: Never     Partners: Male   Lifestyle   • Physical activity     Days per week: Not on file     Minutes per session: Not on file   • Stress: Not on file   Relationships   • Social connections     Talks on phone: Not on file     Gets together: Not on file     Attends Hinduism service: Not on file     Active member of club or organization: Not on file     Attends meetings of clubs or organizations: Not on file     Relationship status: Not on file   • Intimate partner violence     Fear of current or ex partner: Not on file     Emotionally abused: Not on file     Physically abused: Not on file     Forced sexual activity: Not on file   Other Topics Concern   • Not on file   Social History Narrative   • Not on file  "    Family History   Problem Relation Age of Onset   • Cancer Mother         leukemia   • Genetic Disorder Mother         osteochondroma   • Stroke Neg Hx    • Hyperlipidemia Neg Hx    • Hypertension Neg Hx    • Heart Disease Neg Hx    • Diabetes Neg Hx    • Lung Disease Neg Hx        Allergies: Codeine; Nexium; Soy allergy; and Sulfa drugs    Patient is an 84-year-old female who presents today with complaint of ongoing ear pain and mouth pain.  She was seen in the office 8 days ago and was treated with Augmentin.  States no relief of symptoms.  Patient states the sore that she had to the roof of her mouth was on the left side and that the pain radiated from that area back through to her ear.  She reports sensitivity to the pinna of her ear.  Denies fever, aches, or chills.          Pharyngitis    Associated symptoms include ear pain.   Otalgia    Associated symptoms include a sore throat.       Review of Systems   Constitutional: Positive for malaise/fatigue. Negative for chills and fever.   HENT: Positive for ear pain and sore throat.    Skin: Negative.    All other systems reviewed and are negative.         Objective:     /62 (BP Location: Left arm, Patient Position: Sitting, BP Cuff Size: Adult)   Pulse 75   Temp 36.9 °C (98.4 °F) (Temporal)   Resp 12   Ht 1.575 m (5' 2\")   Wt 48.4 kg (106 lb 12.8 oz)   SpO2 95%   BMI 19.53 kg/m²      Physical Exam  Vitals signs reviewed.   Constitutional:       Appearance: Normal appearance.   HENT:      Head: Normocephalic and atraumatic.      Right Ear: Tympanic membrane, ear canal and external ear normal.      Left Ear: Tympanic membrane, ear canal and external ear normal.      Ears:      Comments: Left EAC and left tympanic membrane appear to be within normal limits.  Patient does have some mild preauricular sensitivity, and sensitivity over the pinna.  No lesions noted.     Nose: Nose normal.      Mouth/Throat:      Mouth: Mucous membranes are moist.      " Comments: Ulcerated lesions noted to the hard palate on the left side.  Eyes:      Extraocular Movements: Extraocular movements intact.      Conjunctiva/sclera: Conjunctivae normal.      Pupils: Pupils are equal, round, and reactive to light.   Neck:      Musculoskeletal: Normal range of motion and neck supple.   Cardiovascular:      Rate and Rhythm: Normal rate and regular rhythm.      Heart sounds: Normal heart sounds.   Pulmonary:      Effort: Pulmonary effort is normal.      Breath sounds: Normal breath sounds.   Musculoskeletal: Normal range of motion.   Skin:     General: Skin is warm and dry.   Neurological:      Mental Status: She is alert and oriented to person, place, and time.   Psychiatric:         Mood and Affect: Mood normal.         Behavior: Behavior normal.         Thought Content: Thought content normal.         Judgment: Judgment normal.       Discussed with patient that I think this may be an unusual presentation of shingles as she did not improve with antibiotics.  To her recollection, she does not know if she ever had chickenpox as a very young child.  States all of her children have had chickenpox.  At this time, I discussed a trial of Valtrex and she is willing to do that.  We will also give patient a referral to ENT for follow-up so that she will have that in place if her symptoms persist.          Assessment/Plan:   Left ear pain  Mouth pain  Possible shingles    Valtrex  Referral given to ENT  Return to urgent care otherwise for any further questions or concerns    There are no diagnoses linked to this encounter.

## 2020-05-22 ENCOUNTER — APPOINTMENT (OUTPATIENT)
Dept: PHYSICAL THERAPY | Facility: REHABILITATION | Age: 85
End: 2020-05-22
Attending: FAMILY MEDICINE
Payer: MEDICARE

## 2020-05-28 ENCOUNTER — HOSPITAL ENCOUNTER (OUTPATIENT)
Facility: MEDICAL CENTER | Age: 85
End: 2020-05-28
Attending: PHYSICIAN ASSISTANT
Payer: MEDICARE

## 2020-05-28 ENCOUNTER — OFFICE VISIT (OUTPATIENT)
Dept: URGENT CARE | Facility: PHYSICIAN GROUP | Age: 85
End: 2020-05-28
Payer: MEDICARE

## 2020-05-28 VITALS
OXYGEN SATURATION: 96 % | SYSTOLIC BLOOD PRESSURE: 118 MMHG | DIASTOLIC BLOOD PRESSURE: 60 MMHG | WEIGHT: 106.2 LBS | TEMPERATURE: 97.8 F | HEIGHT: 62 IN | BODY MASS INDEX: 19.54 KG/M2 | RESPIRATION RATE: 16 BRPM | HEART RATE: 78 BPM

## 2020-05-28 DIAGNOSIS — R39.89 SUSPECTED UTI: ICD-10-CM

## 2020-05-28 DIAGNOSIS — N89.8 VAGINAL DISCHARGE: ICD-10-CM

## 2020-05-28 LAB
APPEARANCE UR: NORMAL
BILIRUB UR STRIP-MCNC: NEGATIVE MG/DL
COLOR UR AUTO: NORMAL
GLUCOSE UR STRIP.AUTO-MCNC: NEGATIVE MG/DL
KETONES UR STRIP.AUTO-MCNC: NEGATIVE MG/DL
LEUKOCYTE ESTERASE UR QL STRIP.AUTO: NORMAL
NITRITE UR QL STRIP.AUTO: NEGATIVE
PH UR STRIP.AUTO: 6 [PH] (ref 5–8)
PROT UR QL STRIP: NEGATIVE MG/DL
RBC UR QL AUTO: NEGATIVE
SP GR UR STRIP.AUTO: 1.02
UROBILINOGEN UR STRIP-MCNC: 0.2 MG/DL

## 2020-05-28 PROCEDURE — 87086 URINE CULTURE/COLONY COUNT: CPT

## 2020-05-28 PROCEDURE — 99214 OFFICE O/P EST MOD 30 MIN: CPT | Performed by: PHYSICIAN ASSISTANT

## 2020-05-28 PROCEDURE — 81002 URINALYSIS NONAUTO W/O SCOPE: CPT | Performed by: PHYSICIAN ASSISTANT

## 2020-05-28 RX ORDER — CEFDINIR 300 MG/1
300 CAPSULE ORAL 2 TIMES DAILY
Qty: 10 CAP | Refills: 0 | Status: SHIPPED | OUTPATIENT
Start: 2020-05-28 | End: 2020-06-02

## 2020-05-28 ASSESSMENT — ENCOUNTER SYMPTOMS
BACK PAIN: 0
CHILLS: 0
FLANK PAIN: 0
FEVER: 0
FALLS: 0
MYALGIAS: 0

## 2020-05-28 ASSESSMENT — FIBROSIS 4 INDEX: FIB4 SCORE: 1.69

## 2020-05-29 ENCOUNTER — PHYSICAL THERAPY (OUTPATIENT)
Dept: PHYSICAL THERAPY | Facility: REHABILITATION | Age: 85
End: 2020-05-29
Attending: FAMILY MEDICINE
Payer: MEDICARE

## 2020-05-29 DIAGNOSIS — G89.29 CHRONIC RIGHT SHOULDER PAIN: ICD-10-CM

## 2020-05-29 DIAGNOSIS — M54.2 NECK PAIN: ICD-10-CM

## 2020-05-29 DIAGNOSIS — M25.511 CHRONIC RIGHT SHOULDER PAIN: ICD-10-CM

## 2020-05-29 DIAGNOSIS — M54.41 CHRONIC RIGHT-SIDED LOW BACK PAIN WITH RIGHT-SIDED SCIATICA: ICD-10-CM

## 2020-05-29 DIAGNOSIS — G89.29 CHRONIC RIGHT-SIDED LOW BACK PAIN WITH RIGHT-SIDED SCIATICA: ICD-10-CM

## 2020-05-29 PROCEDURE — 97163 PT EVAL HIGH COMPLEX 45 MIN: CPT

## 2020-05-29 ASSESSMENT — ENCOUNTER SYMPTOMS
PAIN SCALE AT LOWEST: 0
PAIN SCALE AT HIGHEST: 0
PAIN SCALE: 0

## 2020-05-29 NOTE — OP THERAPY EVALUATION
"  Outpatient Physical Therapy  INITIAL EVALUATION    [facility] [place]  [address] [facility] [address], [address]  [place] [address]  Phone:  [phone]    Date of Evaluation: 2020    Patient: Ratna Cline  YOB: 1935  MRN: [id number]     Referring Provider: Shu Norwood D.O.  [address] [place] [address] [address]  [place] [address]   Referring Diagnosis Chronic right-sided low back pain with right-sided sciatica [M54.41, G89.29];Neck pain [M54.2];Chronic right shoulder pain [M25.511, G89.29]     Time Calculation    Start time: 0200  Stop time: 0300 Time Calculation (min): 60 minutes         Chief Complaint: Back Problem; Shoulder Problem; and Neck Pain    Visit Diagnoses     ICD-10-CM   1. Chronic right-sided low back pain with right-sided sciatica  M54.41    G89.29   2. Neck pain  M54.2   3. Chronic right shoulder pain  M25.511    G89.29         Subjective:   History of Present Illness:     Mechanism of injury:  Reporting h/o of osteochondroma. Within past 6 months have bilateral tingling in ankles. Reporting history of intermittent L. Knee pain that she has seen ortho. MD but can't find dx. \"Sometimes leg will go out on me.\" Chronic R. Neck and shoulder discomfort. Reporting h/o of R. Scapular surgery (). Did PT in  did PT for neck and R. Shoulder. R. Hip pain in  limited walking. Did PT and that was helpful. Did a 4 mile hike this AM.     What brings me to PT is concern over tingling sensation in ankles. Soreness in the right hip made worse with sitting. Some persistent pain and stiffness in R. Shoulder blade. I have also had a few \"wobbles\" over the past 6 months. Denies falling but reports near falling and not feeling as stable when standing.   Pain:     Current pain ratin    At best pain ratin    At worst pain ratin    Pain Comments::  Waxing and waning symptoms   Patient Goals:     Patient goals for therapy:  Improved balance and decreased " pain    Other patient goals:  Decrease pain in R. shoulder blade, improve hip mobility, improve balance, reduce pain in R. posterior hip       Past Medical History:   Diagnosis Date   • Arthritis    • Back pain    • Pisano's esophagus     Dr. Anaya, in late 1990's   • Bladder infection    • Dyslipidemia 7/14/2016   • Family history of leukemia 6/3/2019   • History of hemorrhoids    • History of measles    • History of pneumonia    • Hypothyroidism (acquired) 7/13/2015   • Osteochondroma    • Osteochondroma    • Sinusitis    • Thyroid disease    • Tinnitus 7/13/2015     Past Surgical History:   Procedure Laterality Date   • HYSTERECTOMY RADICAL     • OTHER      jaw surgery to correct overbite   • TONSILLECTOMY       Social History     Tobacco Use   • Smoking status: Never Smoker   • Smokeless tobacco: Never Used   Substance Use Topics   • Alcohol use: No     Alcohol/week: 0.0 oz     Family and Occupational History     Socioeconomic History   • Marital status: Single     Spouse name: Not on file   • Number of children: Not on file   • Years of education: Not on file   • Highest education level: Not on file   Occupational History   • Not on file       Objective     Static Posture     Comments  Old surgical incisions patient reports were to treat osteochondroma at R. Anterior hip, medial and lateral R. Knee, L. Patella, and R. Medial border of scapula.     Patient has mass that is not TTP, possible lipoma, at inferior angle of R. Scap. And increase R. Scapular winging.     Hip Screen   Hip range of motion within functional limits with the following exceptions: Bilateral loss of hip rotation R > L   Hip strength within functional limits with the following exceptions: R. Hip flexor weakness compared to L.     Neurological Testing     Reflexes   Left   Patellar (L4): normal (2+)  Achilles (S1): trace (1+)    Right   Patellar (L4): normal (2+)  Achilles (S1): normal (2+)    Myotome testing   Lumbar (left)   L1 (hip  "flexors): 3+  L2 (hip flexors): 3+  L3 (knee extensors): 5  L4 (ankle dorsiflexors): 4  L5 (great toe extension): 5    Lumbar (right)   L1 (hip flexors): 3+  L2 (hip flexors): 3+  L3 (knee extensors): 5  L4 (ankle dorsiflexors): 5  L5 (great toe extension): 5    Active Range of Motion   Left Shoulder   Internal rotation BTB: mid thoracic     Right Shoulder   Flexion: WFL  Extension: WFL  Abduction: WFL  Internal rotation BTB: lumbar     Lumbar   Flexion: within functional limits  Extension: decreased  Left rotation: decreased  Right rotation: decreased    Additional Active Range of Motion Details  Pain at inferior angle of scap and increased scapular winging with BTB Int rot.   ROS posterior R. Glut pain with thoracolumbar extension       Exercises/Treatment  Time-based treatments/modalities:           Assessment, Response and Plan:   Impairments: impaired physical strength and limited mobility    Assessment details:  Patient attending PT with referral for neck, R. Shoulder, and R. Sciatica. She was a poor historian and could not recall what her referral was for. Her chief complaint was abnormal sensation in her ankles \"that only comes on between 4 and 5 pm\" and then resolves on it's own. Her deep tendon reflexes, myotomes and dermatomes were normal, she denied any symptoms associated with central cord compression, and she did not demonstrate signs of lumbar instability. Patient has h/o ostechondroma and has several old incisions in her right lower extremity, right scapula, and L. Knee. In regards to her chronic right shoulder pain she is showing signs of scapular hypermobility and could benefit from oje-scapular strengthening. Her R. Posterior hip pain can be treated in PT and is likely a referred pain from lumbar spine. She can also benefit from gait and balance training.   Barriers to therapy:  Age  Prognosis: good    Goals:   Short Term Goals:   Patient will be independent for scapular strengthening and " stabilization exercise   Complete river balance test and adjust goals accordingly      Patient will have reduction in hypersensitivity to touch at posterior R. Hip   Short term goal time span:  1-2 weeks      Long Term Goals:    Patient will be independent in HEP for balance  Patient will be independent with exercises to improve lumbar mobility  River balance re-test will show low fall risk   Long term goal time span:  2-4 weeks    Plan:   Planned therapy interventions:  E Stim Unattended (CPT 42309), Functional Training, Self Care (CPT 53953), Hot or Cold Pack Therapy (CPT 46877), Manual Therapy (CPT 13447), Neuromuscular Re-education (CPT 75857), Self Care ADL Training (CPT 46396), Therapeutic Activities (CPT 00011) and Therapeutic Exercise (CPT 49310)  Frequency:  2x week  Duration in weeks:  4  Discussed with:  Patient      Functional Assessment Used        Referring provider co-signature:  I have reviewed this plan of care and my co-signature certifies the need for services.    Physician Signature: ________________________________ Date: ______________

## 2020-05-31 LAB
BACTERIA UR CULT: NORMAL
SIGNIFICANT IND 70042: NORMAL
SITE SITE: NORMAL
SOURCE SOURCE: NORMAL

## 2020-06-01 ENCOUNTER — TELEPHONE (OUTPATIENT)
Dept: URGENT CARE | Facility: PHYSICIAN GROUP | Age: 85
End: 2020-06-01

## 2020-06-01 NOTE — TELEPHONE ENCOUNTER
Spoke with the patient regarding her negative results.  She stated she is feeling better and she will stop taking the ABX.    ----- Message from Mark Reyes P.A.-C. sent at 6/1/2020  8:14 AM PDT -----  Regarding: Negative urine culture.  Please alert this patient of negative urine culture results- no indication for her to be on ABX therapy at this time.   Please encourage recheck with us or her PCP for further evaluation if symptoms persist.   Thanks!  Mark

## 2020-06-02 ENCOUNTER — APPOINTMENT (OUTPATIENT)
Dept: PHYSICAL THERAPY | Facility: REHABILITATION | Age: 85
End: 2020-06-02
Attending: FAMILY MEDICINE
Payer: MEDICARE

## 2020-06-04 ENCOUNTER — PHYSICAL THERAPY (OUTPATIENT)
Dept: PHYSICAL THERAPY | Facility: REHABILITATION | Age: 85
End: 2020-06-04
Attending: FAMILY MEDICINE
Payer: MEDICARE

## 2020-06-04 DIAGNOSIS — M25.511 CHRONIC RIGHT SHOULDER PAIN: ICD-10-CM

## 2020-06-04 DIAGNOSIS — M54.41 CHRONIC RIGHT-SIDED LOW BACK PAIN WITH RIGHT-SIDED SCIATICA: ICD-10-CM

## 2020-06-04 DIAGNOSIS — G89.29 CHRONIC RIGHT SHOULDER PAIN: ICD-10-CM

## 2020-06-04 DIAGNOSIS — M54.2 NECK PAIN: ICD-10-CM

## 2020-06-04 DIAGNOSIS — G89.29 CHRONIC RIGHT-SIDED LOW BACK PAIN WITH RIGHT-SIDED SCIATICA: ICD-10-CM

## 2020-06-04 PROCEDURE — 97112 NEUROMUSCULAR REEDUCATION: CPT

## 2020-06-04 NOTE — OP THERAPY DAILY TREATMENT
"  Outpatient Physical Therapy  DAILY TREATMENT     [facility] Outpatient [facility] Therapy [address], [address]  [place] [address]  Phone:  [phone]    Date: 06/04/2020    Patient: Ratna Cline  YOB: 1935  MRN: [id number]     Time Calculation    Start time: 0138  Stop time: 0205 Time Calculation (min): 27 minutes         Chief Complaint: Shoulder Problem    Visit #: 2    SUBJECTIVE:  I went for hike this morning with my son. I found walking up hill \"hard on my hips\". No issues descending.     OBJECTIVE:  Current objective measures:           Therapeutic Exercises (CPT 15703):     1. Shoulder high, mid and low rows , unable due to concern over biceps    2. Scap lift wall lift off    3. Scapular clocks in SL'ing    4. Scap retraction w/ ball fascilitation     5. Nu step, 5 min       Time-based treatments/modalities:    Physical Therapy Timed Treatment Charges  Neuromusc re-ed, balance, coor, post minutes (CPT 48966): 20 minutes  Therapeutic exercise minutes (CPT 84017): 5 minutes        ASSESSMENT:   Response to treatment: When working on scapular strength and motor control patient reported she gets biceps pain. There is a palpable mass at her biceps, likely related to osteochondroma. Pt reporting this is not a new issue. She has poor scapular strength and coordination, we are working on improving this through scapular AROM and will try to progress to scapular strengthening as tolerated.     PLAN/RECOMMENDATIONS:   Plan for treatment: therapy treatment to continue next visit.  Planned interventions for next visit: continue with current treatment.       "

## 2020-06-09 ENCOUNTER — APPOINTMENT (OUTPATIENT)
Dept: PHYSICAL THERAPY | Facility: REHABILITATION | Age: 85
End: 2020-06-09
Attending: FAMILY MEDICINE
Payer: MEDICARE

## 2020-06-11 ENCOUNTER — APPOINTMENT (OUTPATIENT)
Dept: PHYSICAL THERAPY | Facility: REHABILITATION | Age: 85
End: 2020-06-11
Attending: FAMILY MEDICINE
Payer: MEDICARE

## 2020-06-15 ENCOUNTER — APPOINTMENT (OUTPATIENT)
Dept: PHYSICAL THERAPY | Facility: REHABILITATION | Age: 85
End: 2020-06-15
Attending: FAMILY MEDICINE
Payer: MEDICARE

## 2020-06-18 ENCOUNTER — APPOINTMENT (OUTPATIENT)
Dept: PHYSICAL THERAPY | Facility: REHABILITATION | Age: 85
End: 2020-06-18
Attending: FAMILY MEDICINE
Payer: MEDICARE

## 2020-07-07 ENCOUNTER — OFFICE VISIT (OUTPATIENT)
Dept: MEDICAL GROUP | Facility: PHYSICIAN GROUP | Age: 85
End: 2020-07-07
Payer: MEDICARE

## 2020-07-07 VITALS
WEIGHT: 105 LBS | BODY MASS INDEX: 19.32 KG/M2 | SYSTOLIC BLOOD PRESSURE: 132 MMHG | HEART RATE: 61 BPM | DIASTOLIC BLOOD PRESSURE: 58 MMHG | OXYGEN SATURATION: 97 % | HEIGHT: 62 IN | TEMPERATURE: 98.8 F

## 2020-07-07 DIAGNOSIS — H69.92 ACUTE DYSFUNCTION OF LEFT EUSTACHIAN TUBE: ICD-10-CM

## 2020-07-07 DIAGNOSIS — E03.8 SUBCLINICAL HYPOTHYROIDISM: ICD-10-CM

## 2020-07-07 DIAGNOSIS — J30.2 SEASONAL ALLERGIES: ICD-10-CM

## 2020-07-07 PROCEDURE — 99214 OFFICE O/P EST MOD 30 MIN: CPT | Performed by: PHYSICIAN ASSISTANT

## 2020-07-07 ASSESSMENT — FIBROSIS 4 INDEX: FIB4 SCORE: 1.69

## 2020-07-07 NOTE — PROGRESS NOTES
Subjective:   Ratna Cline is a 84 y.o. female here today for follow-up on thyroid, allergies, ear discomfort. Is an established patient of mine.    HPI:    Patient presents to the office today for routine follow-up. Last visit with me was in 2/2020. Since that time,    Patient has subclinical hypothyroidism which has been monitored annually with TSH. This was most recently checked in 7/2019 and was high-normal at that time. Patient denies any neck swelling/mass or significant fatigue. She did start taking some type of thyroid supplement 2-3 months ago as she wanted to see how this would affect her lab results.    She mentions that she has been having a lot of allergy symptoms lately. Main symptom is runny nose but sometimes develops itchy/watery eyes.  Uses Flonase and Zyrtec sporadically but has been hesitant to use more frequently due to concern about side effects.  Feels a plugged sensation with occasional soreness of her left ear.  was seen in urgent care for this recently.     has been eating yogurt before bed and occasionally taking Gas-X. Wonders if this regimen is fine to continue.       Current medicines (including changes today)  Current Outpatient Medications   Medication Sig Dispense Refill   • Multiple Vitamins-Minerals (MULTIVITAMIN ADULT PO) Take  by mouth.     • Hyaluronic Acid-Vitamin C (HYALURONIC ACID PO) Take  by mouth.     • FOLIC ACID PO Take  by mouth.     • Pyridoxine HCl (VITAMIN B-6 PO) Take  by mouth.     • ELDERBERRY PO Take  by mouth as needed.     • Multiple Vitamins-Minerals (ZINC PO) Take  by mouth.     • BIOTIN PO Take  by mouth.     • ALPHA LIPOIC ACID PO Take  by mouth.     • LUTEIN PO Take  by mouth.     • Calcium Carb-Cholecalciferol (CALCIUM 600 + D PO) Take  by mouth.     • Nutritional Supplements (PYCNOGENOL PO) Take  by mouth.     • cyanocobalamin (VITAMIN B-12) 100 MCG Tab Take 100 mcg by mouth every day.     • Turmeric, Curcuma Longa, (CURCUMIN) Powder  "by Does not apply route.     • magnesium citrate Solution Take 300 mL by mouth Once.     • coenzyme Q-10 30 MG capsule Take 60 mg by mouth every day.     • Ginkgo Biloba (GINKOBA PO) Take  by mouth.     • Nutritional Supplements (CALCIUM D-GLUCARATE PO) Take  by mouth.       No current facility-administered medications for this visit.      She  has a past medical history of Arthritis, Back pain, Pisano's esophagus, Bladder infection, Dyslipidemia (7/14/2016), Family history of leukemia (6/3/2019), History of hemorrhoids, History of measles, History of pneumonia, Hypothyroidism (acquired) (7/13/2015), Osteochondroma, Osteochondroma, Sinusitis, Thyroid disease, and Tinnitus (7/13/2015). She also has no past medical history of ASTHMA or Diabetes.    ROS  As per HPI.       Objective:     /58 (BP Location: Right arm, Patient Position: Sitting, BP Cuff Size: Adult)   Pulse 61   Temp 37.1 °C (98.8 °F) (Tympanic)   Ht 1.575 m (5' 2\")   Wt 47.6 kg (105 lb)   SpO2 97%  Body mass index is 19.2 kg/m².     Physical Exam:  Constitutional: Alert, well-appearing elderly female no distress. Pleasant and cooperative.  Skin: Warm, dry, good turgor, no rashes in visible areas.  Eye: Pupils are equal and round, conjunctiva clear, lids normal.  ENMT: No rhinorrhea noted.  External ear canals are clear without erythema, edema, or drainage.  Tympanic membranes are fully visible bilaterally and appear gray with no injection, bulging, or effusion.  Lips without lesions, moist mucus membranes.  Neck: No masses. No submandibular or cervical lymphadenopathy. No palpable thyromegaly.  Respiratory: Unlabored respiratory effort, lungs clear to auscultation, no wheezes, no rhonchi.  Cardiovascular: Normal S1, S2, no murmur.      Assessment and Plan:   The following treatment plan was discussed    1. Subclinical hypothyroidism  Established problem, suspect stable. Now on some type of thyroid supplementation--she is unsure what type. She " is due for repeat thyroid lab work which I previously ordered.  Lab slip was reprinted for patient today. Further recommendations pending lab results.    2. Seasonal allergies  Established problem, uncontrolled.  Having symptoms suggestive of allergic conjunctivitis/rhinitis.  Not consistently taking allergy medication.  Especially in light of her new suspected eustachian tube dysfunction, recommend that she start daily use of Flonase along with over-the-counter antihistamine like the Zyrtec she is taken in the past.    3. Acute dysfunction of left eustachian tube  New problem, uncontrolled.  Suspect related to seasonal allergies.  See above.      Followup: Return for establish care with new PCP.    Diana Cline P.A.-C.

## 2020-07-07 NOTE — PATIENT INSTRUCTIONS
The 3 providers available to establish care with at this clinic are:  1. Dr. Mary Ellen Hyde MD  2. Dr. Shu Norwood DO  3. CHRISTOPHER Diaz

## 2020-07-09 ENCOUNTER — HOSPITAL ENCOUNTER (OUTPATIENT)
Dept: LAB | Facility: MEDICAL CENTER | Age: 85
End: 2020-07-09
Attending: PHYSICIAN ASSISTANT
Payer: MEDICARE

## 2020-07-09 DIAGNOSIS — E78.5 DYSLIPIDEMIA: ICD-10-CM

## 2020-07-09 DIAGNOSIS — E03.8 SUBCLINICAL HYPOTHYROIDISM: ICD-10-CM

## 2020-07-09 LAB
ALBUMIN SERPL BCP-MCNC: 4 G/DL (ref 3.2–4.9)
ALBUMIN/GLOB SERPL: 1.4 G/DL
ALP SERPL-CCNC: 59 U/L (ref 30–99)
ALT SERPL-CCNC: 14 U/L (ref 2–50)
ANION GAP SERPL CALC-SCNC: 14 MMOL/L (ref 7–16)
AST SERPL-CCNC: 22 U/L (ref 12–45)
BILIRUB SERPL-MCNC: 0.5 MG/DL (ref 0.1–1.5)
BUN SERPL-MCNC: 16 MG/DL (ref 8–22)
CALCIUM SERPL-MCNC: 9.7 MG/DL (ref 8.5–10.5)
CHLORIDE SERPL-SCNC: 98 MMOL/L (ref 96–112)
CHOLEST SERPL-MCNC: 222 MG/DL (ref 100–199)
CO2 SERPL-SCNC: 25 MMOL/L (ref 20–33)
CREAT SERPL-MCNC: 0.75 MG/DL (ref 0.5–1.4)
GLOBULIN SER CALC-MCNC: 2.8 G/DL (ref 1.9–3.5)
GLUCOSE SERPL-MCNC: 85 MG/DL (ref 65–99)
HDLC SERPL-MCNC: 53 MG/DL
LDLC SERPL CALC-MCNC: 149 MG/DL
POTASSIUM SERPL-SCNC: 4.1 MMOL/L (ref 3.6–5.5)
PROT SERPL-MCNC: 6.8 G/DL (ref 6–8.2)
SODIUM SERPL-SCNC: 137 MMOL/L (ref 135–145)
TRIGL SERPL-MCNC: 100 MG/DL (ref 0–149)
TSH SERPL DL<=0.005 MIU/L-ACNC: 7.05 UIU/ML (ref 0.38–5.33)

## 2020-07-09 PROCEDURE — 84443 ASSAY THYROID STIM HORMONE: CPT

## 2020-07-09 PROCEDURE — 80053 COMPREHEN METABOLIC PANEL: CPT

## 2020-07-09 PROCEDURE — 80061 LIPID PANEL: CPT

## 2020-07-09 PROCEDURE — 36415 COLL VENOUS BLD VENIPUNCTURE: CPT

## 2020-07-10 ENCOUNTER — TELEPHONE (OUTPATIENT)
Dept: MEDICAL GROUP | Facility: PHYSICIAN GROUP | Age: 85
End: 2020-07-10

## 2020-07-10 NOTE — TELEPHONE ENCOUNTER
----- Message from Diana Cline P.A.-C. sent at 7/9/2020  7:22 PM PDT -----  Please inform patient that her thyroid test came back abnormal.  She needs an additional test to check her thyroid hormone level.  I have ordered this and she can have done anytime.  Otherwise, her cholesterol is elevated, about the same as it was at last check in 2018. Statin is recommended to help prevent cardiovascular complications like stroke and heart attack. Would prefer to have discussion about this in the office, so please set her up with appointment if she's willing to consider medication.  Diana Cline P.A.-C.

## 2020-07-17 ENCOUNTER — HOSPITAL ENCOUNTER (OUTPATIENT)
Facility: MEDICAL CENTER | Age: 85
End: 2020-07-17
Attending: NURSE PRACTITIONER
Payer: MEDICARE

## 2020-07-17 ENCOUNTER — OFFICE VISIT (OUTPATIENT)
Dept: URGENT CARE | Facility: PHYSICIAN GROUP | Age: 85
End: 2020-07-17
Payer: MEDICARE

## 2020-07-17 ENCOUNTER — TELEPHONE (OUTPATIENT)
Dept: URGENT CARE | Facility: PHYSICIAN GROUP | Age: 85
End: 2020-07-17

## 2020-07-17 ENCOUNTER — HOSPITAL ENCOUNTER (OUTPATIENT)
Dept: LAB | Facility: MEDICAL CENTER | Age: 85
End: 2020-07-17
Attending: PHYSICIAN ASSISTANT
Payer: MEDICARE

## 2020-07-17 VITALS
BODY MASS INDEX: 19.32 KG/M2 | HEIGHT: 62 IN | DIASTOLIC BLOOD PRESSURE: 70 MMHG | WEIGHT: 105 LBS | OXYGEN SATURATION: 96 % | HEART RATE: 84 BPM | SYSTOLIC BLOOD PRESSURE: 124 MMHG | TEMPERATURE: 98 F | RESPIRATION RATE: 12 BRPM

## 2020-07-17 DIAGNOSIS — Z00.00 HEALTH CARE MAINTENANCE: ICD-10-CM

## 2020-07-17 DIAGNOSIS — E03.8 SUBCLINICAL HYPOTHYROIDISM: ICD-10-CM

## 2020-07-17 DIAGNOSIS — N94.9 VAGINAL DISCOMFORT: ICD-10-CM

## 2020-07-17 DIAGNOSIS — L98.9 SKIN LESION OF RIGHT ARM: ICD-10-CM

## 2020-07-17 DIAGNOSIS — R35.0 FREQUENT URINATION: ICD-10-CM

## 2020-07-17 LAB
APPEARANCE UR: CLEAR
BILIRUB UR STRIP-MCNC: NEGATIVE MG/DL
COLOR UR AUTO: YELLOW
GLUCOSE UR STRIP.AUTO-MCNC: NEGATIVE MG/DL
KETONES UR STRIP.AUTO-MCNC: NORMAL MG/DL
LEUKOCYTE ESTERASE UR QL STRIP.AUTO: NORMAL
NITRITE UR QL STRIP.AUTO: NEGATIVE
PH UR STRIP.AUTO: 6.5 [PH] (ref 5–8)
PROT UR QL STRIP: NEGATIVE MG/DL
RBC UR QL AUTO: NEGATIVE
SP GR UR STRIP.AUTO: 1.01
T4 FREE SERPL-MCNC: 1.27 NG/DL (ref 0.93–1.7)
UROBILINOGEN UR STRIP-MCNC: 0.2 MG/DL

## 2020-07-17 PROCEDURE — 87510 GARDNER VAG DNA DIR PROBE: CPT

## 2020-07-17 PROCEDURE — 84439 ASSAY OF FREE THYROXINE: CPT

## 2020-07-17 PROCEDURE — 87660 TRICHOMONAS VAGIN DIR PROBE: CPT

## 2020-07-17 PROCEDURE — 36415 COLL VENOUS BLD VENIPUNCTURE: CPT

## 2020-07-17 PROCEDURE — 99214 OFFICE O/P EST MOD 30 MIN: CPT | Performed by: NURSE PRACTITIONER

## 2020-07-17 PROCEDURE — 87480 CANDIDA DNA DIR PROBE: CPT

## 2020-07-17 PROCEDURE — 81002 URINALYSIS NONAUTO W/O SCOPE: CPT | Performed by: NURSE PRACTITIONER

## 2020-07-17 ASSESSMENT — ENCOUNTER SYMPTOMS
FEVER: 0
FLANK PAIN: 0
SHORTNESS OF BREATH: 0
DOUBLE VISION: 0
MYALGIAS: 0
NAUSEA: 0
COUGH: 0
CHILLS: 0
VOMITING: 0
PALPITATIONS: 0
NECK PAIN: 0
BLURRED VISION: 0

## 2020-07-17 ASSESSMENT — FIBROSIS 4 INDEX: FIB4 SCORE: 1.95

## 2020-07-17 NOTE — PROGRESS NOTES
Subjective:     Ratna Cline is a 84 y.o. female who presents for Arm Pain (bump on arm from yard work, sore and tender)      HPI  Pt presents for evaluation of a new problem. She states three days ago she was working in her garden and developed a sore on her right forearm. She states the sore is more painful today and would like to have Clindamycin prescribed to treat this. She states a year ago she had another skin infection that was cleared up following this antibiotic use. She has not tried anything to relieve this sore. She goes on to say she is also having increased vaginal discharge or leakage of urine and the last time she used Clindamycin, this problem resolved. She is unsure where the discharge is coming from. She denies any itching or vaginal odor, fever/chills, nausea or vomiting.     Review of Systems   Constitutional: Negative for chills and fever.   HENT: Negative for hearing loss and tinnitus.    Eyes: Negative for blurred vision and double vision.   Respiratory: Negative for cough and shortness of breath.    Cardiovascular: Negative for chest pain and palpitations.   Gastrointestinal: Negative for nausea and vomiting.   Genitourinary: Positive for dysuria, frequency, hematuria and urgency. Negative for flank pain.   Musculoskeletal: Negative for myalgias and neck pain.       PMH:   Past Medical History:   Diagnosis Date   • Arthritis    • Back pain    • Pisano's esophagus     Dr. Anaya, in late 1990's   • Bladder infection    • Dyslipidemia 7/14/2016   • Family history of leukemia 6/3/2019   • History of hemorrhoids    • History of measles    • History of pneumonia    • Hypothyroidism (acquired) 7/13/2015   • Osteochondroma    • Osteochondroma    • Sinusitis    • Thyroid disease    • Tinnitus 7/13/2015     ALLERGIES:   Allergies   Allergen Reactions   • Codeine Vomiting   • Nexium    • Soy Allergy    • Sulfa Drugs Nausea     SURGHX:   Past Surgical History:   Procedure Laterality Date  "  • HYSTERECTOMY RADICAL     • OTHER      jaw surgery to correct overbite   • TONSILLECTOMY       SOCHX:   Social History     Socioeconomic History   • Marital status: Single     Spouse name: Not on file   • Number of children: Not on file   • Years of education: Not on file   • Highest education level: Not on file   Occupational History   • Not on file   Social Needs   • Financial resource strain: Not on file   • Food insecurity     Worry: Never true     Inability: Never true   • Transportation needs     Medical: No     Non-medical: No   Tobacco Use   • Smoking status: Never Smoker   • Smokeless tobacco: Never Used   Substance and Sexual Activity   • Alcohol use: No     Alcohol/week: 0.0 oz   • Drug use: No   • Sexual activity: Never     Partners: Male   Lifestyle   • Physical activity     Days per week: Not on file     Minutes per session: Not on file   • Stress: Not on file   Relationships   • Social connections     Talks on phone: Not on file     Gets together: Not on file     Attends Yazidi service: Not on file     Active member of club or organization: Not on file     Attends meetings of clubs or organizations: Not on file     Relationship status: Not on file   • Intimate partner violence     Fear of current or ex partner: Not on file     Emotionally abused: Not on file     Physically abused: Not on file     Forced sexual activity: Not on file   Other Topics Concern   • Not on file   Social History Narrative   • Not on file     FH:   Family History   Problem Relation Age of Onset   • Cancer Mother         leukemia   • Genetic Disorder Mother         osteochondroma   • Stroke Neg Hx    • Hyperlipidemia Neg Hx    • Hypertension Neg Hx    • Heart Disease Neg Hx    • Diabetes Neg Hx    • Lung Disease Neg Hx          Objective:   /70 (BP Location: Right arm, Patient Position: Sitting, BP Cuff Size: Adult)   Pulse 84   Temp 36.7 °C (98 °F) (Tympanic)   Resp 12   Ht 1.575 m (5' 2\")   Wt 47.6 kg (105 lb) "   SpO2 96%   BMI 19.20 kg/m²     Physical Exam  Vitals signs and nursing note reviewed.   Constitutional:       General: She is not in acute distress.     Appearance: Normal appearance. She is normal weight. She is not ill-appearing.   HENT:      Head: Normocephalic and atraumatic.      Right Ear: External ear normal.      Left Ear: External ear normal.      Nose: No congestion or rhinorrhea.      Mouth/Throat:      Mouth: Mucous membranes are moist.   Eyes:      General:         Right eye: No discharge.         Left eye: No discharge.      Extraocular Movements: Extraocular movements intact.      Pupils: Pupils are equal, round, and reactive to light.   Neck:      Musculoskeletal: Normal range of motion and neck supple.   Cardiovascular:      Rate and Rhythm: Normal rate and regular rhythm.      Pulses: Normal pulses.      Heart sounds: Normal heart sounds.   Pulmonary:      Effort: Pulmonary effort is normal.      Breath sounds: Normal breath sounds.   Abdominal:      General: Abdomen is flat. Bowel sounds are normal.      Palpations: Abdomen is soft.      Tenderness: There is no abdominal tenderness. There is no right CVA tenderness or left CVA tenderness.   Musculoskeletal: Normal range of motion.   Skin:     General: Skin is warm and dry.      Capillary Refill: Capillary refill takes less than 2 seconds.      Findings: Erythema, lesion and rash present. No abscess or laceration. Rash is papular. Rash is not crusting or vesicular.             Comments: 1 cm closed erythremic papule to right forearm. Tenderness to palpation, Negative for fluctuance or drainage.    Neurological:      General: No focal deficit present.      Mental Status: She is alert and oriented to person, place, and time. Mental status is at baseline.   Psychiatric:         Mood and Affect: Mood normal.         Behavior: Behavior normal.         Thought Content: Thought content normal.         Judgment: Judgment normal.              Assessment/Plan:   Assessment    1. Vaginal discomfort  VAGINAL PATHOGENS DNA PANEL   2. Skin lesion of right arm  mupirocin (BACTROBAN) 2 % Ointment   3. Frequent urination  POCT Urinalysis     Antibiotic not called in as sore is localized to small area of right forearm and pt is symptomatic for a yeast infection. She was educated that a PO antibiotic could exacerbate a yeast infection. We discussed possible sporotrichosis infection however, she states she is unsure if she was working with Hachimenroppi at this time. Vag pathogen collected. She was unable to urinate for poct urine however, she will bring back urine for testing. I will call her with results.   AVS handout given and reviewed with patient. Pt educated on red flags and when to seek treatment back in ER or UC.

## 2020-07-18 DIAGNOSIS — N94.9 VAGINAL DISCOMFORT: ICD-10-CM

## 2020-07-20 ENCOUNTER — OFFICE VISIT (OUTPATIENT)
Dept: MEDICAL GROUP | Facility: PHYSICIAN GROUP | Age: 85
End: 2020-07-20
Payer: MEDICARE

## 2020-07-20 VITALS
HEART RATE: 71 BPM | DIASTOLIC BLOOD PRESSURE: 54 MMHG | OXYGEN SATURATION: 95 % | SYSTOLIC BLOOD PRESSURE: 112 MMHG | BODY MASS INDEX: 19.51 KG/M2 | HEIGHT: 62 IN | TEMPERATURE: 99.4 F | WEIGHT: 106 LBS

## 2020-07-20 DIAGNOSIS — E78.00 PURE HYPERCHOLESTEROLEMIA: ICD-10-CM

## 2020-07-20 DIAGNOSIS — N89.8 VAGINAL DISCHARGE: ICD-10-CM

## 2020-07-20 DIAGNOSIS — E03.8 SUBCLINICAL HYPOTHYROIDISM: ICD-10-CM

## 2020-07-20 PROCEDURE — 99214 OFFICE O/P EST MOD 30 MIN: CPT | Performed by: PHYSICIAN ASSISTANT

## 2020-07-20 ASSESSMENT — FIBROSIS 4 INDEX: FIB4 SCORE: 1.95

## 2020-07-20 NOTE — PROGRESS NOTES
Subjective:   Ratna Cline is a 84 y.o. female here today for follow-up on hyperlipidemia. Is an established patient of mine.    HPI:    Patient presents to the office today to follow-up on elevated cholesterol.  I recently saw patient in the office and she had lipid profile done earlier this month which came back showing elevated total cholesterol and LDL. This has been a chronic issue for her. She was advised to schedule an appointment to discuss starting statin therapy. Patient is pretty adamant that she does not want to start a statin. She has been leery of prescription medication ever since she developed tinnitus after taking Nexium. She expresses motivation to improve her diet and would like some recommendations on this. Has already increased her exercise. States is going for 4-mile walks with her son twice per week.    She is also requesting the results of recent thyroid lab work that she had done.    She was recently seen in the urgent care with complaints of vaginal discharge and had some testing done.  She has not yet gotten the results and is wondering if they are back yet.  She is wondering if she has a vaginal infection of some kind.  This has been an ongoing issue for her for quite a while.  I actually saw her for the same complaint last summer and testing was negative at that time as well.  I referred her to gynecology but she states that she never followed up for appointment but would be willing to now.        Current medicines (including changes today)  Current Outpatient Medications   Medication Sig Dispense Refill   • mupirocin (BACTROBAN) 2 % Ointment Apply 1 Application to affected area(s) 3 times a day for 7 days. 22 g 0   • Multiple Vitamins-Minerals (MULTIVITAMIN ADULT PO) Take  by mouth.     • Hyaluronic Acid-Vitamin C (HYALURONIC ACID PO) Take  by mouth.     • FOLIC ACID PO Take  by mouth.     • Pyridoxine HCl (VITAMIN B-6 PO) Take  by mouth.     • ELDERBERRY PO Take  by mouth as  "needed.     • Multiple Vitamins-Minerals (ZINC PO) Take  by mouth.     • BIOTIN PO Take  by mouth.     • ALPHA LIPOIC ACID PO Take  by mouth.     • LUTEIN PO Take  by mouth.     • Calcium Carb-Cholecalciferol (CALCIUM 600 + D PO) Take  by mouth.     • Nutritional Supplements (PYCNOGENOL PO) Take  by mouth.     • cyanocobalamin (VITAMIN B-12) 100 MCG Tab Take 100 mcg by mouth every day.     • Turmeric, Curcuma Longa, (CURCUMIN) Powder by Does not apply route.     • magnesium citrate Solution Take 300 mL by mouth Once.     • coenzyme Q-10 30 MG capsule Take 60 mg by mouth every day.     • Ginkgo Biloba (GINKOBA PO) Take  by mouth.     • Nutritional Supplements (CALCIUM D-GLUCARATE PO) Take  by mouth.       No current facility-administered medications for this visit.      She  has a past medical history of Arthritis, Back pain, Pisano's esophagus, Bladder infection, Dyslipidemia (7/14/2016), Family history of leukemia (6/3/2019), History of hemorrhoids, History of measles, History of pneumonia, Hypothyroidism (acquired) (7/13/2015), Osteochondroma, Osteochondroma, Sinusitis, Thyroid disease, and Tinnitus (7/13/2015). She also has no past medical history of ASTHMA or Diabetes.    ROS  As per HPI.       Objective:     /54 (BP Location: Left arm, Patient Position: Sitting, BP Cuff Size: Adult)   Pulse 71   Temp 37.4 °C (99.4 °F) (Tympanic)   Ht 1.575 m (5' 2\")   Wt 48.1 kg (106 lb)   SpO2 95%  Body mass index is 19.39 kg/m².     Physical Exam:  Constitutional: Alert, well-appearing elderly female in no distress.  Skin: No rashes in visible areas.  Eye: Pupils are equal and round, conjunctiva clear, lids normal.  ENMT: Lips without lesions, moist mucus membranes.  Neck: Normal-appearing.  Respiratory: Unlabored respiratory effort.      Assessment and Plan:   The following treatment plan was discussed    1. Pure hypercholesterolemia  Established problem, uncontrolled.  High cholesterol has been persistent issue " for patient.  We discussed rationale for statin treatment including reduction in cardiovascular complications like heart attack and stroke.  She does not feel at this time that those benefits outweigh the risk of side effects.  She would like to modify her diet.  We did talk about this at length today.  Discussed importance of eating as fresh diet as possible and minimal and processed foods and refined carbohydrates/sugar.  Should avoid excessive saturated fats as well.  Regular exercise is recommended which she has been doing.    2. Subclinical hypothyroidism  Established problem, stable.  Was noted to have mildly elevated TSH on recent lab work.  Follow-up free thyroxine was normal.  Given that her hypothyroidism remains subclinical in nature, no need for medication at this time.  Should have continued monitoring at least annually.    3. Vaginal discharge  Established problem, uncontrolled.  Given that she continues to experience vaginal discharge with negative infection work-up, recommend gynecology evaluation.  I reprinted her referral from last summer.  Advised to schedule appointment.      Followup: Return for establish care with new PCP.    Diana Cline P.A.-C.

## 2020-07-20 NOTE — PATIENT INSTRUCTIONS
Cholesterol  Cholesterol is a white, waxy, fat-like substance needed by your body in small amounts. The liver makes all the cholesterol you need. Cholesterol is carried from the liver by the blood through the blood vessels. Deposits of cholesterol (plaque) may build up on blood vessel walls. These make the arteries narrower and stiffer. Cholesterol plaques increase the risk for heart attack and stroke.   You cannot feel your cholesterol level even if it is very high. The only way to know it is high is with a blood test. Once you know your cholesterol levels, you should keep a record of the test results. Work with your health care provider to keep your levels in the desired range.   WHAT DO THE RESULTS MEAN?  · Total cholesterol is a rough measure of all the cholesterol in your blood.    · LDL is the so-called bad cholesterol. This is the type that deposits cholesterol in the walls of the arteries. You want this level to be low.    · HDL is the good cholesterol because it cleans the arteries and carries the LDL away. You want this level to be high.  · Triglycerides are fat that the body can either burn for energy or store. High levels are closely linked to heart disease.    WHAT ARE THE DESIRED LEVELS OF CHOLESTEROL?  · Total cholesterol below 200.    · LDL below 100 for people at risk, below 70 for those at very high risk.    · HDL above 50 is good, above 60 is best.    · Triglycerides below 150.    HOW CAN I LOWER MY CHOLESTEROL?  · Diet. Follow your diet programs as directed by your health care provider.    ¨ Choose fish or white meat chicken and turkey, roasted or baked. Limit fatty cuts of red meat, fried foods, and processed meats, such as sausage and lunch meats.    ¨ Eat lots of fresh fruits and vegetables.  ¨ Choose whole grains, beans, pasta, potatoes, and cereals.    ¨ Use only small amounts of olive, corn, or canola oils.    ¨ Avoid butter, mayonnaise, shortening, or palm kernel oils.  ¨ Avoid foods with  trans fats.    ¨ Drink skim or nonfat milk and eat low-fat or nonfat yogurt and cheeses. Avoid whole milk, cream, ice cream, egg yolks, and full-fat cheeses.    ¨ Healthy desserts include juaquin food cake, jerardo snaps, animal crackers, hard candy, popsicles, and low-fat or nonfat frozen yogurt. Avoid pastries, cakes, pies, and cookies.    · Exercise. Follow your exercise programs as directed by your health care provider.    ¨ A regular program helps decrease LDL and raise HDL.    ¨ A regular program helps with weight control.    ¨ Do things that increase your activity level like gardening, walking, or taking the stairs. Ask your health care provider about how you can be more active in your daily life.    · Medicine. Take medicine only as directed by your health care provider.    ¨ Medicine may be prescribed by your health care provider to help lower cholesterol and decrease the risk for heart disease.    ¨ If you have several risk factors, you may need medicine even if your levels are normal.     This information is not intended to replace advice given to you by your health care provider. Make sure you discuss any questions you have with your health care provider.     Document Released: 09/12/2002 Document Revised: 01/08/2016 Document Reviewed: 10/01/2014  [employer] Interactive Patient Education ©2016 [employer]

## 2020-07-21 ENCOUNTER — TELEPHONE (OUTPATIENT)
Dept: URGENT CARE | Facility: CLINIC | Age: 85
End: 2020-07-21

## 2020-07-21 DIAGNOSIS — N94.9 VAGINAL DISCOMFORT: ICD-10-CM

## 2020-07-21 DIAGNOSIS — N89.8 VAGINAL DISCHARGE: ICD-10-CM

## 2020-07-21 NOTE — TELEPHONE ENCOUNTER
Pt notified of negative vag path results. She will be referred to Gyn for evaluation. Wound on forearm is improving.

## 2020-07-27 ENCOUNTER — OFFICE VISIT (OUTPATIENT)
Dept: MEDICAL GROUP | Facility: PHYSICIAN GROUP | Age: 85
End: 2020-07-27
Payer: MEDICARE

## 2020-07-27 VITALS
HEIGHT: 62 IN | HEART RATE: 66 BPM | DIASTOLIC BLOOD PRESSURE: 60 MMHG | SYSTOLIC BLOOD PRESSURE: 104 MMHG | OXYGEN SATURATION: 96 % | WEIGHT: 104.4 LBS | BODY MASS INDEX: 19.21 KG/M2 | TEMPERATURE: 98.2 F

## 2020-07-27 DIAGNOSIS — Z71.9 HEALTH COUNSELING: ICD-10-CM

## 2020-07-27 PROCEDURE — 99214 OFFICE O/P EST MOD 30 MIN: CPT | Performed by: PHYSICIAN ASSISTANT

## 2020-07-27 ASSESSMENT — FIBROSIS 4 INDEX: FIB4 SCORE: 1.95

## 2020-07-27 NOTE — PROGRESS NOTES
Subjective:   Ratna Cline is a 84 y.o. female here today for hormonal concerns. Is an established patient of mine.    HPI:    Ratna presents to the office today expressing concern about hormones. She states that after learning that I was to leave [facility], she went in for appointment to get established with a homeopathic doctor in [facility]. She states that she was told that she likely had an imbalance of hormones and that hormonal replacement would likely improve her overall feeling of wellness and improve her memory. She states that she was given recommendation to undergo salivary hormone testing which would cost her >$400 out-of-pocket. She is wondering if I would order the same tests as blood tests in the hopes that she would be able to get her insurance to cover for less expensive price. Other than ongoing vaginal discomfort and discharge (which we discussed at last appointment and was recently evaluated in ), patient states she feels generally well. She has upcoming appointment with gynecology next month to get this addressed.      Current medicines (including changes today)  Current Outpatient Medications   Medication Sig Dispense Refill   • Multiple Vitamins-Minerals (MULTIVITAMIN ADULT PO) Take  by mouth.     • Hyaluronic Acid-Vitamin C (HYALURONIC ACID PO) Take  by mouth.     • FOLIC ACID PO Take  by mouth.     • Pyridoxine HCl (VITAMIN B-6 PO) Take  by mouth.     • ELDERBERRY PO Take  by mouth as needed.     • Multiple Vitamins-Minerals (ZINC PO) Take  by mouth.     • BIOTIN PO Take  by mouth.     • LUTEIN PO Take  by mouth.     • Calcium Carb-Cholecalciferol (CALCIUM 600 + D PO) Take  by mouth.     • Nutritional Supplements (PYCNOGENOL PO) Take  by mouth.     • cyanocobalamin (VITAMIN B-12) 100 MCG Tab Take 100 mcg by mouth every day.     • Turmeric, Curcuma Longa, (CURCUMIN) Powder by Does not apply route.     • coenzyme Q-10 30 MG capsule Take 60 mg by mouth every day.     • Ginkgo Biloba  "(GINKOBA PO) Take  by mouth.     • Nutritional Supplements (CALCIUM D-GLUCARATE PO) Take  by mouth.     • ALPHA LIPOIC ACID PO Take  by mouth.     • magnesium citrate Solution Take 300 mL by mouth Once.       No current facility-administered medications for this visit.      She  has a past medical history of Arthritis, Back pain, Pisano's esophagus, Bladder infection, Dyslipidemia (7/14/2016), Family history of leukemia (6/3/2019), History of hemorrhoids, History of measles, History of pneumonia, Hypothyroidism (acquired) (7/13/2015), Osteochondroma, Osteochondroma, Sinusitis, Thyroid disease, and Tinnitus (7/13/2015). She also has no past medical history of ASTHMA or Diabetes.    ROS  As per HPI.       Objective:     /60 (BP Location: Left arm, Patient Position: Sitting, BP Cuff Size: Adult)   Pulse 66   Temp 36.8 °C (98.2 °F) (Temporal)   Ht 1.575 m (5' 2\")   Wt 47.4 kg (104 lb 6.4 oz)   SpO2 96%  Body mass index is 19.1 kg/m².     Physical Exam:  Constitutional: Alert, well-appearing elderly female in no distress.  Skin: No rashes in visible areas.  Eye: Pupils are equal and round, conjunctiva clear, lids normal.  ENMT: Lips without lesions, moist mucus membranes.  Neck: Normal-appearing.  Respiratory: Unlabored respiratory effort.      Assessment and Plan:   The following treatment plan was discussed    1. Health counseling  Reviewed the labs that patient requests including estradiol, estriol, estrone, progesterone, and DHEA. 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 There is no reason why I feel she would need full panel of hormonal testing and HRT (at least oral HRT). We discussed that her vaginal symptoms may be explained by atrophic vaginitis, in which case starting vaginal estrogen cream is often beneficial. Would defer to gynecology regarding that after she is evaluated/has pelvic exam.    Total >30 minutes face-to-face time spent with patient, " with greater than 50% of the total time discussing patient's issues and symptoms as listed above in assessment and plan, as well as managing coordination of care for future evaluation and treatment.    Followup: Return for f/u with gynecology.    Diana Cline P.A.-C.

## 2020-08-03 ENCOUNTER — HOSPITAL ENCOUNTER (OUTPATIENT)
Facility: MEDICAL CENTER | Age: 85
End: 2020-08-03
Attending: NURSE PRACTITIONER
Payer: MEDICARE

## 2020-08-03 ENCOUNTER — OFFICE VISIT (OUTPATIENT)
Dept: URGENT CARE | Facility: PHYSICIAN GROUP | Age: 85
End: 2020-08-03
Payer: MEDICARE

## 2020-08-03 VITALS
HEIGHT: 62 IN | SYSTOLIC BLOOD PRESSURE: 116 MMHG | HEART RATE: 76 BPM | OXYGEN SATURATION: 97 % | WEIGHT: 104 LBS | DIASTOLIC BLOOD PRESSURE: 62 MMHG | RESPIRATION RATE: 12 BRPM | TEMPERATURE: 98.8 F | BODY MASS INDEX: 19.14 KG/M2

## 2020-08-03 DIAGNOSIS — R82.998 LEUKOCYTES IN URINE: ICD-10-CM

## 2020-08-03 DIAGNOSIS — R30.0 DYSURIA: ICD-10-CM

## 2020-08-03 LAB
APPEARANCE UR: CLEAR
BILIRUB UR STRIP-MCNC: NORMAL MG/DL
COLOR UR AUTO: YELLOW
GLUCOSE UR STRIP.AUTO-MCNC: NORMAL MG/DL
KETONES UR STRIP.AUTO-MCNC: NORMAL MG/DL
LEUKOCYTE ESTERASE UR QL STRIP.AUTO: NORMAL
NITRITE UR QL STRIP.AUTO: NORMAL
PH UR STRIP.AUTO: 5.5 [PH] (ref 5–8)
PROT UR QL STRIP: NORMAL MG/DL
RBC UR QL AUTO: NORMAL
SP GR UR STRIP.AUTO: 1.02
UROBILINOGEN UR STRIP-MCNC: 0.2 MG/DL

## 2020-08-03 PROCEDURE — 99214 OFFICE O/P EST MOD 30 MIN: CPT | Performed by: NURSE PRACTITIONER

## 2020-08-03 PROCEDURE — 87086 URINE CULTURE/COLONY COUNT: CPT

## 2020-08-03 PROCEDURE — 81002 URINALYSIS NONAUTO W/O SCOPE: CPT | Performed by: NURSE PRACTITIONER

## 2020-08-03 ASSESSMENT — ENCOUNTER SYMPTOMS
NAUSEA: 0
CHILLS: 0
WEAKNESS: 0
VOMITING: 0
MYALGIAS: 0
HEADACHES: 0
DIZZINESS: 0
BACK PAIN: 0
DIARRHEA: 0
FEVER: 0
CONSTIPATION: 0
FLANK PAIN: 0
ABDOMINAL PAIN: 0

## 2020-08-03 ASSESSMENT — FIBROSIS 4 INDEX: FIB4 SCORE: 1.95

## 2020-08-03 NOTE — PROGRESS NOTES
"Subjective:      Ratna Cline is a 84 y.o. female who presents with UTI (off and on, uncomfortable, follow up from last visit )            HPI  C/o recurrent vaginal d/c x \"years\". Will experience dysuria with vaginal d/c. Seen last in UC for this, vaginal pathogens were negative. Has been seen by PCP and UC numerous times for this. Referral to Gynecology placed and has appt in 2 weeks.  Denies fever, n/v, low pelvic pressure, no back pain. Recently seen by homeopathic physician who wanted test for hormones: saliva test, costing > $400. Opts to see Gyne first. May be experiencing vaginal dryness and some urinary incontinence. Denies being sexually active. States has h/o \"colon problems\" as well \"for years\".    PMH:  has a past medical history of Arthritis, Back pain, Pisano's esophagus, Bladder infection, Dyslipidemia (7/14/2016), Family history of leukemia (6/3/2019), History of hemorrhoids, History of measles, History of pneumonia, Hypothyroidism (acquired) (7/13/2015), Osteochondroma, Osteochondroma, Sinusitis, Thyroid disease, and Tinnitus (7/13/2015). She also has no past medical history of ASTHMA or Diabetes.  MEDS:   Current Outpatient Medications:   •  Multiple Vitamins-Minerals (MULTIVITAMIN ADULT PO), Take  by mouth., Disp: , Rfl:   •  Hyaluronic Acid-Vitamin C (HYALURONIC ACID PO), Take  by mouth., Disp: , Rfl:   •  FOLIC ACID PO, Take  by mouth., Disp: , Rfl:   •  Pyridoxine HCl (VITAMIN B-6 PO), Take  by mouth., Disp: , Rfl:   •  ELDERBERRY PO, Take  by mouth as needed., Disp: , Rfl:   •  Multiple Vitamins-Minerals (ZINC PO), Take  by mouth., Disp: , Rfl:   •  BIOTIN PO, Take  by mouth., Disp: , Rfl:   •  ALPHA LIPOIC ACID PO, Take  by mouth., Disp: , Rfl:   •  LUTEIN PO, Take  by mouth., Disp: , Rfl:   •  Calcium Carb-Cholecalciferol (CALCIUM 600 + D PO), Take  by mouth., Disp: , Rfl:   •  Nutritional Supplements (PYCNOGENOL PO), Take  by mouth., Disp: , Rfl:   •  cyanocobalamin (VITAMIN " "B-12) 100 MCG Tab, Take 100 mcg by mouth every day., Disp: , Rfl:   •  Turmeric, Curcuma Longa, (CURCUMIN) Powder, by Does not apply route., Disp: , Rfl:   •  magnesium citrate Solution, Take 300 mL by mouth Once., Disp: , Rfl:   •  coenzyme Q-10 30 MG capsule, Take 60 mg by mouth every day., Disp: , Rfl:   •  Ginkgo Biloba (GINKOBA PO), Take  by mouth., Disp: , Rfl:   •  Nutritional Supplements (CALCIUM D-GLUCARATE PO), Take  by mouth., Disp: , Rfl:   ALLERGIES:   Allergies   Allergen Reactions   • Codeine Vomiting   • Nexium    • Soy Allergy    • Sulfa Drugs Nausea     SURGHX:   Past Surgical History:   Procedure Laterality Date   • HYSTERECTOMY RADICAL     • OTHER      jaw surgery to correct overbite   • TONSILLECTOMY       SOCHX:  reports that she has never smoked. She has never used smokeless tobacco. She reports that she does not drink alcohol or use drugs.  FH: Family history was reviewed, no pertinent findings to report    Review of Systems   Constitutional: Negative for chills, fever and malaise/fatigue.   Gastrointestinal: Negative for abdominal pain, constipation, diarrhea, nausea and vomiting.   Genitourinary: Positive for dysuria. Negative for flank pain, frequency, hematuria and urgency.   Musculoskeletal: Negative for back pain and myalgias.   Skin: Negative for itching and rash.   Neurological: Negative for dizziness, weakness and headaches.   All other systems reviewed and are negative.         Objective:     /62 (BP Location: Right arm, Patient Position: Sitting, BP Cuff Size: Adult)   Pulse 76   Temp 37.1 °C (98.8 °F) (Tympanic)   Resp 12   Ht 1.575 m (5' 2\")   Wt 47.2 kg (104 lb)   SpO2 97%   Breastfeeding No   BMI 19.02 kg/m²      Physical Exam  Vitals signs reviewed.   Constitutional:       General: She is awake. She is not in acute distress.     Appearance: Normal appearance. She is well-developed. She is not ill-appearing, toxic-appearing or diaphoretic.   HENT:      Head: " Normocephalic.   Eyes:      Conjunctiva/sclera: Conjunctivae normal.      Pupils: Pupils are equal, round, and reactive to light.   Neck:      Musculoskeletal: Normal range of motion and neck supple.   Cardiovascular:      Rate and Rhythm: Normal rate.   Pulmonary:      Effort: Pulmonary effort is normal.      Breath sounds: Normal breath sounds.   Abdominal:      General: Bowel sounds are normal. There is no distension.      Palpations: Abdomen is soft.      Tenderness: There is no abdominal tenderness. There is no right CVA tenderness, left CVA tenderness, guarding or rebound.   Musculoskeletal: Normal range of motion.   Skin:     General: Skin is warm and dry.   Neurological:      Mental Status: She is alert and oriented to person, place, and time.      GCS: GCS eye subscore is 4. GCS verbal subscore is 5. GCS motor subscore is 6.      Cranial Nerves: Cranial nerves are intact.      Sensory: Sensation is intact.      Motor: Motor function is intact.      Coordination: Coordination is intact.      Gait: Gait is intact.   Psychiatric:         Attention and Perception: Attention and perception normal.         Mood and Affect: Mood and affect normal.         Speech: Speech normal.         Behavior: Behavior normal. Behavior is cooperative.         Thought Content: Thought content normal.         Cognition and Memory: Cognition and memory normal.         Judgment: Judgment normal.                 Assessment/Plan:       1. Dysuria    - POCT Urinalysis  - URINE CULTURE(NEW); Future    2. Leukocytes in urine    - URINE CULTURE(NEW); Future    Increase water intake  Urinate more frequently and empty bladder completely  Practice good toileting hygiene after bowel movements  Monitor for back/flank pain, difficulty urinating, blood in urine- need re-evaluation    May call home phone then leave message/May call mobile but don't leave message

## 2020-08-06 ENCOUNTER — TELEPHONE (OUTPATIENT)
Dept: URGENT CARE | Facility: PHYSICIAN GROUP | Age: 85
End: 2020-08-06

## 2020-08-06 DIAGNOSIS — R32 URINARY INCONTINENCE, UNSPECIFIED TYPE: ICD-10-CM

## 2020-08-06 DIAGNOSIS — R30.0 DYSURIA: ICD-10-CM

## 2020-08-06 DIAGNOSIS — Z87.440 HISTORY OF RECURRENT UTIS: ICD-10-CM

## 2020-08-06 LAB
BACTERIA UR CULT: NORMAL
SIGNIFICANT IND 70042: NORMAL
SITE SITE: NORMAL
SOURCE SOURCE: NORMAL

## 2020-08-06 NOTE — TELEPHONE ENCOUNTER
Called and spoke to patient regarding urine culture to be negative. Has appt with OB in 2 weeks for vaginal d/c but is also experiencing urinary incontinence with dysuria. Will refer to Urlogy at this time as discussed.

## 2020-08-11 ENCOUNTER — HOSPITAL ENCOUNTER (OUTPATIENT)
Facility: MEDICAL CENTER | Age: 85
End: 2020-08-11
Attending: NURSE PRACTITIONER
Payer: MEDICARE

## 2020-08-11 ENCOUNTER — OFFICE VISIT (OUTPATIENT)
Dept: MEDICAL GROUP | Facility: PHYSICIAN GROUP | Age: 85
End: 2020-08-11
Payer: MEDICARE

## 2020-08-11 VITALS
WEIGHT: 104 LBS | DIASTOLIC BLOOD PRESSURE: 66 MMHG | BODY MASS INDEX: 19.14 KG/M2 | TEMPERATURE: 98.3 F | OXYGEN SATURATION: 96 % | SYSTOLIC BLOOD PRESSURE: 116 MMHG | HEART RATE: 70 BPM | HEIGHT: 62 IN

## 2020-08-11 DIAGNOSIS — R32 URINARY INCONTINENCE, UNSPECIFIED TYPE: ICD-10-CM

## 2020-08-11 DIAGNOSIS — L30.9 VULVAR DERMATITIS: ICD-10-CM

## 2020-08-11 DIAGNOSIS — N76.1 CHRONIC VAGINITIS: ICD-10-CM

## 2020-08-11 PROCEDURE — 87660 TRICHOMONAS VAGIN DIR PROBE: CPT

## 2020-08-11 PROCEDURE — 87480 CANDIDA DNA DIR PROBE: CPT

## 2020-08-11 PROCEDURE — 99214 OFFICE O/P EST MOD 30 MIN: CPT | Performed by: NURSE PRACTITIONER

## 2020-08-11 PROCEDURE — 87510 GARDNER VAG DNA DIR PROBE: CPT

## 2020-08-11 ASSESSMENT — FIBROSIS 4 INDEX: FIB4 SCORE: 1.95

## 2020-08-11 NOTE — PROGRESS NOTES
Chief Complaint   Patient presents with   • Vaginal Discharge   • Enuresis         Subjective:     Ratna Cline is a 84 y.o. female presenting with recurrent vaginitis.       Patient has been experiencing recurrent vulvar irritation with atypical discharge and urinary incontinence for the past 2 weeks.  She reports this is a recurrent problem.  This happened several years ago.  She took an antibiotic and it reported complete improvement.    She is already been seen by urgent care and her primary, urinalysis, urine culture, vaginal pathogens have all been evaluated and returned normal.    She has been referred to gynecology due to this chronic issue and has an appointment on the 20th of this month, but she reports she cannot wait 9 days for symptom relief.    States that she is used multiple remedies, all over-the-counter herbal remedy called Lomantium, it is unclear whether she took this orally or intravaginally.  When asked, patient would not answer.    She has self-referred herself to a homeopathic doctor who recommended hormone testing.  The costs of these exceeded $450 so she opted out.    She has been counseled on the likelihood of atrophic vaginitis, but she is insistent there is an infection that needs to be treated.    Experiencing multiple episodes of urinary incontinence throughout the day, denies burning, pain with urination, urgency.    Review of systems:      Denies chest pain, shortness of breath, sore throat.      Current Outpatient Medications:   •  Multiple Vitamins-Minerals (MULTIVITAMIN ADULT PO), Take  by mouth., Disp: , Rfl:   •  Hyaluronic Acid-Vitamin C (HYALURONIC ACID PO), Take  by mouth., Disp: , Rfl:   •  FOLIC ACID PO, Take  by mouth., Disp: , Rfl:   •  Pyridoxine HCl (VITAMIN B-6 PO), Take  by mouth., Disp: , Rfl:   •  ELDERBERRY PO, Take  by mouth as needed., Disp: , Rfl:   •  Multiple Vitamins-Minerals (ZINC PO), Take  by mouth., Disp: , Rfl:   •  BIOTIN PO, Take  by mouth.,  "Disp: , Rfl:   •  ALPHA LIPOIC ACID PO, Take  by mouth., Disp: , Rfl:   •  LUTEIN PO, Take  by mouth., Disp: , Rfl:   •  Calcium Carb-Cholecalciferol (CALCIUM 600 + D PO), Take  by mouth., Disp: , Rfl:   •  Nutritional Supplements (PYCNOGENOL PO), Take  by mouth., Disp: , Rfl:   •  cyanocobalamin (VITAMIN B-12) 100 MCG Tab, Take 100 mcg by mouth every day., Disp: , Rfl:   •  Turmeric, Curcuma Longa, (CURCUMIN) Powder, by Does not apply route., Disp: , Rfl:   •  magnesium citrate Solution, Take 300 mL by mouth Once., Disp: , Rfl:   •  coenzyme Q-10 30 MG capsule, Take 60 mg by mouth every day., Disp: , Rfl:   •  Ginkgo Biloba (GINKOBA PO), Take  by mouth., Disp: , Rfl:   •  Nutritional Supplements (CALCIUM D-GLUCARATE PO), Take  by mouth., Disp: , Rfl:     Allergies, past medical history, past surgical history, family history, social history reviewed and updated    Objective:     Vitals: /66 (BP Location: Right arm, Patient Position: Sitting, BP Cuff Size: Adult)   Pulse 70   Temp 36.8 °C (98.3 °F) (Temporal)   Ht 1.575 m (5' 2\")   Wt 47.2 kg (104 lb)   SpO2 96%   BMI 19.02 kg/m²   Constitutional: Alert, no distress, well-groomed.  Skin: Warm, dry, good turgor, no rashes in visible areas.  Eye: Equal, round and reactive, conjunctiva clear, lids normal.  ENMT: Lips without lesions, good dentition, moist mucous membranes.  Neck: Trachea midline, no masses, no thyromegaly.  Respiratory: Unlabored respiratory effort, no cough.  MSK: Normal gait, moves all extremities.  Neuro: Grossly non-focal.   Psych: Alert and oriented x3, normal affect and mood.    Assessment/Plan:     Ratna was seen today for vaginal discharge and enuresis.    Diagnoses and all orders for this visit:    Discussed at length the likely etiology of vulvar dermatitis with the patient.  She has been tested numerous times and all of these have been negative.  She is requesting antibiotics regardless, but at this point I think he would do more " harm than good.  As it is been 1 week since she was last tested, I will reorder a UA culture if indicated as well as a vaginal pathogens panel.    I recommended that the patient stop using herbal remedies and limit the amount of products she is using on the vulva and vagina.  This is sensitive tissue and there is likely chronic inflammation going on.    We discussed vulvar dermatitis I advised the patient to use a topical corticosteroid for which she declined.  She states she has many allergies and avoid steroids at all costs, would rather use witch hazel pads.    Patient became very frustrated about preventing further recurrence, I advised her to use a good quality probiotic targeted towards women's vaginal health.  I do think that chronic vaginal atrophy is contributing to the recurrent vaginitis I did advise patient that further evaluation with gynecology is going to be the best venue for evaluation and treatment going forward.    Chronic vaginitis  -     VAGINAL PATHOGENS DNA PANEL; Future  -     URINALYSIS,CULTURE IF INDICATED; Future    Urinary incontinence, unspecified type  -     VAGINAL PATHOGENS DNA PANEL; Future  -     URINALYSIS,CULTURE IF INDICATED; Future    Vulvar dermatitis  -     VAGINAL PATHOGENS DNA PANEL; Future  -     URINALYSIS,CULTURE IF INDICATED; Future          Return With gynecology.    Patient verbalized understanding and agreed to plan of care.  Encouraged to contact me with needs via [employer] or by phone if needed.      I have placed the above orders and discussed them with an approved delegating provider.  The MA is performing the below orders under the direction of Dr Norwood.    Please note that this dictation was created using voice recognition software. I have made every reasonable attempt to correct obvious errors, but I expect that there are errors of grammar and possibly content that I did not discover before finalizing the note.

## 2020-08-13 ENCOUNTER — HOSPITAL ENCOUNTER (OUTPATIENT)
Dept: LAB | Facility: MEDICAL CENTER | Age: 85
End: 2020-08-13
Attending: NURSE PRACTITIONER
Payer: MEDICARE

## 2020-08-13 DIAGNOSIS — L30.9 VULVAR DERMATITIS: ICD-10-CM

## 2020-08-13 DIAGNOSIS — N76.1 CHRONIC VAGINITIS: ICD-10-CM

## 2020-08-13 DIAGNOSIS — R32 URINARY INCONTINENCE, UNSPECIFIED TYPE: ICD-10-CM

## 2020-08-13 LAB
APPEARANCE UR: CLEAR
BILIRUB UR QL STRIP.AUTO: NEGATIVE
COLOR UR: YELLOW
GLUCOSE UR STRIP.AUTO-MCNC: NEGATIVE MG/DL
KETONES UR STRIP.AUTO-MCNC: NEGATIVE MG/DL
LEUKOCYTE ESTERASE UR QL STRIP.AUTO: NEGATIVE
MICRO URNS: NORMAL
NITRITE UR QL STRIP.AUTO: NEGATIVE
PH UR STRIP.AUTO: 7 [PH] (ref 5–8)
PROT UR QL STRIP: NEGATIVE MG/DL
RBC UR QL AUTO: NEGATIVE
SP GR UR STRIP.AUTO: 1.01
UROBILINOGEN UR STRIP.AUTO-MCNC: 0.2 MG/DL

## 2020-08-13 PROCEDURE — 81003 URINALYSIS AUTO W/O SCOPE: CPT

## 2020-08-20 ENCOUNTER — HOSPITAL ENCOUNTER (OUTPATIENT)
Facility: MEDICAL CENTER | Age: 85
End: 2020-08-20
Attending: OBSTETRICS & GYNECOLOGY
Payer: MEDICARE

## 2020-08-20 ENCOUNTER — GYNECOLOGY VISIT (OUTPATIENT)
Dept: OBGYN | Facility: CLINIC | Age: 85
End: 2020-08-20
Payer: MEDICARE

## 2020-08-20 VITALS — BODY MASS INDEX: 19.28 KG/M2 | SYSTOLIC BLOOD PRESSURE: 137 MMHG | DIASTOLIC BLOOD PRESSURE: 63 MMHG | WEIGHT: 105.4 LBS

## 2020-08-20 DIAGNOSIS — Z90.710 HX OF TOTAL HYSTERECTOMY WITH REMOVAL OF BOTH TUBES AND OVARIES: ICD-10-CM

## 2020-08-20 DIAGNOSIS — Z78.0 POSTMENOPAUSAL: ICD-10-CM

## 2020-08-20 DIAGNOSIS — N89.8 DISCHARGE OF VAGINA: ICD-10-CM

## 2020-08-20 DIAGNOSIS — Z90.79 HX OF TOTAL HYSTERECTOMY WITH REMOVAL OF BOTH TUBES AND OVARIES: ICD-10-CM

## 2020-08-20 DIAGNOSIS — Z90.722 HX OF TOTAL HYSTERECTOMY WITH REMOVAL OF BOTH TUBES AND OVARIES: ICD-10-CM

## 2020-08-20 LAB
AMBIGUOUS DTTM AMBI4: NORMAL
GRAM STN SPEC: NORMAL
SIGNIFICANT IND 70042: NORMAL
SIGNIFICANT IND 70042: NORMAL
SITE SITE: NORMAL
SITE SITE: NORMAL
SOURCE SOURCE: NORMAL
SOURCE SOURCE: NORMAL

## 2020-08-20 PROCEDURE — 87186 SC STD MICRODIL/AGAR DIL: CPT

## 2020-08-20 PROCEDURE — 99213 OFFICE O/P EST LOW 20 MIN: CPT | Performed by: OBSTETRICS & GYNECOLOGY

## 2020-08-20 PROCEDURE — 87070 CULTURE OTHR SPECIMN AEROBIC: CPT

## 2020-08-20 PROCEDURE — 87205 SMEAR GRAM STAIN: CPT

## 2020-08-20 PROCEDURE — 87075 CULTR BACTERIA EXCEPT BLOOD: CPT

## 2020-08-20 PROCEDURE — 87077 CULTURE AEROBIC IDENTIFY: CPT

## 2020-08-20 ASSESSMENT — FIBROSIS 4 INDEX: FIB4 SCORE: 1.95

## 2020-08-20 NOTE — NON-PROVIDER
Patient here for a GYN follow up.   C/o - Vaginal DC x10 years off & on  pharmacy verified.   Patient phone #:[phone]

## 2020-08-23 LAB
BACTERIA WND AEROBE CULT: ABNORMAL
GRAM STN SPEC: ABNORMAL
SIGNIFICANT IND 70042: ABNORMAL
SITE SITE: ABNORMAL
SOURCE SOURCE: ABNORMAL

## 2020-08-24 DIAGNOSIS — N30.00 ACUTE CYSTITIS WITHOUT HEMATURIA: ICD-10-CM

## 2020-08-24 LAB
BACTERIA SPEC ANAEROBE CULT: NORMAL
SIGNIFICANT IND 70042: NORMAL
SITE SITE: NORMAL
SOURCE SOURCE: NORMAL

## 2020-08-24 RX ORDER — AMPICILLIN 500 MG/1
500 CAPSULE ORAL 2 TIMES DAILY
Qty: 10 CAP | Refills: 0 | Status: SHIPPED
Start: 2020-08-24 | End: 2020-11-23

## 2020-08-27 ENCOUNTER — TELEPHONE (OUTPATIENT)
Dept: OBGYN | Facility: CLINIC | Age: 85
End: 2020-08-27

## 2020-08-27 NOTE — TELEPHONE ENCOUNTER
Spoke w pt about urine culture and antibiotics, she has already started medication on Monday.    ----- Message from Janet Crow, Med Ass't sent at 8/27/2020  8:26 AM PDT -----    ----- Message -----  From: Rogelio Servin M.D.  Sent: 8/24/2020   8:53 AM PDT  To: [facility]    Urine cultures positive for E. Coli.  Ampicillin prescription sent to pharmacy

## 2020-09-02 ENCOUNTER — TELEPHONE (OUTPATIENT)
Dept: OBGYN | Facility: CLINIC | Age: 85
End: 2020-09-02

## 2020-09-16 ENCOUNTER — GYNECOLOGY VISIT (OUTPATIENT)
Dept: OBGYN | Facility: CLINIC | Age: 85
End: 2020-09-16
Payer: MEDICARE

## 2020-09-16 ENCOUNTER — HOSPITAL ENCOUNTER (OUTPATIENT)
Facility: MEDICAL CENTER | Age: 85
End: 2020-09-16
Attending: OBSTETRICS & GYNECOLOGY
Payer: MEDICARE

## 2020-09-16 VITALS — BODY MASS INDEX: 19.39 KG/M2 | WEIGHT: 106 LBS | SYSTOLIC BLOOD PRESSURE: 138 MMHG | DIASTOLIC BLOOD PRESSURE: 62 MMHG

## 2020-09-16 DIAGNOSIS — N89.8 VAGINAL DISCHARGE: ICD-10-CM

## 2020-09-16 PROCEDURE — 87070 CULTURE OTHR SPECIMN AEROBIC: CPT

## 2020-09-16 PROCEDURE — 99213 OFFICE O/P EST LOW 20 MIN: CPT | Performed by: OBSTETRICS & GYNECOLOGY

## 2020-09-16 PROCEDURE — 87205 SMEAR GRAM STAIN: CPT

## 2020-09-16 RX ORDER — CONJUGATED ESTROGENS 0.62 MG/G
CREAM VAGINAL
Qty: 30 G | Refills: 2 | Status: SHIPPED
Start: 2020-09-16 | End: 2021-02-01

## 2020-09-16 ASSESSMENT — FIBROSIS 4 INDEX: FIB4 SCORE: 1.98

## 2020-09-16 NOTE — NON-PROVIDER
Pt here for Gyn/Follow-up visit  Good Phone#: [phone]  Pharmacy verified.  Pt states still having slight and sticky d/c x   Pt states no other complaints for today.

## 2020-09-17 LAB
GRAM STN SPEC: NORMAL
SIGNIFICANT IND 70042: NORMAL
SITE SITE: NORMAL
SOURCE SOURCE: NORMAL

## 2020-09-20 LAB
BACTERIA GENITAL AEROBE CULT: NORMAL
GRAM STN SPEC: NORMAL
SIGNIFICANT IND 70042: NORMAL
SITE SITE: NORMAL
SOURCE SOURCE: NORMAL

## 2020-09-24 NOTE — RESULT ENCOUNTER NOTE
Please notify the patient that her vaginal culture was normal.  She does not have the E. coli infection anymore.  Thank you!

## 2020-09-29 ENCOUNTER — TELEPHONE (OUTPATIENT)
Dept: OBGYN | Facility: CLINIC | Age: 85
End: 2020-09-29

## 2020-09-29 NOTE — TELEPHONE ENCOUNTER
----- Message from Ilana Ziegler D.O. sent at 9/23/2020  6:27 PM PDT -----  Please notify the patient that her vaginal culture was normal.  She does not have the E. coli infection anymore.  Thank you!      Pt notified as above. Pt verbalized understanding.

## 2020-10-01 ENCOUNTER — NON-PROVIDER VISIT (OUTPATIENT)
Dept: MEDICAL GROUP | Facility: PHYSICIAN GROUP | Age: 85
End: 2020-10-01
Payer: MEDICARE

## 2020-10-01 DIAGNOSIS — Z23 NEED FOR VACCINATION: ICD-10-CM

## 2020-10-01 PROCEDURE — 90662 IIV NO PRSV INCREASED AG IM: CPT | Performed by: INTERNAL MEDICINE

## 2020-10-01 PROCEDURE — G0008 ADMIN INFLUENZA VIRUS VAC: HCPCS | Performed by: INTERNAL MEDICINE

## 2020-10-01 PROCEDURE — 99999 PR NO CHARGE: CPT | Performed by: INTERNAL MEDICINE

## 2020-10-01 NOTE — PROGRESS NOTES
"Ratna Cline is a 85 y.o. female here for a non-provider visit for:   FLU    Reason for immunization: Annual Flu Vaccine  Immunization records indicate need for vaccine: Yes, confirmed with Epic  Minimum interval has been met for this vaccine: Yes  ABN completed: Not Indicated    Order and dose verified by: RP  VIS was given to patient: Yes  All IAC Questionnaire questions were answered \"No.\"    Patient tolerated injection and no adverse effects were observed or reported: Yes    Pt scheduled for next dose in series: Not Indicated  "

## 2020-11-08 NOTE — PROGRESS NOTES
CC: persistent vaginal discharge    HPI: 86 yo  postmenopausal female with recent infection history.  Took medication as prescribed.  Still noting discharge that is bothersome for her.  No urinary complaints.  No signs of systemic infection.     ROS reviewed and negative except above    PE  Vitals:    20 1003   BP: 138/62     NAD, resting comfortably  Abd soft NT, ND, no suprapubic pain  Pelvic ext genitalia appropriate for age, vagina is atrophic, slight clear discharge noted  Ext no KANDICE    A/P Vaginal discharge  -repeat culture and treat appropriately  -start vaginal estrogen to regain normal ashwin  -f/u as needed per symptoms

## 2020-11-23 ENCOUNTER — OFFICE VISIT (OUTPATIENT)
Dept: MEDICAL GROUP | Facility: PHYSICIAN GROUP | Age: 85
End: 2020-11-23
Payer: MEDICARE

## 2020-11-23 VITALS
DIASTOLIC BLOOD PRESSURE: 40 MMHG | WEIGHT: 105.5 LBS | OXYGEN SATURATION: 96 % | HEIGHT: 62 IN | BODY MASS INDEX: 19.41 KG/M2 | SYSTOLIC BLOOD PRESSURE: 116 MMHG | HEART RATE: 75 BPM | TEMPERATURE: 98.3 F

## 2020-11-23 DIAGNOSIS — E03.8 SUBCLINICAL HYPOTHYROIDISM: ICD-10-CM

## 2020-11-23 DIAGNOSIS — J30.2 SEASONAL ALLERGIES: ICD-10-CM

## 2020-11-23 DIAGNOSIS — D16.9 OSTEOCHONDROMA: ICD-10-CM

## 2020-11-23 PROCEDURE — 99215 OFFICE O/P EST HI 40 MIN: CPT | Performed by: INTERNAL MEDICINE

## 2020-11-23 RX ORDER — CYCLOSPORINE 0.5 MG/ML
EMULSION OPHTHALMIC
COMMUNITY
Start: 2020-11-10 | End: 2021-06-08

## 2020-11-23 ASSESSMENT — FIBROSIS 4 INDEX: FIB4 SCORE: 1.98

## 2020-11-23 NOTE — PROGRESS NOTES
New Patient to establish    Chief Complaint   Patient presents with   • Establish Care       Subjective:     History of Present Illness: Patient is a 85 y.o. female who is here today to establish primary care    1. Osteochondroma  She mentioned that she has above, mention family history of osteochondroma in her mother, multiple osteochondroma lesion removal from her both knees as well as right hip, scapulae and ribs as well according to the patient, she was seen by orthopedic before  patient had DEXA scan before with osteopenia, she is taking calcium and vitamin D supplement    2. Seasonal allergies  Chronic, annually, mention since last February she still having some runny nose and sneezing, patient otherwise denied any fever, chills, sore throat, chest pain, shortness of breath, runny eyes    3. Subclinical hypothyroidism  -Per chart review, she has the above, she was also taking Synthroid 25 mcg daily before and then mention she switched to homeopathic Synthroid, mentioned she is obtaining from online, not sure what dosage    Current Outpatient Medications on File Prior to Visit   Medication Sig Dispense Refill   • RESTASIS 0.05 % ophthalmic emulsion      • Misc Natural Products (TART CHERRY ADVANCED PO) Take  by mouth.     • Flax Oil Take  by mouth.     • Omega-3 Fatty Acids (SALMON OIL PO) Take  by mouth.     • Cholecalciferol (VITAMIN D3 PO) Take  by mouth.     • POTASSIUM PO Take  by mouth.     • estrogens, conjugated (PREMARIN) 0.625 MG/GM Cream Place a vaginal opening every night for 2 weeks then only twice weekly at night afterward 30 g 2   • Multiple Vitamins-Minerals (MULTIVITAMIN ADULT PO) Take  by mouth.     • Hyaluronic Acid-Vitamin C (HYALURONIC ACID PO) Take  by mouth.     • FOLIC ACID PO Take  by mouth.     • Pyridoxine HCl (VITAMIN B-6 PO) Take  by mouth.     • ELDERBERRY PO Take  by mouth as needed.     • Multiple Vitamins-Minerals (ZINC PO) Take  by mouth.     • BIOTIN PO Take  by mouth.     •  ALPHA LIPOIC ACID PO Take  by mouth.     • LUTEIN PO Take  by mouth.     • Calcium Carb-Cholecalciferol (CALCIUM 600 + D PO) Take  by mouth.     • Nutritional Supplements (PYCNOGENOL PO) Take  by mouth.     • cyanocobalamin (VITAMIN B-12) 100 MCG Tab Take 100 mcg by mouth every day.     • Turmeric, Curcuma Longa, (CURCUMIN) Powder by Does not apply route.     • coenzyme Q-10 30 MG capsule Take 60 mg by mouth every day.     • Ginkgo Biloba (GINKOBA PO) Take  by mouth.     • Nutritional Supplements (CALCIUM D-GLUCARATE PO) Take  by mouth.       No current facility-administered medications on file prior to visit.      Allergies   Allergen Reactions   • Codeine Vomiting   • Nexium    • Soy Allergy    • Sulfa Drugs Nausea     Patient Active Problem List    Diagnosis Date Noted   • Hx of total hysterectomy with removal of both tubes and ovaries 08/20/2020   • Postmenopausal 08/20/2020   • Acute dysfunction of left eustachian tube 07/07/2020   • Grief 02/24/2020   • Seasonal allergies 06/03/2019   • Osteochondroma 06/03/2019   • Pisano's esophagus 06/03/2019   • Non-celiac gluten sensitivity 06/03/2019   • Osteopenia 05/08/2018   • Irritable bowel syndrome with constipation 04/30/2018   • Pure hypercholesterolemia 07/14/2016   • Subclinical hypothyroidism 07/13/2015   • Tinnitus 07/13/2015     Past Medical History:   Diagnosis Date   • Allergy    • Anxiety    • Arthritis    • Back pain    • Pisano's esophagus     Dr. Anaya, in late 1990's   • Bladder infection    • Depression    • Dyslipidemia 7/14/2016   • Family history of leukemia 6/3/2019   • History of hemorrhoids    • History of measles    • History of pneumonia    • Hypothyroidism (acquired) 7/13/2015   • Osteochondroma    • Osteochondroma    • Sinusitis    • Thyroid disease    • Tinnitus 7/13/2015     Past Surgical History:   Procedure Laterality Date   • HYSTERECTOMY RADICAL     • OTHER      jaw surgery to correct overbite   • TONSILLECTOMY       Family  "History   Problem Relation Age of Onset   • Cancer Mother         leukemia   • Genetic Disorder Mother         osteochondroma   • Stroke Neg Hx    • Hyperlipidemia Neg Hx    • Hypertension Neg Hx    • Heart Disease Neg Hx    • Diabetes Neg Hx    • Lung Disease Neg Hx      Social History     Tobacco Use   • Smoking status: Never Smoker   • Smokeless tobacco: Never Used   Substance Use Topics   • Alcohol use: No     Alcohol/week: 0.0 oz   • Drug use: No     ROS:     - Constitutional: Negative for fever, chills,    - Eye: Negative for blurry vision    -ENT: Negative for ear pain    - Respiratory: Negative for cough, hemoptysis    - Cardiovascular: Negative for chest pain     - Gastrointestinal: Negative for abdominal pain    - Genitourinary: Negative for dysuria    - Musculoskeletal: Negative for joint swelling    - Skin: Negative for itching    - Neurological: Negative for focal weakness     - Psychiatric/Behavioral: Negative for depression        Physical Exam:     /40 (BP Location: Right arm, Patient Position: Sitting, BP Cuff Size: Adult)   Pulse 75   Temp 36.8 °C (98.3 °F) (Temporal)   Ht 1.58 m (5' 2.21\")   Wt 47.9 kg (105 lb 8 oz)   LMP  (LMP Unknown)   SpO2 96%   BMI 19.17 kg/m²   General: Normal appearing. No distress.  ENT: oropharynx without exudates.    Eyes: conjunctiva clear lids without ptosis  Pulmonary: Clear to ausculation.  Normal effort.   Cardiovascular: Regular rate and rhythm  Abdomen: Soft, nontender,  Lymph: No cervical or supraclavicular palpable lymph nodes  Psych: Normal mood and affect.     I have reviewed pertinent labs and diagnostic tests associated with this visit with specific comments listed under the assessment and plan below      Assessment and Plan:     1. 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 Seasonal allergies  Stable, is okay to have over-the-counter " supplement, also educated regarding healthy diet    3. Subclinical hypothyroidism  Advised patient to bring the homeopathic supplement to go over that and see what dosage she is taking and what kind of thyroid supplement  - T3 FREE; Future  - TSH+FREE T4  - THYROID PEROXIDASE  (TPO) AB; Future  -Patient denied having related symptoms    >> Patient history of multiple ear infection, she was taking ampicillin before for that, currently she is taking colloidal silver  Follow Up:      Return in about 4 weeks (around 12/21/2020) for follow up, thyroid lab, lifestyle.    Please note that this dictation was created using voice recognition software. I have made every reasonable attempt to correct obvious errors, but I expect that there are errors of grammar and possibly content that I did not discover before finalizing the note.    Signed by: Mary Ellen Hyde M.D.

## 2020-12-23 ENCOUNTER — HOSPITAL ENCOUNTER (OUTPATIENT)
Dept: LAB | Facility: MEDICAL CENTER | Age: 85
End: 2020-12-23
Attending: INTERNAL MEDICINE
Payer: MEDICARE

## 2020-12-23 DIAGNOSIS — E03.8 SUBCLINICAL HYPOTHYROIDISM: ICD-10-CM

## 2020-12-23 LAB
T3FREE SERPL-MCNC: 3.29 PG/ML (ref 2–4.4)
T4 FREE SERPL-MCNC: 1.16 NG/DL (ref 0.93–1.7)
THYROPEROXIDASE AB SERPL-ACNC: 21 IU/ML (ref 0–9)
TSH SERPL DL<=0.005 MIU/L-ACNC: 7.25 UIU/ML (ref 0.38–5.33)

## 2020-12-23 PROCEDURE — 84439 ASSAY OF FREE THYROXINE: CPT

## 2020-12-23 PROCEDURE — 86376 MICROSOMAL ANTIBODY EACH: CPT

## 2020-12-23 PROCEDURE — 36415 COLL VENOUS BLD VENIPUNCTURE: CPT

## 2020-12-23 PROCEDURE — 84481 FREE ASSAY (FT-3): CPT

## 2020-12-23 PROCEDURE — 84443 ASSAY THYROID STIM HORMONE: CPT

## 2020-12-31 ENCOUNTER — OFFICE VISIT (OUTPATIENT)
Dept: MEDICAL GROUP | Facility: PHYSICIAN GROUP | Age: 85
End: 2020-12-31
Payer: MEDICARE

## 2020-12-31 VITALS
OXYGEN SATURATION: 97 % | DIASTOLIC BLOOD PRESSURE: 68 MMHG | WEIGHT: 106 LBS | SYSTOLIC BLOOD PRESSURE: 120 MMHG | BODY MASS INDEX: 19.51 KG/M2 | HEIGHT: 62 IN | TEMPERATURE: 98 F | HEART RATE: 66 BPM

## 2020-12-31 DIAGNOSIS — E03.8 SUBCLINICAL HYPOTHYROIDISM: ICD-10-CM

## 2020-12-31 PROCEDURE — 99214 OFFICE O/P EST MOD 30 MIN: CPT | Performed by: INTERNAL MEDICINE

## 2020-12-31 RX ORDER — LEVOTHYROXINE SODIUM 0.03 MG/1
25 TABLET ORAL
Qty: 60 TAB | Refills: 1 | Status: SHIPPED
Start: 2020-12-31 | End: 2021-02-01

## 2020-12-31 ASSESSMENT — FIBROSIS 4 INDEX: FIB4 SCORE: 1.98

## 2020-12-31 NOTE — PROGRESS NOTES
Established Patient    Chief Complaint   Patient presents with   • Follow-Up     Thyroid        Subjective:     HPI:   Ratna presents today with the following.    1. Subclinical hypothyroidism  TSH:   Lab Results   Component Value Date/Time    TSHULTRASEN 7.250 (H) 12/23/2020 1458     THYROXINE (T4):   Lab Results   Component Value Date/Time    FREET4 1.16 12/23/2020 1458   >> Patient also has thyroid peroxidase antibody, patient denied having any symptoms specifically, she is also taking thyroid supplement from pure encapsulated, patient would like to start small dose and see if that TSH goes down little bit, she is also using some skin care product with a lot of synthetic materials as well as water from the well  >> Patient also take a lot of other supplements for her osteopenia      Patient Active Problem List    Diagnosis Date Noted   • Hx of total hysterectomy with removal of both tubes and ovaries 08/20/2020   • Postmenopausal 08/20/2020   • Acute dysfunction of left eustachian tube 07/07/2020   • Grief 02/24/2020   • Seasonal allergies 06/03/2019   • Osteochondroma 06/03/2019   • Pisano's esophagus 06/03/2019   • Non-celiac gluten sensitivity 06/03/2019   • Osteopenia 05/08/2018   • Irritable bowel syndrome with constipation 04/30/2018   • Pure hypercholesterolemia 07/14/2016   • Subclinical hypothyroidism 07/13/2015   • Tinnitus 07/13/2015       Current Outpatient Medications on File Prior to Visit   Medication Sig Dispense Refill   • NON SPECIFIED      • RESTASIS 0.05 % ophthalmic emulsion      • Misc Natural Products (TART CHERRY ADVANCED PO) Take  by mouth.     • Flax Oil Take  by mouth.     • Omega-3 Fatty Acids (SALMON OIL PO) Take  by mouth.     • Cholecalciferol (VITAMIN D3 PO) Take  by mouth.     • POTASSIUM PO Take  by mouth.     • NON SPECIFIED Pure Thyroid     • L-ARGININE PO Take  by mouth.     • NON SPECIFIED colloidal silver     • estrogens, conjugated (PREMARIN) 0.625 MG/GM Cream Place a  "vaginal opening every night for 2 weeks then only twice weekly at night afterward 30 g 2   • Multiple Vitamins-Minerals (MULTIVITAMIN ADULT PO) Take  by mouth.     • FOLIC ACID PO Take  by mouth.     • Pyridoxine HCl (VITAMIN B-6 PO) Take  by mouth.     • ELDERBERRY PO Take  by mouth as needed.     • Multiple Vitamins-Minerals (ZINC PO) Take  by mouth.     • BIOTIN PO Take  by mouth.     • ALPHA LIPOIC ACID PO Take  by mouth.     • LUTEIN PO Take  by mouth.     • Calcium Carb-Cholecalciferol (CALCIUM 600 + D PO) Take  by mouth.     • Nutritional Supplements (PYCNOGENOL PO) Take  by mouth.     • cyanocobalamin (VITAMIN B-12) 100 MCG Tab Take 100 mcg by mouth every day.     • Turmeric, Curcuma Longa, (CURCUMIN) Powder by Does not apply route.     • coenzyme Q-10 30 MG capsule Take 60 mg by mouth every day.     • Ginkgo Biloba (GINKOBA PO) Take  by mouth.     • Nutritional Supplements (CALCIUM D-GLUCARATE PO) Take  by mouth.       No current facility-administered medications on file prior to visit.        Allergies, past medical history, past surgical history, family history, social history reviewed and updated    ROS:  All other systems reviewed and are negative except as stated in the HPI     Physical Exam:     /68 (BP Location: Right arm, Patient Position: Sitting, BP Cuff Size: Adult)   Pulse 66   Temp 36.7 °C (98 °F) (Temporal)   Ht 1.58 m (5' 2.21\")   Wt 48.1 kg (106 lb)   LMP  (LMP Unknown)   SpO2 97%   BMI 19.26 kg/m²   General: Normal appearing. No distress.  ENT: oropharynx without exudates.    Eyes: conjunctiva clear lids without ptosis  Pulmonary: Clear to ausculation.  Normal effort.   Cardiovascular: Regular rate and rhythm  Abdomen: Soft, nontender,  Lymph: No cervical or supraclavicular palpable lymph nodes  Psych: Normal mood and affect.     I have reviewed pertinent labs and diagnostic tests associated with this visit with specific comments listed under the assessment and plan " below      Assessment and Plan:     85 y.o. female with the following issues.    1. Subclinical hypothyroidism  Started with Synthroid 25 mcg daily, educated regarding side effect as well if there is any problem with that especially in her age and she is also taking thyroid supplement  >> We will monitor her TSH periodically,  >> She also have a stress at home that could also contribute to the above as well as some lack of sleep, educate regarding stress management conservatively, deep breathing, other mindfulness as well as importance of sleep hygiene      Follow Up:      Return in about 4 weeks (around 1/28/2021) for follow up.   Hypothyroid, lifestyle*    Please note that this dictation was created using voice recognition software. I have made every reasonable attempt to correct obvious errors, but I expect that there are errors of grammar and possibly content that I did not discover before finalizing the note.    Signed by: Mary Ellen Hyde M.D.

## 2021-01-11 DIAGNOSIS — Z23 NEED FOR VACCINATION: ICD-10-CM

## 2021-01-14 ENCOUNTER — NURSE TRIAGE (OUTPATIENT)
Dept: HEALTH INFORMATION MANAGEMENT | Facility: OTHER | Age: 86
End: 2021-01-14

## 2021-01-14 NOTE — TELEPHONE ENCOUNTER
1. Caller Name: Ratna Tawny Thompson    Call Back Number: [phone]          2.  Does patient have any active symptoms of respiratory illness (fever OR cough OR shortness of breath)? No.     Has had a runny nose for years, that comes and goes, believe it has to do with having Hay Fever.    Wants to make sure that she does not have any COVID symptoms prior to being getting her vaccine.     Additional Information  • Negative: SEVERE difficulty breathing (e.g., struggling for each breath, speaks in single words)  • Negative: Difficult to awaken or acting confused (e.g., disoriented, slurred speech)  • Negative: Bluish (or gray) lips or face now  • Negative: Shock suspected (e.g., cold/pale/clammy skin, too weak to stand, low BP, rapid pulse)  • Negative: Sounds like a life-threatening emergency to the triager  • Negative: [1] COVID-19 suspected (e.g., cough, fever, shortness of breath) AND [2] public health department recommends testing  • Negative: [1] COVID-19 exposure AND [2] no symptoms  • Negative: COVID-19 and Breastfeeding, questions about  • Negative: SEVERE or constant chest pain (Exception: mild central chest pain, present only when coughing)  • Negative: MODERATE difficulty breathing (e.g., speaks in phrases, SOB even at rest, pulse 100-120)  • Negative: MILD difficulty breathing (e.g., minimal/no SOB at rest, SOB with walking, pulse <100)  • Negative: Chest pain  • Negative: Patient sounds very sick or weak to the triager  • Negative: Fever > 103 F (39.4 C)  • Negative: [1] Fever > 101 F (38.3 C) AND [2] age > 60  • Negative: [1] Fever > 100.0 F (37.8 C) AND [2] bedridden (e.g., nursing home patient, CVA, chronic illness, recovering from surgery)  • Negative: HIGH RISK patient (e.g., age > 64 years, diabetes, heart or lung disease, weak immune system)  • Negative: Fever present > 3 days (72 hours)  • Negative: [1] Fever returns after gone for over 24 hours AND [2] symptoms worse or not improved  •  "Negative: [1] Continuous (nonstop) coughing interferes with work or school AND [2] no improvement using cough treatment per protocol  • Negative: Cough present > 3 weeks  • Negative: [1] COVID-19 infection diagnosed or suspected AND [2] mild symptoms (fever, cough) AND [3] no trouble breathing or other complications  • Negative: COVID-19, questions about  • Negative: COVID-19 Home Isolation, questions about  • Negative: COVID-19 Prevention and Healthy Living, questions about  • Negative: COVID-19 Testing, questions about    Answer Assessment - Initial Assessment Questions  1. COVID-19 DIAGNOSIS: \"Who made your Coronavirus (COVID-19) diagnosis?\" \"Was it confirmed by a positive lab test?\" If not diagnosed by a HCP, ask \"Are there lots of cases (community spread) where you live?\" (See public health department website, if unsure)    * MAJOR community spread: high number of cases; numbers of cases are increasing; many people hospitalized.    * MINOR community spread: low number of cases; not increasing; few or no people hospitalized      Minor  2. ONSET: \"When did the COVID-19 symptoms start?\"       Runny nose Last summer  3. WORST SYMPTOM: \"What is your worst symptom?\" (e.g., cough, fever, shortness of breath, muscle aches)      Cough ever once in a while  4. COUGH: \"How bad is the cough?\"        None now  5. FEVER: \"Do you have a fever?\" If so, ask: \"What is your temperature, how was it measured, and when did it start?\"      No  6. RESPIRATORY STATUS: \"Describe your breathing?\" (e.g., shortness of breath, wheezing, unable to speak)       No  7. BETTER-SAME-WORSE: \"Are you getting better, staying the same or getting worse compared to yesterday?\"  If getting worse, ask, \"In what way?\"      None  8. HIGH RISK DISEASE: \"Do you have any chronic medical problems?\" (e.g., asthma, heart or lung disease, weak immune system, etc.)      No  9. PREGNANCY: \"Is there any chance you are pregnant?\" \"When was your last menstrual " "period?\"      n/a  10. OTHER SYMPTOMS: \"Do you have any other symptoms?\"  (e.g., runny nose, headache, sore throat, loss of smell)        no    Protocols used: CORONAVIRUS (COVID-19) DIAGNOSED OR [id number]      "

## 2021-01-25 ENCOUNTER — TELEPHONE (OUTPATIENT)
Dept: MEDICAL GROUP | Facility: PHYSICIAN GROUP | Age: 86
End: 2021-01-25

## 2021-01-25 NOTE — TELEPHONE ENCOUNTER
ESTABLISHED PATIENT PRE-VISIT PLANNING     Patient was NOT contacted to complete PVP.     Note: Patient will not be contacted if there is no indication to call.     1.  Reviewed notes from the last few office visits within the medical group: Yes    2.  If any orders were placed at last visit or intended to be done for this visit (i.e. 6 mos follow-up), do we have Results/Consult Notes?         •  Labs - Labs were not ordered at last office visit.  Note: If patient appointment is for lab review and patient did not complete labs, check with provider if OK to reschedule patient until labs completed.       •  Imaging - Imaging was not ordered at last office visit.       •  Referrals - No referrals were ordered at last office visit.    3. Is this appointment scheduled as a Hospital Follow-Up? No    4.  Immunizations were updated in Epic using Reconcile Outside Information activity? UNABLE TO CHECK WEB IZ    5.  Patient is due for the following Health Maintenance Topics:   Health Maintenance Due   Topic Date Due   • COVID-19 Vaccine (1 of 2) 09/14/1951   • IMM ZOSTER VACCINES (1 of 2) 09/14/1987   • BONE DENSITY  09/24/2017       6.  AHA (Pulse8) form printed for Provider? Yes

## 2021-02-01 ENCOUNTER — OFFICE VISIT (OUTPATIENT)
Dept: MEDICAL GROUP | Facility: PHYSICIAN GROUP | Age: 86
End: 2021-02-01
Payer: MEDICARE

## 2021-02-01 VITALS
OXYGEN SATURATION: 96 % | HEART RATE: 75 BPM | SYSTOLIC BLOOD PRESSURE: 100 MMHG | DIASTOLIC BLOOD PRESSURE: 50 MMHG | HEIGHT: 62 IN | BODY MASS INDEX: 19.93 KG/M2 | WEIGHT: 108.3 LBS | TEMPERATURE: 96 F

## 2021-02-01 DIAGNOSIS — H93.13 TINNITUS OF BOTH EARS: ICD-10-CM

## 2021-02-01 DIAGNOSIS — K58.1 IRRITABLE BOWEL SYNDROME WITH CONSTIPATION: ICD-10-CM

## 2021-02-01 DIAGNOSIS — K90.41 NON-CELIAC GLUTEN SENSITIVITY: ICD-10-CM

## 2021-02-01 DIAGNOSIS — H83.3X3 NOISE-INDUCED HEARING LOSS OF BOTH EARS: ICD-10-CM

## 2021-02-01 DIAGNOSIS — R73.03 PREDIABETES: ICD-10-CM

## 2021-02-01 DIAGNOSIS — E03.8 SUBCLINICAL HYPOTHYROIDISM: ICD-10-CM

## 2021-02-01 PROCEDURE — 99214 OFFICE O/P EST MOD 30 MIN: CPT | Performed by: INTERNAL MEDICINE

## 2021-02-01 ASSESSMENT — PATIENT HEALTH QUESTIONNAIRE - PHQ9: CLINICAL INTERPRETATION OF PHQ2 SCORE: 0

## 2021-02-01 ASSESSMENT — FIBROSIS 4 INDEX: FIB4 SCORE: 1.98

## 2021-02-01 NOTE — PROGRESS NOTES
Established Patient    Chief Complaint   Patient presents with   • Follow-Up       Subjective:     HPI:   Ratna presents today with the following.    1. Non-celiac gluten sensitivity  2. Irritable bowel syndrome with constipation  Mention history of the gluten sensitivity, she is trying to eat gluten free diet, she still have some altered bowel syndrome symptoms over the belly, she is currently taking gallstonex supplement over-the-counter for that admission is improving    3. Subclinical hypothyroidism  Patient refused taking Synthroid 25 mcg daily, mention some heartburn with that and is not comfortable, currently she is taking thyroid supplement over-the-counter    4. Tinnitus of both ears  6. Noise-induced hearing loss of both ears  -Chronic, mention started after taking Nexium long times ago for her acid reflux, also has some hearing deficit, would like to be seen by audiologist, she is also taking supplement over-the-counter for tinnitus called Ring stop      Patient Active Problem List    Diagnosis Date Noted   • Prediabetes 02/01/2021   • Noise-induced hearing loss of both ears 02/01/2021   • Hx of total hysterectomy with removal of both tubes and ovaries 08/20/2020   • Postmenopausal 08/20/2020   • Acute dysfunction of left eustachian tube 07/07/2020   • Grief 02/24/2020   • Seasonal allergies 06/03/2019   • Osteochondroma 06/03/2019   • Pisano's esophagus 06/03/2019   • Non-celiac gluten sensitivity 06/03/2019   • Osteopenia 05/08/2018   • Irritable bowel syndrome with constipation 04/30/2018   • Pure hypercholesterolemia 07/14/2016   • Subclinical hypothyroidism 07/13/2015   • Tinnitus 07/13/2015       Current Outpatient Medications on File Prior to Visit   Medication Sig Dispense Refill   • MISC NATURAL PRODUCTS PO Take  by mouth. Gallstonex     • MISC NATURAL PRODUCTS PO Take  by mouth. Ring Homeopathic Product & educational guide-helps fight the ringing in the ears     • COD LIVER OIL PO Take  by  "mouth.     • Cholecalciferol (VITAMIN D3 PO) Take  by mouth.     • POTASSIUM PO Take  by mouth.     • NON SPECIFIED colloidal silver     • Multiple Vitamins-Minerals (MULTIVITAMIN ADULT PO) Take  by mouth.     • FOLIC ACID PO Take  by mouth.     • ELDERBERRY PO Take  by mouth as needed.     • Multiple Vitamins-Minerals (ZINC PO) Take  by mouth.     • BIOTIN PO Take  by mouth.     • ALPHA LIPOIC ACID PO Take  by mouth.     • LUTEIN PO Take  by mouth.     • Calcium Carb-Cholecalciferol (CALCIUM 600 + D PO) Take  by mouth.     • Nutritional Supplements (PYCNOGENOL PO) Take  by mouth.     • Turmeric, Curcuma Longa, (CURCUMIN) Powder by Does not apply route.     • coenzyme Q-10 30 MG capsule Take 60 mg by mouth every day.     • Ginkgo Biloba (GINKOBA PO) Take  by mouth.     • NON SPECIFIED      • RESTASIS 0.05 % ophthalmic emulsion      • Misc Natural Products (TART CHERRY ADVANCED PO) Take  by mouth.     • Flax Oil Take  by mouth.     • Omega-3 Fatty Acids (SALMON OIL PO) Take  by mouth.     • NON SPECIFIED Pure Thyroid     • L-ARGININE PO Take  by mouth.     • Pyridoxine HCl (VITAMIN B-6 PO) Take  by mouth.     • cyanocobalamin (VITAMIN B-12) 100 MCG Tab Take 100 mcg by mouth every day.     • Nutritional Supplements (CALCIUM D-GLUCARATE PO) Take  by mouth.       No current facility-administered medications on file prior to visit.        Allergies, past medical history, past surgical history, family history, social history reviewed and updated    ROS:  All other systems reviewed and are negative except as stated in the HPI     Physical Exam:     /50 (BP Location: Right arm, Patient Position: Sitting, BP Cuff Size: Adult)   Pulse 75   Temp (!) 35.6 °C (96 °F) (Temporal)   Ht 1.58 m (5' 2.21\")   Wt 49.1 kg (108 lb 4.8 oz)   LMP  (LMP Unknown)   SpO2 96%   BMI 19.68 kg/m²   General: Normal appearing. No distress.  ENT: oropharynx without exudates.,  She has some fogginess of the tympanic membrane with " retraction  Eyes: conjunctiva clear lids without ptosis  Pulmonary: Clear to ausculation.  Normal effort.   Cardiovascular: Regular rate and rhythm  Abdomen: Soft, nontender,  Lymph: No cervical or supraclavicular palpable lymph nodes  Psych: Normal mood and affect.     I have reviewed pertinent labs and diagnostic tests associated with this visit with specific comments listed under the assessment and plan below      Assessment and Plan:     85 y.o. female with the following issues.    1. Non-celiac gluten sensitivity  2. Irritable bowel syndrome with constipation  -Educated on healthy lifestyle, healthy diet,  Follow-up for further detail    3. Subclinical hypothyroidism  Refused taking Synthroid, patient will take fiber supplement over-the-counter    4. Tinnitus of both ears  6. Noise-induced hearing loss of both ears  5. Prediabetes  >> On physical exam, she has some evidence of otitis media effusion, however denied having allergy symptoms  - HEMOGLOBIN A1C; Future  - INSULIN FASTING; Future  - VITAMIN D,25 HYDROXY; Future  - VITAMIN B12; Future  - REFERRAL TO AUDIOLOGY  >> Educated regarding Valsalva maneuver also    Follow Up:      Return in about 2 months (around 4/1/2021) for follow up.   Labs, irritable bowel syndrome, gluten sensitivity, lifestyle*    Please note that this dictation was created using voice recognition software. I have made every reasonable attempt to correct obvious errors, but I expect that there are errors of grammar and possibly content that I did not discover before finalizing the note.    Signed by: Mary Ellen Hyde M.D.

## 2021-02-01 NOTE — PROGRESS NOTES
Annual Health Assessment Questions:    1.  Are you currently engaging in any exercise or physical activity? Yes    2.  How would you describe your mood or emotional well-being today? good    3.  Have you had any falls in the last year? Yes    4.  Have you noticed any problems with your balance or had difficulty walking? No    5.  In the last six months have you experienced any leakage of urine? No    6. DPA/Advanced Directive: Patient stated she has a trust.

## 2021-02-03 ENCOUNTER — IMMUNIZATION (OUTPATIENT)
Dept: FAMILY PLANNING/WOMEN'S HEALTH CLINIC | Facility: IMMUNIZATION CENTER | Age: 86
End: 2021-02-03
Attending: INTERNAL MEDICINE
Payer: MEDICARE

## 2021-02-03 DIAGNOSIS — Z23 ENCOUNTER FOR VACCINATION: Primary | ICD-10-CM

## 2021-02-03 DIAGNOSIS — Z23 NEED FOR VACCINATION: ICD-10-CM

## 2021-02-03 PROCEDURE — 91300 PFIZER SARS-COV-2 VACCINE: CPT

## 2021-02-03 PROCEDURE — 0001A PFIZER SARS-COV-2 VACCINE: CPT

## 2021-02-19 ENCOUNTER — PATIENT OUTREACH (OUTPATIENT)
Dept: HEALTH INFORMATION MANAGEMENT | Facility: OTHER | Age: 86
End: 2021-02-19

## 2021-02-24 ENCOUNTER — HOSPITAL ENCOUNTER (OUTPATIENT)
Dept: LAB | Facility: MEDICAL CENTER | Age: 86
End: 2021-02-24
Attending: INTERNAL MEDICINE
Payer: MEDICARE

## 2021-02-24 DIAGNOSIS — R73.03 PREDIABETES: ICD-10-CM

## 2021-02-24 DIAGNOSIS — H93.13 TINNITUS OF BOTH EARS: ICD-10-CM

## 2021-02-24 LAB
25(OH)D3 SERPL-MCNC: 78 NG/ML (ref 30–100)
EST. AVERAGE GLUCOSE BLD GHB EST-MCNC: 103 MG/DL
HBA1C MFR BLD: 5.2 % (ref 4–5.6)
VIT B12 SERPL-MCNC: 1535 PG/ML (ref 211–911)

## 2021-02-24 PROCEDURE — 83525 ASSAY OF INSULIN: CPT

## 2021-02-24 PROCEDURE — 82607 VITAMIN B-12: CPT

## 2021-02-24 PROCEDURE — 36415 COLL VENOUS BLD VENIPUNCTURE: CPT

## 2021-02-24 PROCEDURE — 82306 VITAMIN D 25 HYDROXY: CPT

## 2021-02-24 PROCEDURE — 83036 HEMOGLOBIN GLYCOSYLATED A1C: CPT

## 2021-02-25 LAB
FASTING STATUS PATIENT QL REPORTED: NORMAL
INSULIN P FAST SERPL-ACNC: 3 UIU/ML (ref 3–19)

## 2021-03-03 ENCOUNTER — OFFICE VISIT (OUTPATIENT)
Dept: MEDICAL GROUP | Facility: PHYSICIAN GROUP | Age: 86
End: 2021-03-03
Payer: MEDICARE

## 2021-03-03 VITALS
BODY MASS INDEX: 19.69 KG/M2 | TEMPERATURE: 98 F | WEIGHT: 107 LBS | HEART RATE: 71 BPM | SYSTOLIC BLOOD PRESSURE: 112 MMHG | DIASTOLIC BLOOD PRESSURE: 68 MMHG | OXYGEN SATURATION: 97 % | HEIGHT: 62 IN

## 2021-03-03 DIAGNOSIS — K58.1 IRRITABLE BOWEL SYNDROME WITH CONSTIPATION: ICD-10-CM

## 2021-03-03 DIAGNOSIS — K90.41 NON-CELIAC GLUTEN SENSITIVITY: ICD-10-CM

## 2021-03-03 DIAGNOSIS — H93.13 TINNITUS OF BOTH EARS: ICD-10-CM

## 2021-03-03 PROCEDURE — 99214 OFFICE O/P EST MOD 30 MIN: CPT | Performed by: INTERNAL MEDICINE

## 2021-03-03 ASSESSMENT — FIBROSIS 4 INDEX: FIB4 SCORE: 1.98

## 2021-03-04 NOTE — PROGRESS NOTES
Annual Health Assessment Questions:    1.  Are you currently engaging in any exercise or physical activity? Yes    2.  How would you describe your mood or emotional well-being today? fair    3.  Have you had any falls in the last year? No    4.  Have you noticed any problems with your balance or had difficulty walking? No    5.  In the last six months have you experienced any leakage of urine? No    6. DPA/Advanced Directive: Patient has Advanced Directive, but it is not on file. Instructed to bring in a copy to scan into their chart.

## 2021-03-04 NOTE — PROGRESS NOTES
Established Patient    Chief Complaint   Patient presents with   • Follow-Up       Subjective:     HPI:   Ratna presents today with the following.    1. Irritable bowel syndrome with constipation  2. Non-celiac gluten sensitivity  >> Patient still have some GI issues with bloating and gassiness and sometimes wake her up at night to go to the bathroom and has some bowel movement however there is no actual bowel movement but only some tenesmus, denied having upper GI bleeding, melena, weight loss, other alarm GI signs or symptoms, to review avoiding wheat and gluten  -She is also here to check her lab which was normal    3. Tinnitus of both ears  -Patient was referred to audiologist, mention the hearing aids are expensive and both of them are cost like $2000, would like to postpone that, otherwise she could hear fine most of the time      Patient Active Problem List    Diagnosis Date Noted   • Prediabetes 02/01/2021   • Noise-induced hearing loss of both ears 02/01/2021   • Hx of total hysterectomy with removal of both tubes and ovaries 08/20/2020   • Postmenopausal 08/20/2020   • Acute dysfunction of left eustachian tube 07/07/2020   • Grief 02/24/2020   • Seasonal allergies 06/03/2019   • Osteochondroma 06/03/2019   • Pisano's esophagus 06/03/2019   • Non-celiac gluten sensitivity 06/03/2019   • Osteopenia 05/08/2018   • Irritable bowel syndrome with constipation 04/30/2018   • Pure hypercholesterolemia 07/14/2016   • Subclinical hypothyroidism 07/13/2015   • Tinnitus 07/13/2015       Current Outpatient Medications on File Prior to Visit   Medication Sig Dispense Refill   • MISC NATURAL PRODUCTS PO Take  by mouth. Gallstonex     • MISC NATURAL PRODUCTS PO Take  by mouth. Ring Homeopathic Product & educational guide-helps fight the ringing in the ears     • COD LIVER OIL PO Take  by mouth.     • NON SPECIFIED      • RESTASIS 0.05 % ophthalmic emulsion      • Misc Natural Products (TART CHERRY ADVANCED PO) Take  by  "mouth.     • Flax Oil Take  by mouth.     • Omega-3 Fatty Acids (SALMON OIL PO) Take  by mouth.     • Cholecalciferol (VITAMIN D3 PO) Take  by mouth.     • POTASSIUM PO Take  by mouth.     • NON SPECIFIED Pure Thyroid     • L-ARGININE PO Take  by mouth.     • NON SPECIFIED colloidal silver     • Multiple Vitamins-Minerals (MULTIVITAMIN ADULT PO) Take  by mouth.     • FOLIC ACID PO Take  by mouth.     • Pyridoxine HCl (VITAMIN B-6 PO) Take  by mouth.     • ELDERBERRY PO Take  by mouth as needed.     • Multiple Vitamins-Minerals (ZINC PO) Take  by mouth.     • BIOTIN PO Take  by mouth.     • ALPHA LIPOIC ACID PO Take  by mouth.     • LUTEIN PO Take  by mouth.     • Calcium Carb-Cholecalciferol (CALCIUM 600 + D PO) Take  by mouth.     • Nutritional Supplements (PYCNOGENOL PO) Take  by mouth.     • cyanocobalamin (VITAMIN B-12) 100 MCG Tab Take 100 mcg by mouth every day.     • Turmeric, Curcuma Longa, (CURCUMIN) Powder by Does not apply route.     • coenzyme Q-10 30 MG capsule Take 60 mg by mouth every day.     • Ginkgo Biloba (GINKOBA PO) Take  by mouth.     • Nutritional Supplements (CALCIUM D-GLUCARATE PO) Take  by mouth.       No current facility-administered medications on file prior to visit.       Allergies, past medical history, past surgical history, family history, social history reviewed and updated    ROS:  All other systems reviewed and are negative except as stated in the HPI     Physical Exam:     /68 (BP Location: Left arm, Patient Position: Sitting, BP Cuff Size: Adult)   Pulse 71   Temp 36.7 °C (98 °F) (Temporal)   Ht 1.58 m (5' 2.21\")   Wt 48.5 kg (107 lb)   LMP  (LMP Unknown)   SpO2 97%   BMI 19.44 kg/m²   General: Normal appearing. No distress.  ENT: oropharynx without exudates.    Eyes: conjunctiva clear lids without ptosis  Pulmonary: Clear to ausculation.  Normal effort.   Cardiovascular: Regular rate and rhythm  Abdomen: Soft, nontender,  Lymph: No cervical or supraclavicular " palpable lymph nodes  Psych: Normal mood and affect.     I have reviewed pertinent labs and diagnostic tests associated with this visit with specific comments listed under the assessment and plan below      Assessment and Plan:     85 y.o. female with the following issues.    1. Irritable bowel syndrome with constipation  2. Non-celiac gluten sensitivity  >> Educated regarding avoidance of some elimination diet that is harmful to gut Including gluten, she is taking multiple over-the-counter supplement including herbal treatment as well, she is also would like to try bloating/gas GI supplement like atrantil     3. Tinnitus of both ears  -Advised to contact with her insurance to see if there is any way to cover her hearing aid.  Patient agreed        Follow Up:      Return in about 6 months (around 9/3/2021) for follow up.    Please note that this dictation was created using voice recognition software. I have made every reasonable attempt to correct obvious errors, but I expect that there are errors of grammar and possibly content that I did not discover before finalizing the note.    Signed by: Mary Ellen Hyde M.D.

## 2021-03-10 ENCOUNTER — IMMUNIZATION (OUTPATIENT)
Dept: FAMILY PLANNING/WOMEN'S HEALTH CLINIC | Facility: IMMUNIZATION CENTER | Age: 86
End: 2021-03-10
Attending: INTERNAL MEDICINE
Payer: MEDICARE

## 2021-03-10 DIAGNOSIS — Z23 ENCOUNTER FOR VACCINATION: Primary | ICD-10-CM

## 2021-03-10 PROCEDURE — 0002A PFIZER SARS-COV-2 VACCINE: CPT | Performed by: NURSE PRACTITIONER

## 2021-03-10 PROCEDURE — 91300 PFIZER SARS-COV-2 VACCINE: CPT | Performed by: NURSE PRACTITIONER

## 2021-03-29 ENCOUNTER — APPOINTMENT (RX ONLY)
Dept: URBAN - METROPOLITAN AREA CLINIC 4 | Facility: CLINIC | Age: 86
Setting detail: DERMATOLOGY
End: 2021-03-29

## 2021-03-29 DIAGNOSIS — D18.0 HEMANGIOMA: ICD-10-CM

## 2021-03-29 DIAGNOSIS — D22 MELANOCYTIC NEVI: ICD-10-CM

## 2021-03-29 DIAGNOSIS — L81.4 OTHER MELANIN HYPERPIGMENTATION: ICD-10-CM

## 2021-03-29 DIAGNOSIS — L57.0 ACTINIC KERATOSIS: ICD-10-CM

## 2021-03-29 DIAGNOSIS — Z71.89 OTHER SPECIFIED COUNSELING: ICD-10-CM

## 2021-03-29 DIAGNOSIS — D17 BENIGN LIPOMATOUS NEOPLASM: ICD-10-CM

## 2021-03-29 DIAGNOSIS — Z85.828 PERSONAL HISTORY OF OTHER MALIGNANT NEOPLASM OF SKIN: ICD-10-CM

## 2021-03-29 DIAGNOSIS — L82.1 OTHER SEBORRHEIC KERATOSIS: ICD-10-CM

## 2021-03-29 DIAGNOSIS — L85.3 XEROSIS CUTIS: ICD-10-CM

## 2021-03-29 PROBLEM — D18.01 HEMANGIOMA OF SKIN AND SUBCUTANEOUS TISSUE: Status: ACTIVE | Noted: 2021-03-29

## 2021-03-29 PROBLEM — D22.5 MELANOCYTIC NEVI OF TRUNK: Status: ACTIVE | Noted: 2021-03-29

## 2021-03-29 PROBLEM — D17.24 BENIGN LIPOMATOUS NEOPLASM OF SKIN AND SUBCUTANEOUS TISSUE OF LEFT LEG: Status: ACTIVE | Noted: 2021-03-29

## 2021-03-29 PROCEDURE — ? LIQUID NITROGEN

## 2021-03-29 PROCEDURE — ? SUNSCREEN TREATMENT REGIMEN

## 2021-03-29 PROCEDURE — ? COUNSELING

## 2021-03-29 PROCEDURE — 99213 OFFICE O/P EST LOW 20 MIN: CPT | Mod: 25

## 2021-03-29 PROCEDURE — 17000 DESTRUCT PREMALG LESION: CPT

## 2021-03-29 PROCEDURE — 17003 DESTRUCT PREMALG LES 2-14: CPT

## 2021-03-29 ASSESSMENT — LOCATION DETAILED DESCRIPTION DERM
LOCATION DETAILED: LOWER STERNUM
LOCATION DETAILED: RIGHT ANTERIOR DISTAL UPPER ARM
LOCATION DETAILED: LEFT CENTRAL MANDIBULAR CHEEK
LOCATION DETAILED: LEFT ANTERIOR PROXIMAL THIGH
LOCATION DETAILED: RIGHT ANTERIOR PROXIMAL UPPER ARM
LOCATION DETAILED: INFERIOR THORACIC SPINE
LOCATION DETAILED: LEFT MEDIAL UPPER BACK
LOCATION DETAILED: PHILTRUM
LOCATION DETAILED: LEFT INFERIOR CENTRAL MALAR CHEEK
LOCATION DETAILED: RIGHT CENTRAL BUCCAL CHEEK
LOCATION DETAILED: RIGHT ANTERIOR PROXIMAL THIGH
LOCATION DETAILED: EPIGASTRIC SKIN
LOCATION DETAILED: RIGHT CENTRAL MALAR CHEEK
LOCATION DETAILED: LEFT INFERIOR MEDIAL FOREHEAD
LOCATION DETAILED: MIDDLE STERNUM
LOCATION DETAILED: LEFT SUPERIOR MEDIAL UPPER BACK
LOCATION DETAILED: PERIUMBILICAL SKIN
LOCATION DETAILED: SUPERIOR THORACIC SPINE
LOCATION DETAILED: LEFT ANTERIOR DISTAL UPPER ARM

## 2021-03-29 ASSESSMENT — LOCATION ZONE DERM
LOCATION ZONE: LEG
LOCATION ZONE: TRUNK
LOCATION ZONE: FACE
LOCATION ZONE: LIP
LOCATION ZONE: ARM

## 2021-03-29 ASSESSMENT — LOCATION SIMPLE DESCRIPTION DERM
LOCATION SIMPLE: LEFT UPPER ARM
LOCATION SIMPLE: RIGHT CHEEK
LOCATION SIMPLE: UPPER LIP
LOCATION SIMPLE: UPPER BACK
LOCATION SIMPLE: LEFT CHEEK
LOCATION SIMPLE: LEFT THIGH
LOCATION SIMPLE: ABDOMEN
LOCATION SIMPLE: LEFT UPPER BACK
LOCATION SIMPLE: RIGHT UPPER ARM
LOCATION SIMPLE: LEFT FOREHEAD
LOCATION SIMPLE: CHEST
LOCATION SIMPLE: RIGHT THIGH

## 2021-03-31 NOTE — PROGRESS NOTES
Answered members questions regarding Chiropractor and Physical Therapy copays. Informed member regarding [facility] Preferred Plan. Member would like a return call back at 4:00 PM to further educate regarding Plan materials.

## 2021-05-10 ENCOUNTER — OFFICE VISIT (OUTPATIENT)
Dept: MEDICAL GROUP | Facility: PHYSICIAN GROUP | Age: 86
End: 2021-05-10
Payer: MEDICARE

## 2021-05-10 VITALS
DIASTOLIC BLOOD PRESSURE: 60 MMHG | HEART RATE: 59 BPM | BODY MASS INDEX: 19.96 KG/M2 | HEIGHT: 62 IN | TEMPERATURE: 98.1 F | WEIGHT: 108.5 LBS | OXYGEN SATURATION: 96 % | SYSTOLIC BLOOD PRESSURE: 108 MMHG

## 2021-05-10 DIAGNOSIS — M79.89 PALPABLE MASS OF SOFT TISSUE OF SHOULDER: ICD-10-CM

## 2021-05-10 DIAGNOSIS — M85.852 OSTEOPENIA OF NECK OF LEFT FEMUR: ICD-10-CM

## 2021-05-10 DIAGNOSIS — E78.00 PURE HYPERCHOLESTEROLEMIA: ICD-10-CM

## 2021-05-10 DIAGNOSIS — M79.642 LEFT HAND PAIN: ICD-10-CM

## 2021-05-10 DIAGNOSIS — D16.9 OSTEOCHONDROMA: ICD-10-CM

## 2021-05-10 DIAGNOSIS — E53.8 B12 DEFICIENCY: ICD-10-CM

## 2021-05-10 DIAGNOSIS — H93.13 TINNITUS OF BOTH EARS: ICD-10-CM

## 2021-05-10 DIAGNOSIS — K22.70 BARRETT'S ESOPHAGUS WITHOUT DYSPLASIA: ICD-10-CM

## 2021-05-10 DIAGNOSIS — E03.8 SUBCLINICAL HYPOTHYROIDISM: ICD-10-CM

## 2021-05-10 DIAGNOSIS — E55.9 VITAMIN D DEFICIENCY: ICD-10-CM

## 2021-05-10 DIAGNOSIS — R73.03 PREDIABETES: ICD-10-CM

## 2021-05-10 PROBLEM — Z90.79 HX OF TOTAL HYSTERECTOMY WITH REMOVAL OF BOTH TUBES AND OVARIES: Status: RESOLVED | Noted: 2020-08-20 | Resolved: 2021-05-10

## 2021-05-10 PROBLEM — F43.21 GRIEF: Status: RESOLVED | Noted: 2020-02-24 | Resolved: 2021-05-10

## 2021-05-10 PROBLEM — K90.41 NON-CELIAC GLUTEN SENSITIVITY: Status: RESOLVED | Noted: 2019-06-03 | Resolved: 2021-05-10

## 2021-05-10 PROBLEM — H83.3X3 NOISE-INDUCED HEARING LOSS OF BOTH EARS: Status: RESOLVED | Noted: 2021-02-01 | Resolved: 2021-05-10

## 2021-05-10 PROBLEM — Z90.710 HX OF TOTAL HYSTERECTOMY WITH REMOVAL OF BOTH TUBES AND OVARIES: Status: RESOLVED | Noted: 2020-08-20 | Resolved: 2021-05-10

## 2021-05-10 PROBLEM — Z78.0 POSTMENOPAUSAL: Status: RESOLVED | Noted: 2020-08-20 | Resolved: 2021-05-10

## 2021-05-10 PROBLEM — Z90.722 HX OF TOTAL HYSTERECTOMY WITH REMOVAL OF BOTH TUBES AND OVARIES: Status: RESOLVED | Noted: 2020-08-20 | Resolved: 2021-05-10

## 2021-05-10 PROBLEM — J30.2 SEASONAL ALLERGIES: Status: RESOLVED | Noted: 2019-06-03 | Resolved: 2021-05-10

## 2021-05-10 PROBLEM — K58.1 IRRITABLE BOWEL SYNDROME WITH CONSTIPATION: Status: RESOLVED | Noted: 2018-04-30 | Resolved: 2021-05-10

## 2021-05-10 PROCEDURE — 99215 OFFICE O/P EST HI 40 MIN: CPT | Performed by: INTERNAL MEDICINE

## 2021-05-10 ASSESSMENT — FIBROSIS 4 INDEX: FIB4 SCORE: 1.98

## 2021-05-10 NOTE — PROGRESS NOTES
Subjective:     CC:  Establish care    HISTORY OF THE PRESENT ILLNESS: Ratna Cline is a 85 y.o. female here today to establish primary medical care and discuss the below stated chronic medical conditions. Ratna is unaccompanied for today's visit.    Problem   Left hand pain- between 2nd and 3rd digits due to barbed wire fence    She reports while hiking with her son in early April 2021 they had to pass through a barbed wire fence and she injured the left hand.  Specifically in the crevice between the second and third digit the barbed wire caused a penetrating injury.  She noticed on the palmar aspect just distal to the space between the second and third digit she developed pain.  She does not think there it was a foreign body but is not sure.  Her tetanus was updated in 2017.  The pain present for a few weeks after the injury, improved for a few days, and then the pain returned.  She has tried topical Neosporin with some improvement.     Palpable Mass of Soft Tissue of Shoulder    On the posterior aspect of her right scapula she has a palpable mass.  It is soft and mobile.  It is not tender with palpation.  It gets bigger with abduction and flexion and improves with extension of the right upper extremity.  He has baseline deformity of the right scapula compared to the left where it is more prominent on inspection.  There is some associated discomfort but not enough that she wants to have anything done about it yet.  She thinks the masses been present for at least 6 months but is not sure.     Osteochondroma    She reports longstanding history of osteochondroma since her teenage years.  Her mother and her children also have this condition.  She has had multiple areas where surgeries have been completed to remove abnormal bony growth, on her right scapula and extremities for example. These were done in [place].      Pisano's Esophagus    She has a known history of Pisano's esophagus diagnosed around 2002 2005.   She has not followed up with gastroenterology for a number of years.  She is previously described Nexium which she feels led to her tinnitus.  She would like to avoid PPI medications moving forward due to potential side effects and tries to treat her ailments with homeopathic remedies.     Osteopenia    1/2007 DEXA T-score Lspine -1.0, L femur -2.0 (osteopenia)  9/2012 DEXA T-score Lspine -0.9, L femur -2.3 (osteopenia)    She has a known history of osteopenia.  No fragility fractures.  She does not desire additional evaluation or treatment for her osteopenia.  She is taking calcium and vitamin D.  She received IV Reclast x2 in the past, no records in the [facility] system for me to review so unclear on the timing.     Pure Hypercholesterolemia       Ref. Range 7/9/2020 07:19   Cholesterol,Tot Latest Ref Range: 100 - 199 mg/dL 222 (H)   Triglycerides Latest Ref Range: 0 - 149 mg/dL 100   HDL Latest Ref Range: >=40 mg/dL 53   LDL Latest Ref Range: <100 mg/dL 149 (H)     She has a history of elevated cholesterol.  She is using many over-the-counter supplements to treat her ankle problems.  She has coenzyme Q 10, cod liver oil, Cipriano 3 fatty acids, and niacin all listed as over-the-counter supplements.  No personal history of coronary artery disease or cerebrovascular disease.     Subclinical Hypothyroidism       Ref. Range 12/23/2020 14:58   TSH Latest Ref Range: 0.380 - 5.330 uIU/mL 7.250 (H)   Free T-4 Latest Ref Range: 0.93 - 1.70 ng/dL 1.16   T3,Free Latest Ref Range: 2.00 - 4.40 pg/mL 3.29       She has had subclinical hypothyroidism since at least 2012 in varying degrees.  TSH has been as high as 9.  She recalls previous treatment with levothyroxine and then with a desiccated thyroid.  Of note, she does take biotin intermittently which may alter her labs.     Tinnitus    She has had ringing in the ears for a long time, roughly since 2005.  She attributes this to a side effect of PPI use for her Pisano's esophagus,  Nexium specifically.  She has associated hearing loss and is trying to use homeopathic remedies to help with the tinnitus.  She is referred for hearing test last year but reports she was not able to talk to an ENT about strategies to help with the tinnitus.       Current Medications:  Current Outpatient Medications Ordered in Epic   Medication Sig Dispense Refill   • Omega-3 Fatty Acids (SALMON OIL PO) Take  by mouth.     • Cholecalciferol (VITAMIN D3 PO) Take  by mouth.     • POTASSIUM PO Take  by mouth.     • NON SPECIFIED colloidal silver     • Multiple Vitamins-Minerals (MULTIVITAMIN ADULT PO) Take  by mouth.     • ELDERBERRY PO Take  by mouth as needed.     • Multiple Vitamins-Minerals (ZINC PO) Take  by mouth.     • BIOTIN PO Take  by mouth.     • LUTEIN PO Take  by mouth.     • Calcium Carb-Cholecalciferol (CALCIUM 600 + D PO) Take  by mouth.     • Nutritional Supplements (PYCNOGENOL PO) Take  by mouth.     • Turmeric, Curcuma Longa, (CURCUMIN) Powder by Does not apply route.     • coenzyme Q-10 30 MG capsule Take 60 mg by mouth every day.     • Ginkgo Biloba (GINKOBA PO) Take  by mouth.     • MISC NATURAL PRODUCTS PO Take  by mouth. Gallstonex     • MISC NATURAL PRODUCTS PO Take  by mouth. Ring Homeopathic Product & educational guide-helps fight the ringing in the ears     • COD LIVER OIL PO Take  by mouth.     • NON SPECIFIED      • RESTASIS 0.05 % ophthalmic emulsion      • Misc Natural Products (TART CHERRY ADVANCED PO) Take  by mouth.     • Flax Oil Take  by mouth.     • NON SPECIFIED Pure Thyroid     • L-ARGININE PO Take  by mouth.     • FOLIC ACID PO Take  by mouth.     • Pyridoxine HCl (VITAMIN B-6 PO) Take  by mouth.     • ALPHA LIPOIC ACID PO Take  by mouth.     • cyanocobalamin (VITAMIN B-12) 100 MCG Tab Take 100 mcg by mouth every day.     • Nutritional Supplements (CALCIUM D-GLUCARATE PO) Take  by mouth.       No current Epic-ordered facility-administered medications on file.       PMH, PSH,  "Social History, Medications, Allergies, FMH updated and reviewed as documented:    Objective:   Physical Exam:    Vitals: /60 (BP Location: Right arm, Patient Position: Sitting, BP Cuff Size: Adult)   Pulse (!) 59   Temp 36.7 °C (98.1 °F) (Temporal)   Ht 1.58 m (5' 2.21\")   Wt 49.2 kg (108 lb 8 oz)   SpO2 96%    BMI: Body mass index is 19.71 kg/m².     Physical Exam   Constitutional: She is well-developed, well-nourished, and in no distress.   HENT:   No cerumen in bilateral external ear canals.  Mild hearing loss.   Eyes: Conjunctivae are normal.   Anisocoria.  Right less than left.   Cardiovascular: Normal rate, regular rhythm and normal heart sounds.   Pulmonary/Chest: Effort normal and breath sounds normal. She has no wheezes.   Musculoskeletal:         General: Deformity present. No edema.      Comments: Probable mass at right posterior shoulder blade on the lateral aspect.  There is also asymmetry with her shoulder blade right greater than left with abduction and extension.   Skin: Skin is warm and dry. No rash noted.   Well-healed puncture wound on left hand between second and third digits on palmar aspect.   Psychiatric: Mood, memory, affect and judgment normal.        Assessment & Plan:   Ratna is a 85 y.o. female with the following:  Problem List Items Addressed This Visit     Subclinical hypothyroidism     Previous problem that requires follow-up.  We will update her thyroid labs to see if she still has findings consistent with subclinical hypothyroidism.  Advised her to avoid biotin use before lab draw as this may alter her values.  Could consider treatment especially for TSH would be greater than 10, she would like to avoid prescriptions as much as possible.         Relevant Orders    CBC WITH DIFFERENTIAL    Comp Metabolic Panel    TSH WITH REFLEX TO FT4    Tinnitus     Chronic and ongoing problem, quite frustrating for the patient.  She feels this is a side effect from a previous PPI for " Pisano's esophagus.  She was referred for a hearing test but disappointed that she cannot talk to an ENT about her symptoms and if there is any additional evaluation or treatment recommendations.         Pure hypercholesterolemia     Chronic and ongoing problem.  Will update cholesterol level to see if there is been much change compared to last July.  She prefers to avoid prescription medicines and use homeopathic remedies.  She will continue with the cod liver oil, omega-3 fatty acids, niacin, and coenzyme Q 10.  Could consider addition of red yeast rice extract if needed.         Relevant Orders    CBC WITH DIFFERENTIAL    Comp Metabolic Panel    Lipid Profile    Osteopenia     Chronic ongoing problem.  She received 2 doses of IV Reclast per the medical records however I cannot confirm that with review of her chart at [facility].  May have been given at an outside institution.  Continue calcium and vitamin D.  Unclear if patient will want to update bone density, repeat DEXA scans have been ordered in the past and not completed.         Osteochondroma     Chronic and ongoing problem.  She has had multiple excisions in the past.  More recently she has had challenges with what sounds like lipomas.  May need to get her back in with a orthopedic surgeon if she would have regrowth that would require excision or evaluation.  Of note, she would like to avoid any general anesthesia she is worried about cognitive impairment.         Pisano's esophagus     Previous problem, no recent follow-up.  If she would develop any warning symptoms such as odynophagia, dysphagia, hoarse voice, melena, anemia, or other concern for an esophageal cancer then would strongly push her for repeat endoscopy.  She would like to avoid surveillance testing/imaging at this time.  We will update blood counts to ensure there is no anemia.         Left hand pain- between 2nd and 3rd digits due to barbed wire fence     New and decompensated problem.  We  will start with an x-ray of the left hand to make sure there is no foreign body or fracture.  Symptoms not consistent with infection at this time.  Her tetanus shot is up-to-date.  Could consider CT or MRI if the pain would be worsening as well as a referral to a hand surgeon.  Not bothersome at this time, she just thought it was helpful to bring it up to me.         Relevant Orders    DX-HAND 2- LEFT    Palpable mass of soft tissue of shoulder     New problem.  Exam and description seem consistent with a lipoma or perhaps a cyst.  It is not bothersome enough that she wants to have imaging or treatment.  Discussed that I can refer her to a general surgeon for excision in the future especially if it would start to cause consistent pain or limit her ADLs.  She agrees and will keep me updated.         RESOLVED: Prediabetes    Relevant Orders    CBC WITH DIFFERENTIAL      Other Visit Diagnoses     Vitamin D deficiency        Relevant Orders    VITAMIN D,25 HYDROXY    B12 deficiency        Relevant Orders    VITAMIN B12             RTC: Return in about 3 weeks (around 5/31/2021) for review labs and complete MoCA.    I spent a total of 65 minutes with record review, exam, communication with the patient, communication with other providers, and documentation of this encounter.    PLEASE NOTE: This dictation was created using voice recognition software. I have made every reasonable attempt to correct obvious errors, but I expect that there are errors of grammar and possibly content that I did not discover before finalizing the note.      Criselda Portillo, DO  Geriatric and Internal Medicine  [facility]

## 2021-05-11 ENCOUNTER — HOSPITAL ENCOUNTER (OUTPATIENT)
Dept: RADIOLOGY | Facility: MEDICAL CENTER | Age: 86
End: 2021-05-11
Attending: INTERNAL MEDICINE
Payer: MEDICARE

## 2021-05-11 DIAGNOSIS — M79.642 LEFT HAND PAIN: ICD-10-CM

## 2021-05-11 PROCEDURE — 73130 X-RAY EXAM OF HAND: CPT | Mod: LT

## 2021-05-11 NOTE — ASSESSMENT & PLAN NOTE
New problem.  Exam and description seem consistent with a lipoma or perhaps a cyst.  It is not bothersome enough that she wants to have imaging or treatment.  Discussed that I can refer her to a general surgeon for excision in the future especially if it would start to cause consistent pain or limit her ADLs.  She agrees and will keep me updated.

## 2021-05-11 NOTE — ASSESSMENT & PLAN NOTE
Chronic and ongoing problem, quite frustrating for the patient.  She feels this is a side effect from a previous PPI for Pisano's esophagus.  She was referred for a hearing test but disappointed that she cannot talk to an ENT about her symptoms and if there is any additional evaluation or treatment recommendations.

## 2021-05-11 NOTE — ASSESSMENT & PLAN NOTE
Previous problem, no recent follow-up.  If she would develop any warning symptoms such as odynophagia, dysphagia, hoarse voice, melena, anemia, or other concern for an esophageal cancer then would strongly push her for repeat endoscopy.  She would like to avoid surveillance testing/imaging at this time.  We will update blood counts to ensure there is no anemia.

## 2021-05-11 NOTE — ASSESSMENT & PLAN NOTE
New and decompensated problem.  We will start with an x-ray of the left hand to make sure there is no foreign body or fracture.  Symptoms not consistent with infection at this time.  Her tetanus shot is up-to-date.  Could consider CT or MRI if the pain would be worsening as well as a referral to a hand surgeon.  Not bothersome at this time, she just thought it was helpful to bring it up to me.

## 2021-05-11 NOTE — ASSESSMENT & PLAN NOTE
Chronic and ongoing problem.  She has had multiple excisions in the past.  More recently she has had challenges with what sounds like lipomas.  May need to get her back in with a orthopedic surgeon if she would have regrowth that would require excision or evaluation.  Of note, she would like to avoid any general anesthesia she is worried about cognitive impairment.

## 2021-05-11 NOTE — ASSESSMENT & PLAN NOTE
Previous problem that requires follow-up.  We will update her thyroid labs to see if she still has findings consistent with subclinical hypothyroidism.  Advised her to avoid biotin use before lab draw as this may alter her values.  Could consider treatment especially for TSH would be greater than 10, she would like to avoid prescriptions as much as possible.

## 2021-05-11 NOTE — ASSESSMENT & PLAN NOTE
Chronic and ongoing problem.  Will update cholesterol level to see if there is been much change compared to last July.  She prefers to avoid prescription medicines and use homeopathic remedies.  She will continue with the cod liver oil, omega-3 fatty acids, niacin, and coenzyme Q 10.  Could consider addition of red yeast rice extract if needed.

## 2021-05-11 NOTE — ASSESSMENT & PLAN NOTE
Chronic ongoing problem.  She received 2 doses of IV Reclast per the medical records however I cannot confirm that with review of her chart at [facility].  May have been given at an outside institution.  Continue calcium and vitamin D.  Unclear if patient will want to update bone density, repeat DEXA scans have been ordered in the past and not completed.

## 2021-05-13 ENCOUNTER — TELEPHONE (OUTPATIENT)
Dept: MEDICAL GROUP | Facility: PHYSICIAN GROUP | Age: 86
End: 2021-05-13

## 2021-05-13 ENCOUNTER — HOSPITAL ENCOUNTER (OUTPATIENT)
Dept: LAB | Facility: MEDICAL CENTER | Age: 86
End: 2021-05-13
Attending: INTERNAL MEDICINE
Payer: MEDICARE

## 2021-05-13 DIAGNOSIS — E53.8 B12 DEFICIENCY: ICD-10-CM

## 2021-05-13 DIAGNOSIS — E78.00 PURE HYPERCHOLESTEROLEMIA: ICD-10-CM

## 2021-05-13 DIAGNOSIS — R73.03 PREDIABETES: ICD-10-CM

## 2021-05-13 DIAGNOSIS — E55.9 VITAMIN D DEFICIENCY: ICD-10-CM

## 2021-05-13 DIAGNOSIS — E03.8 SUBCLINICAL HYPOTHYROIDISM: ICD-10-CM

## 2021-05-13 LAB
ALBUMIN SERPL BCP-MCNC: 4.2 G/DL (ref 3.2–4.9)
ALBUMIN/GLOB SERPL: 1.7 G/DL
ALP SERPL-CCNC: 53 U/L (ref 30–99)
ALT SERPL-CCNC: 17 U/L (ref 2–50)
ANION GAP SERPL CALC-SCNC: 9 MMOL/L (ref 7–16)
AST SERPL-CCNC: 27 U/L (ref 12–45)
BASOPHILS # BLD AUTO: 0.8 % (ref 0–1.8)
BASOPHILS # BLD: 0.04 K/UL (ref 0–0.12)
BILIRUB SERPL-MCNC: 0.6 MG/DL (ref 0.1–1.5)
BUN SERPL-MCNC: 11 MG/DL (ref 8–22)
CALCIUM SERPL-MCNC: 9.4 MG/DL (ref 8.5–10.5)
CHLORIDE SERPL-SCNC: 102 MMOL/L (ref 96–112)
CHOLEST SERPL-MCNC: 205 MG/DL (ref 100–199)
CO2 SERPL-SCNC: 26 MMOL/L (ref 20–33)
CREAT SERPL-MCNC: 0.77 MG/DL (ref 0.5–1.4)
EOSINOPHIL # BLD AUTO: 0.08 K/UL (ref 0–0.51)
EOSINOPHIL NFR BLD: 1.6 % (ref 0–6.9)
ERYTHROCYTE [DISTWIDTH] IN BLOOD BY AUTOMATED COUNT: 47.7 FL (ref 35.9–50)
FASTING STATUS PATIENT QL REPORTED: NORMAL
GLOBULIN SER CALC-MCNC: 2.5 G/DL (ref 1.9–3.5)
GLUCOSE SERPL-MCNC: 91 MG/DL (ref 65–99)
HCT VFR BLD AUTO: 44.5 % (ref 37–47)
HDLC SERPL-MCNC: 59 MG/DL
HGB BLD-MCNC: 14.2 G/DL (ref 12–16)
IMM GRANULOCYTES # BLD AUTO: 0.02 K/UL (ref 0–0.11)
IMM GRANULOCYTES NFR BLD AUTO: 0.4 % (ref 0–0.9)
LDLC SERPL CALC-MCNC: 131 MG/DL
LYMPHOCYTES # BLD AUTO: 0.99 K/UL (ref 1–4.8)
LYMPHOCYTES NFR BLD: 20 % (ref 22–41)
MCH RBC QN AUTO: 31.3 PG (ref 27–33)
MCHC RBC AUTO-ENTMCNC: 31.9 G/DL (ref 33.6–35)
MCV RBC AUTO: 98 FL (ref 81.4–97.8)
MONOCYTES # BLD AUTO: 0.61 K/UL (ref 0–0.85)
MONOCYTES NFR BLD AUTO: 12.3 % (ref 0–13.4)
NEUTROPHILS # BLD AUTO: 3.22 K/UL (ref 2–7.15)
NEUTROPHILS NFR BLD: 64.9 % (ref 44–72)
NRBC # BLD AUTO: 0 K/UL
NRBC BLD-RTO: 0 /100 WBC
PLATELET # BLD AUTO: 264 K/UL (ref 164–446)
PMV BLD AUTO: 10.4 FL (ref 9–12.9)
POTASSIUM SERPL-SCNC: 4.3 MMOL/L (ref 3.6–5.5)
PROT SERPL-MCNC: 6.7 G/DL (ref 6–8.2)
RBC # BLD AUTO: 4.54 M/UL (ref 4.2–5.4)
SODIUM SERPL-SCNC: 137 MMOL/L (ref 135–145)
T4 FREE SERPL-MCNC: 0.72 NG/DL (ref 0.93–1.7)
TRIGL SERPL-MCNC: 75 MG/DL (ref 0–149)
TSH SERPL DL<=0.005 MIU/L-ACNC: 35.7 UIU/ML (ref 0.38–5.33)
VIT B12 SERPL-MCNC: 1117 PG/ML (ref 211–911)
WBC # BLD AUTO: 5 K/UL (ref 4.8–10.8)

## 2021-05-13 PROCEDURE — 84443 ASSAY THYROID STIM HORMONE: CPT

## 2021-05-13 PROCEDURE — 85025 COMPLETE CBC W/AUTO DIFF WBC: CPT

## 2021-05-13 PROCEDURE — 82607 VITAMIN B-12: CPT

## 2021-05-13 PROCEDURE — 82306 VITAMIN D 25 HYDROXY: CPT

## 2021-05-13 PROCEDURE — 36415 COLL VENOUS BLD VENIPUNCTURE: CPT

## 2021-05-13 PROCEDURE — 84439 ASSAY OF FREE THYROXINE: CPT

## 2021-05-13 PROCEDURE — 80053 COMPREHEN METABOLIC PANEL: CPT

## 2021-05-13 PROCEDURE — 80061 LIPID PANEL: CPT

## 2021-05-13 NOTE — TELEPHONE ENCOUNTER
----- Message from Criselda Portillo D.O. sent at 5/13/2021 10:56 AM PDT -----  Please notify patient that xray results of her hand do not show any foreign body from the barbed wire fence (she had an injury between the 2nd and 3rd fingers). There is evidence of arthritis.    Thanks, KT

## 2021-05-15 LAB — 25(OH)D3 SERPL-MCNC: 67 NG/ML (ref 30–80)

## 2021-05-17 ENCOUNTER — TELEPHONE (OUTPATIENT)
Dept: MEDICAL GROUP | Facility: PHYSICIAN GROUP | Age: 86
End: 2021-05-17

## 2021-05-17 DIAGNOSIS — E03.8 SUBCLINICAL HYPOTHYROIDISM: Primary | ICD-10-CM

## 2021-05-17 NOTE — TELEPHONE ENCOUNTER
Called pt, gave labs. Pt wants to order homeopathic supplement, then re-check labs in a few weeks. Per pt request mailed copy of labs.     Filiberto'd up labs below-

## 2021-05-18 ENCOUNTER — TELEPHONE (OUTPATIENT)
Dept: MEDICAL GROUP | Facility: PHYSICIAN GROUP | Age: 86
End: 2021-05-18

## 2021-05-18 NOTE — TELEPHONE ENCOUNTER
Called pt and advised thyroid labs were ordered. Told pt that she needs to take the homeopathic medication she is ordering on the Internet, for at least 6 weeks- then present to a [facility] lab for the repeat thyroid testing. Explained as long as she presents to a [facility] lab she does not need the lab paperwork- they will be able to see the orders in the computer.   Pt verbalized understanding of process. Encouraged pt to contact us if she has further questions or concerns.

## 2021-06-08 ENCOUNTER — OFFICE VISIT (OUTPATIENT)
Dept: MEDICAL GROUP | Facility: PHYSICIAN GROUP | Age: 86
End: 2021-06-08
Payer: MEDICARE

## 2021-06-08 VITALS
HEIGHT: 63 IN | OXYGEN SATURATION: 96 % | BODY MASS INDEX: 19.07 KG/M2 | SYSTOLIC BLOOD PRESSURE: 104 MMHG | TEMPERATURE: 98.2 F | DIASTOLIC BLOOD PRESSURE: 50 MMHG | WEIGHT: 107.6 LBS | RESPIRATION RATE: 16 BRPM | HEART RATE: 67 BPM

## 2021-06-08 DIAGNOSIS — E78.00 PURE HYPERCHOLESTEROLEMIA: ICD-10-CM

## 2021-06-08 DIAGNOSIS — L30.9 DERMATITIS: ICD-10-CM

## 2021-06-08 DIAGNOSIS — E03.4 HYPOTHYROIDISM DUE TO ACQUIRED ATROPHY OF THYROID: ICD-10-CM

## 2021-06-08 DIAGNOSIS — R41.3 MEMORY CHANGE: ICD-10-CM

## 2021-06-08 PROCEDURE — 99214 OFFICE O/P EST MOD 30 MIN: CPT | Performed by: INTERNAL MEDICINE

## 2021-06-08 RX ORDER — TRIAMCINOLONE ACETONIDE 1 MG/G
1 CREAM TOPICAL 2 TIMES DAILY
Qty: 28.4 G | Refills: 0 | Status: SHIPPED | OUTPATIENT
Start: 2021-06-08 | End: 2021-06-22

## 2021-06-08 ASSESSMENT — FIBROSIS 4 INDEX: FIB4 SCORE: 2.06

## 2021-06-08 NOTE — PROGRESS NOTES
Subjective:   Chief Complaint/History of Present Illness:  Ratna Cline is a 85 y.o. female established patient who presents today to discuss medical problems as listed below. Ratna is unaccompanied for today's visit.    Problem   Memory Change    He has subjective concerns about her memory.  She is independent and a very good historian regarding her medical care.  She walks with her son 3 days/week and says sometimes he will quit as her ask her questions but he has not made any comments to her about concern regarding the memory.  She will note sometimes walking into a room and forgetting what she wanted and therefore.  She does not have any concerns with completing ADLs or IADLs.     Dermatitis    She has about 1 week of dermatitis on her anterior neck.  She cannot think of any potential triggers.  No new creams or topicals.  No new environmental or chemical exposures.  She does walk with her son 3 times per week but wears a large sun hat.  It is predominantly itchy.  No symptoms on the posterior neck or elsewhere on her body.  No prior occurrence.  It has improved some with application of topical coconut cream.     Pure Hypercholesterolemia       Ref. Range 5/13/2021 08:16   Cholesterol,Tot Latest Ref Range: 100 - 199 mg/dL 205 (H)   Triglycerides Latest Ref Range: 0 - 149 mg/dL 75   HDL Latest Ref Range: >=40 mg/dL 59   LDL Latest Ref Range: <100 mg/dL 131 (H)     She has a history of elevated cholesterol.  She is using many over-the-counter supplements to treat her ankle problems.  She has coenzyme Q 10, cod liver oil, Omega 3 fatty acids, and niacin all listed as over-the-counter supplements.  No personal history of coronary artery disease or cerebrovascular disease.     Hypothyroidism Due to Acquired Atrophy of Thyroid       Ref. Range 12/23/2020 14:58 5/13/2021 08:16   TSH Latest Ref Range: 0.380 - 5.330 uIU/mL 7.250 (H) 35.700 (H)   Free T-4 Latest Ref Range: 0.93 - 1.70 ng/dL 1.16 0.72 (L)  "  T3,Free Latest Ref Range: 2.00 - 4.40 pg/mL 3.29      She has had subclinical hypothyroidism since at least 2012 in varying degrees.  TSH has been as high as 9.  She recalls previous treatment with levothyroxine and then with a desiccated thyroid.  Of note, she does take biotin intermittently which may alter her labs.          Current Medications:  Current Outpatient Medications Ordered in Epic   Medication Sig Dispense Refill   • triamcinolone acetonide (KENALOG) 0.1 % Cream Apply 1 Application topically 2 times a day for 14 days. To neck 28.4 g 0   • MISC NATURAL PRODUCTS PO Take  by mouth. Gallstonex     • MISC NATURAL PRODUCTS PO Take  by mouth. Ring Homeopathic Product & educational guide-helps fight the ringing in the ears     • NON SPECIFIED      • Omega-3 Fatty Acids (SALMON OIL PO) Take  by mouth.     • Cholecalciferol (VITAMIN D3 PO) Take  by mouth.     • POTASSIUM PO Take  by mouth.     • NON SPECIFIED Pure Thyroid     • NON SPECIFIED colloidal silver     • Multiple Vitamins-Minerals (MULTIVITAMIN ADULT PO) Take  by mouth.     • BIOTIN PO Take  by mouth.     • Calcium Carb-Cholecalciferol (CALCIUM 600 + D PO) Take  by mouth.     • cyanocobalamin (VITAMIN B-12) 100 MCG Tab Take 100 mcg by mouth every day.     • Turmeric, Curcuma Longa, (CURCUMIN) Powder by Does not apply route.     • coenzyme Q-10 30 MG capsule Take 60 mg by mouth every day.     • Ginkgo Biloba (GINKOBA PO) Take  by mouth.       No current Epic-ordered facility-administered medications on file.          Objective:   Physical Exam:    Vitals: /50 (BP Location: Left arm, Patient Position: Sitting, BP Cuff Size: Adult)   Pulse 67   Temp 36.8 °C (98.2 °F) (Temporal)   Resp 16   Ht 1.588 m (5' 2.5\")   Wt 48.8 kg (107 lb 9.6 oz)   SpO2 96%    BMI: Body mass index is 19.37 kg/m².  Physical Exam  Constitutional:       General: She is not in acute distress.     Appearance: She is normal weight. She is not ill-appearing.   HENT:      " Right Ear: There is no impacted cerumen.      Left Ear: There is no impacted cerumen.   Eyes:      Conjunctiva/sclera: Conjunctivae normal.      Pupils: Pupils are equal, round, and reactive to light.   Cardiovascular:      Rate and Rhythm: Normal rate and regular rhythm.      Pulses: Normal pulses.      Heart sounds: No murmur heard.     Pulmonary:      Effort: Pulmonary effort is normal. No respiratory distress.      Breath sounds: Normal breath sounds. No wheezing.   Musculoskeletal:      Right lower leg: No edema.      Left lower leg: No edema.   Skin:     General: Skin is warm.      Findings: Rash present.      Comments: Dermatitis over bilateral anterior neck.   Neurological:      Deep Tendon Reflexes: Reflexes normal.   Psychiatric:         Mood and Affect: Mood normal.         Behavior: Behavior normal.         Thought Content: Thought content normal.         Judgment: Judgment normal.          Assessment and Plan:   Ratna is a 85 y.o. female with the following:  Problem List Items Addressed This Visit     Hypothyroidism due to acquired atrophy of thyroid     Chronic and decompensated problem.  Her thyroid function came abnormal on most recent evaluation.  She admits she was not taking her natural supplement leading up to that blood draw.  She prefers natural supplements over prescriptions.  She is agreeable to updating the thyroid labs after being on the natural supplements for 4 to 6 weeks to ensure she is heading in the right direction.          Pure hypercholesterolemia     Chronic and ongoing problem.  Hesitant to adjust any medicines as she not have any personal history of cardiovascular or cerebrovascular disease.  She would like to continue with her homeopathic remedies including cod liver oil, omega-3 fatty acids, niacin, and coenzyme Q 10.  Could always add red yeast rice extract in the future should be interested.  Continued observation at this time.         Memory change     New problem, mild  severity.  Most consistent with normal aging.  If she would have any signs of mild cognitive impairment they could complete [place] exam to establish a baseline.  Asked her to keep me updated.  We will also work to normalize her thyroid as significant hypothyroidism can affect her mentation.         Dermatitis     New and decompensated problem.  We will have her try Kenalog topically twice daily for next 14 days.  Asked her to update me in a few days to make sure it is improving.  Stop taking right away if you have any side effects or worsening of the skin.  Do not use for more than 14 days as it can thin out the skin.         Relevant Medications    triamcinolone acetonide (KENALOG) 0.1 % Cream             RTC: No follow-ups on file.    I spent a total of 35 minutes with record review, exam, communication with the patient, communication with other providers, and documentation of this encounter.    PLEASE NOTE: This dictation was created using voice recognition software. I have made every reasonable attempt to correct obvious errors, but I expect that there are errors of grammar and possibly content that I did not discover before finalizing the note.      Criselda Portillo, DO  Geriatric and Internal Medicine  [facility]

## 2021-06-08 NOTE — ASSESSMENT & PLAN NOTE
Chronic and ongoing problem.  Hesitant to adjust any medicines as she not have any personal history of cardiovascular or cerebrovascular disease.  She would like to continue with her homeopathic remedies including cod liver oil, omega-3 fatty acids, niacin, and coenzyme Q 10.  Could always add red yeast rice extract in the future should be interested.  Continued observation at this time.

## 2021-06-08 NOTE — ASSESSMENT & PLAN NOTE
New problem, mild severity.  Most consistent with normal aging.  If she would have any signs of mild cognitive impairment they could complete [place] exam to establish a baseline.  Asked her to keep me updated.  We will also work to normalize her thyroid as significant hypothyroidism can affect her mentation.

## 2021-06-08 NOTE — ASSESSMENT & PLAN NOTE
New and decompensated problem.  We will have her try Kenalog topically twice daily for next 14 days.  Asked her to update me in a few days to make sure it is improving.  Stop taking right away if you have any side effects or worsening of the skin.  Do not use for more than 14 days as it can thin out the skin.

## 2021-06-08 NOTE — ASSESSMENT & PLAN NOTE
Chronic and decompensated problem.  Her thyroid function came abnormal on most recent evaluation.  She admits she was not taking her natural supplement leading up to that blood draw.  She prefers natural supplements over prescriptions.  She is agreeable to updating the thyroid labs after being on the natural supplements for 4 to 6 weeks to ensure she is heading in the right direction.

## 2021-06-29 ENCOUNTER — TELEPHONE (OUTPATIENT)
Dept: OBGYN | Facility: CLINIC | Age: 86
End: 2021-06-29

## 2021-06-29 ENCOUNTER — OFFICE VISIT (OUTPATIENT)
Dept: MEDICAL GROUP | Facility: PHYSICIAN GROUP | Age: 86
End: 2021-06-29
Payer: MEDICARE

## 2021-06-29 VITALS
HEART RATE: 74 BPM | HEIGHT: 63 IN | TEMPERATURE: 97 F | BODY MASS INDEX: 18.61 KG/M2 | WEIGHT: 105 LBS | SYSTOLIC BLOOD PRESSURE: 118 MMHG | RESPIRATION RATE: 18 BRPM | DIASTOLIC BLOOD PRESSURE: 60 MMHG

## 2021-06-29 DIAGNOSIS — N89.8 VAGINAL DISCHARGE: ICD-10-CM

## 2021-06-29 PROCEDURE — 99213 OFFICE O/P EST LOW 20 MIN: CPT | Performed by: INTERNAL MEDICINE

## 2021-06-29 RX ORDER — AMPICILLIN 500 MG/1
500 CAPSULE ORAL 4 TIMES DAILY
Qty: 28 CAPSULE | Refills: 0 | Status: SHIPPED
Start: 2021-06-29 | End: 2021-07-15

## 2021-06-29 ASSESSMENT — FIBROSIS 4 INDEX: FIB4 SCORE: 2.06

## 2021-06-29 NOTE — TELEPHONE ENCOUNTER
Kalli from [facility] called today patient was being seen over at their office. Patient was having some vaginal issues. Kalli wanted to know what medication was prescribed in the past from our office to patient.  Let her know she was given estrogens, conjugated and ampicillin. Ampicillin was used to treat e coli. Kalli had no further questions.

## 2021-06-30 NOTE — PROGRESS NOTES
CC: Vaginal discharge    HPI:  Ratna presents with the following:  Patient of Dr. Portillo    1. Vaginal discharge  Patient presents with a recurrence of whitish/yellowish vaginal discharge present for 1 week.  Patient denies any pelvic discomfort/pain, no bloody discharge, no odor, no fevers or chills.  Patient denies any urinary symptoms.  Patient had similar symptoms last May, presented to urgent care.  Was given Omnicef for UTI and miconazole vaginal cream for vaginal discharge.  Symptoms did not improve.  Multiple cultures for Candida, Gardnerella, trichomonas etc. has been negative.  Patient was eventually is referred to GYN, urine culture was positive for E. coli with associated symptoms of vaginal discharge.  Patient was given ampicillin for 7 to 10 days and symptoms completely resolved until last week.  Patient requesting a refill on ampicillin.       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 28 capsule 0   • MISC NATURAL PRODUCTS PO Take  by mouth. Gallstonex     • MISC NATURAL PRODUCTS PO Take  by mouth. Ring Homeopathic Product & educational guide-helps fight the ringing in the ears     • NON SPECIFIED      • Omega-3 Fatty Acids (SALMON OIL PO) Take  by mouth.     • Cholecalciferol (VITAMIN D3 PO) Take  by mouth.     • POTASSIUM PO Take  by mouth.     • NON SPECIFIED Pure Thyroid     • NON SPECIFIED colloidal silver     • Multiple  Vitamins-Minerals (MULTIVITAMIN ADULT PO) Take  by mouth.     • BIOTIN PO Take  by mouth.     • Calcium Carb-Cholecalciferol (CALCIUM 600 + D PO) Take  by mouth.     • cyanocobalamin (VITAMIN B-12) 100 MCG Tab Take 100 mcg by mouth every day.     • Turmeric, Curcuma Longa, (CURCUMIN) Powder by Does not apply route.     • coenzyme Q-10 30 MG capsule Take 60 mg by mouth every day.     • Ginkgo Biloba (GINKOBA PO) Take  by mouth.       No current facility-administered medications for this visit.         Allergies as of 06/29/2021 - Reviewed 06/29/2021   Allergen Reaction Noted   • Codeine Vomiting 12/29/2013   • Nexium Unspecified 04/24/2020   • Soy allergy  06/03/2019   • Sulfa drugs Nausea 12/29/2013        Social History     Socioeconomic History   • Marital status:      Spouse name: Not on file   • Number of children: 1   • Years of education: Not on file   • Highest education level: Not on file   Occupational History   • Not on file   Tobacco Use   • Smoking status: Never Smoker   • Smokeless tobacco: Never Used   Vaping Use   • Vaping Use: Never used   Substance and Sexual Activity   • Alcohol use: No     Alcohol/week: 0.0 oz   • Drug use: No   • Sexual activity: Not Currently     Partners: Male   Other Topics Concern   • Not on file   Social History Narrative    She is a retired , 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  He was  for over 60 years, her partner passed away around [id number].  She has a son who she goes hiking with in town.  She lives alone and is independent in all ADLs at this time.  She continues to drive.  She is not using a gait aid.     Social Determinants of Health     Financial Resource Strain:    • Difficulty of Paying Living Expenses:    Food Insecurity:    • Worried About Running Out of Food in the Last Year:    • Ran Out of Food in the Last Year:    Transportation Needs:    • Lack of Transportation (Medical):    • Lack of Transportation  "(Non-Medical):    Physical Activity:    • Days of Exercise per Week:    • Minutes of Exercise per Session:    Stress:    • Feeling of Stress :    Social Connections:    • Frequency of Communication with Friends and Family:    • Frequency of Social Gatherings with Friends and Family:    • Attends [employer] Services:    • Active Member of Clubs or Organizations:    • Attends Club or Organization Meetings:    • Marital Status:    Intimate Partner Violence:    • Fear of Current or Ex-Partner:    • Emotionally Abused:    • Physically Abused:    • Sexually Abused:        Family History   Problem Relation Age of Onset   • Cancer Mother         leukemia   • Genetic Disorder Mother         osteochondroma   • Stroke Neg Hx    • Hyperlipidemia Neg Hx    • Hypertension Neg Hx    • Heart Disease Neg Hx    • Diabetes Neg Hx    • Lung Disease Neg Hx        Past Surgical History:   Procedure Laterality Date   • HYSTERECTOMY RADICAL     • OTHER      jaw surgery to correct overbite   • TONSILLECTOMY         ROS:  Denies any Headache,Chest pain,  Shortness of breath,  Abdominal pain, Changes of bowel or bladder, Lower ext edema, Fevers, Nights sweats, Weight Changes, Focal weakness or numbness.  All other systems are negative.    /60 (BP Location: Right arm, Patient Position: Sitting, BP Cuff Size: Adult)   Pulse 74   Temp 36.1 °C (97 °F) (Temporal)   Resp 18   Ht 1.588 m (5' 2.5\")   Wt 47.6 kg (105 lb)   LMP  (LMP Unknown)   BMI 18.90 kg/m²      Constitutional: Alert, no distress, well-groomed.  Skin: Warm, dry, good turgor, no rashes in visible areas.  Eye: Equal, round and reactive, conjunctiva clear, lids normal.  ENMT: Lips without lesions, good dentition, moist mucous membranes.  Neck: Trachea midline, no masses, no thyromegaly.  Respiratory: Unlabored respiratory effort, no cough.  Abdomen: Soft, no gross masses. No CVA tenderness.  No suprapubic tenderness.  MSK: Normal gait, moves all extremities.  Neuro: Grossly " non-focal. No cranial nerve deficit. Strength and sensation intact.   Psych: Alert and oriented x3, normal affect and mood.    Assessment and Plan.   85 y.o. female presenting with the following.     1. Vaginal discharge  We will send a prescription for ampicillin to her pharmacy.  Patient to follow-up with her PCP/UC if symptoms do not resolve.

## 2021-07-15 ENCOUNTER — OFFICE VISIT (OUTPATIENT)
Dept: MEDICAL GROUP | Facility: PHYSICIAN GROUP | Age: 86
End: 2021-07-15
Payer: MEDICARE

## 2021-07-15 ENCOUNTER — HOSPITAL ENCOUNTER (OUTPATIENT)
Dept: LAB | Facility: MEDICAL CENTER | Age: 86
End: 2021-07-15
Attending: INTERNAL MEDICINE
Payer: MEDICARE

## 2021-07-15 ENCOUNTER — HOSPITAL ENCOUNTER (OUTPATIENT)
Facility: MEDICAL CENTER | Age: 86
End: 2021-07-15
Attending: INTERNAL MEDICINE
Payer: MEDICARE

## 2021-07-15 VITALS
BODY MASS INDEX: 18.64 KG/M2 | DIASTOLIC BLOOD PRESSURE: 60 MMHG | OXYGEN SATURATION: 97 % | SYSTOLIC BLOOD PRESSURE: 122 MMHG | HEIGHT: 63 IN | WEIGHT: 105.2 LBS | TEMPERATURE: 98 F | HEART RATE: 62 BPM

## 2021-07-15 DIAGNOSIS — N94.9 VAGINAL DISCOMFORT: ICD-10-CM

## 2021-07-15 DIAGNOSIS — E03.8 SUBCLINICAL HYPOTHYROIDISM: ICD-10-CM

## 2021-07-15 DIAGNOSIS — E03.4 HYPOTHYROIDISM DUE TO ACQUIRED ATROPHY OF THYROID: ICD-10-CM

## 2021-07-15 LAB
APPEARANCE UR: CLEAR
BILIRUB UR QL STRIP.AUTO: NEGATIVE
COLOR UR: YELLOW
GLUCOSE UR STRIP.AUTO-MCNC: NEGATIVE MG/DL
KETONES UR STRIP.AUTO-MCNC: NEGATIVE MG/DL
LEUKOCYTE ESTERASE UR QL STRIP.AUTO: NEGATIVE
MICRO URNS: NORMAL
NITRITE UR QL STRIP.AUTO: NEGATIVE
PH UR STRIP.AUTO: 7.5 [PH] (ref 5–8)
PROT UR QL STRIP: NEGATIVE MG/DL
RBC UR QL AUTO: NEGATIVE
SP GR UR STRIP.AUTO: 1.01
T4 FREE SERPL-MCNC: 1.23 NG/DL (ref 0.93–1.7)
TSH SERPL DL<=0.005 MIU/L-ACNC: 6.46 UIU/ML (ref 0.38–5.33)
UROBILINOGEN UR STRIP.AUTO-MCNC: 0.2 MG/DL

## 2021-07-15 PROCEDURE — 84439 ASSAY OF FREE THYROXINE: CPT

## 2021-07-15 PROCEDURE — 84443 ASSAY THYROID STIM HORMONE: CPT

## 2021-07-15 PROCEDURE — 81003 URINALYSIS AUTO W/O SCOPE: CPT

## 2021-07-15 PROCEDURE — 99214 OFFICE O/P EST MOD 30 MIN: CPT | Performed by: INTERNAL MEDICINE

## 2021-07-15 PROCEDURE — 36415 COLL VENOUS BLD VENIPUNCTURE: CPT

## 2021-07-15 ASSESSMENT — FIBROSIS 4 INDEX: FIB4 SCORE: 2.06

## 2021-07-15 NOTE — ASSESSMENT & PLAN NOTE
Chronic and decompensated problem.  She wanted to continue with her natural supplement before rechecking her thyroid labs.  If her thyroid continues to be significantly undertreated then have highly encouraged her to consider going back on levothyroxine which we can adjust the dose to see if that helps with her skin problem.

## 2021-07-15 NOTE — PROGRESS NOTES
Subjective:   Chief Complaint/History of Present Illness:  Ratna Cline is a 85 y.o. female established patient who presents today to discuss medical problems as listed below. Ratna is unaccompanied for today's visit.    Problem   Vaginal Discomfort    She reports issue with vaginal discharge and discomfort which occurred in August 2020, found to have vaginal culture positive for E. coli.  She is treated with a 5-day course of ampicillin 500 mg twice daily which cleared up the issue.  She followed up with gynecology in the next month who then also recommended topical estrogen.  She also has issues with undertreated thyroid as she preferred to go off of levothyroxine use natural supplements, this could certainly be contributing as well.  No systemic signs or symptoms of infection.  No bloody discharge.  She saw a colleague of mine about 2 weeks ago who reinitiated treatment with ampicillin 500 mg 4 times daily for 7 days which was helpful but did not seem to alleviate the discharge completely.     Hypothyroidism Due to Acquired Atrophy of Thyroid       Ref. Range 12/23/2020 14:58 5/13/2021 08:16   TSH Latest Ref Range: 0.380 - 5.330 uIU/mL 7.250 (H) 35.700 (H)   Free T-4 Latest Ref Range: 0.93 - 1.70 ng/dL 1.16 0.72 (L)   T3,Free Latest Ref Range: 2.00 - 4.40 pg/mL 3.29      She has had subclinical hypothyroidism since at least 2012 in varying degrees.  TSH has been as high as 9.  She recalls previous treatment with levothyroxine and then with a desiccated thyroid.  Of note, she does take biotin intermittently which may alter her labs.          Current Medications:  Current Outpatient Medications Ordered in Epic   Medication Sig Dispense Refill   • MISC NATURAL PRODUCTS PO Take  by mouth. Gallstonex     • MISC NATURAL PRODUCTS PO Take  by mouth. Ring Homeopathic Product & educational guide-helps fight the ringing in the ears     • NON SPECIFIED      • Omega-3 Fatty Acids (SALMON OIL PO) Take  by mouth.    "  • Cholecalciferol (VITAMIN D3 PO) Take  by mouth.     • POTASSIUM PO Take  by mouth.     • NON SPECIFIED Pure Thyroid     • NON SPECIFIED colloidal silver     • Multiple Vitamins-Minerals (MULTIVITAMIN ADULT PO) Take  by mouth.     • BIOTIN PO Take  by mouth.     • Calcium Carb-Cholecalciferol (CALCIUM 600 + D PO) Take  by mouth.     • cyanocobalamin (VITAMIN B-12) 100 MCG Tab Take 100 mcg by mouth every day.     • Turmeric, Curcuma Longa, (CURCUMIN) Powder by Does not apply route.     • coenzyme Q-10 30 MG capsule Take 60 mg by mouth every day.     • Ginkgo Biloba (GINKOBA PO) Take  by mouth.       No current Epic-ordered facility-administered medications on file.          Objective:   Physical Exam:    Vitals: /60 (BP Location: Left arm, Patient Position: Sitting, BP Cuff Size: Adult)   Pulse 62   Temp 36.7 °C (98 °F) (Temporal)   Ht 1.588 m (5' 2.5\")   Wt 47.7 kg (105 lb 3.2 oz)   SpO2 97%    BMI: Body mass index is 18.93 kg/m².  Physical Exam  Constitutional:       General: She is not in acute distress.     Appearance: She is not ill-appearing.   Pulmonary:      Effort: Pulmonary effort is normal. No respiratory distress.   Skin:     General: Skin is warm and dry.      Findings: No rash.   Psychiatric:         Mood and Affect: Mood normal.         Behavior: Behavior normal.         Thought Content: Thought content normal.         Judgment: Judgment normal.            Assessment and Plan:   Ratna is a 85 y.o. female with the following:  Problem List Items Addressed This Visit     Hypothyroidism due to acquired atrophy of thyroid     Chronic and decompensated problem.  She wanted to continue with her natural supplement before rechecking her thyroid labs.  If her thyroid continues to be significantly undertreated then have highly encouraged her to consider going back on levothyroxine which we can adjust the dose to see if that helps with her skin problem.         Vaginal discomfort     Previous " problem, decompensated.  She reports some associated urinary incontinence.  We will start with urinalysis with reflex to culture if indicated to make sure that her symptoms not related to urinary tract infection.  Also encouraged her to update her thyroid labs to see if that is improved as I suspect her significant hypothyroidism would also contribute to discomfort with her vaginal tissue.  Will make an appointment for her to return on Monday if needed to perform a repeat vaginal exam with culture and review the thyroid labs.  She was agreeable with this game plan.         Relevant Orders    URINALYSIS,CULTURE IF INDICATED             RTC: Return in about 4 days (around 7/19/2021).    I spent a total of 30 minutes with record review, exam, communication with the patient, communication with other providers, and documentation of this encounter.    PLEASE NOTE: This dictation was created using voice recognition software. I have made every reasonable attempt to correct obvious errors, but I expect that there are errors of grammar and possibly content that I did not discover before finalizing the note.      Criselda Portillo, DO  Geriatric and Internal Medicine  [facility]

## 2021-07-15 NOTE — ASSESSMENT & PLAN NOTE
Previous problem, decompensated.  She reports some associated urinary incontinence.  We will start with urinalysis with reflex to culture if indicated to make sure that her symptoms not related to urinary tract infection.  Also encouraged her to update her thyroid labs to see if that is improved as I suspect her significant hypothyroidism would also contribute to discomfort with her vaginal tissue.  Will make an appointment for her to return on Monday if needed to perform a repeat vaginal exam with culture and review the thyroid labs.  She was agreeable with this game plan.

## 2021-07-19 ENCOUNTER — OFFICE VISIT (OUTPATIENT)
Dept: MEDICAL GROUP | Facility: PHYSICIAN GROUP | Age: 86
End: 2021-07-19
Payer: MEDICARE

## 2021-07-19 VITALS
WEIGHT: 105.5 LBS | BODY MASS INDEX: 18.69 KG/M2 | OXYGEN SATURATION: 94 % | RESPIRATION RATE: 12 BRPM | SYSTOLIC BLOOD PRESSURE: 112 MMHG | HEART RATE: 72 BPM | TEMPERATURE: 98.1 F | DIASTOLIC BLOOD PRESSURE: 60 MMHG | HEIGHT: 63 IN

## 2021-07-19 DIAGNOSIS — E03.4 HYPOTHYROIDISM DUE TO ACQUIRED ATROPHY OF THYROID: ICD-10-CM

## 2021-07-19 DIAGNOSIS — N94.9 VAGINAL DISCOMFORT: ICD-10-CM

## 2021-07-19 PROCEDURE — 99214 OFFICE O/P EST MOD 30 MIN: CPT | Performed by: INTERNAL MEDICINE

## 2021-07-19 ASSESSMENT — FIBROSIS 4 INDEX: FIB4 SCORE: 2.06

## 2021-07-19 NOTE — ASSESSMENT & PLAN NOTE
Chronic and ongoing problem. Her TSH and free T4 have improved with addition of a new natural thyroid product, I'm not exactly sure what she is taking but her numbers have improved. She is previously on levothyroxine but did not like how it made her feel and would like to avoid taking this if possible moving forward. Previous dose was 25 mcg daily. We'll plan to recheck her thyroid levels at her next follow-up in 3 months to ensure ongoing improvement and stability.

## 2021-07-19 NOTE — PROGRESS NOTES
Subjective:   Chief Complaint/History of Present Illness:  Ratna Cline is a 85 y.o. female established patient who presents today to discuss medical problems as listed below. Ratna is unaccompanied for today's visit.    Problem   Vaginal Discomfort    She reports issue with vaginal discharge and discomfort which occurred in August 2020, found to have vaginal culture positive for E. coli.  She is treated with a 5-day course of ampicillin 500 mg twice daily which cleared up the issue.  She followed up with gynecology in the next month who then also recommended topical estrogen.  She also has issues with undertreated thyroid as she preferred to go off of levothyroxine use natural supplements, this could certainly be contributing as well.  No systemic signs or symptoms of infection.  No bloody discharge.  She saw a colleague of mine about 2 weeks ago who reinitiated treatment with ampicillin 500 mg 4 times daily for 7 days which was helpful but did not seem to alleviate the discharge completely.    When I saw her last week we checked a urinalysis which was normal. In clinic today on 7/19/2021 performed a pelvic examination. Had to use smaller grade speculum due to patient discomfort. She has a narrow introitus but no obvious stenosis. The cervix is atrophied, no lesions I was able to visualize however patient did not tolerate procedure for a long period she had some erythema and atrophy on the vaginal wall as well. No ki bleeding or discharge visualized.     Hypothyroidism Due to Acquired Atrophy of Thyroid       Ref. Range 5/13/2021 08:16 7/15/2021 13:17   TSH Latest Ref Range: 0.380 - 5.330 uIU/mL 35.700 (H) 6.460 (H)   Free T-4 Latest Ref Range: 0.93 - 1.70 ng/dL 0.72 (L) 1.23     She has had subclinical hypothyroidism since at least 2012 in varying degrees. She recalls previous treatment with levothyroxine and then with a desiccated thyroid.  Of note, she does take biotin intermittently which may alter  "her labs.    Current regimen: natural thyroid supplement          Current Medications:  Current Outpatient Medications Ordered in Epic   Medication Sig Dispense Refill   • MISC NATURAL PRODUCTS PO Take  by mouth. Gallstonex     • MISC NATURAL PRODUCTS PO Take  by mouth. Ring Homeopathic Product & educational guide-helps fight the ringing in the ears     • NON SPECIFIED      • Omega-3 Fatty Acids (SALMON OIL PO) Take  by mouth.     • Cholecalciferol (VITAMIN D3 PO) Take  by mouth.     • POTASSIUM PO Take  by mouth.     • NON SPECIFIED Pure Thyroid     • NON SPECIFIED colloidal silver     • Multiple Vitamins-Minerals (MULTIVITAMIN ADULT PO) Take  by mouth.     • BIOTIN PO Take  by mouth.     • Calcium Carb-Cholecalciferol (CALCIUM 600 + D PO) Take  by mouth.     • cyanocobalamin (VITAMIN B-12) 100 MCG Tab Take 100 mcg by mouth every day.     • Turmeric, Curcuma Longa, (CURCUMIN) Powder by Does not apply route.     • coenzyme Q-10 30 MG capsule Take 60 mg by mouth every day.     • Ginkgo Biloba (GINKOBA PO) Take  by mouth.       No current Epic-ordered facility-administered medications on file.          Objective:   Physical Exam:    Vitals: /60 (BP Location: Right arm, Patient Position: Sitting, BP Cuff Size: Adult)   Pulse 72   Temp 36.7 °C (98.1 °F) (Temporal)   Resp 12   Ht 1.588 m (5' 2.5\")   Wt 47.9 kg (105 lb 8 oz)   SpO2 94%    BMI: Body mass index is 18.99 kg/m².  Physical Exam  Constitutional:       General: She is not in acute distress.     Appearance: Normal appearance. She is not ill-appearing.   Eyes:      Extraocular Movements: Extraocular movements intact.      Conjunctiva/sclera: Conjunctivae normal.   Pulmonary:      Effort: Pulmonary effort is normal. No respiratory distress.   Genitourinary:     General: Normal vulva.      Pubic Area: No rash.       Labia:         Right: No rash or lesion.         Left: Tenderness present. No rash or lesion.       Comments: Tenderness on speculum exam, " cervix appears slightly atrophic/small, no visible lesions. Friability of lateral vaginal walls.  Skin:     General: Skin is warm and dry.      Findings: No rash.   Psychiatric:         Mood and Affect: Mood normal.         Behavior: Behavior normal.         Thought Content: Thought content normal.         Judgment: Judgment normal.               Assessment and Plan:   Ratna is a 85 y.o. female with the following:  Problem List Items Addressed This Visit     Hypothyroidism due to acquired atrophy of thyroid     Chronic and ongoing problem. Her TSH and free T4 have improved with addition of a new natural thyroid product, I'm not exactly sure what she is taking but her numbers have improved. She is previously on levothyroxine but did not like how it made her feel and would like to avoid taking this if possible moving forward. Previous dose was 25 mcg daily. We'll plan to recheck her thyroid levels at her next follow-up in 3 months to ensure ongoing improvement and stability.         Vaginal discomfort     Chronic and decompensated problem. She has had this issue in the past, underwent treatment with antibiotics in August 2021 and also recommended to go on topical estrogen cream. Then had recurrence of discharge and discomfort symptoms in June 2021 and saw a colleague of mine and was given a longer course of antibiotics. 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 We'll also send a vaginal culture to make sure there are no identifiable pathogens. Discussed with her that due to her recent antibiotic treatment its possible something was partially treated and we may not  on a positive culture. Will discuss with her later this week or early next week to make sure her symptoms are improving and if not may need to refer her back to gynecology who she saw twice last fall.         Relevant Orders    VAGINAL PATHOGENS DNA PANEL            RTC: Return in about 3 months (around 10/19/2021).    I spent a total of 32 minutes with record review, exam, communication with the patient, communication with other providers, and documentation of this encounter.    PLEASE NOTE: This dictation was created using voice recognition software. I have made every reasonable attempt to correct obvious errors, but I expect that there are errors of grammar and possibly content that I did not discover before finalizing the note.      Criselda Portillo, DO  Geriatric and Internal Medicine  [facility]

## 2021-07-19 NOTE — ASSESSMENT & PLAN NOTE
Chronic and decompensated problem. She has had this issue in the past, underwent treatment with antibiotics in August 2021 and also recommended to go on topical estrogen cream. Then had recurrence of discharge and discomfort symptoms in June 2021 and saw a colleague of mine and was given a longer course of antibiotics. 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 We'll also send a vaginal culture to make sure there are no identifiable pathogens. Discussed with her that due to her recent antibiotic treatment its possible something was partially treated and we may not  on a positive culture. Will discuss with her later this week or early next week to make sure her symptoms are improving and if not may need to refer her back to gynecology who she saw twice last fall.

## 2021-07-20 ENCOUNTER — HOSPITAL ENCOUNTER (OUTPATIENT)
Facility: MEDICAL CENTER | Age: 86
End: 2021-07-20
Attending: INTERNAL MEDICINE
Payer: MEDICARE

## 2021-07-20 DIAGNOSIS — N94.9 VAGINAL DISCOMFORT: ICD-10-CM

## 2021-07-22 ENCOUNTER — TELEPHONE (OUTPATIENT)
Dept: MEDICAL GROUP | Facility: PHYSICIAN GROUP | Age: 86
End: 2021-07-22

## 2021-07-22 NOTE — TELEPHONE ENCOUNTER
Call patient and let her know. Can set up a recollect in clinic or give it a few weeks to see if the topical estrogen cream helps. There was no obvious discharge at the last clinic evaluation.

## 2021-07-22 NOTE — TELEPHONE ENCOUNTER
1. Caller Name: Ratna Tawny Thompson                          Call Back Number: [phone] (home) [phone] (work)        How would the patient prefer to be contacted with a response: Phone call OK to leave a detailed message    Asking about test results from Monday

## 2021-07-22 NOTE — TELEPHONE ENCOUNTER
1. Caller Name: Ratna Tawny Thompson                          Call Back Number: [phone] (home) [phone] (work)        How would the patient prefer to be contacted with a response: Phone call OK to leave a detailed message      Refer to gynecologist who she saw before

## 2021-08-06 ENCOUNTER — PATIENT OUTREACH (OUTPATIENT)
Dept: HEALTH INFORMATION MANAGEMENT | Facility: OTHER | Age: 86
End: 2021-08-06

## 2021-08-06 ENCOUNTER — TELEPHONE (OUTPATIENT)
Dept: HEALTH INFORMATION MANAGEMENT | Facility: OTHER | Age: 86
End: 2021-08-06

## 2021-08-06 NOTE — NON-PROVIDER
Member called in for a referral to Aquatic Physical Therapy [facility]. Verified HIPAA. I sent in the request to her provider and gave all needed information. Used Epic and [employer].

## 2021-08-06 NOTE — TELEPHONE ENCOUNTER
Jazzmine Portillo,     This patient called in requesting a referral to Harmony Rubio at [facility] for her neck and shoulder pain she is having. Please advise. Thank you.     David

## 2021-08-08 DIAGNOSIS — M25.512 CHRONIC PAIN OF BOTH SHOULDERS: ICD-10-CM

## 2021-08-08 DIAGNOSIS — G89.29 CHRONIC PAIN OF BOTH SHOULDERS: ICD-10-CM

## 2021-08-08 DIAGNOSIS — M25.511 CHRONIC PAIN OF BOTH SHOULDERS: ICD-10-CM

## 2021-08-08 DIAGNOSIS — M54.2 NECK PAIN: ICD-10-CM

## 2021-08-10 ENCOUNTER — OFFICE VISIT (OUTPATIENT)
Dept: MEDICAL GROUP | Facility: PHYSICIAN GROUP | Age: 86
End: 2021-08-10
Payer: MEDICARE

## 2021-08-10 ENCOUNTER — PATIENT OUTREACH (OUTPATIENT)
Dept: HEALTH INFORMATION MANAGEMENT | Facility: OTHER | Age: 86
End: 2021-08-10

## 2021-08-10 VITALS
OXYGEN SATURATION: 98 % | HEART RATE: 62 BPM | BODY MASS INDEX: 19.69 KG/M2 | SYSTOLIC BLOOD PRESSURE: 90 MMHG | HEIGHT: 62 IN | DIASTOLIC BLOOD PRESSURE: 46 MMHG | WEIGHT: 107 LBS | TEMPERATURE: 99.2 F | RESPIRATION RATE: 15 BRPM

## 2021-08-10 DIAGNOSIS — I83.811 VARICOSE VEINS OF RIGHT LOWER EXTREMITY WITH PAIN: ICD-10-CM

## 2021-08-10 PROCEDURE — 99213 OFFICE O/P EST LOW 20 MIN: CPT | Performed by: INTERNAL MEDICINE

## 2021-08-10 ASSESSMENT — FIBROSIS 4 INDEX: FIB4 SCORE: 2.06

## 2021-08-10 NOTE — ASSESSMENT & PLAN NOTE
New problem, mild severity.  Advised her to  medium grade compression stockings to wear in the right lower extremity.  Be mindful of sodium intake.  She is pending initiation of aquatic therapy for bilateral shoulder pain and neck pain and I suspect this will also help with the lower extremity.  She will continue to observe and let me know if it develops into significant pain or any other concerns.  She will return to clinic in approximately 2 months.

## 2021-08-10 NOTE — PROGRESS NOTES
Subjective:   Chief Complaint/History of Present Illness:  Ratna Cline is a 85 y.o. female established patient who presents today to discuss medical problems as listed below. Ratna is unaccompanied for today's visit.    Problem   Varicose Veins of Right Lower Extremity With Pain    She reports longstanding history of enlarged vein on the right lower extremity.  She recalls a vein procedure when she was around 21 years old, unclear exactly what they did.  She also has longstanding history of osteochondroma with multiple soft tissue masses removed over the years.  More recently she has developed pain and bulging of a vein in the right lower extremity on the posteromedial aspect.  The pain is not constant but has become more of a nuisance.  She would like to avoid any procedures moving forward.  She wonders if electrical stimulation therapy or compression therapy would be helpful in this situation.          Current Medications:  Current Outpatient Medications Ordered in Epic   Medication Sig Dispense Refill   • MISC NATURAL PRODUCTS PO Take  by mouth. Gallstonex     • MISC NATURAL PRODUCTS PO Take  by mouth. Ring Homeopathic Product & educational guide-helps fight the ringing in the ears     • NON SPECIFIED      • Omega-3 Fatty Acids (SALMON OIL PO) Take  by mouth.     • Cholecalciferol (VITAMIN D3 PO) Take  by mouth.     • POTASSIUM PO Take  by mouth.     • NON SPECIFIED Pure Thyroid     • Multiple Vitamins-Minerals (MULTIVITAMIN ADULT PO) Take  by mouth.     • BIOTIN PO Take  by mouth.     • Calcium Carb-Cholecalciferol (CALCIUM 600 + D PO) Take  by mouth.     • cyanocobalamin (VITAMIN B-12) 100 MCG Tab Take 100 mcg by mouth every day.     • Turmeric, Curcuma Longa, (CURCUMIN) Powder by Does not apply route.     • Ginkgo Biloba (GINKOBA PO) Take  by mouth.       No current Epic-ordered facility-administered medications on file.          Objective:   Physical Exam:    Vitals: BP (!) 90/46 (BP Location:  "Right arm, Patient Position: Sitting, BP Cuff Size: Adult)   Pulse 62   Temp 37.3 °C (99.2 °F) (Temporal)   Resp 15   Ht 1.575 m (5' 2\")   Wt 48.5 kg (107 lb)   SpO2 98%    BMI: Body mass index is 19.57 kg/m².  Physical Exam  Constitutional:       General: She is not in acute distress.     Appearance: Normal appearance. She is not ill-appearing or toxic-appearing.   Eyes:      Extraocular Movements: Extraocular movements intact.      Conjunctiva/sclera: Conjunctivae normal.   Pulmonary:      Effort: Pulmonary effort is normal. No respiratory distress.   Skin:     General: Skin is warm and dry.      Findings: No bruising or rash.      Comments: Mild varicose vein bulge of right lower extremity on posteromedial calf. Round skin lesion on posteromedial calf, slightly raised, beige in color, smooth borders, no asymmetry, unchanged in size per patient.   Neurological:      Gait: Gait normal.   Psychiatric:         Mood and Affect: Mood normal.         Behavior: Behavior normal.         Thought Content: Thought content normal.         Judgment: Judgment normal.          Assessment and Plan:   Ratna is a 85 y.o. female with the following:  Problem List Items Addressed This Visit     Varicose veins of right lower extremity with pain     New problem, mild severity.  Advised her to  medium grade compression stockings to wear in the right lower extremity.  Be mindful of sodium intake.  She is pending initiation of aquatic therapy for bilateral shoulder pain and neck pain and I suspect this will also help with the lower extremity.  She will continue to observe and let me know if it develops into significant pain or any other concerns.  She will return to clinic in approximately 2 months.                RTC: Return if symptoms worsen or fail to improve.    I spent a total of 25 minutes with record review, exam, communication with the patient, communication with other providers, and documentation of this " encounter.    PLEASE NOTE: This dictation was created using voice recognition software. I have made every reasonable attempt to correct obvious errors, but I expect that there are errors of grammar and possibly content that I did not discover before finalizing the note.      Criselda Portillo, DO  Geriatric and Internal Medicine  [facility]

## 2021-08-13 PROBLEM — N89.8 VAGINAL DISCHARGE: Status: ACTIVE | Noted: 2021-08-13

## 2021-08-13 PROBLEM — F39 MOOD DISORDER (HCC): Status: ACTIVE | Noted: 2021-08-13

## 2021-08-13 PROBLEM — F32.A MILD DEPRESSION: Status: ACTIVE | Noted: 2021-08-13

## 2021-08-13 PROBLEM — F39 MOOD DISORDER (HCC): Status: RESOLVED | Noted: 2021-08-13 | Resolved: 2021-08-13

## 2021-08-13 PROBLEM — I77.9 DISORDER OF ARTERIES AND ARTERIOLES (HCC): Status: ACTIVE | Noted: 2021-08-13

## 2021-08-16 ENCOUNTER — GYNECOLOGY VISIT (OUTPATIENT)
Dept: OBGYN | Facility: CLINIC | Age: 86
End: 2021-08-16
Payer: MEDICARE

## 2021-08-16 ENCOUNTER — HOSPITAL ENCOUNTER (OUTPATIENT)
Facility: MEDICAL CENTER | Age: 86
End: 2021-08-16
Attending: OBSTETRICS & GYNECOLOGY
Payer: MEDICARE

## 2021-08-16 VITALS — WEIGHT: 104 LBS | BODY MASS INDEX: 18.9 KG/M2 | SYSTOLIC BLOOD PRESSURE: 119 MMHG | DIASTOLIC BLOOD PRESSURE: 62 MMHG

## 2021-08-16 DIAGNOSIS — N89.8 VAGINAL DISCHARGE: ICD-10-CM

## 2021-08-16 LAB
AMBIGUOUS DTTM AMBI4: NORMAL
APPEARANCE UR: CLEAR
BILIRUB UR STRIP-MCNC: NORMAL MG/DL
CANDIDA DNA VAG QL PROBE+SIG AMP: NEGATIVE
COLOR UR AUTO: YELLOW
G VAGINALIS DNA VAG QL PROBE+SIG AMP: NEGATIVE
GLUCOSE UR STRIP.AUTO-MCNC: NEGATIVE MG/DL
KETONES UR STRIP.AUTO-MCNC: NEGATIVE MG/DL
LEUKOCYTE ESTERASE UR QL STRIP.AUTO: NEGATIVE
NITRITE UR QL STRIP.AUTO: NEGATIVE
PH UR STRIP.AUTO: 7 [PH] (ref 5–8)
PROT UR QL STRIP: NEGATIVE MG/DL
RBC UR QL AUTO: NEGATIVE
SIGNIFICANT IND 70042: NORMAL
SITE SITE: NORMAL
SOURCE SOURCE: NORMAL
SP GR UR STRIP.AUTO: 1.01
T VAGINALIS DNA VAG QL PROBE+SIG AMP: NEGATIVE
UROBILINOGEN UR STRIP-MCNC: NORMAL MG/DL

## 2021-08-16 PROCEDURE — 87480 CANDIDA DNA DIR PROBE: CPT

## 2021-08-16 PROCEDURE — 99213 OFFICE O/P EST LOW 20 MIN: CPT | Performed by: OBSTETRICS & GYNECOLOGY

## 2021-08-16 PROCEDURE — 81002 URINALYSIS NONAUTO W/O SCOPE: CPT | Performed by: OBSTETRICS & GYNECOLOGY

## 2021-08-16 PROCEDURE — 87510 GARDNER VAG DNA DIR PROBE: CPT

## 2021-08-16 PROCEDURE — 87205 SMEAR GRAM STAIN: CPT

## 2021-08-16 PROCEDURE — 87660 TRICHOMONAS VAGIN DIR PROBE: CPT

## 2021-08-16 PROCEDURE — 87070 CULTURE OTHR SPECIMN AEROBIC: CPT

## 2021-08-16 ASSESSMENT — FIBROSIS 4 INDEX: FIB4 SCORE: 2.06

## 2021-08-16 NOTE — NON-PROVIDER
Patient here for GYN exam.  C/o Vaginal Discharge   LMP= Postmenopausal   Phone number: [phone]  Pharmacy verified

## 2021-08-16 NOTE — PROGRESS NOTES
Subjective     Ratna Cline is a 85 y.o. female who presents for Gynecologic Exam (Vaginal Discharge)            HPI patient is an 85-year-old postmenopausal female who presents today with complaints of abnormal vaginal discharge x1 week. States discharge is thick and yellow. She sometimes gets some episode of vulvar itching for the past week also. She reports that during the past few days, she had experienced some bladder pain in the evening that lasted for short while and a few time she had dribbling of urine also.  She denies any fevers or dysuria. Denies vaginal bleeding. Patient has had history of hysterectomy.  She states she went to her PCP who prescribed ampicillin which she took for 5 days symptoms did not improve.    ROS all organ systems are reviewed and were negative except for complaints in HPI           Objective     /62 (BP Location: Left arm, Patient Position: Sitting, BP Cuff Size: Adult)   Wt 47.2 kg (104 lb)   LMP  (LMP Unknown)   BMI 18.90 kg/m²      Physical Exam  Vitals and nursing note reviewed. Exam conducted with a chaperone present.   Constitutional:       General: She is not in acute distress.     Appearance: Normal appearance. She is normal weight. She is not toxic-appearing.   HENT:      Head: Normocephalic and atraumatic.   Eyes:      General: No scleral icterus.        Right eye: No discharge.         Left eye: No discharge.      Conjunctiva/sclera: Conjunctivae normal.   Cardiovascular:      Rate and Rhythm: Normal rate and regular rhythm.      Pulses: Normal pulses.      Heart sounds: Normal heart sounds. No murmur heard.   No gallop.    Pulmonary:      Effort: Pulmonary effort is normal. No respiratory distress.      Breath sounds: Normal breath sounds. No wheezing.   Abdominal:      General: Abdomen is flat. Bowel sounds are normal. There is no distension.      Palpations: Abdomen is soft. There is no mass.      Tenderness: There is no abdominal tenderness. There  is no right CVA tenderness or left CVA tenderness.   Genitourinary:     General: Normal vulva.      Labia:         Right: No rash, tenderness, lesion or injury.         Left: No rash, tenderness, lesion or injury.       Urethra: No prolapse.      Vagina: Vaginal discharge (Small amount of yellowish discharge present in the vaginal vault) present. No tenderness or bleeding.      Uterus: Absent.       Adnexa:         Right: No mass, tenderness or fullness.          Left: No mass, tenderness or fullness.        Comments: Bimanual exam reveals no tenderness masses or abnormality  Musculoskeletal:         General: Normal range of motion.      Cervical back: Normal range of motion and neck supple. No rigidity.      Right lower leg: No edema.      Left lower leg: No edema.   Lymphadenopathy:      Cervical: No cervical adenopathy.      Lower Body: No right inguinal adenopathy. No left inguinal adenopathy.   Skin:     General: Skin is warm and dry.      Coloration: Skin is not jaundiced.      Findings: No bruising.   Neurological:      General: No focal deficit present.      Mental Status: She is alert and oriented to person, place, and time.      Coordination: Coordination normal.   Psychiatric:         Mood and Affect: Mood normal.         Behavior: Behavior normal.         Thought Content: Thought content normal.         Judgment: Judgment normal.                             Assessment & Plan        1. Vaginal discharge  85-year-old presenting with abnormal vaginal discharge x1 week. Patient also had some vague bladder symptoms a few times. Urine analysis ordered today. Vaginal pathogen swab obtained along with Gram stain and culture. Findings on exam discussed with patient. Patient will be called with results  - VAGINAL PATHOGENS DNA PANEL; Future  - POCT Urinalysis  - CULTURE UROGENITAL W/ GRAM STN; Future     2. Precautions and plan of care were reviewed with patient

## 2021-08-16 NOTE — PROGRESS NOTES
"Subjective:      Ratna Cline is a 84 y.o. female who presents with UTI ( sudden urge, discharge, more intense than in the past, x1 day )            Patient is an 84-year-old female who presents to urgent care with suspected UTI at this time.  Patient reports yesterday when she was about to use the restroom she noticed a slight sticky discharge to her underwear notably slight urine urgency.  She reports she noticed this discharge also after her walk today with slight urinary urgency as well.  She became concerned she also noticed a brown-reddish spot on her underwear mainly to the suprapubic region and to the side prompting evaluation today as she was uncertain if she was having blood in her urine or not.  She denies any abdominal pain, back pain, dysuria or notable frequency that she is aware of.  She does report history of intermittent sticky discharge in the past of which she reports she did well on antibiotics for.      UTI   This is a new problem. The current episode started yesterday. The problem has been waxing and waning. Pertinent negatives include no chills, fever or myalgias. Nothing aggravates the symptoms.       Review of Systems   Constitutional: Negative for chills and fever.   Genitourinary: Positive for urgency. Negative for dysuria, flank pain, frequency and hematuria.   Musculoskeletal: Negative for back pain, falls and myalgias.   All other systems reviewed and are negative.         Objective:     /60 (BP Location: Left arm, Patient Position: Sitting, BP Cuff Size: Adult)   Pulse 78   Temp 36.6 °C (97.8 °F) (Temporal)   Resp 16   Ht 1.575 m (5' 2\")   Wt 48.2 kg (106 lb 3.2 oz)   SpO2 96%   BMI 19.42 kg/m²    PMH:  has a past medical history of Arthritis, Back pain, Pisano's esophagus, Bladder infection, Dyslipidemia (7/14/2016), Family history of leukemia (6/3/2019), History of hemorrhoids, History of measles, History of pneumonia, Hypothyroidism (acquired) (7/13/2015), " Osteochondroma, Osteochondroma, Sinusitis, Thyroid disease, and Tinnitus (7/13/2015). She also has no past medical history of ASTHMA or Diabetes.  MEDS: Reviewed .   ALLERGIES:   Allergies   Allergen Reactions   • Codeine Vomiting   • Nexium    • Soy Allergy    • Sulfa Drugs Nausea     SURGHX:   Past Surgical History:   Procedure Laterality Date   • HYSTERECTOMY RADICAL     • OTHER      jaw surgery to correct overbite   • TONSILLECTOMY       SOCHX:  reports that she has never smoked. She has never used smokeless tobacco. She reports that she does not drink alcohol or use drugs.  FH: Family history was reviewed, no pertinent findings to report      Physical Exam  Vitals signs reviewed.   Constitutional:       General: She is not in acute distress.     Appearance: She is well-developed.   HENT:      Head: Normocephalic and atraumatic.      Right Ear: External ear normal.      Left Ear: External ear normal.      Mouth/Throat:      Pharynx: No oropharyngeal exudate.   Eyes:      Conjunctiva/sclera: Conjunctivae normal.      Pupils: Pupils are equal, round, and reactive to light.   Neck:      Musculoskeletal: Normal range of motion and neck supple.      Trachea: No tracheal deviation.   Cardiovascular:      Rate and Rhythm: Normal rate and regular rhythm.      Heart sounds: No murmur.   Pulmonary:      Effort: Pulmonary effort is normal. No respiratory distress.      Breath sounds: Normal breath sounds.   Abdominal:      Tenderness: There is no right CVA tenderness or left CVA tenderness.   Genitourinary:     Comments: Deferred.   Musculoskeletal: Normal range of motion.   Skin:     General: Skin is warm.      Findings: No lesion or rash.   Neurological:      Mental Status: She is alert and oriented to person, place, and time.      Coordination: Coordination normal.   Psychiatric:         Behavior: Behavior normal.         Thought Content: Thought content normal.         Judgment: Judgment normal.               UA- Trace  KARISHMA.   Sent for culture.     Assessment/Plan:       1. Suspected UTI  - cefdinir (OMNICEF) 300 MG Cap; Take 1 Cap by mouth 2 times a day for 5 days.  Dispense: 10 Cap; Refill: 0  - URINE CULTURE(NEW); Future  - POCT Urinalysis    2. Vaginal discharge  - MICONAZOLE NITRATE VAGINAL 4 % Cream; Insert 1 Applicator in vagina every bedtime for 3 days.  Dispense: 3 Tube; Refill: 0  - POCT Urinalysis    We will start the patient on the above-however if urine culture is negative patient will discontinue Omnicef at this time.  It does appear based on documentation that patient did well with nighttime miconazole in the past when she has had history of the sticky discharge-none currently this appears to be episodic at this time.  Encourage patient to increase her fluid intake.  Addressing the discoloration on her underwear appears to be a vitamin that was in her left pocket I suspect blood through onto her underwear.  Patient and/or guardian given precautionary s/sx that mandate immediate follow up and evaluation in the ED. Advised of risks of not doing so.  Side effects of the above medications discussed.   DDX, Supportive care, and indications for immediate follow-up discussed with patient.    Instructed to return to clinic or nearest emergency department if we are not available for any change in condition, further concerns, or worsening of symptoms.    The patient and/or guardian demonstrated a good understanding and agreed with the treatment plan.    Please note that this dictation was created using voice recognition software. I have made every reasonable attempt to correct obvious errors, but I expect that there are errors of grammar and possibly content that I did not discover before finalizing the note.       Glycopyrrolate Counseling:  I discussed with the patient the risks of glycopyrrolate including but not limited to skin rash, drowsiness, dry mouth, difficulty urinating, and blurred vision.

## 2021-08-17 LAB
GRAM STN SPEC: NORMAL
SIGNIFICANT IND 70042: NORMAL
SITE SITE: NORMAL
SOURCE SOURCE: NORMAL

## 2021-08-18 ENCOUNTER — TELEPHONE (OUTPATIENT)
Dept: MEDICAL GROUP | Facility: PHYSICIAN GROUP | Age: 86
End: 2021-08-18

## 2021-08-18 DIAGNOSIS — N89.8 VAGINAL DISCHARGE: ICD-10-CM

## 2021-08-18 RX ORDER — METRONIDAZOLE 7.5 MG/G
1 GEL VAGINAL
Qty: 70 G | Refills: 0 | Status: SHIPPED | OUTPATIENT
Start: 2021-08-18 | End: 2021-08-23

## 2021-08-18 NOTE — TELEPHONE ENCOUNTER
ESTABLISHED PATIENT PRE-VISIT PLANNING     Patient was NOT contacted to complete PVP.     Note: Patient will not be contacted if there is no indication to call.     1.  Reviewed notes from the last few office visits within the medical group: Yes    2.  If any orders were placed at last visit or intended to be done for this visit (i.e. 6 mos follow-up), do we have Results/Consult Notes?         •  Labs - Labs were not ordered at last office visit.  Note: If patient appointment is for lab review and patient did not complete labs, check with provider if OK to reschedule patient until labs completed.       •  Imaging - Imaging was not ordered at last office visit.       •  Referrals - No referrals were ordered at last office visit.    3. Is this appointment scheduled as a Hospital Follow-Up? No    4.  Immunizations were updated in Epic using Reconcile Outside Information activity? Yes    5.  Patient is due for the following Health Maintenance Topics:   Health Maintenance Due   Topic Date Due   • BONE DENSITY  09/24/2017       6.  AHA (Pulse8) form printed for Provider? No, already completed

## 2021-08-18 NOTE — PROGRESS NOTES
I called patient and discussed results of vaginal culture and DNA testing with patient which is basically negative but patient still reports bladder some yellow discharge with some vaginal irritation.  I discussed that we can try repHresh over-the-counter versus a course of MetroGel.  She desires to try MetroGel.  Patient was instructed to insert 1 applicatorful in vagina nightly for 5 nights.  Symptoms are still persistent, she was instructed to call us.

## 2021-08-19 ENCOUNTER — APPOINTMENT (OUTPATIENT)
Dept: MEDICAL GROUP | Facility: PHYSICIAN GROUP | Age: 86
End: 2021-08-19
Payer: MEDICARE

## 2021-09-07 ENCOUNTER — OFFICE VISIT (OUTPATIENT)
Dept: MEDICAL GROUP | Facility: PHYSICIAN GROUP | Age: 86
End: 2021-09-07
Payer: MEDICARE

## 2021-09-07 VITALS
HEART RATE: 68 BPM | TEMPERATURE: 98.3 F | SYSTOLIC BLOOD PRESSURE: 110 MMHG | BODY MASS INDEX: 18.95 KG/M2 | RESPIRATION RATE: 15 BRPM | OXYGEN SATURATION: 94 % | HEIGHT: 62 IN | WEIGHT: 103 LBS | DIASTOLIC BLOOD PRESSURE: 50 MMHG
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
## 2021-09-07 DIAGNOSIS — N94.9 VAGINAL DISCOMFORT: ICD-10-CM
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
## 2021-09-07 PROCEDURE — 99215 OFFICE O/P EST HI 40 MIN: CPT | Performed by: FAMILY MEDICINE

## 2021-09-07 ASSESSMENT — PATIENT HEALTH QUESTIONNAIRE - PHQ9
3. TROUBLE FALLING OR STAYING ASLEEP OR SLEEPING TOO MUCH: NEARLY EVERY DAY
6. FEELING BAD ABOUT YOURSELF - OR THAT YOU ARE A FAILURE OR HAVE LET YOURSELF OR YOUR FAMILY DOWN: NOT AL ALL
8. MOVING OR SPEAKING SO SLOWLY THAT OTHER PEOPLE COULD HAVE NOTICED. OR THE OPPOSITE, BEING SO FIGETY OR RESTLESS THAT YOU HAVE BEEN MOVING AROUND A LOT MORE THAN USUAL: NOT AT ALL
9. THOUGHTS THAT YOU WOULD BE BETTER OFF DEAD, OR OF HURTING YOURSELF: NOT AT ALL
7. TROUBLE CONCENTRATING ON THINGS, SUCH AS READING THE NEWSPAPER OR WATCHING TELEVISION: SEVERAL DAYS
SUM OF ALL RESPONSES TO PHQ9 QUESTIONS 1 AND 2: 1
1. LITTLE INTEREST OR PLEASURE IN DOING THINGS: NOT AT ALL
5. POOR APPETITE OR OVEREATING: NOT AT ALL
4. FEELING TIRED OR HAVING LITTLE ENERGY: SEVERAL DAYS
2. FEELING DOWN, DEPRESSED, IRRITABLE, OR HOPELESS: SEVERAL DAYS
SUM OF ALL RESPONSES TO PHQ QUESTIONS 1-9: 6

## 2021-09-07 ASSESSMENT — ENCOUNTER SYMPTOMS
NERVOUS/ANXIOUS: 0
FEVER: 0
GASTROINTESTINAL NEGATIVE: 1
EYES NEGATIVE: 1
DIZZINESS: 0
HEADACHES: 0
FALLS: 1
DEPRESSION: 0
CHILLS: 0
SHORTNESS OF BREATH: 0
COUGH: 0

## 2021-09-07 ASSESSMENT — FIBROSIS 4 INDEX: FIB4 SCORE: 2.06

## 2021-09-07 NOTE — ASSESSMENT & PLAN NOTE
left elbow irritation due to fall 7 days ago from tripping on a rock outside, states she was looking at a plant and wasn't paying attention and hit a rock and fell forward onto her left arm, she scrapped up her left elbow. Denies any other injuries. She had a small area on left elbow that previously had a scab but scab was no longer there and healed, no redness or bruising observed. States she just wanted someone to look at her elbow because she has irritation at night when rubbing against her sheets, discussed using moisturizer on this area. Discussed fall precautions with patient. ROM intact, strength intact, no ecchymosis, swelling, or erythema present.

## 2021-09-07 NOTE — PROGRESS NOTES
"Subjective:     CC:   Chief Complaint   Patient presents with   • Fall     x1week. (L) shoulder scab. Walking about her property and tripped on rock. Lidocaine & Bactrim applied.        HPI:   Ratna presents today with multiple issues to discuss; fall 7 days ago, exposure to COVID 10 days ago, and vaginal irritation.    She states she was also exposed to COVID 10 days ago when she was square dancing, she was speaking to her friends  for 30 minutes in close contact, but did not exhibit symptoms after this. She did quarantine for 10 days, and is fully vaccinated, no need to COVID test at this time.    Vaginal discomfort  Chronic, ongoing issue. Last vaginal pathogens DNA panel negative that was done by OB on 8/16/21, discussed trying rephresh OTC gel that she can get at pharmacy to help with irritation. OB on 8/16/21 prescribed metronidazole at that time which didn't \"fully\" help her problem. Denies foul smelling discharge, burning with urination, just that she gets yellow/sticky discharge that causes vaginal irritation, pt is also frequently wiping vagina to remove discharge which is likely increasing irritation.     Fall at home   left elbow irritation due to fall 7 days ago from tripping on a rock outside, states she was looking at a plant and wasn't paying attention and hit a rock and fell forward onto her left arm, she scrapped up her left elbow. Denies any other injuries. She had a small area on left elbow that previously had a scab but scab was no longer there and healed, no redness or bruising observed. States she just wanted someone to look at her elbow because she has irritation at night when rubbing against her sheets, discussed using moisturizer on this area. Discussed fall precautions with patient. ROM intact, strength intact, no ecchymosis, swelling, or erythema present.       Current Outpatient Medications Ordered in Epic   Medication Sig Dispense Refill   • MISC NATURAL PRODUCTS PO Take  by " "mouth. Ring Homeopathic Product & educational guide-helps fight the ringing in the ears     • NON SPECIFIED      • Omega-3 Fatty Acids (SALMON OIL PO) Take  by mouth.     • Cholecalciferol (VITAMIN D3 PO) Take  by mouth.     • POTASSIUM PO Take  by mouth.     • NON SPECIFIED Pure Thyroid     • Multiple Vitamins-Minerals (MULTIVITAMIN ADULT PO) Take  by mouth.     • BIOTIN PO Take  by mouth.     • Calcium Carb-Cholecalciferol (CALCIUM 600 + D PO) Take  by mouth.     • cyanocobalamin (VITAMIN B-12) 100 MCG Tab Take 100 mcg by mouth every day.     • Turmeric, Curcuma Longa, (CURCUMIN) Powder by Does not apply route.     • Ginkgo Biloba (GINKOBA PO) Take  by mouth.       No current Epic-ordered facility-administered medications on file.       Health Maintenance: Completed    Review of Systems   Constitutional: Negative for chills and fever.   HENT: Negative.    Eyes: Negative.    Respiratory: Negative for cough and shortness of breath.    Cardiovascular: Negative for chest pain and leg swelling.   Gastrointestinal: Negative.    Genitourinary: Negative.    Musculoskeletal: Positive for falls.   Skin: Negative for itching and rash.        Small scab on left elbow   Neurological: Negative for dizziness and headaches.   Psychiatric/Behavioral: Negative for depression. The patient is not nervous/anxious.          Objective:     Exam:  /50 (BP Location: Right arm, Patient Position: Sitting, BP Cuff Size: Adult)   Pulse 68   Temp 36.8 °C (98.3 °F) (Temporal)   Resp 15   Ht 1.575 m (5' 2\")   Wt 46.7 kg (103 lb)   LMP  (LMP Unknown)   SpO2 94%   BMI 18.84 kg/m²  Body mass index is 18.84 kg/m².    Physical Exam  Vitals reviewed.   Constitutional:       Appearance: Normal appearance.   Eyes:      Conjunctiva/sclera: Conjunctivae normal.      Pupils: Pupils are equal, round, and reactive to light.   Cardiovascular:      Pulses: Normal pulses.      Heart sounds: Normal heart sounds.   Pulmonary:      Effort: Pulmonary " effort is normal.   Neurological:      Mental Status: She is alert.          A chaperone was offered to the patient during today's exam. Patient declined chaperone.      Assessment & Plan:     85 y.o. female with the following -     1. Vaginal discomfort  Chronic, ongoing issue. Discussed trying repHresh OTC gel to help with lubrication and irritation. Last vaginal pathogens DNA panel negative, denies urinary problem and only external vaginal irritation which has been chronic. Pt verbalized understanding and has f/u with PCP on 10/11/21.     I spent a total of 40 minutes with record review, exam, communication with the patient, communication with other providers, and documentation of this encounter.      Return if symptoms worsen or fail to improve.    Please note that this dictation was created using voice recognition software. I have made every reasonable attempt to correct obvious errors, but I expect that there are errors of grammar and possibly content that I did not discover before finalizing the note.

## 2021-09-07 NOTE — ASSESSMENT & PLAN NOTE
"Chronic, ongoing issue. Last vaginal pathogens DNA panel negative that was done by OB on 8/16/21, discussed trying rephresh OTC gel that she can get at pharmacy to help with irritation. OB on 8/16/21 prescribed metronidazole at that time which didn't \"fully\" help her problem. Denies foul smelling discharge, burning with urination, just that she gets yellow/sticky discharge that causes vaginal irritation, pt is also frequently wiping vagina to remove discharge which is likely increasing irritation.   "

## 2021-09-20 ENCOUNTER — TELEPHONE (OUTPATIENT)
Dept: HEALTH INFORMATION MANAGEMENT | Facility: OTHER | Age: 86
End: 2021-09-20

## 2021-09-20 NOTE — TELEPHONE ENCOUNTER
Outcome: Pt called stating has been trying to call [facility] to cancel an appt but has not been able to reach them. Pt stated at this point she would go to office to cancel appt. I offered to call [facility] to cancel on pt behalf and she stated yes. Pt also had questions regarding insurance. Adv will call her back to confirm appt has been cancelled       Called [facility] [phone] was on hold for over an hour and was unable to connect with office.Sent email to Dolly to escalate.         Please transfer to Patient Outreach Team at [phone] when patient returns call.      [employer] Verified: yes    Attempt # 1

## 2021-10-04 ENCOUNTER — OFFICE VISIT (OUTPATIENT)
Dept: URGENT CARE | Facility: PHYSICIAN GROUP | Age: 86
End: 2021-10-04
Payer: MEDICARE

## 2021-10-04 VITALS
SYSTOLIC BLOOD PRESSURE: 124 MMHG | WEIGHT: 107 LBS | TEMPERATURE: 98.8 F | OXYGEN SATURATION: 97 % | RESPIRATION RATE: 16 BRPM | BODY MASS INDEX: 19.69 KG/M2 | HEART RATE: 61 BPM | DIASTOLIC BLOOD PRESSURE: 64 MMHG | HEIGHT: 62 IN

## 2021-10-04 DIAGNOSIS — L30.9 DERMATITIS OF FACE: ICD-10-CM

## 2021-10-04 PROCEDURE — 99214 OFFICE O/P EST MOD 30 MIN: CPT | Performed by: NURSE PRACTITIONER

## 2021-10-04 RX ORDER — PREDNISONE 20 MG/1
TABLET ORAL
Qty: 9 TABLET | Refills: 0 | Status: SHIPPED
Start: 2021-10-04 | End: 2021-10-11

## 2021-10-04 ASSESSMENT — ENCOUNTER SYMPTOMS
MYALGIAS: 0
COUGH: 0
FEVER: 0
NAUSEA: 0
CHILLS: 0
HEADACHES: 0

## 2021-10-04 ASSESSMENT — FIBROSIS 4 INDEX: FIB4 SCORE: 2.08

## 2021-10-04 ASSESSMENT — LIFESTYLE VARIABLES: SUBSTANCE_ABUSE: 0

## 2021-10-04 NOTE — PROGRESS NOTES
Ratna Cline is a 86 y.o. female who presents for Rash (rash located on neck and face, itchiness, x1 day )      HPI this new problem.  Ratna is an 86-year-old female presents with a rash to both of her cheeks and both sides of her neck.  She denies any new facial products, lotions, detergents or facial cleansers.  She uses a Vaseline lotion to her face every day.  She reports she has been using that for a long time now.  She does not recall anything new that she uses.  She takes a lot of homeopathic medications.  The rash is very itchy.  It was hard for her to sleep last night.  This morning it felt like her face was burning.  She thinks she scratched it during the night.  Treatments tried none except her Vaseline lotion on her face..  No other aggravating or alleviating factors.    Review of Systems   Constitutional: Negative for chills, fever and malaise/fatigue.   Respiratory: Negative for cough.    Gastrointestinal: Negative for nausea.   Musculoskeletal: Negative for myalgias.   Skin: Positive for itching and rash.   Neurological: Negative for headaches.   Psychiatric/Behavioral: Negative for substance abuse.       Allergies:       Allergies   Allergen Reactions   • Codeine Vomiting   • Nexium Unspecified     Ringing in ears   • Soy Allergy    • Sulfa Drugs Nausea       PMSFS Hx:  Past Medical History:   Diagnosis Date   • Allergy    • Anxiety    • Arthritis    • Back pain    • Pisano's esophagus     Dr. 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   • Non-celiac gluten sensitivity 6/3/2019   •  Osteochondroma    • Osteochondroma    • Postmenopausal 8/20/2020   • Prediabetes 2/1/2021   • Seasonal allergies 6/3/2019   • Sinusitis    • Thyroid disease    • Tinnitus 7/13/2015     Past Surgical History:   Procedure Laterality Date   • HYSTERECTOMY RADICAL     • OTHER      jaw surgery to correct overbite   • TONSILLECTOMY       Family History   Problem Relation Age of Onset   • Cancer Mother         leukemia   • Genetic Disorder Mother         osteochondroma   • Stroke Neg Hx    • Hyperlipidemia Neg Hx    • Hypertension Neg Hx    • Heart Disease Neg Hx    • Diabetes Neg Hx    • Lung Disease Neg Hx      Social History     Tobacco Use   • Smoking status: Never Smoker   • Smokeless tobacco: Never Used   Substance Use Topics   • Alcohol use: No     Alcohol/week: 0.0 oz       Problems:   Patient Active Problem List   Diagnosis   • Hypothyroidism due to acquired atrophy of thyroid   • Tinnitus   • Pure hypercholesterolemia   • Osteopenia   • Osteochondroma   • Pisano's esophagus   • Left hand pain- between 2nd and 3rd digits due to barbed wire fence   • Palpable mass of soft tissue of shoulder   • Memory change   • Dermatitis   • Vaginal discomfort   • Varicose veins of right lower extremity with pain   • Disorder of arteries and arterioles (HCC)   • Mild depression (HCC)   • Vaginal discharge   • Fall at home       Medications:   Current Outpatient Medications on File Prior to Visit   Medication Sig Dispense Refill   • MISC NATURAL PRODUCTS PO Take  by mouth. Ring Homeopathic Product & educational guide-helps fight the ringing in the ears     • NON SPECIFIED      • Omega-3 Fatty Acids (SALMON OIL PO) Take  by mouth.     • Cholecalciferol (VITAMIN D3 PO) Take  by mouth.     • POTASSIUM PO Take  by mouth.     • NON SPECIFIED Pure Thyroid     • Multiple Vitamins-Minerals (MULTIVITAMIN ADULT PO) Take  by mouth.     • BIOTIN PO Take  by mouth.     • Calcium Carb-Cholecalciferol (CALCIUM 600 + D PO) Take  by mouth.     •  "cyanocobalamin (VITAMIN B-12) 100 MCG Tab Take 100 mcg by mouth every day.     • Turmeric, Curcuma Longa, (CURCUMIN) Powder by Does not apply route.     • Ginkgo Biloba (GINKOBA PO) Take  by mouth.       No current facility-administered medications on file prior to visit.          Objective:     /64 (BP Location: Right arm, Patient Position: Sitting, BP Cuff Size: Adult)   Pulse 61   Temp 37.1 °C (98.8 °F) (Temporal)   Resp 16   Ht 1.575 m (5' 2\")   Wt 48.5 kg (107 lb)   LMP  (LMP Unknown)   SpO2 97%   BMI 19.57 kg/m²     Physical Exam  Vitals and nursing note reviewed.   Constitutional:       General: She is not in acute distress.     Appearance: Normal appearance. She is well-developed, well-groomed and normal weight. She is not ill-appearing.   HENT:      Head: Normocephalic.      Mouth/Throat:      Mouth: Mucous membranes are moist.   Cardiovascular:      Rate and Rhythm: Normal rate and regular rhythm.      Pulses: Normal pulses.      Heart sounds: Normal heart sounds.   Pulmonary:      Effort: Pulmonary effort is normal.      Breath sounds: Normal breath sounds.   Skin:     General: Skin is warm and dry.      Findings: Rash present. Rash is macular and papular.          Neurological:      Mental Status: She is alert and oriented to person, place, and time.   Psychiatric:         Mood and Affect: Mood normal.         Behavior: Behavior normal. Behavior is cooperative.         Thought Content: Thought content normal.         Assessment /Associated Orders:      1. Dermatitis of face  predniSONE (DELTASONE) 20 MG Tab       Medical Decision Making:    Pt is clinically stable at today's acute urgent care visit.  No acute distress noted. Appropriate for outpatient management at this time.   Acute problem today with uncertain prognosis.   Educated in proper administration of medication(s) ordered today including safety, possible SE, risks, benefits, rationale and alternatives to therapy.   OTC " antihistamine of choice. Follow manufactures dosing and safety guidelines.   Avoid products on face except mild soap and water to wash her face daily until the rash heals.    Keep appointment as scheduled with primary care provider on Monday.      Advised to follow-up with the primary care provider for recheck, reevaluation, and consideration of further management if necessary.   Discussed management options (risks,benefits, and alternatives to treatment). Expressed understanding and the treatment plan was agreed upon. Questions were encouraged and answered   Return to urgent care prn if new or worsening sx or if there is no improvement in condition prn.    Educated in Red flags and indications to immediately call 911 or present to the Emergency Department.     I personally reviewed prior external notes and test results pertinent to today's visit.  I have independently reviewed and interpreted all diagnostics ordered during this urgent care acute visit.   Time spent evaluating this patient was at least 30 minutes and includes preparing for visit, counseling/education, exam and evaluation, obtaining history, independent interpretation, ordering lab/test/procedures,medication management and documentation.Time does not include separately billable procedures noted .

## 2021-10-07 ENCOUNTER — TELEPHONE (OUTPATIENT)
Dept: MEDICAL GROUP | Facility: PHYSICIAN GROUP | Age: 86
End: 2021-10-07

## 2021-10-07 NOTE — TELEPHONE ENCOUNTER
ESTABLISHED PATIENT PRE-VISIT PLANNING     Patient was NOT contacted to complete PVP.     Note: Patient will not be contacted if there is no indication to call.     1.  Reviewed notes from the last few office visits within the medical group: Yes    2.  If any orders were placed at last visit or intended to be done for this visit (i.e. 6 mos follow-up), do we have Results/Consult Notes?         •  Labs - Labs were not ordered at last office visit.  Note: If patient appointment is for lab review and patient did not complete labs, check with provider if OK to reschedule patient until labs completed.       •  Imaging - Imaging was not ordered at last office visit.       •  Referrals - No referrals were ordered at last office visit.    3. Is this appointment scheduled as a Hospital Follow-Up? No    4.  Immunizations were updated in Epic using Reconcile Outside Information activity? Yes    5.  Patient is due for the following Health Maintenance Topics:   Health Maintenance Due   Topic Date Due   • BONE DENSITY  09/24/2017   • IMM INFLUENZA (1) 09/01/2021       6.  AHA (Pulse8) form printed for Provider? No, already completed

## 2021-10-11 ENCOUNTER — APPOINTMENT (OUTPATIENT)
Dept: MEDICAL GROUP | Facility: PHYSICIAN GROUP | Age: 86
End: 2021-10-11
Payer: MEDICARE

## 2021-10-11 ENCOUNTER — NON-PROVIDER VISIT (OUTPATIENT)
Dept: MEDICAL GROUP | Facility: PHYSICIAN GROUP | Age: 86
End: 2021-10-11

## 2021-10-11 ENCOUNTER — HOSPITAL ENCOUNTER (OUTPATIENT)
Facility: MEDICAL CENTER | Age: 86
End: 2021-10-11
Attending: INTERNAL MEDICINE
Payer: MEDICARE

## 2021-10-11 ENCOUNTER — OFFICE VISIT (OUTPATIENT)
Dept: MEDICAL GROUP | Facility: PHYSICIAN GROUP | Age: 86
End: 2021-10-11
Payer: MEDICARE

## 2021-10-11 VITALS
HEART RATE: 71 BPM | OXYGEN SATURATION: 98 % | TEMPERATURE: 97.4 F | DIASTOLIC BLOOD PRESSURE: 60 MMHG | HEIGHT: 62 IN | RESPIRATION RATE: 12 BRPM | SYSTOLIC BLOOD PRESSURE: 118 MMHG | BODY MASS INDEX: 19.41 KG/M2 | WEIGHT: 105.5 LBS

## 2021-10-11 DIAGNOSIS — L30.9 DERMATITIS: ICD-10-CM

## 2021-10-11 DIAGNOSIS — K58.0 IRRITABLE BOWEL SYNDROME WITH DIARRHEA: ICD-10-CM

## 2021-10-11 DIAGNOSIS — E03.4 HYPOTHYROIDISM DUE TO ACQUIRED ATROPHY OF THYROID: ICD-10-CM

## 2021-10-11 DIAGNOSIS — Z23 NEED FOR VACCINATION: ICD-10-CM

## 2021-10-11 DIAGNOSIS — Z78.0 MENOPAUSE: ICD-10-CM

## 2021-10-11 LAB
T4 FREE SERPL-MCNC: 1.39 NG/DL (ref 0.93–1.7)
TSH SERPL DL<=0.005 MIU/L-ACNC: 3.28 UIU/ML (ref 0.38–5.33)

## 2021-10-11 PROCEDURE — 84443 ASSAY THYROID STIM HORMONE: CPT

## 2021-10-11 PROCEDURE — G0008 ADMIN INFLUENZA VIRUS VAC: HCPCS | Performed by: INTERNAL MEDICINE

## 2021-10-11 PROCEDURE — 84439 ASSAY OF FREE THYROXINE: CPT

## 2021-10-11 PROCEDURE — 99000 SPECIMEN HANDLING OFFICE-LAB: CPT | Performed by: INTERNAL MEDICINE

## 2021-10-11 PROCEDURE — 90662 IIV NO PRSV INCREASED AG IM: CPT | Performed by: INTERNAL MEDICINE

## 2021-10-11 PROCEDURE — 36415 COLL VENOUS BLD VENIPUNCTURE: CPT | Performed by: INTERNAL MEDICINE

## 2021-10-11 PROCEDURE — 99214 OFFICE O/P EST MOD 30 MIN: CPT | Mod: 25 | Performed by: INTERNAL MEDICINE

## 2021-10-11 ASSESSMENT — FIBROSIS 4 INDEX: FIB4 SCORE: 2.08

## 2021-10-11 NOTE — ASSESSMENT & PLAN NOTE
Chronic and ongoing problem.  Her labs are drawn by my RN this morning so we will follow up on then and make additional changes.  She reports starting a new drop from her homeopathic provider which is post to help stimulate thyroid.  She does not know the active ingredient but will check at home and call and leave this message.

## 2021-10-11 NOTE — ASSESSMENT & PLAN NOTE
Chronic and ongoing problem.  Was told she had a milk allergy in the past.  She has had queasy stomach with bloating and diarrhea over the years.  Apparently negative GI work-up.  She be interested in food allergy testing specifically milk so she could try some avoidance techniques with her diet.  Will refer her to allergy medicine.

## 2021-10-11 NOTE — ASSESSMENT & PLAN NOTE
Chronic and intermittent problem.  Recent flareup in early October 2021 put her in urgent care and she was treated with oral steroids.  Nearly resolved at this time.  She feels it was related to environmental exposure as it occurred after she was in her garden.

## 2021-10-11 NOTE — PROGRESS NOTES
Subjective:   Chief Complaint/History of Present Illness:  Ratna Cline is a 86 y.o. female established patient who presents today to discuss medical problems as listed below. Ratna is unaccompanied for today's visit.    Problem   Irritable Bowel Syndrome With Diarrhea    She reports longstanding challenges with upset stomach and intermittent diarrhea.  It feels to be food related.  She is not had any formal allergen testing but sounds like a homeopathic doctor told her she was allergic to milk in the past.  She would like to investigate this further so she could try elimination approach in her diet to improve her bowel function.     Dermatitis    She has about 1 week of dermatitis on her anterior neck.  She cannot think of any potential triggers.  No new creams or topicals.  No new environmental or chemical exposures.  She does walk with her son 3 times per week but wears a large sun hat.  It is predominantly itchy.  No symptoms on the posterior neck or elsewhere on her body.  No prior occurrence.  It has improved some with application of topical coconut cream.      This re-surged again in early October 2021 and she went to urgent care.  She was treated with oral steroids and took these for 4 days with near resolution of her symptoms.         Hypothyroidism Due to Acquired Atrophy of Thyroid       Ref. Range 5/13/2021 08:16 7/15/2021 13:17   TSH Latest Ref Range: 0.380 - 5.330 uIU/mL 35.700 (H) 6.460 (H)   Free T-4 Latest Ref Range: 0.93 - 1.70 ng/dL 0.72 (L) 1.23     She has had subclinical hypothyroidism since at least 2012 in varying degrees. She recalls previous treatment with levothyroxine and then with a desiccated thyroid.  Of note, she does take biotin intermittently which may alter her labs.    Current regimen: natural thyroid supplement, thyroid drops?          Current Medications:  Current Outpatient Medications Ordered in Epic   Medication Sig Dispense Refill   • MISC NATURAL PRODUCTS PO Take   "by mouth. Ring Homeopathic Product & educational guide-helps fight the ringing in the ears     • NON SPECIFIED      • Omega-3 Fatty Acids (SALMON OIL PO) Take  by mouth.     • Cholecalciferol (VITAMIN D3 PO) Take  by mouth.     • POTASSIUM PO Take  by mouth.     • NON SPECIFIED Pure Thyroid     • Multiple Vitamins-Minerals (MULTIVITAMIN ADULT PO) Take  by mouth.     • BIOTIN PO Take  by mouth.     • Calcium Carb-Cholecalciferol (CALCIUM 600 + D PO) Take  by mouth.     • cyanocobalamin (VITAMIN B-12) 100 MCG Tab Take 100 mcg by mouth every day.     • Turmeric, Curcuma Longa, (CURCUMIN) Powder by Does not apply route.     • Ginkgo Biloba (GINKOBA PO) Take  by mouth.       No current Epic-ordered facility-administered medications on file.          Objective:   Physical Exam:    Vitals: /60 (BP Location: Left arm, Patient Position: Sitting, BP Cuff Size: Adult)   Pulse 71   Temp 36.3 °C (97.4 °F) (Temporal)   Resp 12   Ht 1.575 m (5' 2\")   Wt 47.9 kg (105 lb 8 oz)   SpO2 98%    BMI: Body mass index is 19.3 kg/m².  Physical Exam  Constitutional:       General: She is not in acute distress.     Appearance: Normal appearance. She is not ill-appearing.   HENT:      Right Ear: Tympanic membrane and ear canal normal. There is no impacted cerumen.      Left Ear: Tympanic membrane and ear canal normal. There is no impacted cerumen.   Eyes:      General: No scleral icterus.     Conjunctiva/sclera: Conjunctivae normal.   Cardiovascular:      Rate and Rhythm: Normal rate and regular rhythm.      Pulses: Normal pulses.      Heart sounds: No murmur heard.     Pulmonary:      Effort: Pulmonary effort is normal. No respiratory distress.      Breath sounds: Normal breath sounds. No wheezing or rhonchi.   Musculoskeletal:      Right lower leg: No edema.      Left lower leg: No edema.   Skin:     General: Skin is warm and dry.      Findings: No rash.   Psychiatric:         Mood and Affect: Mood normal.         Behavior: " Behavior normal.         Thought Content: Thought content normal.         Judgment: Judgment normal.          Assessment and Plan:   Ratna is a 86 y.o. female with the following:  Problem List Items Addressed This Visit     Hypothyroidism due to acquired atrophy of thyroid     Chronic and ongoing problem.  Her labs are drawn by my RN this morning so we will follow up on then and make additional changes.  She reports starting a new drop from her homeopathic provider which is post to help stimulate thyroid.  She does not know the active ingredient but will check at home and call and leave this message.         Dermatitis     Chronic and intermittent problem.  Recent flareup in early October 2021 put her in urgent care and she was treated with oral steroids.  Nearly resolved at this time.  She feels it was related to environmental exposure as it occurred after she was in her garden.         Irritable bowel syndrome with diarrhea     Chronic and ongoing problem.  Was told she had a milk allergy in the past.  She has had queasy stomach with bloating and diarrhea over the years.  Apparently negative GI work-up.  She be interested in food allergy testing specifically milk so she could try some avoidance techniques with her diet.  Will refer her to allergy medicine.         Relevant Orders    REFERRAL TO ALLERGY      Other Visit Diagnoses     Need for vaccination        Relevant Orders    Influenza Vaccine, High Dose (65+ Only) (Completed)    Menopause        Relevant Orders    DS-BONE DENSITY STUDY (DEXA)           RTC: Return in about 11 weeks (around 12/27/2021).    I spent a total of 31 minutes with record review, exam, communication with the patient, communication with other providers, and documentation of this encounter.    PLEASE NOTE: This dictation was created using voice recognition software. I have made every reasonable attempt to correct obvious errors, but I expect that there are errors of grammar and possibly  content that I did not discover before finalizing the note.      Criselda Portillo, DO  Geriatric and Internal Medicine  [facility]

## 2021-10-14 ENCOUNTER — TELEPHONE (OUTPATIENT)
Dept: MEDICAL GROUP | Facility: PHYSICIAN GROUP | Age: 86
End: 2021-10-14

## 2021-10-14 NOTE — TELEPHONE ENCOUNTER
1. Caller Name: Ratna Tawny Thompson                          Call Back Number: [phone] (home) [phone] (work)        How would the patient prefer to be contacted with a response: Phone call OK to leave a detailed message    Called pt to let her know that her thyroid labs are stable and better than 3 months ago  Keep doing what she is doing.

## 2021-10-19 ENCOUNTER — TELEPHONE (OUTPATIENT)
Dept: HEALTH INFORMATION MANAGEMENT | Facility: OTHER | Age: 86
End: 2021-10-19

## 2021-10-19 NOTE — TELEPHONE ENCOUNTER
Outcome: Patient called asking where she would be able to get COVID tested. Patient notified that if she is having active symptoms she could go to any of [facility] Urgent cares to be tested. Notified her that if she needs it for travel she needs a doctors order and would need to go to one of our COVID testing labs to get it done. Patient also notified that local pharmacies also offer COVID testing. Patient asked about COVID booster vaccination and notified [facility] is not currently offering them in office and that she can either check local pharmacies or [facility].     Please transfer to Patient Outreach Team at [phone] when patient returns call.

## 2021-10-27 ENCOUNTER — HOSPITAL ENCOUNTER (OUTPATIENT)
Dept: RADIOLOGY | Facility: MEDICAL CENTER | Age: 86
End: 2021-10-27
Attending: INTERNAL MEDICINE
Payer: MEDICARE

## 2021-10-27 DIAGNOSIS — Z78.0 MENOPAUSE: ICD-10-CM

## 2021-10-27 PROCEDURE — 77080 DXA BONE DENSITY AXIAL: CPT

## 2021-11-05 ENCOUNTER — TELEPHONE (OUTPATIENT)
Dept: MEDICAL GROUP | Facility: PHYSICIAN GROUP | Age: 86
End: 2021-11-05

## 2021-11-05 NOTE — TELEPHONE ENCOUNTER
Phone Number Called: [phone] (home) [phone] (work)      Call outcome: Spoke to patient regarding message below.    Message: PT aware of upcoming apt. Pt will get a copy at her apt time. Pt will also bring in copies of  allergist notes.

## 2021-11-05 NOTE — TELEPHONE ENCOUNTER
----- Message from Criselda Portillo D.O. sent at 11/4/2021  6:20 PM PDT -----  Please let patient know that we will go through her bone density results in detail at our next follow-up visit.  She does not have my chart so you can offer to mail her a copy if she would like to review before our appointment.  Spine is consistent with osteopenia and hip is consistent with osteoporosis and she would qualify for prescription medication.

## 2021-11-15 DIAGNOSIS — E03.4 HYPOTHYROIDISM DUE TO ACQUIRED ATROPHY OF THYROID: ICD-10-CM

## 2021-11-15 RX ORDER — THYROID 30 MG/1
30 TABLET ORAL DAILY
COMMUNITY
End: 2021-11-15 | Stop reason: SDUPTHER

## 2021-11-15 RX ORDER — THYROID 15 MG/1
15 TABLET ORAL DAILY
Qty: 100 TABLET | Refills: 3 | Status: SHIPPED | OUTPATIENT
Start: 2021-11-15 | End: 2022-09-07 | Stop reason: SDUPTHER

## 2021-11-18 ENCOUNTER — OFFICE VISIT (OUTPATIENT)
Dept: MEDICAL GROUP | Facility: PHYSICIAN GROUP | Age: 86
End: 2021-11-18
Payer: MEDICARE

## 2021-11-18 VITALS
SYSTOLIC BLOOD PRESSURE: 102 MMHG | HEART RATE: 70 BPM | WEIGHT: 105 LBS | BODY MASS INDEX: 19.32 KG/M2 | DIASTOLIC BLOOD PRESSURE: 60 MMHG | TEMPERATURE: 97.3 F | RESPIRATION RATE: 12 BRPM | HEIGHT: 62 IN | OXYGEN SATURATION: 93 %

## 2021-11-18 DIAGNOSIS — E03.4 HYPOTHYROIDISM DUE TO ACQUIRED ATROPHY OF THYROID: ICD-10-CM

## 2021-11-18 DIAGNOSIS — M81.0 AGE-RELATED OSTEOPOROSIS WITHOUT CURRENT PATHOLOGICAL FRACTURE: ICD-10-CM

## 2021-11-18 DIAGNOSIS — K58.0 IRRITABLE BOWEL SYNDROME WITH DIARRHEA: ICD-10-CM

## 2021-11-18 PROCEDURE — 99214 OFFICE O/P EST MOD 30 MIN: CPT | Performed by: INTERNAL MEDICINE

## 2021-11-18 ASSESSMENT — FIBROSIS 4 INDEX: FIB4 SCORE: 2.08

## 2021-11-18 NOTE — ASSESSMENT & PLAN NOTE
Chronic and ongoing problem. She has not formally sat down with the allergist to review these findings. Will scan results into the chart.

## 2021-11-18 NOTE — ASSESSMENT & PLAN NOTE
Chronic and ongoing problem.  Good no longer afford the over-the-counter thyroid drops.  Send in prescription earlier this week NP thyroid 15 mg daily.  We will recheck her thyroid levels in 6 to 8 weeks to ensure appropriate response.  She knows to monitor for signs and symptoms of overtreatment such as tachycardia, tremulousness, or frequent stools.

## 2021-11-18 NOTE — LETTER
[employer]  Criselda Portillo D.O.  [address]  [place] [address]  Fax: [phone]   Authorization for Release/Disclosure of   Protected Health Information   Name: GODFREY DUARTE : 1935 SSN: [id number]   Address: [address] [address] Phone:    [phone] (home) [phone] (work)   I authorize the entity listed below to release/disclose the PHI below to:   [facility]/Criselda Portillo D.O. and Criselda Portillo D.O.   Provider or Entity Name:   [facility]   Address   City, State, Zip   Phone:      Fax:     Reason for request: continuity of care   Information to be released:    [  ] LAST COLONOSCOPY,  including any PATH REPORT and follow-up  [  ] LAST FIT/COLOGUARD RESULT [  ] LAST DEXA  [  ] LAST MAMMOGRAM  [  ] LAST PAP  [  ] LAST LABS [  ] RETINA EXAM REPORT  [  ] IMMUNIZATION RECORDS  [ x ] Release all info      [ x ] Check here and initial the line next to each item to release ALL health information INCLUDING  _____ Care and treatment for drug and / or alcohol abuse  _____ HIV testing, infection status, or AIDS  _____ Genetic Testing    DATES OF SERVICE OR TIME PERIOD TO BE DISCLOSED: _____________  I understand and acknowledge that:  * This Authorization may be revoked at any time by you in writing, except if your health information has already been used or disclosed.  * Your health information that will be used or disclosed as a result of you signing this authorization could be re-disclosed by the recipient. If this occurs, your re-disclosed health information may no longer be protected by State or Federal laws.  * You may refuse to sign this Authorization. Your refusal will not affect your ability to obtain treatment.  * This Authorization becomes effective upon signing and will  on (date) __________.      If no date is indicated, this Authorization will  one (1) year from the signature date.    Name: Godfrey Hector  Son    Signature:   Date:     11/18/2021       PLEASE FAX REQUESTED RECORDS BACK TO: [phone]

## 2021-11-18 NOTE — ASSESSMENT & PLAN NOTE
Chronic and ongoing problem.  She received IV Reclast at some point in the past. No fragility fractures. She is taking calcium and vitamin D. We discussed regular weight bearing exercise. Would qualify again for antiresorptive therapy.

## 2021-11-18 NOTE — PROGRESS NOTES
Subjective:   Chief Complaint/History of Present Illness:  Ratna Cline is a 86 y.o. female established patient who presents today to discuss medical problems as listed below. Ratna is unaccompanied for today's visit.    Problem   Irritable Bowel Syndrome With Diarrhea    She reports longstanding challenges with upset stomach and intermittent diarrhea.  It feels to be food related.  She is not had any formal allergen testing but sounds like a homeopathic doctor told her she was allergic to milk in the past.  She would like to investigate this further so she could try elimination approach in her diet to improve her bowel function.    She Had allergy testing that was negative for food allergies and positive for environmental allergies.     Age-Related Osteoporosis Without Current Pathological Fracture    Bone Density (10/2021)  lumbar spine T score of -1.1  proximal left femur T score of -3.0      IMPRESSION: According to the World Health Organization classification, bone mineral density of this patient is osteopenia with increased risk of fracture in the lumbar spine and osteoporosis with high risk of fracture in the left femur.     10-year Probability of Fracture:  Major Osteoporotic     24.5%  Hip     11.8%    1/2007 DEXA T-score Lspine -1.0, L femur -2.0 (osteopenia)  9/2012 DEXA T-score Lspine -0.9, L femur -2.3 (osteopenia)    She has a known history of osteopenia, transitioned into osteoporosis on bone density in 2021.  No fragility fractures.  She does not desire additional evaluation or treatment for her osteopenia.  She is taking calcium and vitamin D.  She received IV Reclast x2 in the past, no records in the [employer] system for me to review so unclear on the timing.     Hypothyroidism Due to Acquired Atrophy of Thyroid       Ref. Range 5/13/2021 08:16 7/15/2021 13:17 10/11/2021 09:50   TSH Latest Ref Range: 0.380 - 5.330 uIU/mL 35.700 (H) 6.460 (H) 3.280   Free T-4 Latest Ref Range: 0.93 - 1.70  "ng/dL 0.72 (L) 1.23 1.39     She has had subclinical hypothyroidism since at least 2012 in varying degrees. She recalls previous treatment with levothyroxine and then with a desiccated thyroid.  Of note, she does take biotin intermittently which may alter her labs.    Current regimen: NP thyroid 15 mg daily  Previous regimen: OTC thyroid drops          Current Medications:  Current Outpatient Medications Ordered in Epic   Medication Sig Dispense Refill   • thyroid (ARMOUR THYROID) 15 MG Tab Take 1 Tablet by mouth every day. 100 Tablet 3   • MISC NATURAL PRODUCTS PO Take  by mouth. Ring Homeopathic Product & educational guide-helps fight the ringing in the ears     • Omega-3 Fatty Acids (SALMON OIL PO) Take  by mouth.     • Cholecalciferol (VITAMIN D3 PO) Take  by mouth.     • POTASSIUM PO Take  by mouth.     • NON SPECIFIED Pure Thyroid     • Multiple Vitamins-Minerals (MULTIVITAMIN ADULT PO) Take  by mouth.     • BIOTIN PO Take  by mouth.     • Calcium Carb-Cholecalciferol (CALCIUM 600 + D PO) Take  by mouth.     • cyanocobalamin (VITAMIN B-12) 100 MCG Tab Take 100 mcg by mouth every day.     • Turmeric, Curcuma Longa, (CURCUMIN) Powder by Does not apply route.       No current [facility]-ordered facility-administered medications on file.          Objective:   Physical Exam:    Vitals: /60 (BP Location: Left arm, Patient Position: Sitting, BP Cuff Size: Adult)   Pulse 70   Temp 36.3 °C (97.3 °F) (Temporal)   Resp 12   Ht 1.575 m (5' 2\")   Wt 47.6 kg (105 lb)   SpO2 93%    BMI: Body mass index is 19.2 kg/m².  Physical Exam  Constitutional:       General: She is not in acute distress.     Appearance: She is not ill-appearing.      Comments: Frail phenotype   Eyes:      General: No scleral icterus.     Conjunctiva/sclera: Conjunctivae normal.   Pulmonary:      Effort: Pulmonary effort is normal. No respiratory distress.   Skin:     Findings: No bruising or rash.   Psychiatric:         Mood and Affect: Mood " normal.         Behavior: Behavior normal.         Thought Content: Thought content normal.         Judgment: Judgment normal.          Assessment and Plan:   Ratna is a 86 y.o. female with the following:  Problem List Items Addressed This Visit     Hypothyroidism due to acquired atrophy of thyroid     Chronic and ongoing problem.  Good no longer afford the over-the-counter thyroid drops.  Send in prescription earlier this week NP thyroid 15 mg daily.  We will recheck her thyroid levels in 6 to 8 weeks to ensure appropriate response.  She knows to monitor for signs and symptoms of overtreatment such as tachycardia, tremulousness, or frequent stools.         Relevant Orders    TSH    FREE THYROXINE    Age-related osteoporosis without current pathological fracture     Chronic and ongoing problem.  She received IV Reclast at some point in the past. No fragility fractures. She is taking calcium and vitamin D. We discussed regular weight bearing exercise. Would qualify again for antiresorptive therapy.         Irritable bowel syndrome with diarrhea     Chronic and ongoing problem. She has not formally sat down with the allergist to review these findings. Will scan results into the chart.                RTC: Return in about 5 weeks (around 12/23/2021).    I spent a total of 38 minutes with record review, exam, communication with the patient, communication with other providers, and documentation of this encounter.    PLEASE NOTE: This dictation was created using voice recognition software. I have made every reasonable attempt to correct obvious errors, but I expect that there are errors of grammar and possibly content that I did not discover before finalizing the note.      Criselda Portillo, DO  Geriatric and Internal Medicine  [facility]

## 2021-12-20 ENCOUNTER — HOSPITAL ENCOUNTER (OUTPATIENT)
Facility: MEDICAL CENTER | Age: 86
End: 2021-12-20
Attending: INTERNAL MEDICINE
Payer: MEDICARE

## 2021-12-20 ENCOUNTER — NON-PROVIDER VISIT (OUTPATIENT)
Dept: MEDICAL GROUP | Facility: PHYSICIAN GROUP | Age: 86
End: 2021-12-20
Payer: MEDICARE

## 2021-12-20 DIAGNOSIS — E03.4 HYPOTHYROIDISM DUE TO ACQUIRED ATROPHY OF THYROID: ICD-10-CM

## 2021-12-20 LAB
T4 FREE SERPL-MCNC: 1.33 NG/DL (ref 0.93–1.7)
TSH SERPL DL<=0.005 MIU/L-ACNC: 3.72 UIU/ML (ref 0.38–5.33)

## 2021-12-20 PROCEDURE — 36415 COLL VENOUS BLD VENIPUNCTURE: CPT | Performed by: INTERNAL MEDICINE

## 2021-12-20 PROCEDURE — 99000 SPECIMEN HANDLING OFFICE-LAB: CPT | Performed by: INTERNAL MEDICINE

## 2021-12-20 PROCEDURE — 84439 ASSAY OF FREE THYROXINE: CPT

## 2021-12-20 PROCEDURE — 84443 ASSAY THYROID STIM HORMONE: CPT

## 2021-12-20 NOTE — PROGRESS NOTES
Patient arrived for blood draw. Patient reports fasting for at least 8-10 hours.   Verified patient's name/date-of-birth and reason for visit before procedure was started. Patient's right antecubital cleansed. Venipuncture attempts X1. Blood draw completed on patient's right AC. Applied pressure afterwards and placed Coban on site of venipuncture with directions for patient to remove within the next hour. Patient tolerated procedure well, no adverse reactions. Patient ambulated safely upon leaving clinic.   Completed labels were placed on blood samples in draw room with patient present. Appropriate blood samples were centrifuged. Blood samples were then placed in locked lab box for  pick-up.

## 2021-12-22 ENCOUNTER — TELEPHONE (OUTPATIENT)
Dept: MEDICAL GROUP | Facility: PHYSICIAN GROUP | Age: 86
End: 2021-12-22

## 2021-12-22 NOTE — TELEPHONE ENCOUNTER
----- Message from Criselda Portillo D.O. sent at 12/21/2021  7:56 PM PST -----  Please call patient with lab results. Thyroid labs remain stable, we can review in detail at her follow-up next week feel free to give her the exact values if she is curious.

## 2021-12-22 NOTE — TELEPHONE ENCOUNTER
Phone Number Called: [phone]      Call outcome: Spoke to patient regarding message below.    Message: pt informed of lab result.

## 2021-12-27 ENCOUNTER — TELEPHONE (OUTPATIENT)
Dept: MEDICAL GROUP | Facility: PHYSICIAN GROUP | Age: 86
End: 2021-12-27

## 2021-12-27 NOTE — TELEPHONE ENCOUNTER
ESTABLISHED PATIENT PRE-VISIT PLANNING     Patient was NOT contacted to complete PVP.     Note: Patient will not be contacted if there is no indication to call.     1.  Reviewed notes from the last few office visits within the medical group: Yes    2.  If any orders were placed at last visit or intended to be done for this visit (i.e. 6 mos follow-up), do we have Results/Consult Notes?         •  Labs - Labs ordered, completed on 12/20/21 and results are in chart.  Note: If patient appointment is for lab review and patient did not complete labs, check with provider if OK to reschedule patient until labs completed.       •  Imaging - Imaging was not ordered at last office visit.       •  Referrals - No referrals were ordered at last office visit.    3. Is this appointment scheduled as a Hospital Follow-Up? No    4.  Immunizations were updated in Epic using Reconcile Outside Information activity? Yes    5.  Patient is due for the following Health Maintenance Topics:   Health Maintenance Due   Topic Date Due   • IMM ZOSTER VACCINES (1 of 2) Never done     6.  AHA (Pulse8) form printed for Provider? No, already completed       Schedule with chiropractor  Continue OTC sinus medication for symptoms  Start saline nasal spray  Increase fluid intake and rest  Avoid NSAIDS while on steroid pack  If no improvement over the next 2-3 days or symptoms start to worsen, follow up with Urgent Care or return to this clinic.        Acute Pain, Adult  Acute pain is a type of pain that may last for just a few days or as long as six months. It is often related to an illness, injury, or medical procedure. Acute pain may be mild, moderate, or severe. It usually goes away once your injury has healed or you are no longer ill.  Pain can make it hard for you to do daily activities. It can cause anxiety and lead to other problems if left untreated. Treatment depends on the cause and severity of your acute pain.  Follow these instructions at home:  · Check your pain level as told by your health care provider.  · Take over-the-counter and prescription medicines only as told by your health care provider.  · If you are taking prescription pain medicine:  ? Ask your health care provider about taking a stool softener or laxative to prevent constipation.  ? Do not stop taking the medicine suddenly. Talk to your health care provider about how and when to discontinue prescription pain medicine.  ? If your pain is severe, do not take more pills than instructed by your health care provider.  ? Do not take other over-the-counter pain medicines in addition to this medicine unless told by your health care provider.  ? Do not drive or operate heavy machinery while taking prescription pain medicine.  · Apply ice or heat as told by your health care provider. These may reduce swelling and pain.  · Ask your health care provider if other strategies such as distraction, relaxation, or physical therapies can help your pain.  · Keep all follow-up visits as told by your health care provider. This is important.  Contact a health care provider if:  · You have pain that is not controlled  "by medicine.  · Your pain does not improve or gets worse.  · You have side effects from pain medicines, such as vomiting or confusion.  Get help right away if:  · You have severe pain.  · You have trouble breathing.  · You lose consciousness.  · You have chest pain or pressure that lasts for more than a few minutes. Along with the chest pain you may:  ? Have pain or discomfort in one or both arms, your back, neck, jaw, or stomach.  ? Have shortness of breath.  ? Break out in a cold sweat.  ? Feel nauseous.  ? Become light-headed.  These symptoms may represent a serious problem that is an emergency. Do not wait to see if the symptoms will go away. Get medical help right away. Call your local emergency services (911 in the [place] Do not drive yourself to the hospital.  This information is not intended to replace advice given to you by your health care provider. Make sure you discuss any questions you have with your health care provider.  Document Released: 01/01/2017 Document Revised: 05/26/2017 Document Reviewed: 01/01/2017  [employer] Interactive Patient Education © 2018 [employer]        Upper Respiratory Infection, Adult  Most upper respiratory infections (URIs) are a viral infection of the air passages leading to the lungs. A URI affects the nose, throat, and upper air passages. The most common type of URI is nasopharyngitis and is typically referred to as \"the common cold.\"  URIs run their course and usually go away on their own. Most of the time, a URI does not require medical attention, but sometimes a bacterial infection in the upper airways can follow a viral infection. This is called a secondary infection. Sinus and middle ear infections are common types of secondary upper respiratory infections.  Bacterial pneumonia can also complicate a URI. A URI can worsen asthma and chronic obstructive pulmonary disease (COPD). Sometimes, these complications can require emergency medical care and may be life " threatening.  What are the causes?  Almost all URIs are caused by viruses. A virus is a type of germ and can spread from one person to another.  What increases the risk?  You may be at risk for a URI if:  · You smoke.  · You have chronic heart or lung disease.  · You have a weakened defense (immune) system.  · You are very young or very old.  · You have nasal allergies or asthma.  · You work in crowded or poorly ventilated areas.  · You work in health care facilities or schools.    What are the signs or symptoms?  Symptoms typically develop 2-3 days after you come in contact with a cold virus. Most viral URIs last 7-10 days. However, viral URIs from the influenza virus (flu virus) can last 14-18 days and are typically more severe. Symptoms may include:  · Runny or stuffy (congested) nose.  · Sneezing.  · Cough.  · Sore throat.  · Headache.  · Fatigue.  · Fever.  · Loss of appetite.  · Pain in your forehead, behind your eyes, and over your cheekbones (sinus pain).  · Muscle aches.    How is this diagnosed?  Your health care provider may diagnose a URI by:  · Physical exam.  · Tests to check that your symptoms are not due to another condition such as:  ? Strep throat.  ? Sinusitis.  ? Pneumonia.  ? Asthma.    How is this treated?  A URI goes away on its own with time. It cannot be cured with medicines, but medicines may be prescribed or recommended to relieve symptoms. Medicines may help:  · Reduce your fever.  · Reduce your cough.  · Relieve nasal congestion.    Follow these instructions at home:  · Take medicines only as directed by your health care provider.  · Gargle warm saltwater or take cough drops to comfort your throat as directed by your health care provider.  · Use a warm mist humidifier or inhale steam from a shower to increase air moisture. This may make it easier to breathe.  · Drink enough fluid to keep your urine clear or pale yellow.  · Eat soups and other clear broths and maintain good  nutrition.  · Rest as needed.  · Return to work when your temperature has returned to normal or as your health care provider advises. You may need to stay home longer to avoid infecting others. You can also use a face mask and careful hand washing to prevent spread of the virus.  · Increase the usage of your inhaler if you have asthma.  · Do not use any tobacco products, including cigarettes, chewing tobacco, or electronic cigarettes. If you need help quitting, ask your health care provider.  How is this prevented?  The best way to protect yourself from getting a cold is to practice good hygiene.  · Avoid oral or hand contact with people with cold symptoms.  · Wash your hands often if contact occurs.    There is no clear evidence that vitamin C, vitamin E, echinacea, or exercise reduces the chance of developing a cold. However, it is always recommended to get plenty of rest, exercise, and practice good nutrition.  Contact a health care provider if:  · You are getting worse rather than better.  · Your symptoms are not controlled by medicine.  · You have chills.  · You have worsening shortness of breath.  · You have brown or red mucus.  · You have yellow or brown nasal discharge.  · You have pain in your face, especially when you bend forward.  · You have a fever.  · You have swollen neck glands.  · You have pain while swallowing.  · You have white areas in the back of your throat.  Get help right away if:  · You have severe or persistent:  ? Headache.  ? Ear pain.  ? Sinus pain.  ? Chest pain.  · You have chronic lung disease and any of the following:  ? Wheezing.  ? Prolonged cough.  ? Coughing up blood.  ? A change in your usual mucus.  · You have a stiff neck.  · You have changes in your:  ? Vision.  ? Hearing.  ? Thinking.  ? Mood.  This information is not intended to replace advice given to you by your health care provider. Make sure you discuss any questions you have with your health care provider.  Document  Released: 06/13/2002 Document Revised: 08/20/2017 Document Reviewed: 03/25/2015  [employer]vier Interactive Patient Education © 2018 [employer]

## 2021-12-28 ENCOUNTER — OFFICE VISIT (OUTPATIENT)
Dept: MEDICAL GROUP | Facility: PHYSICIAN GROUP | Age: 86
End: 2021-12-28
Payer: MEDICARE

## 2021-12-28 VITALS
RESPIRATION RATE: 14 BRPM | TEMPERATURE: 97.2 F | HEIGHT: 62 IN | WEIGHT: 107 LBS | SYSTOLIC BLOOD PRESSURE: 108 MMHG | HEART RATE: 69 BPM | OXYGEN SATURATION: 96 % | DIASTOLIC BLOOD PRESSURE: 56 MMHG | BODY MASS INDEX: 19.69 KG/M2

## 2021-12-28 DIAGNOSIS — M81.0 AGE-RELATED OSTEOPOROSIS WITHOUT CURRENT PATHOLOGICAL FRACTURE: ICD-10-CM

## 2021-12-28 DIAGNOSIS — E03.4 HYPOTHYROIDISM DUE TO ACQUIRED ATROPHY OF THYROID: ICD-10-CM

## 2021-12-28 PROCEDURE — 99213 OFFICE O/P EST LOW 20 MIN: CPT | Performed by: INTERNAL MEDICINE

## 2021-12-28 ASSESSMENT — FIBROSIS 4 INDEX: FIB4 SCORE: 2.08

## 2021-12-28 NOTE — PATIENT INSTRUCTIONS
[facility]  [phone]  [facility]  [address], [address]  [place] [address]

## 2021-12-28 NOTE — PROGRESS NOTES
Subjective:   Chief Complaint/History of Present Illness:  Ratna Cline is a 86 y.o. female established patient who presents today to discuss medical problems as listed below. Ratna is unaccompanied for today's visit.    Problem   Age-Related Osteoporosis Without Current Pathological Fracture    Bone Density (10/2021)  lumbar spine T score of -1.1  proximal left femur T score of -3.0      IMPRESSION: According to the World Health Organization classification, bone mineral density of this patient is osteopenia with increased risk of fracture in the lumbar spine and osteoporosis with high risk of fracture in the left femur.     10-year Probability of Fracture:  Major Osteoporotic     24.5%  Hip     11.8%    1/2007 DEXA T-score Lspine -1.0, L femur -2.0 (osteopenia)  9/ however this is not DEXA T-score Lspine -0.9, L femur -2.3 (osteopenia)    She has a known history of osteopenia, transitioned into osteoporosis on bone density in 2021.  No fragility fractures. She is taking calcium and vitamin D.      Prior office notes state patient reported receiving IV Reclast x2 in 2012 however I cannot find any evidence of this in the computer and patient does not have any memory of receiving this medicine.  She has a history of Pisano's esophagus would not be a good candidate for Fosamax or Boniva.  She is agreeable to IV Reclast and I mentioned to her the drug assistance program to look into to help with cost.     Hypothyroidism Due to Acquired Atrophy of Thyroid       Ref. Range 5/13/2021 08:16 7/15/2021 13:17 10/11/2021 09:50 12/20/2021 09:00   TSH Latest Ref Range: 0.380 - 5.330 uIU/mL 35.700 (H) 6.460 (H) 3.280 3.720   Free T-4 Latest Ref Range: 0.93 - 1.70 ng/dL 0.72 (L) 1.23 1.39 1.33     She has had subclinical hypothyroidism since at least 2012 in varying degrees, became overt hypothyroidism in May 2021. She recalls previous treatment with levothyroxine but did not feel comfortable with it and has been on  "desiccated/natural formulations since that time.  Of note, previously she used biotin intermittently which may have altered her labs.    Current regimen: NP thyroid 15 mg daily  Previous regimen: OTC thyroid drops          Current Medications:  Current Outpatient Medications Ordered in Epic   Medication Sig Dispense Refill   • thyroid (ARMOUR THYROID) 15 MG Tab Take 1 Tablet by mouth every day. 100 Tablet 3   • MISC NATURAL PRODUCTS PO Take  by mouth. Ring Homeopathic Product & educational guide-helps fight the ringing in the ears     • Omega-3 Fatty Acids (SALMON OIL PO) Take  by mouth.     • Cholecalciferol (VITAMIN D3 PO) Take  by mouth.     • POTASSIUM PO Take  by mouth.     • NON SPECIFIED Pure Thyroid     • Multiple Vitamins-Minerals (MULTIVITAMIN ADULT PO) Take  by mouth.     • BIOTIN PO Take  by mouth.     • Calcium Carb-Cholecalciferol (CALCIUM 600 + D PO) Take  by mouth.     • cyanocobalamin (VITAMIN B-12) 100 MCG Tab Take 100 mcg by mouth every day.     • Turmeric, Curcuma Longa, (CURCUMIN) Powder by Does not apply route.       No current [facility]-ordered facility-administered medications on file.          Objective:   Physical Exam:    Vitals: /56 (BP Location: Right arm, Patient Position: Sitting, BP Cuff Size: Adult)   Pulse 69   Temp 36.2 °C (97.2 °F) (Temporal)   Resp 14   Ht 1.575 m (5' 2\")   Wt 48.5 kg (107 lb)   SpO2 96%    BMI: Body mass index is 19.57 kg/m².  Physical Exam  Constitutional:       General: She is not in acute distress.     Appearance: Normal appearance. She is normal weight. She is not ill-appearing.   HENT:      Right Ear: Ear canal normal. There is no impacted cerumen.      Left Ear: Ear canal normal. There is no impacted cerumen.   Eyes:      General: No scleral icterus.     Conjunctiva/sclera: Conjunctivae normal.   Cardiovascular:      Rate and Rhythm: Normal rate and regular rhythm.      Pulses: Normal pulses.      Heart sounds: No murmur heard.      Pulmonary:      " Effort: Pulmonary effort is normal. No respiratory distress.      Breath sounds: Normal breath sounds. No wheezing or rhonchi.   Abdominal:      General: Bowel sounds are normal.      Palpations: Abdomen is soft.      Tenderness: There is no abdominal tenderness.   Musculoskeletal:      Right lower leg: No edema.      Left lower leg: No edema.      Comments: Bony deformities related to known osteochrondroma.   Skin:     General: Skin is warm and dry.      Findings: No bruising or rash.   Psychiatric:         Mood and Affect: Mood normal.         Behavior: Behavior normal.         Thought Content: Thought content normal.         Judgment: Judgment normal.          Assessment and Plan:   Ratna is a 86 y.o. female with the following:  Problem List Items Addressed This Visit     Hypothyroidism due to acquired atrophy of thyroid     Chronic and stable problem.  TSH and free T4 at goal.  She is feeling well on current regimen.  She would like to continue NP thyroid 15 mg daily.         Age-related osteoporosis without current pathological fracture     Chronic and worsening problem, she has transitioned into osteoporosis as of October 2021.  She is appropriately on calcium and vitamin D.  Prior chart notes document IV Reclast however patient has [place] no recollection of this and thinks it may be an error.  Due to Pisano's esophagus she would not be a good candidate for oral bisphosphonates.  She would be open to IV Reclast, she will plan to discuss this with her new physician and I mentioned to her the patient assistance program through the drug company that will help cover significant amount of the cost.                RTC: Return if symptoms worsen or fail to improve. She now has [employer] insurance and will be looking for a new medical group.    I spent a total of 28 minutes with record review, exam, communication with the patient, communication with other providers, and documentation of this encounter.    PLEASE  NOTE: This dictation was created using voice recognition software. I have made every reasonable attempt to correct obvious errors, but I expect that there are errors of grammar and possibly content that I did not discover before finalizing the note.      Criselda Portillo, DO  Geriatric and Internal Medicine  [facility]

## 2021-12-28 NOTE — ASSESSMENT & PLAN NOTE
Chronic and stable problem.  TSH and free T4 at goal.  She is feeling well on current regimen.  She would like to continue NP thyroid 15 mg daily.

## 2021-12-28 NOTE — ASSESSMENT & PLAN NOTE
Chronic and worsening problem, she has transitioned into osteoporosis as of October 2021.  She is appropriately on calcium and vitamin D.  Prior chart notes document IV Reclast however patient has Apsley no recollection of this and thinks it may be an error.  Due to Pisano's esophagus she would not be a good candidate for oral bisphosphonates.  She would be open to IV Reclast, she will plan to discuss this with her new physician and I mentioned to her the patient assistance program through the drug company that will help cover significant amount of the cost.

## 2021-12-30 ENCOUNTER — TELEPHONE (OUTPATIENT)
Dept: MEDICAL GROUP | Facility: PHYSICIAN GROUP | Age: 86
End: 2021-12-30

## 2021-12-30 NOTE — TELEPHONE ENCOUNTER
1. Caller Name: Ratnasheri Hector Thompson                          Call Back Number: ,[phone]        How would the patient prefer to be contacted with a response: Phone call OK to leave a detailed message    Let pt know her RX went to [facility]

## 2022-03-28 ENCOUNTER — APPOINTMENT (OUTPATIENT)
Dept: URGENT CARE | Facility: PHYSICIAN GROUP | Age: 87
End: 2022-03-28

## 2022-08-02 PROBLEM — G31.84 MILD COGNITIVE IMPAIRMENT: Status: ACTIVE | Noted: 2021-06-08

## 2022-12-14 PROBLEM — G44.209 TENSION HEADACHE: Status: ACTIVE | Noted: 2022-12-14

## 2023-02-07 PROBLEM — R35.1 NOCTURIA: Status: ACTIVE | Noted: 2023-02-07

## 2023-02-10 PROBLEM — N89.8 VAGINAL DISCHARGE: Status: RESOLVED | Noted: 2021-08-13 | Resolved: 2023-02-10

## 2023-02-17 PROBLEM — F32.0 CURRENT MILD EPISODE OF MAJOR DEPRESSIVE DISORDER WITHOUT PRIOR EPISODE (HCC): Status: ACTIVE | Noted: 2023-02-17

## 2023-02-17 PROBLEM — R10.32 LEFT LOWER QUADRANT ABDOMINAL PAIN: Status: ACTIVE | Noted: 2023-02-17

## 2023-02-17 PROBLEM — E67.3 HYPERVITAMINOSIS D: Status: ACTIVE | Noted: 2023-02-17

## 2023-04-05 PROBLEM — R23.8: Status: ACTIVE | Noted: 2023-04-05

## 2023-04-05 PROBLEM — R23.8: Chronic | Status: ACTIVE | Noted: 2023-04-05

## 2023-04-05 PROBLEM — E67.3 HYPERVITAMINOSIS D: Chronic | Status: ACTIVE | Noted: 2023-02-17

## 2023-04-05 PROBLEM — I83.93 ASYMPTOMATIC VARICOSE VEINS OF BOTH LOWER EXTREMITIES: Status: ACTIVE | Noted: 2023-04-05

## 2023-04-07 PROBLEM — I73.9 PERIPHERAL ARTERIAL DISEASE (HCC): Chronic | Status: ACTIVE | Noted: 2021-08-13

## 2023-04-07 PROBLEM — G31.84 MILD COGNITIVE IMPAIRMENT: Chronic | Status: ACTIVE | Noted: 2021-06-08

## 2023-04-07 PROBLEM — F32.0 CURRENT MILD EPISODE OF MAJOR DEPRESSIVE DISORDER WITHOUT PRIOR EPISODE (HCC): Chronic | Status: ACTIVE | Noted: 2023-02-17

## 2023-04-07 PROBLEM — I73.9 PERIPHERAL ARTERIAL DISEASE (HCC): Status: ACTIVE | Noted: 2021-08-13

## 2023-06-06 PROBLEM — H57.02 ANISOCORIA: Chronic | Status: ACTIVE | Noted: 2023-06-06

## 2023-06-06 PROBLEM — M75.21 BICEPS TENDONITIS ON RIGHT: Status: ACTIVE | Noted: 2023-06-06

## 2023-06-06 PROBLEM — H57.02 ANISOCORIA: Status: ACTIVE | Noted: 2023-06-06

## 2023-06-06 PROBLEM — M75.21 BICEPS TENDONITIS ON RIGHT: Chronic | Status: ACTIVE | Noted: 2023-06-06

## 2023-06-07 PROBLEM — M79.89 LEG SWELLING: Status: ACTIVE | Noted: 2023-06-07

## 2023-06-07 PROBLEM — L98.9 SKIN LESION: Status: RESOLVED | Noted: 2023-06-07 | Resolved: 2023-06-07

## 2023-06-07 PROBLEM — L98.9 SKIN LESION: Status: ACTIVE | Noted: 2023-06-07

## 2023-09-08 PROBLEM — Z71.9 HEALTH COUNSELING: Status: ACTIVE | Noted: 2023-09-08

## 2023-09-27 ENCOUNTER — APPOINTMENT (RX ONLY)
Dept: URBAN - METROPOLITAN AREA CLINIC 22 | Facility: CLINIC | Age: 88
Setting detail: DERMATOLOGY
End: 2023-09-27

## 2023-09-27 DIAGNOSIS — L82.1 OTHER SEBORRHEIC KERATOSIS: ICD-10-CM

## 2023-09-27 DIAGNOSIS — L57.0 ACTINIC KERATOSIS: ICD-10-CM

## 2023-09-27 PROCEDURE — 17000 DESTRUCT PREMALG LESION: CPT

## 2023-09-27 PROCEDURE — 99212 OFFICE O/P EST SF 10 MIN: CPT | Mod: 25

## 2023-09-27 PROCEDURE — ? COUNSELING

## 2023-09-27 PROCEDURE — ? LIQUID NITROGEN

## 2023-09-27 PROCEDURE — 17003 DESTRUCT PREMALG LES 2-14: CPT

## 2023-09-27 ASSESSMENT — LOCATION SIMPLE DESCRIPTION DERM
LOCATION SIMPLE: NECK
LOCATION SIMPLE: RIGHT CHEEK
LOCATION SIMPLE: SCALP

## 2023-09-27 ASSESSMENT — LOCATION ZONE DERM
LOCATION ZONE: NECK
LOCATION ZONE: FACE
LOCATION ZONE: SCALP

## 2023-09-27 ASSESSMENT — LOCATION DETAILED DESCRIPTION DERM
LOCATION DETAILED: RIGHT SUPERIOR CENTRAL BUCCAL CHEEK
LOCATION DETAILED: LEFT INFERIOR POSTAURICULAR SKIN
LOCATION DETAILED: RIGHT CENTRAL MALAR CHEEK
LOCATION DETAILED: LEFT SUPERIOR LATERAL NECK

## 2023-09-27 NOTE — PROCEDURE: LIQUID NITROGEN
Render Post-Care Instructions In Note?: no
Detail Level: Detailed
Show Aperture Variable?: Yes
Consent: The patient's consent was obtained including but not limited to risks of crusting, scabbing, blistering, scarring, darker or lighter pigmentary change, recurrence, incomplete removal and infection.
Number Of Freeze-Thaw Cycles: 2 freeze-thaw cycles
Duration Of Freeze Thaw-Cycle (Seconds): 0
Post-Care Instructions: I reviewed with the patient in detail post-care instructions. Patient is to wear sunprotection, and avoid picking at any of the treated lesions. Pt may apply Vaseline to crusted or scabbing areas.

## 2023-10-20 PROBLEM — M19.90 ARTHRITIS: Status: ACTIVE | Noted: 2023-10-20

## 2023-12-01 PROBLEM — R23.8: Chronic | Status: RESOLVED | Noted: 2023-04-05 | Resolved: 2023-12-01

## 2023-12-01 PROBLEM — F32.A MILD DEPRESSION: Status: RESOLVED | Noted: 2021-08-13 | Resolved: 2023-12-01

## 2023-12-01 PROBLEM — M79.89 LEG SWELLING: Status: RESOLVED | Noted: 2023-06-07 | Resolved: 2023-12-01

## 2023-12-01 PROBLEM — H57.02 ANISOCORIA: Chronic | Status: RESOLVED | Noted: 2023-06-06 | Resolved: 2023-12-01

## 2024-01-01 ENCOUNTER — OFFICE VISIT (OUTPATIENT)
Dept: URGENT CARE | Facility: PHYSICIAN GROUP | Age: 89
End: 2024-01-01
Payer: MEDICARE

## 2024-01-01 VITALS
HEIGHT: 64 IN | WEIGHT: 96.6 LBS | SYSTOLIC BLOOD PRESSURE: 124 MMHG | OXYGEN SATURATION: 96 % | TEMPERATURE: 98.1 F | RESPIRATION RATE: 16 BRPM | HEART RATE: 64 BPM | BODY MASS INDEX: 16.49 KG/M2 | DIASTOLIC BLOOD PRESSURE: 58 MMHG

## 2024-01-01 DIAGNOSIS — S00.12XA BLACK EYE OF LEFT SIDE, INITIAL ENCOUNTER: ICD-10-CM

## 2024-01-01 DIAGNOSIS — S00.03XA CONTUSION OF SCALP, INITIAL ENCOUNTER: ICD-10-CM

## 2024-01-01 DIAGNOSIS — Z87.820 HISTORY OF CLOSED HEAD INJURY: ICD-10-CM

## 2024-01-01 PROCEDURE — 99213 OFFICE O/P EST LOW 20 MIN: CPT | Performed by: PHYSICIAN ASSISTANT

## 2024-01-01 PROCEDURE — 3074F SYST BP LT 130 MM HG: CPT | Performed by: PHYSICIAN ASSISTANT

## 2024-01-01 PROCEDURE — 3078F DIAST BP <80 MM HG: CPT | Performed by: PHYSICIAN ASSISTANT

## 2024-01-01 ASSESSMENT — ENCOUNTER SYMPTOMS
EYE DISCHARGE: 0
EYE PAIN: 0
DOUBLE VISION: 0
FEVER: 0
PHOTOPHOBIA: 0
EYE REDNESS: 0
VISUAL CHANGE: 0
EASY BRUISING: 1
BLURRED VISION: 0
HEADACHES: 0
WEAKNESS: 0
ANOREXIA: 0
VOMITING: 0

## 2024-01-01 ASSESSMENT — VISUAL ACUITY: OU: 1
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
## 2024-01-24 ENCOUNTER — TELEPHONE (OUTPATIENT)
Dept: HEALTH INFORMATION MANAGEMENT | Facility: OTHER | Age: 89
End: 2024-01-24
Payer: MEDICARE

## 2024-01-30 ENCOUNTER — OFFICE VISIT (OUTPATIENT)
Dept: MEDICAL GROUP | Facility: LAB | Age: 89
End: 2024-01-30
Payer: MEDICARE

## 2024-01-30 VITALS
SYSTOLIC BLOOD PRESSURE: 128 MMHG | RESPIRATION RATE: 16 BRPM | DIASTOLIC BLOOD PRESSURE: 70 MMHG | OXYGEN SATURATION: 96 % | HEART RATE: 56 BPM | BODY MASS INDEX: 16.75 KG/M2 | HEIGHT: 64 IN | WEIGHT: 98.11 LBS | TEMPERATURE: 97.2 F

## 2024-01-30 DIAGNOSIS — G31.84 MILD COGNITIVE IMPAIRMENT: Chronic | ICD-10-CM

## 2024-01-30 DIAGNOSIS — F33.0 MILD EPISODE OF RECURRENT MAJOR DEPRESSIVE DISORDER (HCC): ICD-10-CM

## 2024-01-30 DIAGNOSIS — R35.1 NOCTURIA: ICD-10-CM

## 2024-01-30 DIAGNOSIS — E03.4 HYPOTHYROIDISM DUE TO ACQUIRED ATROPHY OF THYROID: ICD-10-CM

## 2024-01-30 DIAGNOSIS — E67.3 HYPERVITAMINOSIS D: Chronic | ICD-10-CM

## 2024-01-30 DIAGNOSIS — R79.89 LOW SERUM LOW DENSITY LIPOPROTEIN (LDL): ICD-10-CM

## 2024-01-30 DIAGNOSIS — H91.90 SUBJECTIVE HEARING LOSS: ICD-10-CM

## 2024-01-30 DIAGNOSIS — H93.13 TINNITUS OF BOTH EARS: ICD-10-CM

## 2024-01-30 PROBLEM — R10.32 LEFT LOWER QUADRANT ABDOMINAL PAIN: Status: RESOLVED | Noted: 2023-02-17 | Resolved: 2024-01-30
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
## 2024-01-30 PROCEDURE — 3074F SYST BP LT 130 MM HG: CPT | Performed by: STUDENT IN AN ORGANIZED HEALTH CARE EDUCATION/TRAINING PROGRAM

## 2024-01-30 PROCEDURE — 3078F DIAST BP <80 MM HG: CPT | Performed by: STUDENT IN AN ORGANIZED HEALTH CARE EDUCATION/TRAINING PROGRAM

## 2024-01-30 PROCEDURE — 99215 OFFICE O/P EST HI 40 MIN: CPT | Performed by: STUDENT IN AN ORGANIZED HEALTH CARE EDUCATION/TRAINING PROGRAM

## 2024-01-30 ASSESSMENT — PATIENT HEALTH QUESTIONNAIRE - PHQ9
8. MOVING OR SPEAKING SO SLOWLY THAT OTHER PEOPLE COULD HAVE NOTICED. OR THE OPPOSITE, BEING SO FIGETY OR RESTLESS THAT YOU HAVE BEEN MOVING AROUND A LOT MORE THAN USUAL: NOT AT ALL
5. POOR APPETITE OR OVEREATING: NOT AT ALL
SUM OF ALL RESPONSES TO PHQ9 QUESTIONS 1 AND 2: 0
3. TROUBLE FALLING OR STAYING ASLEEP OR SLEEPING TOO MUCH: NOT AT ALL
4. FEELING TIRED OR HAVING LITTLE ENERGY: NOT AT ALL
6. FEELING BAD ABOUT YOURSELF - OR THAT YOU ARE A FAILURE OR HAVE LET YOURSELF OR YOUR FAMILY DOWN: NOT AL ALL
5. POOR APPETITE OR OVEREATING: 0 - NOT AT ALL
9. THOUGHTS THAT YOU WOULD BE BETTER OFF DEAD, OR OF HURTING YOURSELF: NOT AT ALL
SUM OF ALL RESPONSES TO PHQ QUESTIONS 1-9: 7
1. LITTLE INTEREST OR PLEASURE IN DOING THINGS: NOT AT ALL
2. FEELING DOWN, DEPRESSED, IRRITABLE, OR HOPELESS: NOT AT ALL
CLINICAL INTERPRETATION OF PHQ2 SCORE: 2
SUM OF ALL RESPONSES TO PHQ QUESTIONS 1-9: 0
7. TROUBLE CONCENTRATING ON THINGS, SUCH AS READING THE NEWSPAPER OR WATCHING TELEVISION: NOT AT ALL

## 2024-01-30 ASSESSMENT — ENCOUNTER SYMPTOMS
ABDOMINAL PAIN: 0
CONSTIPATION: 1
WEIGHT LOSS: 0
NAUSEA: 0
BLOOD IN STOOL: 0
SHORTNESS OF BREATH: 0
PALPITATIONS: 0
SORE THROAT: 0
CHILLS: 0
DIARRHEA: 0
VOMITING: 0
FEVER: 0
COUGH: 0
HEADACHES: 0

## 2024-01-30 NOTE — PROGRESS NOTES
Subjective:     Chief Complaint   Patient presents with    Establish Care     HISTORY OF THE PRESENT ILLNESS: Patient is a 88 y.o. female. This pleasant patient is here today to establish care and discuss vascular disease and medication. His/her prior PCP was Augustine SETHI.    Patient had an abnormal screening QuantaFlo however AASHISH came back normal.  Patient wondering if she does have vascular disease or not.  Denies any persistent lower extremity pain aside from chronic stable discomfort from osteochondroma for which she previously was followed by orthopedics who offered surgery which she declined prior.  Denies any numbness or tingling in her lower extremities.  Denies any claudication.  Patient continues with red yeast rice and had fasting lab orders that she has not completed yet.  Wonders which labs she should have.    Patient states she was transition from [place] Thyroid to levothyroxine due to insurance issue.  Denies any significant weight changes, fatigue and diarrhea.  Does endorse some constipation which is overall controlled with prune juice.    Patient does report chronic issues with her memory, cites things like walking into her room and forgetting what she went in there for.  Lives with her son.  Wondering about getting hearing aids for bilateral hearing loss.  Reports chronic history of high-pitched tinnitus in both ears, more noticeable on the left.    States that her sleep is not great as she does often wake at night to use the restroom.    Review of Systems   Constitutional:  Negative for chills, fever, malaise/fatigue and weight loss.   HENT:  Negative for congestion and sore throat.    Respiratory:  Negative for cough and shortness of breath.    Cardiovascular:  Negative for chest pain, palpitations and leg swelling.   Gastrointestinal:  Positive for constipation (last BM this am, normal stool this AM). Negative for abdominal pain, blood in stool, diarrhea, nausea and vomiting.   Genitourinary:  " Positive for frequency. Negative for dysuria and hematuria.   Skin:  Negative for rash.   Neurological:  Negative for headaches.     Objective:     Exam: /70 (BP Location: Right arm, Patient Position: Sitting, BP Cuff Size: Adult)   Pulse (!) 56   Temp 36.2 °C (97.2 °F) (Temporal)   Resp 16   Ht 1.613 m (5' 3.5\")   Wt 44.5 kg (98 lb 1.7 oz)   SpO2 96%  Body mass index is 17.11 kg/m².    Physical Exam  Vitals reviewed.   Constitutional:       General: She is not in acute distress.     Appearance: She is underweight. She is not ill-appearing.   HENT:      Head: Normocephalic and atraumatic.      Right Ear: Tympanic membrane, ear canal and external ear normal. There is no impacted cerumen.      Left Ear: Tympanic membrane, ear canal and external ear normal. There is no impacted cerumen.      Nose: Nose normal.      Mouth/Throat:      Mouth: Mucous membranes are moist.   Eyes:      Conjunctiva/sclera: Conjunctivae normal.   Cardiovascular:      Rate and Rhythm: Normal rate and regular rhythm.      Pulses:           Dorsalis pedis pulses are 2+ on the left side.        Posterior tibial pulses are 2+ on the left side.      Heart sounds: Normal heart sounds. No murmur heard.  Pulmonary:      Effort: Pulmonary effort is normal. No respiratory distress.      Breath sounds: Normal breath sounds. No stridor. No wheezing, rhonchi or rales.   Abdominal:      General: Abdomen is flat. Bowel sounds are normal.      Palpations: Abdomen is soft. There is no mass.      Tenderness: There is abdominal tenderness (minimal LUQ and generalized minimal lower).      Hernia: No hernia is present.   Musculoskeletal:      Cervical back: Normal range of motion and neck supple. No rigidity or tenderness.      Right lower leg: No edema.      Left lower leg: No edema.   Feet:      Left foot:      Skin integrity: Skin integrity normal.   Skin:     General: Skin is warm and dry.   Neurological:      Mental Status: She is alert and " oriented to person, place, and time.      Comments: Hearing fair   Psychiatric:         Mood and Affect: Mood normal.         Behavior: Behavior normal.         Thought Content: Thought content normal.       Labs: Reviewed from 1/28/2023, 2/13/2023 (see media)  Imaging: Reviewed from 11/20/2023 (see media) normal AASHISH    Depression Screening    Little interest or pleasure in doing things?  1 - several days  Feeling down, depressed , or hopeless? 1 - several days  Trouble falling or staying asleep, or sleeping too much?  3 - nearly every day  Feeling tired or having little energy?  1 - several days  Poor appetite or overeating?  0 - not at all  Feeling bad about yourself - or that you are a failure or have let yourself or your family down? 1 - several days  Trouble concentrating on things, such as reading the newspaper or watching television? 0 - not at all  Moving or speaking so slowly that other people could have noticed.  Or the opposite - being so fidgety or restless that you have been moving around a lot more than usual?  0 - not at all  Thoughts that you would be better off dead, or of hurting yourself?  0 - not at all  Patient Health Questionnaire Score: 7    Assessment & Plan:   88 y.o. female with the following -    1. Hypothyroidism due to acquired atrophy of thyroid  Chronic, due to trend after switch to levothyroxine from [place] Thyroid.  Continue medication(s) as below pending lab results.  - TSH WITH REFLEX TO FT4; Future  - CBC WITH DIFFERENTIAL; Future  - TRIIDOTHYRONINE; Future    2. Low serum low density lipoprotein (LDL)  Chronic.  Patient has started red yeast rice so we will trend.  Given age would have to have strong indication for statin to consider, not indicated at this point. AASHISH was normal indicating lack of significant vascular disease.  - Comp Metabolic Panel; Future  - Lipid Profile; Future    3. Hypervitaminosis D  Noted prior, sounds like patient was not advised to make any changes in  her vitamin D supplement so we will trend with next labs.  - Comp Metabolic Panel; Future  - VITAMIN D,25 HYDROXY (DEFICIENCY); Future    4. Nocturia  Chronic, previously followed by urology back in 202 for urinary symptoms and history of recurrent UTI.  Patient denies concern for recurrent UTI, check urine analysis and discuss at follow-up.  Consider referral back to urology.  - URINALYSIS,CULTURE IF INDICATED; Future    5. Subjective hearing loss  6. Tinnitus of both ears  Chronic, patient may benefit from hearing aids.  Referred.  - Referral to Audiology    7. Mild cognitive impairment  Chronic, suspect multifactorial.  Lives with her son.  Will check labs and have her referred for hearing aids.  Depression does not seem to be a component at this time, monitor clinically.  - Comp Metabolic Panel; Future  - TSH WITH REFLEX TO FT4; Future  - CBC WITH DIFFERENTIAL; Future  - VITAMIN B12; Future    8. Mild episode of recurrent major depressive disorder (HCC)  Chronic and sounds improved from prior.  No safety concerns.  Monitor clinically.  May benefit from referral to behavioral health mainly for sleep, review again at follow-up.    I spent a total of 44 minutes with record review, exam, communication with the patient, and documentation of this encounter.    Return in about 3 weeks (around 2/20/2024), or if symptoms worsen or fail to improve, for labs, sleep/memory/nocturia (40min requested).    Please note that this dictation was created using voice recognition software. I have made every reasonable attempt to correct obvious errors, but I expect that there are errors of grammar and possibly content that I did not discover before finalizing the note.

## 2024-01-30 NOTE — PATIENT INSTRUCTIONS
fasting labs due anytime (at least 1-2 weeks prior to follow up)     Vaccines due that can be received only at pharmacy:  COVID  RSV (respiratory syncytial virus)  Shingrix series (shingles)

## 2024-01-31 ENCOUNTER — TELEPHONE (OUTPATIENT)
Dept: MEDICAL GROUP | Facility: LAB | Age: 89
End: 2024-01-31
Payer: MEDICARE

## 2024-01-31 NOTE — TELEPHONE ENCOUNTER
Patient came into the office asking for a refill of the above medication.  Pt. Has 2 pills left.  Pt. Forget to mention this at yesterday's appt.      Pharmacy:  [facility]    Contact # [phone]

## 2024-02-16 ENCOUNTER — HOSPITAL ENCOUNTER (OUTPATIENT)
Dept: LAB | Facility: MEDICAL CENTER | Age: 89
End: 2024-02-16
Attending: STUDENT IN AN ORGANIZED HEALTH CARE EDUCATION/TRAINING PROGRAM
Payer: MEDICARE

## 2024-02-16 DIAGNOSIS — G31.84 MILD COGNITIVE IMPAIRMENT: Chronic | ICD-10-CM

## 2024-02-16 DIAGNOSIS — R35.1 NOCTURIA: ICD-10-CM

## 2024-02-16 DIAGNOSIS — R79.89 LOW SERUM LOW DENSITY LIPOPROTEIN (LDL): ICD-10-CM

## 2024-02-16 DIAGNOSIS — E03.4 HYPOTHYROIDISM DUE TO ACQUIRED ATROPHY OF THYROID: ICD-10-CM

## 2024-02-16 DIAGNOSIS — E67.3 HYPERVITAMINOSIS D: Chronic | ICD-10-CM

## 2024-02-16 LAB
APPEARANCE UR: CLEAR
BACTERIA #/AREA URNS HPF: ABNORMAL /HPF
BASOPHILS # BLD AUTO: 0.5 % (ref 0–1.8)
BASOPHILS # BLD: 0.03 K/UL (ref 0–0.12)
BILIRUB UR QL STRIP.AUTO: NEGATIVE
COLOR UR: YELLOW
EOSINOPHIL # BLD AUTO: 0.08 K/UL (ref 0–0.51)
EOSINOPHIL NFR BLD: 1.3 % (ref 0–6.9)
EPI CELLS #/AREA URNS HPF: ABNORMAL /HPF
ERYTHROCYTE [DISTWIDTH] IN BLOOD BY AUTOMATED COUNT: 45.5 FL (ref 35.9–50)
GLUCOSE UR STRIP.AUTO-MCNC: NEGATIVE MG/DL
HCT VFR BLD AUTO: 40 % (ref 37–47)
HGB BLD-MCNC: 13.5 G/DL (ref 12–16)
HYALINE CASTS #/AREA URNS LPF: ABNORMAL /LPF
IMM GRANULOCYTES # BLD AUTO: 0.02 K/UL (ref 0–0.11)
IMM GRANULOCYTES NFR BLD AUTO: 0.3 % (ref 0–0.9)
KETONES UR STRIP.AUTO-MCNC: NEGATIVE MG/DL
LEUKOCYTE ESTERASE UR QL STRIP.AUTO: ABNORMAL
LYMPHOCYTES # BLD AUTO: 0.86 K/UL (ref 1–4.8)
LYMPHOCYTES NFR BLD: 13.7 % (ref 22–41)
MCH RBC QN AUTO: 32 PG (ref 27–33)
MCHC RBC AUTO-ENTMCNC: 33.8 G/DL (ref 32.2–35.5)
MCV RBC AUTO: 94.8 FL (ref 81.4–97.8)
MICRO URNS: ABNORMAL
MONOCYTES # BLD AUTO: 0.78 K/UL (ref 0–0.85)
MONOCYTES NFR BLD AUTO: 12.4 % (ref 0–13.4)
NEUTROPHILS # BLD AUTO: 4.51 K/UL (ref 1.82–7.42)
NEUTROPHILS NFR BLD: 71.8 % (ref 44–72)
NITRITE UR QL STRIP.AUTO: NEGATIVE
NRBC # BLD AUTO: 0 K/UL
NRBC BLD-RTO: 0 /100 WBC (ref 0–0.2)
PH UR STRIP.AUTO: 7.5 [PH] (ref 5–8)
PLATELET # BLD AUTO: 280 K/UL (ref 164–446)
PMV BLD AUTO: 10.4 FL (ref 9–12.9)
PROT UR QL STRIP: NEGATIVE MG/DL
RBC # BLD AUTO: 4.22 M/UL (ref 4.2–5.4)
RBC # URNS HPF: ABNORMAL /HPF
RBC UR QL AUTO: NEGATIVE
SP GR UR STRIP.AUTO: 1.01
UROBILINOGEN UR STRIP.AUTO-MCNC: 0.2 MG/DL
WBC # BLD AUTO: 6.3 K/UL (ref 4.8–10.8)
WBC #/AREA URNS HPF: ABNORMAL /HPF

## 2024-02-16 PROCEDURE — 81001 URINALYSIS AUTO W/SCOPE: CPT

## 2024-02-16 PROCEDURE — 84480 ASSAY TRIIODOTHYRONINE (T3): CPT

## 2024-02-16 PROCEDURE — 85025 COMPLETE CBC W/AUTO DIFF WBC: CPT

## 2024-02-16 PROCEDURE — 82306 VITAMIN D 25 HYDROXY: CPT

## 2024-02-16 PROCEDURE — 82607 VITAMIN B-12: CPT

## 2024-02-16 PROCEDURE — 80053 COMPREHEN METABOLIC PANEL: CPT

## 2024-02-16 PROCEDURE — 36415 COLL VENOUS BLD VENIPUNCTURE: CPT

## 2024-02-16 PROCEDURE — 80061 LIPID PANEL: CPT

## 2024-02-16 PROCEDURE — 84443 ASSAY THYROID STIM HORMONE: CPT

## 2024-02-17 LAB
25(OH)D3 SERPL-MCNC: 86 NG/ML (ref 30–100)
ALBUMIN SERPL BCP-MCNC: 4.1 G/DL (ref 3.2–4.9)
ALBUMIN/GLOB SERPL: 1.6 G/DL
ALP SERPL-CCNC: 60 U/L (ref 30–99)
ALT SERPL-CCNC: 14 U/L (ref 2–50)
ANION GAP SERPL CALC-SCNC: 11 MMOL/L (ref 7–16)
AST SERPL-CCNC: 23 U/L (ref 12–45)
BILIRUB SERPL-MCNC: 0.4 MG/DL (ref 0.1–1.5)
BUN SERPL-MCNC: 13 MG/DL (ref 8–22)
CALCIUM ALBUM COR SERPL-MCNC: 9.2 MG/DL (ref 8.5–10.5)
CALCIUM SERPL-MCNC: 9.3 MG/DL (ref 8.5–10.5)
CHLORIDE SERPL-SCNC: 97 MMOL/L (ref 96–112)
CHOLEST SERPL-MCNC: 181 MG/DL (ref 100–199)
CO2 SERPL-SCNC: 25 MMOL/L (ref 20–33)
CREAT SERPL-MCNC: 0.63 MG/DL (ref 0.5–1.4)
FASTING STATUS PATIENT QL REPORTED: NORMAL
GFR SERPLBLD CREATININE-BSD FMLA CKD-EPI: 86 ML/MIN/1.73 M 2
GLOBULIN SER CALC-MCNC: 2.6 G/DL (ref 1.9–3.5)
GLUCOSE SERPL-MCNC: 88 MG/DL (ref 65–99)
HDLC SERPL-MCNC: 56 MG/DL
LDLC SERPL CALC-MCNC: 112 MG/DL
POTASSIUM SERPL-SCNC: 4.1 MMOL/L (ref 3.6–5.5)
PROT SERPL-MCNC: 6.7 G/DL (ref 6–8.2)
SODIUM SERPL-SCNC: 133 MMOL/L (ref 135–145)
T3 SERPL-MCNC: 118 NG/DL (ref 60–181)
TRIGL SERPL-MCNC: 64 MG/DL (ref 0–149)
TSH SERPL DL<=0.005 MIU/L-ACNC: 3.07 UIU/ML (ref 0.38–5.33)
VIT B12 SERPL-MCNC: 581 PG/ML (ref 211–911)

## 2024-02-21 ENCOUNTER — OFFICE VISIT (OUTPATIENT)
Dept: MEDICAL GROUP | Facility: LAB | Age: 89
End: 2024-02-21
Payer: MEDICARE

## 2024-02-21 VITALS
SYSTOLIC BLOOD PRESSURE: 108 MMHG | HEIGHT: 63 IN | HEART RATE: 61 BPM | RESPIRATION RATE: 20 BRPM | TEMPERATURE: 97.7 F | OXYGEN SATURATION: 97 % | WEIGHT: 99.43 LBS | BODY MASS INDEX: 17.62 KG/M2 | DIASTOLIC BLOOD PRESSURE: 64 MMHG

## 2024-02-21 DIAGNOSIS — E03.4 HYPOTHYROIDISM DUE TO ACQUIRED ATROPHY OF THYROID: ICD-10-CM

## 2024-02-21 DIAGNOSIS — G31.84 MILD COGNITIVE IMPAIRMENT: Chronic | ICD-10-CM

## 2024-02-21 DIAGNOSIS — K22.70 BARRETT'S ESOPHAGUS WITHOUT DYSPLASIA: ICD-10-CM

## 2024-02-21 DIAGNOSIS — R79.89 HIGH SERUM LOW-DENSITY LIPOPROTEIN (LDL): ICD-10-CM

## 2024-02-21 DIAGNOSIS — R10.84 GENERALIZED ABDOMINAL PAIN: ICD-10-CM

## 2024-02-21 DIAGNOSIS — E87.1 HYPONATREMIA: ICD-10-CM

## 2024-02-21 DIAGNOSIS — K59.09 CHRONIC CONSTIPATION: ICD-10-CM

## 2024-02-21 DIAGNOSIS — J30.9 ALLERGIC RHINITIS, UNSPECIFIED SEASONALITY, UNSPECIFIED TRIGGER: ICD-10-CM

## 2024-02-21 DIAGNOSIS — M81.0 AGE-RELATED OSTEOPOROSIS WITHOUT CURRENT PATHOLOGICAL FRACTURE: ICD-10-CM

## 2024-02-21 DIAGNOSIS — Z87.19 HISTORY OF DIVERTICULITIS: ICD-10-CM

## 2024-02-21 PROBLEM — E67.3 HYPERVITAMINOSIS D: Chronic | Status: RESOLVED | Noted: 2023-02-17 | Resolved: 2024-02-21

## 2024-02-21 PROCEDURE — 99214 OFFICE O/P EST MOD 30 MIN: CPT | Performed by: STUDENT IN AN ORGANIZED HEALTH CARE EDUCATION/TRAINING PROGRAM

## 2024-02-21 PROCEDURE — 3078F DIAST BP <80 MM HG: CPT | Performed by: STUDENT IN AN ORGANIZED HEALTH CARE EDUCATION/TRAINING PROGRAM

## 2024-02-21 PROCEDURE — 3074F SYST BP LT 130 MM HG: CPT | Performed by: STUDENT IN AN ORGANIZED HEALTH CARE EDUCATION/TRAINING PROGRAM

## 2024-02-21 RX ORDER — FLUTICASONE PROPIONATE 50 MCG
2 SPRAY, SUSPENSION (ML) NASAL DAILY
Qty: 16 G | Refills: 3 | Status: SHIPPED | OUTPATIENT
Start: 2024-02-21

## 2024-02-21 ASSESSMENT — ENCOUNTER SYMPTOMS
COUGH: 1
VOMITING: 0
PALPITATIONS: 0
HEADACHES: 0
SHORTNESS OF BREATH: 0
BLOOD IN STOOL: 0
ORTHOPNEA: 0
WEIGHT LOSS: 0
CHILLS: 0
FEVER: 0
DIARRHEA: 0
SORE THROAT: 0
ABDOMINAL PAIN: 1
SPUTUM PRODUCTION: 0
WHEEZING: 0
NAUSEA: 0

## 2024-02-21 ASSESSMENT — FIBROSIS 4 INDEX: FIB4 SCORE: 1.89

## 2024-02-21 NOTE — PATIENT INSTRUCTIONS
Calcium can cause constipation  Goal 1200mg/day (diet best, 600mg twice a day)   If getting in diet, no need to take more    non-fasting labs due 1-2 weeks to repeat sodium level     Audiology  [facility]  [address] [place]. [address]  Phone: [phone]    Start Flonase 2 sprays each nostril daily   If nasal dryness/nose bleeds develop:  Consider in room humidifier, AYR gel (green box) and/or Flonase reduce down to 1 spray a day

## 2024-02-21 NOTE — PROGRESS NOTES
Subjective:     Chief Complaint   Patient presents with    Follow-Up    Lab Results     HPI:   Ratna presents today for follow-up and lab review.    Patient reports some recent constipation and gas pain.  States that she was having painful bowel movements but now bowel movements are soft and constipation well-controlled with over-the-counter medications and prune juice.  Using Preparation H at night which is helping.  States that she has generalized abdominal pain that typically resolves or improves with passing gas or bowel movement.  States that she does often feel sensation that she has to have a bowel movement but instead urinates.  Last bowel movement was this morning.  Typically has 2 bowel movements a day.  No blood, nausea, vomiting or persistent abdominal pain.  No fevers or chills.  Reports that she grew up in the homes without bathrooms and had to wait often as a child, wonders if this contributes.  Reports calcium rich foods and takes a calcium supplement given her history of low bone density.    Reports recently a mild cough in association with rhinorrhea and sneezing.  States that this typically occurs most January/February.  Has not tried anything for this.  Denies any other symptoms.  States cough is quite mild.    Still wonders about her short-term memory.  Has not made an appointment for audiology evaluation.    Review of Systems   Constitutional:  Negative for chills, fever, malaise/fatigue and weight loss.   HENT:  Positive for hearing loss and tinnitus. Negative for congestion (mild rhinorrhea), ear discharge, ear pain, nosebleeds and sore throat.    Respiratory:  Positive for cough. Negative for sputum production, shortness of breath and wheezing.    Cardiovascular:  Negative for chest pain, palpitations, orthopnea and leg swelling.   Gastrointestinal:  Positive for abdominal pain. Negative for blood in stool, diarrhea, melena, nausea and vomiting.   Genitourinary:  Negative for dysuria,  "frequency, hematuria and urgency.   Skin:  Negative for rash.   Neurological:  Negative for headaches.     Objective:     Exam:  /64 (BP Location: Left arm, Patient Position: Sitting, BP Cuff Size: Adult)   Pulse 61   Temp 36.5 °C (97.7 °F) (Temporal)   Resp 20   Ht 1.588 m (5' 2.5\")   Wt 45.1 kg (99 lb 6.8 oz)   LMP  (LMP Unknown)   SpO2 97%   BMI 17.90 kg/m²  Body mass index is 17.9 kg/m².    Physical Exam  Vitals reviewed.   Constitutional:       General: She is not in acute distress.     Appearance: Normal appearance. She is not ill-appearing.   HENT:      Head: Normocephalic and atraumatic.      Nose: Rhinorrhea present. No congestion. Rhinorrhea is clear.      Mouth/Throat:      Mouth: Mucous membranes are moist.   Eyes:      General: No scleral icterus.  Cardiovascular:      Rate and Rhythm: Normal rate and regular rhythm.      Heart sounds: Normal heart sounds.   Pulmonary:      Effort: Pulmonary effort is normal.      Breath sounds: Normal breath sounds.   Abdominal:      General: Abdomen is flat. Bowel sounds are normal. There is no distension.      Palpations: Abdomen is soft. There is no mass.      Tenderness: There is abdominal tenderness (mild generalized). There is no guarding or rebound.   Skin:     General: Skin is warm and dry.      Coloration: Skin is not jaundiced.   Neurological:      Mental Status: She is alert and oriented to person, place, and time. Mental status is at baseline.   Psychiatric:         Mood and Affect: Mood normal.         Behavior: Behavior normal.         Thought Content: Thought content normal.     Labs: Reviewed from 2/16/2024  Imaging: Reviewed from 10/3/2018    Assessment & Plan:     88 y.o. female with the following -     1. Hyponatremia  New finding, trend with nonfasting labs.  Plan pending results.  - Basic Metabolic Panel; Future    2. Hypothyroidism due to acquired atrophy of thyroid  Chronic, controlled. Continue medication(s) as below.    " levothyroxine (SYNTHROID) 25 MCG Tab, Take 1 Tablet by mouth every morning on an empty stomach., Disp: 30 Tablet, Rfl: 11    3. High serum low-density lipoprotein (LDL)  Chronic, improved with red yeast rice.  Defer statin given advanced age and lack of other risk factors.    4. Chronic constipation  5. Generalized abdominal pain  6. History of diverticulitis  7. Pisano's esophagus without dysplasia  Chronic conditions.  Reviewed CT from 2018, patient defers further imaging or colonoscopy at this time and would prefer to discuss with gastroenterology.  Referred.  Discussed that excess calcium supplementation may be contributing to constipation, advised given osteoporosis below 1200mg Ca daily preferably from the diet to avoid side effects.  Gave return precautions.  - Referral to Gastroenterology    8. Age-related osteoporosis without current pathological fracture  Chronic, over due to trend.  Not on bisphosphonate, continues with vitamin D and calcium/fall precautions.  Plan pending results.  - DS-BONE DENSITY STUDY (DEXA); Future    9. Allergic rhinitis, unspecified seasonality, unspecified trigger  Acute on chronic, start Flonase as below.  - fluticasone (FLONASE) 50 MCG/ACT nasal spray; Administer 2 Sprays into affected nostril(S) every day.  Dispense: 16 g; Refill: 3    10. Mild cognitive impairment  Chronic reported condition.  Labs reviewed with patient.  Defer imaging pending follow-up for more thorough thorough investigation with MOCA/MMSE.  Stressed importance of establishing with audiology to consider hearing aids, depression discussed last visit-monitor.    I spent a total of 38 minutes with record review, exam, communication with the patient, and documentation of this encounter.    Return if symptoms worsen or fail to improve, for memory (40min).    Please note that this dictation was created using voice recognition software. I have made every reasonable attempt to correct obvious errors, but I expect  that there are errors of grammar and possibly content that I did not discover before finalizing the note.

## 2024-03-18 ENCOUNTER — HOSPITAL ENCOUNTER (OUTPATIENT)
Dept: RADIOLOGY | Facility: MEDICAL CENTER | Age: 89
End: 2024-03-18
Attending: STUDENT IN AN ORGANIZED HEALTH CARE EDUCATION/TRAINING PROGRAM
Payer: MEDICARE

## 2024-03-18 DIAGNOSIS — M81.0 AGE-RELATED OSTEOPOROSIS WITHOUT CURRENT PATHOLOGICAL FRACTURE: ICD-10-CM

## 2024-03-18 PROCEDURE — 77080 DXA BONE DENSITY AXIAL: CPT

## 2024-04-17 ENCOUNTER — OFFICE VISIT (OUTPATIENT)
Dept: MEDICAL GROUP | Facility: LAB | Age: 89
End: 2024-04-17
Payer: MEDICARE

## 2024-04-17 ENCOUNTER — HOSPITAL ENCOUNTER (OUTPATIENT)
Dept: LAB | Facility: MEDICAL CENTER | Age: 89
End: 2024-04-17
Attending: STUDENT IN AN ORGANIZED HEALTH CARE EDUCATION/TRAINING PROGRAM
Payer: MEDICARE

## 2024-04-17 VITALS
HEART RATE: 80 BPM | DIASTOLIC BLOOD PRESSURE: 60 MMHG | HEIGHT: 63 IN | OXYGEN SATURATION: 96 % | TEMPERATURE: 97.2 F | BODY MASS INDEX: 17.38 KG/M2 | WEIGHT: 98.11 LBS | SYSTOLIC BLOOD PRESSURE: 122 MMHG | RESPIRATION RATE: 20 BRPM

## 2024-04-17 DIAGNOSIS — E87.1 HYPONATREMIA: ICD-10-CM

## 2024-04-17 DIAGNOSIS — R10.84 GENERALIZED ABDOMINAL PAIN: ICD-10-CM

## 2024-04-17 DIAGNOSIS — K59.09 CHRONIC CONSTIPATION: ICD-10-CM

## 2024-04-17 DIAGNOSIS — G31.84 MILD COGNITIVE IMPAIRMENT: Chronic | ICD-10-CM

## 2024-04-17 DIAGNOSIS — M81.0 AGE-RELATED OSTEOPOROSIS WITHOUT CURRENT PATHOLOGICAL FRACTURE: ICD-10-CM

## 2024-04-17 DIAGNOSIS — Z91.81 HISTORY OF FALLING: ICD-10-CM

## 2024-04-17 LAB
ANION GAP SERPL CALC-SCNC: 11 MMOL/L (ref 7–16)
BUN SERPL-MCNC: 14 MG/DL (ref 8–22)
CALCIUM SERPL-MCNC: 10.2 MG/DL (ref 8.5–10.5)
CHLORIDE SERPL-SCNC: 102 MMOL/L (ref 96–112)
CO2 SERPL-SCNC: 26 MMOL/L (ref 20–33)
CREAT SERPL-MCNC: 0.76 MG/DL (ref 0.5–1.4)
GFR SERPLBLD CREATININE-BSD FMLA CKD-EPI: 76 ML/MIN/1.73 M 2
GLUCOSE SERPL-MCNC: 97 MG/DL (ref 65–99)
POTASSIUM SERPL-SCNC: 5.1 MMOL/L (ref 3.6–5.5)
SODIUM SERPL-SCNC: 139 MMOL/L (ref 135–145)

## 2024-04-17 PROCEDURE — 80048 BASIC METABOLIC PNL TOTAL CA: CPT

## 2024-04-17 PROCEDURE — 36415 COLL VENOUS BLD VENIPUNCTURE: CPT

## 2024-04-17 PROCEDURE — 3078F DIAST BP <80 MM HG: CPT | Performed by: STUDENT IN AN ORGANIZED HEALTH CARE EDUCATION/TRAINING PROGRAM

## 2024-04-17 PROCEDURE — 3074F SYST BP LT 130 MM HG: CPT | Performed by: STUDENT IN AN ORGANIZED HEALTH CARE EDUCATION/TRAINING PROGRAM

## 2024-04-17 PROCEDURE — 99214 OFFICE O/P EST MOD 30 MIN: CPT | Performed by: STUDENT IN AN ORGANIZED HEALTH CARE EDUCATION/TRAINING PROGRAM

## 2024-04-17 RX ORDER — METHYLPREDNISOLONE SODIUM SUCCINATE 125 MG/2ML
125 INJECTION, POWDER, LYOPHILIZED, FOR SOLUTION INTRAMUSCULAR; INTRAVENOUS PRN
OUTPATIENT
Start: 2024-04-17

## 2024-04-17 RX ORDER — DIPHENHYDRAMINE HYDROCHLORIDE 50 MG/ML
50 INJECTION INTRAMUSCULAR; INTRAVENOUS PRN
OUTPATIENT
Start: 2024-04-17

## 2024-04-17 RX ORDER — EPINEPHRINE 1 MG/ML(1)
0.5 AMPUL (ML) INJECTION PRN
OUTPATIENT
Start: 2024-04-17

## 2024-04-17 ASSESSMENT — ENCOUNTER SYMPTOMS
VOMITING: 0
WEIGHT LOSS: 0
CONSTIPATION: 1
MEMORY LOSS: 1
COUGH: 0
DIARRHEA: 0
CHILLS: 0
SHORTNESS OF BREATH: 0
NAUSEA: 0
FEVER: 0

## 2024-04-17 ASSESSMENT — FIBROSIS 4 INDEX: FIB4 SCORE: 1.89

## 2024-04-17 NOTE — PATIENT INSTRUCTIONS
Will we start denosumab (prolia) every 6 months to reduce fracture risk and improve osteoporosis. This will be given at the [facility]-they will car[place] to make an appointment.     Consider Psyllium (metamucil) if not effective can titrate miralax to soft stools (typical dose 17 g/1 heaping capful in 8 ounces of water, can adjust for example to 1/2 capful in 4oz of water).    Since you are not interested in a colonoscopy we will repeat your CT scan.      Calcium can cause constipation  Goal 1200mg/day (diet best, 600mg twice a day)   If getting in diet, no need to take more    repeat sodium level today    Audiology  [facility]  [address] [place]. [address]  Phone: [phone]

## 2024-04-17 NOTE — PROGRESS NOTES
"Subjective:     Chief Complaint   Patient presents with    Follow-Up     HPI:   Ratna is a 88 y.o. female who presents today scheduled to discuss her memory but has other concerns as below.    Patient states that she has an appointment gastroenterology in September.  Has not made any adjustments to her calcium supplement and continues as prior.  Constipation still ongoing with some gas pain that has improved with taking a supplement (alpha-galactosidase enzyme 600) which is helping with the feeling of gas and need to have a BM.  Dealing with hemorrhoids as a result of the constipation but denies any blood in the stool, he uses Preparation H.  Patient reports type III-IV [place] class stools but states that she does feel like she has to strain.  Typically has 2 bowel movements in a day.  Often has a sensation that she needs to have a bowel movement but instead will urinate.  Prune juice does help.  Abdominal pain is stable from prior which is generalized and typically resolves or improves with passing gas or having a bowel movement. Reports that she grew up in the homes without bathrooms and had to wait often as a child, wonders if this contributes.     Completed her bone density and would like to discuss the results.  Patient reports no recent falls.    Review of Systems   Constitutional:  Negative for chills, fever, malaise/fatigue and weight loss.   Respiratory:  Negative for cough and shortness of breath.    Cardiovascular:  Negative for chest pain.   Gastrointestinal:  Positive for abdominal pain and constipation. Negative for blood in stool, diarrhea, nausea and vomiting.   Skin:  Negative for rash.   Psychiatric/Behavioral:  Positive for memory loss.      Objective:     Exam:  /60 (BP Location: Right arm, Patient Position: Sitting, BP Cuff Size: Adult)   Pulse 80   Temp 36.2 °C (97.2 °F) (Temporal)   Resp 20   Ht 1.588 m (5' 2.5\")   Wt 44.5 kg (98 lb 1.7 oz)   LMP  (LMP Unknown)   SpO2 96%   BMI " 17.66 kg/m²  Body mass index is 17.66 kg/m².    Physical Exam  Vitals reviewed.   Constitutional:       General: She is not in acute distress.     Appearance: Normal appearance. She is not ill-appearing.   HENT:      Head: Normocephalic and atraumatic.      Mouth/Throat:      Mouth: Mucous membranes are moist.   Eyes:      General: No scleral icterus.  Pulmonary:      Effort: Pulmonary effort is normal.   Abdominal:      General: Abdomen is flat. There is no distension.      Palpations: Abdomen is soft. There is no mass.      Tenderness: There is abdominal tenderness (Generalized). There is no guarding or rebound.      Comments: Hyperactive bowel sounds   Skin:     General: Skin is warm and dry.      Coloration: Skin is not jaundiced.   Neurological:      Mental Status: She is alert and oriented to person, place, and time. Mental status is at baseline.   Psychiatric:         Mood and Affect: Mood normal.         Behavior: Behavior normal.         Thought Content: Thought content normal.       Labs: Reviewed from 2/16/2024  Imaging: Reviewed from 3/18/2024 DEXA    Assessment & Plan:     88 y.o. female with the following -     1. Age-related osteoporosis without current pathological fracture  This is a chronic condition, worse from prior.  Patient agreeable to starting Prolia 60 mg SQ every 6 months with the infusion center.   - REFERRAL TO CARE MANAGEMENT    2. History of falling  Discussed fall precautions, may benefit from physical therapy for fall risk reduction.  - Referral to Physical Therapy    3. Hyponatremia  Advised patient to repeat lab work today.  - REFERRAL TO CARE MANAGEMENT    4. Chronic constipation  5. Generalized abdominal pain  Chronic conditions.  CT again reviewed from 2018.  Patient to keep her appointment with gastroenterology.  Patient prefers to defer colonoscopy and is at this point agreeable to repeat imaging.  Discussed again that if she is getting appropriate amount of calcium in the diet  for bone health that she does not need to take extra calcium supplements which may also be contributing to her constipation.  Can continue prune juice which has seemed to be helpful.  Advised trial of psyllium/Metamucil and if not effective can titrate MiraLAX.  If refractory would consider prescriptions for constipation.  - REFERRAL TO CARE MANAGEMENT  - CT-ABDOMEN-PELVIS WITH; Future    6. Mild cognitive impairment  Patient initially scheduled today for further evaluation of MCI, may benefit from aid from care management to ensure patient scheduling appropriately with specialist, completing studies and following through with the plan.  As always, instructions in the after visit summary reviewed and provided.  - REFERRAL TO CARE MANAGEMENT    Return in about 4 weeks (around 5/15/2024), or if symptoms worsen or fail to improve.    Please note that this dictation was created using voice recognition software. I have made every reasonable attempt to correct obvious errors, but I expect that there are errors of grammar and possibly content that I did not discover before finalizing the note.

## 2024-04-20 ASSESSMENT — ENCOUNTER SYMPTOMS
ABDOMINAL PAIN: 1
BLOOD IN STOOL: 0

## 2024-04-22 ENCOUNTER — PATIENT OUTREACH (OUTPATIENT)
Dept: HEALTH INFORMATION MANAGEMENT | Facility: OTHER | Age: 89
End: 2024-04-22
Payer: MEDICARE

## 2024-04-22 SDOH — ECONOMIC STABILITY: INCOME INSECURITY: IN THE LAST 12 MONTHS, WAS THERE A TIME WHEN YOU WERE NOT ABLE TO PAY THE MORTGAGE OR RENT ON TIME?: NO

## 2024-04-22 SDOH — HEALTH STABILITY: PHYSICAL HEALTH: ON AVERAGE, HOW MANY MINUTES DO YOU ENGAGE IN EXERCISE AT THIS LEVEL?: 30 MIN

## 2024-04-22 SDOH — HEALTH STABILITY: PHYSICAL HEALTH: ON AVERAGE, HOW MANY DAYS PER WEEK DO YOU ENGAGE IN MODERATE TO STRENUOUS EXERCISE (LIKE A BRISK WALK)?: 5 DAYS

## 2024-04-22 SDOH — ECONOMIC STABILITY: HOUSING INSECURITY
IN THE LAST 12 MONTHS, WAS THERE A TIME WHEN YOU DID NOT HAVE A STEADY PLACE TO SLEEP OR SLEPT IN A SHELTER (INCLUDING NOW)?: NO

## 2024-04-22 SDOH — ECONOMIC STABILITY: HOUSING INSECURITY: IN THE LAST 12 MONTHS, HOW MANY PLACES HAVE YOU LIVED?: 1

## 2024-04-22 ASSESSMENT — SOCIAL DETERMINANTS OF HEALTH (SDOH)
IN A TYPICAL WEEK, HOW MANY TIMES DO YOU TALK ON THE PHONE WITH FAMILY, FRIENDS, OR NEIGHBORS?: THREE TIMES A WEEK
WITHIN THE LAST YEAR, HAVE YOU BEEN KICKED, HIT, SLAPPED, OR OTHERWISE PHYSICALLY HURT BY YOUR PARTNER OR EX-PARTNER?: NO
HOW OFTEN DO YOU GET TOGETHER WITH FRIENDS OR RELATIVES?: THREE TIMES A WEEK
HOW OFTEN DO YOU ATTEND CHURCH OR RELIGIOUS SERVICES?: 1 TO 4 TIMES PER YEAR
DO YOU BELONG TO ANY CLUBS OR ORGANIZATIONS SUCH AS CHURCH GROUPS UNIONS, FRATERNAL OR ATHLETIC GROUPS, OR SCHOOL GROUPS?: YES
WITHIN THE LAST YEAR, HAVE YOU BEEN HUMILIATED OR EMOTIONALLY ABUSED IN OTHER WAYS BY YOUR PARTNER OR EX-PARTNER?: NO
WITHIN THE LAST YEAR, HAVE TO BEEN RAPED OR FORCED TO HAVE ANY KIND OF SEXUAL ACTIVITY BY YOUR PARTNER OR EX-PARTNER?: NO
IN THE PAST 12 MONTHS, HAS THE ELECTRIC, GAS, OIL, OR WATER COMPANY THREATENED TO SHUT OFF SERVICE IN YOUR HOME?: NO
HOW HARD IS IT FOR YOU TO PAY FOR THE VERY BASICS LIKE FOOD, HOUSING, MEDICAL CARE, AND HEATING?: SOMEWHAT HARD
HOW OFTEN DO YOU ATTENT MEETINGS OF THE CLUB OR ORGANIZATION YOU BELONG TO?: 1 TO 4 TIMES PER YEAR

## 2024-04-22 ASSESSMENT — LIFESTYLE VARIABLES
SKIP TO QUESTIONS 9-10: 1
HOW OFTEN DO YOU HAVE SIX OR MORE DRINKS ON ONE OCCASION: NEVER
HOW OFTEN DO YOU HAVE A DRINK CONTAINING ALCOHOL: NEVER
AUDIT-C TOTAL SCORE: 0
HOW MANY STANDARD DRINKS CONTAINING ALCOHOL DO YOU HAVE ON A TYPICAL DAY: PATIENT DOES NOT DRINK

## 2024-04-22 ASSESSMENT — PATIENT HEALTH QUESTIONNAIRE - PHQ9: CLINICAL INTERPRETATION OF PHQ2 SCORE: 0

## 2024-04-22 NOTE — PROGRESS NOTES
04/22/2024    [facility] SW received a referral from PCP. Pt might need assistance with appointments, might have memory issues.    @0940 SW called Ratna. [facility] SW assessment was conducted, areas of needs identified.    Social Work Assessment  Community Care Management    Synopsis: Pt lives in [place] with her son. Pt would like to find PCP closer to her home, currently goes to [facility]. Pt also would like to be referred to neurology. No other needs were identified this time.    Living Situation/Home Environment: Pt lives in a house in [place] with her son. Pt stated she is doing ok, currently busy with yard work. Pt stated her house is organized, no extra clutter.    Financial Situation/Sources of Income: Pt receives 1700/month is SS, her house is paid off. Pt stated finances are hard but she is doing ok. Her son helps around the house.  Pt stated she is able to pay bells and get groceries, refused referral for meals on wheels at this time.  Transportation: Pt has a car and is an active . Denied transportation resources at this time.  Support System: Pt stated her son helps and she is slow but doing ok. Denied any need in help at this time.  Mental Health/Substance Abuse Hx: Pt doesn't smoke or drink, denied history of mental health issues, has no signs of depression or SI at time of assessment.  Pt has concerns with her short-term memory. She is using paper and calendar to stay organized. Offered house calls for reminders. Pt stated she is not interested at this time.  Ability to Obtain Basic Resources: Pt stated she is doing ok, she is able to manage her finances and medications. Pt would like this SW to assist with finding PCP closer to her home, as well as referral to neurology and follow up with audiology referral.  Physical Functioning: Pt stated she is slow but stable. No DME is used.  Patient's Perception of Needs: Pt would like to find a PCP closer to home, she also would like to see  audiologist and neurologist,  AD Discussion?: Pt has AD.    Plan: 1. SW to reach out to scheduling to find PCP in [facility]  2. SW to reach out to PCP to request neurology referral being placed  3. SW to follow up with audiology referral.    Goal:  Connect pt with specialty and PCP closer to her home.        Barriers:  Pt lives in relatively far area of [facility], a few PCP available, ling wait times for specialty scheduling  Interventions: Request referrals, follow up on scheduling.     Start Date: 04/22/2024  Anticipated Goal Achievement Date:  04/26/2024        Next Scheduled patient outreach:  as needed  Social Work Care Coordinator:  Freda Campa  [facility] Care Management:  [phone]      @1007 SW reached out to PCP requesting neurology referral  @1010 SW reached out to scheduling  Initial new patient PCP appointment with Rachel Valente at [facility] was scheduled on 06/19/2024 at 1000.

## 2024-04-23 DIAGNOSIS — G31.84 MILD COGNITIVE IMPAIRMENT: Chronic | ICD-10-CM

## 2024-04-25 ENCOUNTER — PATIENT OUTREACH (OUTPATIENT)
Dept: HEALTH INFORMATION MANAGEMENT | Facility: OTHER | Age: 89
End: 2024-04-25
Payer: MEDICARE

## 2024-04-25 NOTE — PROGRESS NOTES
04/25/2024    @3798 SW reached out to Ratna. This SW informed Ratna that she was able to schedule a new pt appointment with PCP at [facility] on 06/19/2024. Pt thanked this SW as [place] clinic is way closer to her. Pt ia ware of transportation assistance though [facility].   Pt informed this SW that she got a call back from audiology and will have a hearing test on 06/14. Pt was also able to schedule GI and neurology appointments.    This SW asked if pt has any other questions or concerns. Pt stated no at this time. Pt also informed this SW that she wrote down all the appointments. This SW discussed with pt Personal Care Management program. Pt stated she might be interested and will ask about it during her new PCP appointment.  Referral is closed as no further assistance from the pt was requested. Contact information provided.

## 2024-05-09 SDOH — SOCIAL STABILITY: SOCIAL INSECURITY: WITHIN THE LAST YEAR, HAVE YOU BEEN AFRAID OF YOUR PARTNER OR EX-PARTNER?: NO

## 2024-05-09 SDOH — ECONOMIC STABILITY: INCOME INSECURITY: IN THE LAST 12 MONTHS, WAS THERE A TIME WHEN YOU WERE NOT ABLE TO PAY THE MORTGAGE OR RENT ON TIME?: NO

## 2024-05-09 SDOH — SOCIAL STABILITY: SOCIAL NETWORK
DO YOU BELONG TO ANY CLUBS OR ORGANIZATIONS SUCH AS CHURCH GROUPS UNIONS, FRATERNAL OR ATHLETIC GROUPS, OR SCHOOL GROUPS?: YES

## 2024-05-09 SDOH — SOCIAL STABILITY: SOCIAL NETWORK: IN A TYPICAL WEEK, HOW MANY TIMES DO YOU TALK ON THE PHONE WITH FAMILY, FRIENDS, OR NEIGHBORS?: THREE TIMES A WEEK

## 2024-05-09 SDOH — ECONOMIC STABILITY: INCOME INSECURITY: HOW HARD IS IT FOR YOU TO PAY FOR THE VERY BASICS LIKE FOOD, HOUSING, MEDICAL CARE, AND HEATING?: SOMEWHAT HARD

## 2024-05-09 SDOH — ECONOMIC STABILITY: FOOD INSECURITY: WITHIN THE PAST 12 MONTHS, THE FOOD YOU BOUGHT JUST DIDN'T LAST AND YOU DIDN'T HAVE MONEY TO GET MORE.: NEVER TRUE

## 2024-05-09 SDOH — HEALTH STABILITY: MENTAL HEALTH: HOW MANY STANDARD DRINKS CONTAINING ALCOHOL DO YOU HAVE ON A TYPICAL DAY?: PATIENT DOES NOT DRINK

## 2024-05-09 SDOH — SOCIAL STABILITY: SOCIAL INSECURITY
WITHIN THE LAST YEAR, HAVE TO BEEN RAPED OR FORCED TO HAVE ANY KIND OF SEXUAL ACTIVITY BY YOUR PARTNER OR EX-PARTNER?: NO

## 2024-05-09 SDOH — HEALTH STABILITY: MENTAL HEALTH: HOW OFTEN DO YOU HAVE A DRINK CONTAINING ALCOHOL?: NEVER

## 2024-05-09 SDOH — ECONOMIC STABILITY: TRANSPORTATION INSECURITY
IN THE PAST 12 MONTHS, HAS THE LACK OF TRANSPORTATION KEPT YOU FROM MEDICAL APPOINTMENTS OR FROM GETTING MEDICATIONS?: NO

## 2024-05-09 SDOH — HEALTH STABILITY: PHYSICAL HEALTH: ON AVERAGE, HOW MANY DAYS PER WEEK DO YOU ENGAGE IN MODERATE TO STRENUOUS EXERCISE (LIKE A BRISK WALK)?: 5 DAYS

## 2024-05-09 SDOH — SOCIAL STABILITY: SOCIAL NETWORK: HOW OFTEN DO YOU ATTENT MEETINGS OF THE CLUB OR ORGANIZATION YOU BELONG TO?: 1 TO 4 TIMES PER YEAR

## 2024-05-09 SDOH — ECONOMIC STABILITY: INCOME INSECURITY: IN THE PAST 12 MONTHS, HAS THE ELECTRIC, GAS, OIL, OR WATER COMPANY THREATENED TO SHUT OFF SERVICE IN YOUR HOME?: NO

## 2024-05-09 SDOH — ECONOMIC STABILITY: TRANSPORTATION INSECURITY
IN THE PAST 12 MONTHS, HAS LACK OF TRANSPORTATION KEPT YOU FROM MEETINGS, WORK, OR FROM GETTING THINGS NEEDED FOR DAILY LIVING?: NO

## 2024-05-09 SDOH — ECONOMIC STABILITY: FOOD INSECURITY: WITHIN THE PAST 12 MONTHS, YOU WORRIED THAT YOUR FOOD WOULD RUN OUT BEFORE YOU GOT MONEY TO BUY MORE.: NEVER TRUE

## 2024-05-09 SDOH — HEALTH STABILITY: MENTAL HEALTH: HOW OFTEN DO YOU HAVE 6 OR MORE DRINKS ON ONE OCCASION?: NEVER

## 2024-05-09 SDOH — HEALTH STABILITY: PHYSICAL HEALTH: ON AVERAGE, HOW MANY MINUTES DO YOU ENGAGE IN EXERCISE AT THIS LEVEL?: 30 MIN

## 2024-05-09 SDOH — SOCIAL STABILITY: SOCIAL INSECURITY: WITHIN THE LAST YEAR, HAVE YOU BEEN HUMILIATED OR EMOTIONALLY ABUSED IN OTHER WAYS BY YOUR PARTNER OR EX-PARTNER?: NO

## 2024-05-09 SDOH — HEALTH STABILITY: MENTAL HEALTH
STRESS IS WHEN SOMEONE FEELS TENSE, NERVOUS, ANXIOUS, OR CAN'T SLEEP AT NIGHT BECAUSE THEIR MIND IS TROUBLED. HOW STRESSED ARE YOU?: PATIENT UNABLE TO ANSWER

## 2024-05-09 SDOH — SOCIAL STABILITY: SOCIAL NETWORK: ARE YOU MARRIED, WIDOWED, DIVORCED, SEPARATED, NEVER MARRIED, OR LIVING WITH A PARTNER?: WIDOWED

## 2024-05-09 SDOH — SOCIAL STABILITY: SOCIAL INSECURITY
WITHIN THE LAST YEAR, HAVE YOU BEEN KICKED, HIT, SLAPPED, OR OTHERWISE PHYSICALLY HURT BY YOUR PARTNER OR EX-PARTNER?: NO

## 2024-05-09 SDOH — SOCIAL STABILITY: SOCIAL NETWORK: HOW OFTEN DO YOU GET TOGETHER WITH FRIENDS OR RELATIVES?: THREE TIMES A WEEK

## 2024-05-09 SDOH — SOCIAL STABILITY: SOCIAL NETWORK: HOW OFTEN DO YOU ATTEND CHURCH OR RELIGIOUS SERVICES?: 1 TO 4 TIMES PER YEAR

## 2024-05-09 SDOH — ECONOMIC STABILITY: HOUSING INSECURITY: IN THE LAST 12 MONTHS, HOW MANY PLACES HAVE YOU LIVED?: 1

## 2024-05-09 ASSESSMENT — LIFESTYLE VARIABLES
SKIP TO QUESTIONS 9-10: 1
AUDIT-C TOTAL SCORE: 0

## 2024-05-13 ENCOUNTER — HOSPITAL ENCOUNTER (OUTPATIENT)
Dept: RADIOLOGY | Facility: MEDICAL CENTER | Age: 89
End: 2024-05-13
Attending: STUDENT IN AN ORGANIZED HEALTH CARE EDUCATION/TRAINING PROGRAM
Payer: MEDICARE

## 2024-05-13 DIAGNOSIS — R10.84 GENERALIZED ABDOMINAL PAIN: ICD-10-CM

## 2024-05-13 DIAGNOSIS — K59.09 CHRONIC CONSTIPATION: ICD-10-CM

## 2024-05-13 RX ADMIN — IOHEXOL 100 ML: 350 INJECTION, SOLUTION INTRAVENOUS at 09:31

## 2024-07-01 ENCOUNTER — PATIENT OUTREACH (OUTPATIENT)
Dept: HEALTH INFORMATION MANAGEMENT | Facility: OTHER | Age: 89
End: 2024-07-01

## 2024-07-01 ENCOUNTER — APPOINTMENT (OUTPATIENT)
Dept: MEDICAL GROUP | Facility: PHYSICIAN GROUP | Age: 89
End: 2024-07-01
Payer: MEDICARE

## 2024-08-05 ENCOUNTER — PATIENT OUTREACH (OUTPATIENT)
Dept: HEALTH INFORMATION MANAGEMENT | Facility: OTHER | Age: 89
End: 2024-08-05
Payer: MEDICARE

## 2024-08-05 NOTE — PROGRESS NOTES
LARA Carvajal reached out to pt to complete monthly outreach. Pt was eating lunch at this time and requested a call later in the day. CHW agreed.

## 2024-08-05 NOTE — PROGRESS NOTES
Community Health Worker Note    Discussed: CHW Alena reached out to complete monthly outreach. Pt shared she thinks she may have a UTI. This CHW encouraged her to go to urgent care. Pt agreed, and said she will go either tomorrow or the next day. Pt shared she feels the need for encouragement at times, and expressed gratitude for the call. Pt shared her grandson occasionally comes by and helps her with various things around the house. CHW reminded pt of her appt on 9/4 @1120 and told her she needs to check in @1105. Pt wrote down appt details. CHW gave pt contact information again, pt wrote down details. CHW encouraged pt to reach out with any needs or questions.    Follow-Up Needs: None at this time.     Plan: Ensure pt establishes with PCP on 9/4 @1120. Assess for graduation from [facility].

## 2024-08-06 ENCOUNTER — HOSPITAL ENCOUNTER (OUTPATIENT)
Facility: MEDICAL CENTER | Age: 89
End: 2024-08-06
Attending: FAMILY MEDICINE
Payer: MEDICARE

## 2024-08-06 ENCOUNTER — OFFICE VISIT (OUTPATIENT)
Dept: URGENT CARE | Facility: PHYSICIAN GROUP | Age: 89
End: 2024-08-06
Payer: MEDICARE

## 2024-08-06 VITALS
TEMPERATURE: 97.7 F | DIASTOLIC BLOOD PRESSURE: 60 MMHG | OXYGEN SATURATION: 95 % | WEIGHT: 97.4 LBS | BODY MASS INDEX: 17.92 KG/M2 | HEIGHT: 62 IN | RESPIRATION RATE: 20 BRPM | SYSTOLIC BLOOD PRESSURE: 122 MMHG | HEART RATE: 69 BPM

## 2024-08-06 DIAGNOSIS — N39.0 ACUTE UTI: ICD-10-CM

## 2024-08-06 DIAGNOSIS — R30.0 DYSURIA: ICD-10-CM

## 2024-08-06 LAB
APPEARANCE UR: CLEAR
BILIRUB UR STRIP-MCNC: NORMAL MG/DL
COLOR UR AUTO: YELLOW
GLUCOSE UR STRIP.AUTO-MCNC: NORMAL MG/DL
KETONES UR STRIP.AUTO-MCNC: NORMAL MG/DL
LEUKOCYTE ESTERASE UR QL STRIP.AUTO: NORMAL
NITRITE UR QL STRIP.AUTO: NORMAL
PH UR STRIP.AUTO: 7 [PH] (ref 5–8)
PROT UR QL STRIP: NORMAL MG/DL
RBC UR QL AUTO: NORMAL
SP GR UR STRIP.AUTO: 1.02
UROBILINOGEN UR STRIP-MCNC: 0.2 MG/DL

## 2024-08-06 PROCEDURE — 81002 URINALYSIS NONAUTO W/O SCOPE: CPT | Performed by: FAMILY MEDICINE

## 2024-08-06 PROCEDURE — 3074F SYST BP LT 130 MM HG: CPT | Performed by: FAMILY MEDICINE

## 2024-08-06 PROCEDURE — 99213 OFFICE O/P EST LOW 20 MIN: CPT | Performed by: FAMILY MEDICINE

## 2024-08-06 PROCEDURE — 87086 URINE CULTURE/COLONY COUNT: CPT

## 2024-08-06 PROCEDURE — 3078F DIAST BP <80 MM HG: CPT | Performed by: FAMILY MEDICINE

## 2024-08-06 RX ORDER — CEFUROXIME AXETIL 250 MG/1
250 TABLET ORAL 2 TIMES DAILY
Qty: 10 TABLET | Refills: 0 | Status: SHIPPED | OUTPATIENT
Start: 2024-08-06 | End: 2024-08-11

## 2024-08-06 RX ORDER — THYROID 15 MG/1
TABLET ORAL
COMMUNITY
End: 2024-08-06

## 2024-08-06 ASSESSMENT — ENCOUNTER SYMPTOMS
WEIGHT LOSS: 0
EYE DISCHARGE: 0
VOMITING: 0
MYALGIAS: 0
NAUSEA: 0
EYE REDNESS: 0

## 2024-08-06 ASSESSMENT — FIBROSIS 4 INDEX: FIB4 SCORE: 1.89

## 2024-08-06 NOTE — PROGRESS NOTES
"Subjective     Ratna Cline is a 88 y.o. female who presents with UTI (Frequent urination,painful,x5 days)            5 days urinary urgency with small volume urine output. Urinary burning.  No blood in urine.  No fever.  No flank pain.  No vaginal symptoms.  No other aggravating or alleviating factors.        Review of Systems   Constitutional:  Negative for malaise/fatigue and weight loss.   Eyes:  Negative for discharge and redness.   Gastrointestinal:  Negative for nausea and vomiting.   Musculoskeletal:  Negative for joint pain and myalgias.   Skin:  Negative for itching and rash.              Objective     /60 (BP Location: Right arm, Patient Position: Sitting, BP Cuff Size: Adult)   Pulse 69   Temp 36.5 °C (97.7 °F) (Temporal)   Resp 20   Ht 1.575 m (5' 2\")   Wt 44.2 kg (97 lb 6.4 oz)   LMP  (LMP Unknown)   SpO2 95%   BMI 17.81 kg/m²      Physical Exam  Constitutional:       General: She is not in acute distress.     Appearance: She is well-developed.   HENT:      Head: Normocephalic and atraumatic.   Eyes:      Conjunctiva/sclera: Conjunctivae normal.   Cardiovascular:      Rate and Rhythm: Normal rate and regular rhythm.      Heart sounds: Normal heart sounds. No murmur heard.  Pulmonary:      Effort: Pulmonary effort is normal.      Breath sounds: Normal breath sounds. No wheezing.   Abdominal:      Tenderness: There is no abdominal tenderness. There is no right CVA tenderness or left CVA tenderness.   Skin:     General: Skin is warm and dry.      Findings: No rash.   Neurological:      Mental Status: She is alert.                             Assessment & Plan   POCT UA reviewed    1. Dysuria  POCT Urinalysis      2. Acute UTI  URINE CULTURE(NEW)    cefUROXime (CEFTIN) 250 MG Tab        Differential diagnosis, natural history, supportive care, and indications for immediate follow-up were discussed.     F/u urine culture             "

## 2024-08-08 LAB
BACTERIA UR CULT: NORMAL
SIGNIFICANT IND 70042: NORMAL
SITE SITE: NORMAL
SOURCE SOURCE: NORMAL

## 2024-08-12 RX ORDER — LEVOTHYROXINE SODIUM 25 UG/1
25 TABLET ORAL
COMMUNITY
End: 2024-08-12 | Stop reason: SDUPTHER

## 2024-08-12 NOTE — TELEPHONE ENCOUNTER
Received request via: Patient    Was the patient seen in the last year in this department? Yes    Does the patient have an active prescription (recently filled or refills available) for medication(s) requested? No    Pharmacy Name: [facility]    Does the patient have [facility] and need 100-day supply? (This applies to ALL medications) Yes, quantity updated to 100 days      Pt walked into [facility] today stating that she has an upcoming New to you appt in Sept.  She has ran out of her Levothyroxine and would like to know if she can get a refill to get her to her New to You Appt?

## 2024-08-14 RX ORDER — LEVOTHYROXINE SODIUM 25 UG/1
25 TABLET ORAL
Qty: 100 TABLET | Refills: 0 | Status: SHIPPED
Start: 2024-08-14 | End: 2024-08-26

## 2024-08-16 ENCOUNTER — OFFICE VISIT (OUTPATIENT)
Dept: URGENT CARE | Facility: PHYSICIAN GROUP | Age: 89
End: 2024-08-16
Payer: MEDICARE

## 2024-08-16 ENCOUNTER — HOSPITAL ENCOUNTER (OUTPATIENT)
Facility: MEDICAL CENTER | Age: 89
End: 2024-08-16
Attending: NURSE PRACTITIONER
Payer: MEDICARE

## 2024-08-16 VITALS
BODY MASS INDEX: 16.93 KG/M2 | WEIGHT: 101.6 LBS | HEIGHT: 65 IN | HEART RATE: 61 BPM | DIASTOLIC BLOOD PRESSURE: 60 MMHG | SYSTOLIC BLOOD PRESSURE: 120 MMHG | OXYGEN SATURATION: 98 % | TEMPERATURE: 97.6 F | RESPIRATION RATE: 14 BRPM

## 2024-08-16 DIAGNOSIS — N76.0 ACUTE VAGINITIS: ICD-10-CM

## 2024-08-16 DIAGNOSIS — N76.0 BACTERIAL VAGINOSIS: ICD-10-CM

## 2024-08-16 DIAGNOSIS — B96.89 BACTERIAL VAGINOSIS: ICD-10-CM

## 2024-08-16 DIAGNOSIS — R32 URINARY INCONTINENCE, UNSPECIFIED TYPE: ICD-10-CM

## 2024-08-16 LAB
APPEARANCE UR: CLEAR
BILIRUB UR STRIP-MCNC: NORMAL MG/DL
CANDIDA DNA VAG QL PROBE+SIG AMP: NEGATIVE
COLOR UR AUTO: YELLOW
G VAGINALIS DNA VAG QL PROBE+SIG AMP: POSITIVE
GLUCOSE UR STRIP.AUTO-MCNC: NORMAL MG/DL
KETONES UR STRIP.AUTO-MCNC: NORMAL MG/DL
LEUKOCYTE ESTERASE UR QL STRIP.AUTO: NORMAL
NITRITE UR QL STRIP.AUTO: NORMAL
PH UR STRIP.AUTO: 6.5 [PH] (ref 5–8)
PROT UR QL STRIP: NORMAL MG/DL
RBC UR QL AUTO: NORMAL
SP GR UR STRIP.AUTO: 1.01
T VAGINALIS DNA VAG QL PROBE+SIG AMP: NEGATIVE
UROBILINOGEN UR STRIP-MCNC: 0.2 MG/DL

## 2024-08-16 PROCEDURE — 99213 OFFICE O/P EST LOW 20 MIN: CPT | Performed by: NURSE PRACTITIONER

## 2024-08-16 PROCEDURE — 3074F SYST BP LT 130 MM HG: CPT | Performed by: NURSE PRACTITIONER

## 2024-08-16 PROCEDURE — 3078F DIAST BP <80 MM HG: CPT | Performed by: NURSE PRACTITIONER

## 2024-08-16 PROCEDURE — 81002 URINALYSIS NONAUTO W/O SCOPE: CPT | Performed by: NURSE PRACTITIONER

## 2024-08-16 PROCEDURE — 87510 GARDNER VAG DNA DIR PROBE: CPT

## 2024-08-16 PROCEDURE — 87480 CANDIDA DNA DIR PROBE: CPT

## 2024-08-16 PROCEDURE — 87660 TRICHOMONAS VAGIN DIR PROBE: CPT

## 2024-08-16 ASSESSMENT — ENCOUNTER SYMPTOMS
ABDOMINAL PAIN: 0
FEVER: 0

## 2024-08-16 ASSESSMENT — FIBROSIS 4 INDEX: FIB4 SCORE: 1.89

## 2024-08-16 ASSESSMENT — PAIN SCALES - GENERAL: PAINLEVEL: NO PAIN

## 2024-08-16 NOTE — PROGRESS NOTES
Subjective:     Ratna Cline is a 88 y.o. female who presents for UTI (Burning, frequency /Fallow up from 08/06/2024)      Presents with concerns for possible UTI. Was recently treated for a UTI. Has frequency in urination. States she has had some urinary incontinence, and is using a pad. Had had constipation, now resolved. No vaginal mass. Reports urinary symptoms are more prevalent with doing yard work. Denies back pain or injury. History of hysterectomy. Has had a yellow sticky vaginal discharge. Has started taking tumeric and milk thistle.           UTI  Pertinent negatives include no abdominal pain, fever or rash.       Past Medical History:   Diagnosis Date    Age-related osteoporosis without current pathological fracture     Allergy     Anisocoria 06/06/2023    Anxiety     Arthritis     Back pain     Pisano's esophagus     Dr. Anaya, in late 1990's    Bladder infection     Candidiasis     Depression     Difficulty swallowing     Dusky feet 04/05/2023    Dyslipidemia 07/14/2016    Fall at home 09/07/2021    Family history of leukemia 06/03/2019    GERD (gastroesophageal reflux disease)     Grief 02/24/2020    Health counseling 09/08/2023    Hearing loss     History of hemorrhoids     History of measles     History of pneumonia     Hx of total hysterectomy with removal of both tubes and ovaries 08/20/2020    Hypervitaminosis D     Hypothyroidism (acquired) 07/13/2015    Irritable bowel syndrome with constipation 04/30/2018    Left lower quadrant abdominal pain-now resolved 02/17/2023    Leg swelling 06/07/2023    Memory loss     Mild depression 08/13/2021    Neck pain     Noise-induced hearing loss of both ears 02/01/2021    Non-celiac gluten sensitivity 06/03/2019    Osteochondroma     Osteochondroma     Peripheral arterial disease (HCC)     Postmenopausal 08/20/2020    Prediabetes 02/01/2021    Seasonal allergies 06/03/2019    Sinusitis     Skin lesion 06/07/2023    Thyroid disease      Tinnitus 07/13/2015    Vaginal discharge 08/13/2021    Vision loss        Past Surgical History:   Procedure Laterality Date    HYSTERECTOMY RADICAL      OTHER      jaw surgery to correct overbite    TONSILLECTOMY         Social History     Socioeconomic History    Marital status:      Spouse name: Not on file    Number of children: 1    Years of education: Not on file    Highest education level: Not on file   Occupational History    Not on file   Tobacco Use    Smoking status: Never    Smokeless tobacco: Never   Vaping Use    Vaping status: Never Used   Substance and Sexual Activity    Alcohol use: No     Alcohol/week: 0.0 oz    Drug use: No    Sexual activity: Not Currently     Partners: Male   Other Topics Concern    Not on file   Social History Narrative    She is a retired , 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  He was  for over 60 years, her partner passed away around [id number].  She has a son who she goes hiking with in town.  She lives alone and is independent in all ADLs at this time.  She continues to drive.  She is not using a gait aid.     Social Determinants of Health     Financial Resource Strain: Medium Risk (5/9/2024)    Overall Financial Resource Strain (CARDIA)     Difficulty of Paying Living Expenses: Somewhat hard   Food Insecurity: No Food Insecurity (5/9/2024)    Hunger Vital Sign     Worried About Running Out of Food in the Last Year: Never true     Ran Out of Food in the Last Year: Never true   Transportation Needs: No Transportation Needs (5/9/2024)    PRAPARE - Transportation     Lack of Transportation (Medical): No     Lack of Transportation (Non-Medical): No   Physical Activity: Sufficiently Active (5/9/2024)    Exercise Vital Sign     Days of Exercise per Week: 5 days     Minutes of Exercise per Session: 30 min   Stress: Patient Unable To Answer (5/9/2024)    [employer] [place] [employer] - Occupational Stress Questionnaire     " Feeling of Stress : Patient unable to answer   Social Connections: Moderately Integrated (5/9/2024)    Social Connection and Isolation Panel [NHANES]     Frequency of Communication with Friends and Family: Three times a week     Frequency of Social Gatherings with Friends and Family: Three times a week     Attends [employer] Services: 1 to 4 times per year     Active Member of Clubs or Organizations: Yes     Attends Club or Organization Meetings: 1 to 4 times per year     Marital Status:    Intimate Partner Violence: Not At Risk (5/9/2024)    Humiliation, Afraid, Rape, and Kick questionnaire     Fear of Current or Ex-Partner: No     Emotionally Abused: No     Physically Abused: No     Sexually Abused: No   Housing Stability: Low Risk  (5/9/2024)    Housing Stability Vital Sign     Unable to Pay for Housing in the Last Year: No     Number of Places Lived in the Last Year: 1     Unstable Housing in the Last Year: No        Family History   Problem Relation Age of Onset    Cancer Mother         leukemia    Genetic Disorder Mother         osteochondroma    Dementia Father     Stroke Neg Hx     Hyperlipidemia Neg Hx     Hypertension Neg Hx     Heart Disease Neg Hx     Diabetes Neg Hx     Lung Disease Neg Hx         Allergies   Allergen Reactions    Codeine Vomiting    Nexium Unspecified     Ringing in ears    Soy Allergy     Sulfa Drugs Nausea       Review of Systems   Constitutional:  Negative for fever.   Gastrointestinal:  Negative for abdominal pain.   Genitourinary:  Positive for frequency. Negative for flank pain and hematuria.   Skin:  Negative for rash.   All other systems reviewed and are negative.       Objective:   /60 (BP Location: Right arm, Patient Position: Sitting, BP Cuff Size: Adult)   Pulse 61   Temp 36.4 °C (97.6 °F) (Temporal)   Resp 14   Ht 1.651 m (5' 5\")   Wt 46.1 kg (101 lb 9.6 oz)   LMP  (LMP Unknown)   SpO2 98%   BMI 16.91 kg/m²     Physical Exam  Vitals reviewed. "   Constitutional:       General: She is not in acute distress.  Abdominal:      General: Bowel sounds are normal. There is no distension.      Palpations: Abdomen is soft.      Tenderness: There is no abdominal tenderness. There is no right CVA tenderness or left CVA tenderness.   Genitourinary:     Labia:         Right: No rash, tenderness or lesion.         Left: No rash, tenderness or lesion.       Vagina: No tenderness or bleeding.      Comments: Bi manual vaginal exam performed, no visualized or palpated prolapse. Vaginal pathogen swab was collected. Patient tolerated exam well.   Skin:     General: Skin is warm and dry.      Findings: No rash.   Neurological:      Mental Status: She is alert and oriented to person, place, and time.         Assessment/Plan:   1. Urinary incontinence, unspecified type  - POCT Urinalysis    2. Acute vaginitis  - VAGINAL PATHOGENS DNA PANEL; Future    3. Bacterial vaginosis  - VAGINAL PATHOGENS DNA PANEL; Future  - metroNIDAZOLE (FLAGYL) 500 MG Tab; Take 1 Tablet by mouth 2 times a day for 7 days.  Dispense: 14 Tablet; Refill: 0    Results for orders placed or performed in visit on 08/16/24   POCT Urinalysis   Result Value Ref Range    POC Color Yellow Negative    POC Appearance Clear Negative    POC Glucose neg Negative mg/dL    POC Bilirubin neg Negative mg/dL    POC Ketones neg Negative mg/dL    POC Specific Gravity 1.015 <1.005 - >1.030    POC Blood neg Negative    POC Urine PH 6.5 5.0 - 8.0    POC Protein neg Negative mg/dL    POC Urobiligen 0.2 Negative (0.2) mg/dL    POC Nitrites neg Negative    POC Leukocyte Esterase neg Negative     -Follow up with PCP.    Follow up urgently for abdominal pain, flank pain, difficulty with urination, fevers, vomiting, weakness, tachycardia, or any other concerns.     -Presents with acute urinary incontinence starting after being treated for a UTI. Discussed there was no indication of a current UTI on the UA. The urine culture from 8/6 was  negative. No visualized vaginal prolapse noted on exam. Differential to include overactive bladder. She also has symptoms of vaginitis, testing positive for BV. Initiated flagyl. She had had a abdominal/pelvis CT done in May, reviewed results:  -Kidneys: 1.5 cm cyst right kidney requiring   No hydronephrosis  -Peritoneum: Unremarkable without ascites.   -Pelvis: No focal urinary bladder wall thickening     Stable vitals. Afebrile. No CVA or abdominal tenderness to palpation. Advised follow up with PCP as planned, as she has an  appointment on 9/4.      Differential diagnosis, natural history, supportive care, and indications for immediate follow-up discussed.

## 2024-08-17 ENCOUNTER — TELEPHONE (OUTPATIENT)
Dept: URGENT CARE | Facility: CLINIC | Age: 89
End: 2024-08-17
Payer: MEDICARE

## 2024-08-17 RX ORDER — METRONIDAZOLE 500 MG/1
500 TABLET ORAL 2 TIMES DAILY
Qty: 14 TABLET | Refills: 0 | Status: SHIPPED | OUTPATIENT
Start: 2024-08-17 | End: 2024-08-24

## 2024-08-19 ASSESSMENT — ENCOUNTER SYMPTOMS: FLANK PAIN: 0

## 2024-08-19 NOTE — PATIENT INSTRUCTIONS
-Follow up with PCP.    Follow up urgently for abdominal pain, flank pain, difficulty with urination, fevers, vomiting, weakness, tachycardia, or any other concerns.

## 2024-08-26 ENCOUNTER — OFFICE VISIT (OUTPATIENT)
Dept: URGENT CARE | Facility: PHYSICIAN GROUP | Age: 89
End: 2024-08-26
Payer: MEDICARE

## 2024-08-26 VITALS
OXYGEN SATURATION: 97 % | HEIGHT: 63 IN | HEART RATE: 61 BPM | TEMPERATURE: 97.4 F | BODY MASS INDEX: 17.19 KG/M2 | SYSTOLIC BLOOD PRESSURE: 104 MMHG | WEIGHT: 97 LBS | DIASTOLIC BLOOD PRESSURE: 60 MMHG | RESPIRATION RATE: 16 BRPM

## 2024-08-26 DIAGNOSIS — Z03.818 ENCOUNTER FOR PATIENT CONCERN ABOUT EXPOSURE TO INFECTIOUS ORGANISM: ICD-10-CM

## 2024-08-26 LAB
FLUAV RNA SPEC QL NAA+PROBE: NEGATIVE
FLUBV RNA SPEC QL NAA+PROBE: NEGATIVE
RSV RNA SPEC QL NAA+PROBE: NEGATIVE
SARS-COV-2 RNA RESP QL NAA+PROBE: NEGATIVE

## 2024-08-26 ASSESSMENT — FIBROSIS 4 INDEX: FIB4 SCORE: 1.89

## 2024-08-26 NOTE — PROGRESS NOTES
"  Subjective:      88 y.o. female presents to urgent care for COVID 19 testing. Last Wednesday she went to a square dancing event and she was recently notified that several people tested positive for COVID. She is currently asymptomatic. No tobacco product use.  No history of asthma or COPD.  She is vaccinated against COVID.      She denies any other questions or concerns at this time.    Current problem list, medication, and past medical/surgical history were reviewed in Epic.    ROS  See HPI     Objective:      /60 (BP Location: Left arm, Patient Position: Sitting, BP Cuff Size: Adult)   Pulse 61   Temp 36.3 °C (97.4 °F) (Temporal)   Resp 16   Ht 1.588 m (5' 2.5\")   Wt 44 kg (97 lb)   LMP  (LMP Unknown)   SpO2 97%   BMI 17.46 kg/m²     Physical Exam  Constitutional:       General: She is not in acute distress.     Appearance: She is not diaphoretic.   Cardiovascular:      Rate and Rhythm: Normal rate and regular rhythm.      Heart sounds: Normal heart sounds.   Pulmonary:      Effort: Pulmonary effort is normal. No respiratory distress.      Breath sounds: Normal breath sounds.   Neurological:      Mental Status: She is alert.   Psychiatric:         Mood and Affect: Affect normal.         Judgment: Judgment normal.       Assessment/Plan:     1. Encounter for patient concern about exposure to infectious organism  Negative COVID, influenza, and RSV.   - POCT CEPHEID COV-2, FLU A/B, RSV - PCR      Instructed to return to Urgent Care or nearest Emergency Department if symptoms fail to improve, for any change in condition, further concerns, or new concerning symptoms. Patient states understanding of the plan of care and discharge instructions.    Bailey Diaz M.D.   "

## 2024-09-04 ENCOUNTER — OFFICE VISIT (OUTPATIENT)
Dept: MEDICAL GROUP | Facility: PHYSICIAN GROUP | Age: 89
End: 2024-09-04
Payer: MEDICARE

## 2024-09-04 VITALS
HEIGHT: 62 IN | OXYGEN SATURATION: 95 % | WEIGHT: 97.2 LBS | HEART RATE: 65 BPM | SYSTOLIC BLOOD PRESSURE: 110 MMHG | BODY MASS INDEX: 17.89 KG/M2 | DIASTOLIC BLOOD PRESSURE: 64 MMHG | TEMPERATURE: 97.3 F

## 2024-09-04 DIAGNOSIS — E03.4 HYPOTHYROIDISM DUE TO ACQUIRED ATROPHY OF THYROID: ICD-10-CM

## 2024-09-04 DIAGNOSIS — M81.0 AGE-RELATED OSTEOPOROSIS WITHOUT CURRENT PATHOLOGICAL FRACTURE: ICD-10-CM

## 2024-09-04 DIAGNOSIS — N94.9 VAGINAL DISCOMFORT: ICD-10-CM

## 2024-09-04 DIAGNOSIS — K58.0 IRRITABLE BOWEL SYNDROME WITH DIARRHEA: ICD-10-CM

## 2024-09-04 DIAGNOSIS — K22.70 BARRETT'S ESOPHAGUS WITHOUT DYSPLASIA: ICD-10-CM

## 2024-09-04 DIAGNOSIS — Z76.89 ENCOUNTER TO ESTABLISH CARE: ICD-10-CM

## 2024-09-04 DIAGNOSIS — G31.84 MILD COGNITIVE IMPAIRMENT: Chronic | ICD-10-CM

## 2024-09-04 PROCEDURE — 99214 OFFICE O/P EST MOD 30 MIN: CPT

## 2024-09-04 PROCEDURE — 3074F SYST BP LT 130 MM HG: CPT

## 2024-09-04 PROCEDURE — 3078F DIAST BP <80 MM HG: CPT

## 2024-09-04 RX ORDER — LEVOTHYROXINE SODIUM 25 UG/1
25 TABLET ORAL
Qty: 100 TABLET | Refills: 3 | Status: SHIPPED | OUTPATIENT
Start: 2024-09-04

## 2024-09-04 ASSESSMENT — FIBROSIS 4 INDEX: FIB4 SCORE: 1.89

## 2024-09-04 NOTE — ASSESSMENT & PLAN NOTE
Chronic, ongoing. Was seen in UC 8/16 for symptoms, was prescribed metronidazole, has not taken this. Reports she was taking premarin in the past for recurrent symptoms.  Consider resuming premarin as needed. Reports stopped using with price 3x prior cost of medication.

## 2024-09-04 NOTE — ASSESSMENT & PLAN NOTE
Chronic, stable. Using levothyroxine 25 mcg daily on an empty stomach. Continue this, continue tsh recheck annually    Orders:    levothyroxine (SYNTHROID) 25 MCG Tab; Take 1 Tablet by mouth every morning on an empty stomach.

## 2024-09-04 NOTE — PROGRESS NOTES
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    Pt presents today to establish care with me, prior pcp Ervin,    She is feeling well today, no acute concerns.    She is , lives with her adult son, has 2 adopted children who also live nearby and are in close contact with her.    Pt was seen in  8/16 for UTI symptoms. She reports her symptoms have resolved.    HPI:   Ratna presents today with    Problem   Irritable Bowel Syndrome With Diarrhea    She reports longstanding challenges with upset stomach and intermittent diarrhea.  It feels to be food related.  She is not had any formal allergen testing but sounds like a homeopathic doctor told her she was allergic to milk in the past.  She would like to investigate this further so she could try elimination approach in her diet to improve her bowel function.    She Had allergy testing that was negative for food allergies and positive for environmental allergies.     Vaginal Discomfort    She reports issue with vaginal discharge and discomfort which occurred in August 2020, found to have vaginal culture positive for E. coli.  She is treated with a 5-day course of ampicillin 500 mg twice daily which cleared up the issue.  She followed up with gynecology in the next month who then also recommended topical estrogen.  She also has issues with undertreated thyroid as she preferred to go off of levothyroxine use natural supplements, this could certainly be contributing as well.  No systemic signs or symptoms of infection.  No bloody discharge.  She saw a colleague of mine about 2 weeks ago who reinitiated treatment with ampicillin 500 mg 4 times daily for 7 days which was helpful but  did not seem to alleviate the discharge completely.    When I saw her last week we checked a urinalysis which was normal. In clinic today on 7/19/2021 performed a pelvic examination. Had to use smaller grade speculum due to patient discomfort. She has a narrow introitus but no obvious stenosis. The cervix is atrophied, no lesions I was able to visualize however patient did not tolerate procedure for a long period she had some erythema and atrophy on the vaginal wall as well. No ki bleeding or discharge visualized.     Mild Cognitive Impairment    He has subjective concerns about her memory.  She is independent and a very good historian regarding her medical care.  She walks with her son 3 days/week and says sometimes he will quit as her ask her questions but he has not made any comments to her about concern regarding the memory.  She will note sometimes walking into a room and forgetting what she wanted and therefore.  She does not have any concerns with completing ADLs or IADLs.     Pisano's Esophagus    She has a known history of Pisano's esophagus diagnosed around 2002 2005.  She has not followed up with gastroenterology for a number of years.  She is previously described Nexium which she feels led to her tinnitus.  She would like to avoid PPI medications moving forward due to potential side effects and tries to treat her ailments with homeopathic remedies.     Age-Related Osteoporosis Without Current Pathological Fracture    Bone Density (10/2021)  lumbar spine T score of -1.1  proximal left femur T score of -3.0      IMPRESSION: According to the World Health Organization classification, bone mineral density of this patient is osteopenia with increased risk of fracture in the lumbar spine and osteoporosis with high risk of fracture in the left femur.     10-year Probability of Fracture:  Major Osteoporotic     24.5%  Hip     11.8%    1/2007 DEXA T-score Lspine -1.0, L femur -2.0 (osteopenia)  9/ however  "this is not DEXA T-score Lspine -0.9, L femur -2.3 (osteopenia)    She has a known history of osteopenia, transitioned into osteoporosis on bone density in 2021.  No fragility fractures. She is taking calcium and vitamin D.      Prior office notes state patient reported receiving IV Reclast x2 in 2012 however I cannot find any evidence of this in the computer and patient does not have any memory of receiving this medicine.  She has a history of Pisano's esophagus would not be a good candidate for Fosamax or Boniva.  She is agreeable to IV Reclast and I mentioned to her the drug assistance program to look into to help with cost.     Hypothyroidism Due to Acquired Atrophy of Thyroid       Ref. Range 5/13/2021 08:16 7/15/2021 13:17 10/11/2021 09:50 12/20/2021 09:00   TSH Latest Ref Range: 0.380 - 5.330 uIU/mL 35.700 (H) 6.460 (H) 3.280 3.720   Free T-4 Latest Ref Range: 0.93 - 1.70 ng/dL 0.72 (L) 1.23 1.39 1.33     She has had subclinical hypothyroidism since at least 2012 in varying degrees, became overt hypothyroidism in May 2021. She recalls previous treatment with levothyroxine but did not feel comfortable with it and has been on desiccated/natural formulations since that time.  Of note, previously she used biotin intermittently which may have altered her labs.    Current regimen: NP thyroid 15 mg daily  Previous regimen: OTC thyroid drops         ROS:  Review of Systems   All other systems reviewed and are negative.      Objective:     Exam:  /64 (BP Location: Left arm, Patient Position: Sitting, BP Cuff Size: Adult)   Pulse 65   Temp 36.3 °C (97.3 °F) (Temporal)   Ht 1.575 m (5' 2\")   Wt 44.1 kg (97 lb 3.2 oz)   LMP  (LMP Unknown)   SpO2 95%   BMI 17.78 kg/m²  Body mass index is 17.78 kg/m².    Physical Exam  Vitals reviewed.   Constitutional:       General: She is not in acute distress.     Appearance: Normal appearance. She is not ill-appearing.   HENT:      Head: Normocephalic and atraumatic. "   Cardiovascular:      Rate and Rhythm: Normal rate.      Pulses: Normal pulses.   Pulmonary:      Effort: Pulmonary effort is normal. No respiratory distress.   Skin:     General: Skin is warm and dry.      Findings: No rash.   Neurological:      General: No focal deficit present.      Mental Status: She is alert and oriented to person, place, and time.   Psychiatric:         Mood and Affect: Mood normal.         Behavior: Behavior normal.         Labs:    Latest Reference Range & Units 02/16/24 08:44 03/18/24 14:42 04/17/24 09:44 05/13/24 09:17 08/06/24 09:03 08/06/24 09:16 08/16/24 08:38 08/16/24 09:15 08/26/24 10:23   WBC 4.8 - 10.8 K/uL 6.3           RBC 4.20 - 5.40 M/uL 4.22           Hemoglobin 12.0 - 16.0 g/dL 13.5           Hematocrit 37.0 - 47.0 % 40.0           MCV 81.4 - 97.8 fL 94.8           MCH 27.0 - 33.0 pg 32.0           MCHC 32.2 - 35.5 g/dL 33.8           RDW 35.9 - 50.0 fL 45.5           Platelet Count 164 - 446 K/uL 280           MPV 9.0 - 12.9 fL 10.4           Neutrophils-Polys 44.00 - 72.00 % 71.80           Neutrophils (Absolute) 1.82 - 7.42 K/uL 4.51           Lymphocytes 22.00 - 41.00 % 13.70 (L)           Lymphs (Absolute) 1.00 - 4.80 K/uL 0.86 (L)           Monocytes 0.00 - 13.40 % 12.40           Monos (Absolute) 0.00 - 0.85 K/uL 0.78           Eosinophils 0.00 - 6.90 % 1.30           Eos (Absolute) 0.00 - 0.51 K/uL 0.08           Basophils 0.00 - 1.80 % 0.50           Baso (Absolute) 0.00 - 0.12 K/uL 0.03           Immature Granulocytes 0.00 - 0.90 % 0.30           Immature Granulocytes (abs) 0.00 - 0.11 K/uL 0.02           Nucleated RBC 0.00 - 0.20 /100 WBC 0.00           NRBC (Absolute) K/uL 0.00           Sodium 135 - 145 mmol/L 133 (L)  139         Potassium 3.6 - 5.5 mmol/L 4.1  5.1         Chloride 96 - 112 mmol/L 97  102         Co2 20 - 33 mmol/L 25  26         Anion Gap 7.0 - 16.0  11.0  11.0         Glucose 65 - 99 mg/dL 88  97         Bun 8 - 22 mg/dL 13  14          Creatinine 0.50 - 1.40 mg/dL 0.63  0.76         GFR (CKD-EPI) >60 mL/min/1.73 m 2 86  76         Calcium 8.5 - 10.5 mg/dL 9.3  10.2         Correct Calcium 8.5 - 10.5 mg/dL 9.2           AST(SGOT) 12 - 45 U/L 23           ALT(SGPT) 2 - 50 U/L 14           Alkaline Phosphatase 30 - 99 U/L 60           Total Bilirubin 0.1 - 1.5 mg/dL 0.4           Albumin 3.2 - 4.9 g/dL 4.1           Total Protein 6.0 - 8.2 g/dL 6.7           Globulin 1.9 - 3.5 g/dL 2.6           A-G Ratio g/dL 1.6           Fasting Status  Fasting           Cholesterol,Tot 100 - 199 mg/dL 181           Triglycerides 0 - 149 mg/dL 64           HDL >=40 mg/dL 56           LDL <100 mg/dL 112 (H)           Urobilinogen, Urine Negative  0.2           Color  Yellow           Character  Clear           Specific Gravity <1.035  1.009           Ph 5.0 - 8.0  7.5           Glucose Negative mg/dL Negative           Ketones Negative mg/dL Negative           Bilirubin Negative  Negative           Occult Blood Negative  Negative           Protein Negative mg/dL Negative           Nitrite Negative  Negative           Leukocyte Esterase Negative  Trace !           Micro Urine Req  Microscopic           WBC /hpf 0-2           RBC /hpf 0-2           Epithelial Cells /hpf Few           Bacteria None /hpf Few !           Hyaline Cast /lpf 0-2           POC Color Negative      YELLOW  Yellow     POC Appearance Negative      CLEAR  Clear     POC Specific Gravity <1.005 - >1.030      1.020  1.015     POC Urine PH 5.0 - 8.0      7.0  6.5     POC Glucose Negative mg/dL     NEG  neg     POC Ketones Negative mg/dL     NEG  neg     POC Protein Negative mg/dL     TRACE  neg     POC Nitrites Negative      NEG  neg     POC Leukocyte Esterase Negative      LARGE  neg     POC Blood Negative      TRACE  neg     POC Bilirubin Negative mg/dL     NEG  neg     POC Urobiligen Negative (0.2) mg/dL     0.2  0.2     25-Hydroxy   Vitamin D 25 30 - 100 ng/mL 86           Vitamin B12 -True  Cobalamin 211 - 911 pg/mL 581           TSH 0.380 - 5.330 uIU/mL 3.070           T3 60.0 - 181.0 ng/dL 118.0           Influenza virus A RNA Negative, Invalid          Negative   Influenza virus B, PCR Negative, Invalid          Negative   RSV, PCR Negative, Invalid          Negative   SARS-CoV-2 by PCR Negative, Invalid          Negative   Candida species DNA Probe Negative         Negative    Gardnerella vaginalis DNA Probe Negative         POSITIVE !    Significant Indicator       NEG      Site       URINE, CLEAN CATCH      Source       UR      Trichamonas vaginalis DNA Probe Negative         Negative    CT-ABDOMEN-PELVIS WITH     Rpt        DS-BONE DENSITY STUDY (DEXA)   Rpt          (L): Data is abnormally low  (H): Data is abnormally high  !: Data is abnormal  Rpt: View report in Results Review for more information    Assessment & Plan:     88 y.o. female with the following -     Assessment & Plan  Vaginal discomfort  Chronic, ongoing. Was seen in  8/16 for symptoms, was prescribed metronidazole, has not taken this. Reports she was taking premarin in the past for recurrent symptoms.  Consider resuming premarin as needed. Reports stopped using with price 3x prior cost of medication.           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 Seeing GI for this, has f/u scheduled 9/11/24         Mild cognitive impairment  Chronic, ongoing. Seeing neurology, upcoming appointment scheduled 10/18/24         Hypothyroidism due to acquired atrophy of thyroid  Chronic, stable. Using levothyroxine 25 mcg daily on an empty stomach. Continue this, continue tsh recheck annually    Orders:    levothyroxine (SYNTHROID) 25 MCG Tab; Take 1 Tablet by mouth every morning on an empty stomach.    Encounter to establish care         Age-related osteoporosis without current pathological fracture  Chronic,  unstable. Discussed healthy lifestyle recommendations.     Dexa 3/18/24 COMPARISON: Bone densitometry scans 9/24/2012 and 10/27/2021.   FINDINGS:  The lumbar spine has a mean bone mineral density of 1.026 g/cm2, with a T score of -1.3 and a Z score of 1.3.   The proximal left femur has a mean bone mineral density of 0.629 g/cm2, with a T score of -3.0 and a Z score of -0.2.   When compared with the most recent study dated 10/27/2021, there has been a 0.6% decrease in the bone mineral density of the proximal left femur and when compared with the study dated 9/24/2012 there has been a 3.8% decrease in the bone mineral density   of the lumbar spine.   IMPRESSION:   According to the World Health Organization classification, bone mineral density of this patient is osteopenic in the lumbar spine and osteoporotic in the proximal left femur.   10-year Probability of Fracture:  Major Osteoporotic     16.7%  Hip     7.2%  Population      [place] ()    Consider IV therapy given history of aguilar's esophagus. Pt prefers to take supplements.          Patient was educated in proper administration of medication(s) ordered today including safety, possible SE, risks, benefits, rationale and alternatives to therapy.   Supportive care, differential diagnoses, and indications for immediate follow-up discussed with patient.    Pathogenesis of diagnosis discussed including typical length and natural progression.    Instructed to return to clinic or nearest emergency department for any change in condition, further concerns, or worsening of symptoms.  Patient states understanding of the plan of care and discharge instructions.    Return in about 3 months (around 12/4/2024), or if symptoms worsen or fail to improve.    Please note that this dictation was created using voice recognition software. I have made every reasonable attempt to correct obvious errors, but I expect that there are errors of grammar and possibly content that I did  not discover before finalizing the note.

## 2024-09-04 NOTE — ASSESSMENT & PLAN NOTE
Chronic, unstable. Discussed healthy lifestyle recommendations.     Dexa 3/18/24 COMPARISON: Bone densitometry scans 9/24/2012 and 10/27/2021.   FINDINGS:  The lumbar spine has a mean bone mineral density of 1.026 g/cm2, with a T score of -1.3 and a Z score of 1.3.   The proximal left femur has a mean bone mineral density of 0.629 g/cm2, with a T score of -3.0 and a Z score of -0.2.   When compared with the most recent study dated 10/27/2021, there has been a 0.6% decrease in the bone mineral density of the proximal left femur and when compared with the study dated 9/24/2012 there has been a 3.8% decrease in the bone mineral density   of the lumbar spine.   IMPRESSION:   According to the World Health Organization classification, bone mineral density of this patient is osteopenic in the lumbar spine and osteoporotic in the proximal left femur.   10-year Probability of Fracture:  Major Osteoporotic     16.7%  Hip     7.2%  Population      [place] ()    Consider IV therapy given history of aguilar's esophagus. Pt prefers to take supplements.

## 2024-09-05 ENCOUNTER — PATIENT OUTREACH (OUTPATIENT)
Dept: HEALTH INFORMATION MANAGEMENT | Facility: OTHER | Age: 89
End: 2024-09-05
Payer: MEDICARE

## 2024-09-05 DIAGNOSIS — L98.9 SKIN LESION: ICD-10-CM

## 2024-09-05 NOTE — PROGRESS NOTES
Community Health Worker Note    Discussed: CHW Alena reached out to pt to complete monthly outreach. Pt shared she is doing well. Pt stated she really likes her new PCP and appreciates the time and effort she put into their visit. Pt stated she forgot to ask PCP for dermatology referral. This CHW asked PCP to put in derm referral. Referral placed. CHW called pt back to update her on referral. Pt expressed gratitude. Pt expressed no other needs at this time. Pt has contact information.     Follow-Up Needs: None at this time.    Plan: CHW to graduate pt, needs are met at this time.

## 2024-09-23 ENCOUNTER — APPOINTMENT (OUTPATIENT)
Dept: RADIOLOGY | Facility: MEDICAL CENTER | Age: 89
DRG: 544 | End: 2024-09-23
Attending: EMERGENCY MEDICINE
Payer: MEDICARE

## 2024-09-23 ENCOUNTER — HOSPITAL ENCOUNTER (INPATIENT)
Facility: MEDICAL CENTER | Age: 89
LOS: 2 days | DRG: 544 | End: 2024-09-25
Attending: EMERGENCY MEDICINE | Admitting: STUDENT IN AN ORGANIZED HEALTH CARE EDUCATION/TRAINING PROGRAM
Payer: MEDICARE

## 2024-09-23 DIAGNOSIS — S32.401A CLOSED NONDISPLACED FRACTURE OF RIGHT ACETABULUM, UNSPECIFIED PORTION OF ACETABULUM, INITIAL ENCOUNTER (HCC): ICD-10-CM

## 2024-09-23 DIAGNOSIS — S32.591A: ICD-10-CM

## 2024-09-23 DIAGNOSIS — S32.10XA CLOSED FRACTURE OF SACRUM, UNSPECIFIED PORTION OF SACRUM, INITIAL ENCOUNTER (HCC): ICD-10-CM

## 2024-09-23 DIAGNOSIS — S32.591A CLOSED FRACTURE OF RAMUS OF RIGHT PUBIS, INITIAL ENCOUNTER (HCC): ICD-10-CM

## 2024-09-23 DIAGNOSIS — D72.829 LEUKOCYTOSIS, UNSPECIFIED TYPE: ICD-10-CM
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
## 2024-09-23 DIAGNOSIS — S32.401A: ICD-10-CM

## 2024-09-23 LAB
ALBUMIN SERPL BCP-MCNC: 3.9 G/DL (ref 3.2–4.9)
ALBUMIN/GLOB SERPL: 1.6 G/DL
ALP SERPL-CCNC: 65 U/L (ref 30–99)
ALT SERPL-CCNC: 24 U/L (ref 2–50)
ANION GAP SERPL CALC-SCNC: 12 MMOL/L (ref 7–16)
APPEARANCE UR: CLEAR
APTT PPP: 30.3 SEC (ref 24.7–36)
AST SERPL-CCNC: 44 U/L (ref 12–45)
BASOPHILS # BLD AUTO: 0.2 % (ref 0–1.8)
BASOPHILS # BLD: 0.04 K/UL (ref 0–0.12)
BILIRUB SERPL-MCNC: 0.7 MG/DL (ref 0.1–1.5)
BILIRUB UR QL STRIP.AUTO: NEGATIVE
BUN SERPL-MCNC: 17 MG/DL (ref 8–22)
CALCIUM ALBUM COR SERPL-MCNC: 9.3 MG/DL (ref 8.5–10.5)
CALCIUM SERPL-MCNC: 9.2 MG/DL (ref 8.5–10.5)
CHLORIDE SERPL-SCNC: 104 MMOL/L (ref 96–112)
CO2 SERPL-SCNC: 22 MMOL/L (ref 20–33)
COLOR UR: YELLOW
CREAT SERPL-MCNC: 0.92 MG/DL (ref 0.5–1.4)
EKG IMPRESSION: NORMAL
EOSINOPHIL # BLD AUTO: 0.02 K/UL (ref 0–0.51)
EOSINOPHIL NFR BLD: 0.1 % (ref 0–6.9)
ERYTHROCYTE [DISTWIDTH] IN BLOOD BY AUTOMATED COUNT: 45.3 FL (ref 35.9–50)
GFR SERPLBLD CREATININE-BSD FMLA CKD-EPI: 60 ML/MIN/1.73 M 2
GLOBULIN SER CALC-MCNC: 2.4 G/DL (ref 1.9–3.5)
GLUCOSE SERPL-MCNC: 109 MG/DL (ref 65–99)
GLUCOSE UR STRIP.AUTO-MCNC: NEGATIVE MG/DL
HCT VFR BLD AUTO: 40.5 % (ref 37–47)
HGB BLD-MCNC: 13.2 G/DL (ref 12–16)
IMM GRANULOCYTES # BLD AUTO: 0.11 K/UL (ref 0–0.11)
IMM GRANULOCYTES NFR BLD AUTO: 0.6 % (ref 0–0.9)
INR PPP: 1.05 (ref 0.87–1.13)
KETONES UR STRIP.AUTO-MCNC: 15 MG/DL
LEUKOCYTE ESTERASE UR QL STRIP.AUTO: NEGATIVE
LYMPHOCYTES # BLD AUTO: 0.34 K/UL (ref 1–4.8)
LYMPHOCYTES NFR BLD: 2 % (ref 22–41)
MAGNESIUM SERPL-MCNC: 2.2 MG/DL (ref 1.5–2.5)
MCH RBC QN AUTO: 31 PG (ref 27–33)
MCHC RBC AUTO-ENTMCNC: 32.6 G/DL (ref 32.2–35.5)
MCV RBC AUTO: 95.1 FL (ref 81.4–97.8)
MICRO URNS: ABNORMAL
MONOCYTES # BLD AUTO: 1.04 K/UL (ref 0–0.85)
MONOCYTES NFR BLD AUTO: 6 % (ref 0–13.4)
NEUTROPHILS # BLD AUTO: 15.74 K/UL (ref 1.82–7.42)
NEUTROPHILS NFR BLD: 91.1 % (ref 44–72)
NITRITE UR QL STRIP.AUTO: NEGATIVE
NRBC # BLD AUTO: 0 K/UL
NRBC BLD-RTO: 0 /100 WBC (ref 0–0.2)
PH UR STRIP.AUTO: 6 [PH] (ref 5–8)
PLATELET # BLD AUTO: 170 K/UL (ref 164–446)
PMV BLD AUTO: 10.1 FL (ref 9–12.9)
POTASSIUM SERPL-SCNC: 4 MMOL/L (ref 3.6–5.5)
PROT SERPL-MCNC: 6.3 G/DL (ref 6–8.2)
PROT UR QL STRIP: NEGATIVE MG/DL
PROTHROMBIN TIME: 13.7 SEC (ref 12–14.6)
RBC # BLD AUTO: 4.26 M/UL (ref 4.2–5.4)
RBC UR QL AUTO: NEGATIVE
SODIUM SERPL-SCNC: 138 MMOL/L (ref 135–145)
SP GR UR STRIP.AUTO: 1.01
TSH SERPL DL<=0.005 MIU/L-ACNC: 3.28 UIU/ML (ref 0.38–5.33)
UROBILINOGEN UR STRIP.AUTO-MCNC: 0.2 MG/DL
WBC # BLD AUTO: 17.3 K/UL (ref 4.8–10.8)

## 2024-09-23 PROCEDURE — 51701 INSERT BLADDER CATHETER: CPT

## 2024-09-23 PROCEDURE — 85610 PROTHROMBIN TIME: CPT

## 2024-09-23 PROCEDURE — 85025 COMPLETE CBC W/AUTO DIFF WBC: CPT

## 2024-09-23 PROCEDURE — 93005 ELECTROCARDIOGRAM TRACING: CPT | Performed by: EMERGENCY MEDICINE

## 2024-09-23 PROCEDURE — 99285 EMERGENCY DEPT VISIT HI MDM: CPT

## 2024-09-23 PROCEDURE — 85730 THROMBOPLASTIN TIME PARTIAL: CPT

## 2024-09-23 PROCEDURE — 93005 ELECTROCARDIOGRAM TRACING: CPT

## 2024-09-23 PROCEDURE — 80053 COMPREHEN METABOLIC PANEL: CPT

## 2024-09-23 PROCEDURE — 73700 CT LOWER EXTREMITY W/O DYE: CPT | Mod: RT

## 2024-09-23 PROCEDURE — 73501 X-RAY EXAM HIP UNI 1 VIEW: CPT | Mod: RT

## 2024-09-23 PROCEDURE — 84443 ASSAY THYROID STIM HORMONE: CPT

## 2024-09-23 PROCEDURE — 36415 COLL VENOUS BLD VENIPUNCTURE: CPT

## 2024-09-23 PROCEDURE — 99223 1ST HOSP IP/OBS HIGH 75: CPT | Mod: AI | Performed by: STUDENT IN AN ORGANIZED HEALTH CARE EDUCATION/TRAINING PROGRAM

## 2024-09-23 PROCEDURE — 83735 ASSAY OF MAGNESIUM: CPT

## 2024-09-23 PROCEDURE — 770001 HCHG ROOM/CARE - MED/SURG/GYN PRIV*

## 2024-09-23 PROCEDURE — A9270 NON-COVERED ITEM OR SERVICE: HCPCS | Performed by: STUDENT IN AN ORGANIZED HEALTH CARE EDUCATION/TRAINING PROGRAM

## 2024-09-23 PROCEDURE — 81003 URINALYSIS AUTO W/O SCOPE: CPT

## 2024-09-23 PROCEDURE — A9270 NON-COVERED ITEM OR SERVICE: HCPCS | Performed by: EMERGENCY MEDICINE

## 2024-09-23 PROCEDURE — 700102 HCHG RX REV CODE 250 W/ 637 OVERRIDE(OP): Performed by: STUDENT IN AN ORGANIZED HEALTH CARE EDUCATION/TRAINING PROGRAM

## 2024-09-23 PROCEDURE — 700102 HCHG RX REV CODE 250 W/ 637 OVERRIDE(OP): Performed by: EMERGENCY MEDICINE

## 2024-09-23 RX ORDER — HYDROMORPHONE HYDROCHLORIDE 1 MG/ML
0.25 INJECTION, SOLUTION INTRAMUSCULAR; INTRAVENOUS; SUBCUTANEOUS
Status: DISCONTINUED | OUTPATIENT
Start: 2024-09-23 | End: 2024-09-23

## 2024-09-23 RX ORDER — POLYETHYLENE GLYCOL 3350 17 G/17G
1 POWDER, FOR SOLUTION ORAL
Status: DISCONTINUED | OUTPATIENT
Start: 2024-09-23 | End: 2024-09-25 | Stop reason: HOSPADM

## 2024-09-23 RX ORDER — ENOXAPARIN SODIUM 100 MG/ML
30 INJECTION SUBCUTANEOUS DAILY
Status: DISCONTINUED | OUTPATIENT
Start: 2024-09-23 | End: 2024-09-24

## 2024-09-23 RX ORDER — ONDANSETRON 2 MG/ML
4 INJECTION INTRAMUSCULAR; INTRAVENOUS EVERY 4 HOURS PRN
Status: DISCONTINUED | OUTPATIENT
Start: 2024-09-23 | End: 2024-09-25 | Stop reason: HOSPADM

## 2024-09-23 RX ORDER — OXYCODONE HYDROCHLORIDE 10 MG/1
10 TABLET ORAL EVERY 6 HOURS PRN
Status: DISCONTINUED | OUTPATIENT
Start: 2024-09-23 | End: 2024-09-25 | Stop reason: HOSPADM

## 2024-09-23 RX ORDER — OXYCODONE HYDROCHLORIDE 5 MG/1
2.5 TABLET ORAL
Status: DISCONTINUED | OUTPATIENT
Start: 2024-09-23 | End: 2024-09-23

## 2024-09-23 RX ORDER — CYCLOBENZAPRINE HCL 10 MG
10 TABLET ORAL 3 TIMES DAILY PRN
Status: DISCONTINUED | OUTPATIENT
Start: 2024-09-23 | End: 2024-09-25 | Stop reason: HOSPADM

## 2024-09-23 RX ORDER — HYDROMORPHONE HYDROCHLORIDE 1 MG/ML
0.5 INJECTION, SOLUTION INTRAMUSCULAR; INTRAVENOUS; SUBCUTANEOUS
Status: DISCONTINUED | OUTPATIENT
Start: 2024-09-23 | End: 2024-09-25 | Stop reason: HOSPADM

## 2024-09-23 RX ORDER — OXYCODONE HYDROCHLORIDE 5 MG/1
5 TABLET ORAL
Status: DISCONTINUED | OUTPATIENT
Start: 2024-09-23 | End: 2024-09-23

## 2024-09-23 RX ORDER — LEVOTHYROXINE SODIUM 25 UG/1
25 TABLET ORAL
Status: DISCONTINUED | OUTPATIENT
Start: 2024-09-24 | End: 2024-09-25 | Stop reason: HOSPADM

## 2024-09-23 RX ORDER — OXYCODONE HYDROCHLORIDE 5 MG/1
5 TABLET ORAL EVERY 6 HOURS PRN
Status: DISCONTINUED | OUTPATIENT
Start: 2024-09-23 | End: 2024-09-25 | Stop reason: HOSPADM

## 2024-09-23 RX ORDER — GUAIFENESIN/DEXTROMETHORPHAN 100-10MG/5
10 SYRUP ORAL EVERY 6 HOURS PRN
Status: DISCONTINUED | OUTPATIENT
Start: 2024-09-23 | End: 2024-09-25 | Stop reason: HOSPADM

## 2024-09-23 RX ORDER — HYDROMORPHONE HYDROCHLORIDE 1 MG/ML
0.5 INJECTION, SOLUTION INTRAMUSCULAR; INTRAVENOUS; SUBCUTANEOUS ONCE
Status: DISCONTINUED | OUTPATIENT
Start: 2024-09-23 | End: 2024-09-23

## 2024-09-23 RX ORDER — ONDANSETRON 4 MG/1
4 TABLET, ORALLY DISINTEGRATING ORAL EVERY 4 HOURS PRN
Status: DISCONTINUED | OUTPATIENT
Start: 2024-09-23 | End: 2024-09-25 | Stop reason: HOSPADM

## 2024-09-23 RX ORDER — ACETAMINOPHEN 500 MG
1000 TABLET ORAL 3 TIMES DAILY
Status: DISCONTINUED | OUTPATIENT
Start: 2024-09-23 | End: 2024-09-25 | Stop reason: HOSPADM

## 2024-09-23 RX ORDER — VITAMIN B COMPLEX
1000 TABLET ORAL DAILY
Status: DISCONTINUED | OUTPATIENT
Start: 2024-09-23 | End: 2024-09-23

## 2024-09-23 RX ORDER — IBUPROFEN/DIPHENHYDRAMINE CIT 200MG-38MG
2 TABLET ORAL
Status: ON HOLD | COMMUNITY
End: 2024-09-25

## 2024-09-23 RX ORDER — AMOXICILLIN 250 MG
2 CAPSULE ORAL 2 TIMES DAILY
Status: DISCONTINUED | OUTPATIENT
Start: 2024-09-23 | End: 2024-09-25 | Stop reason: HOSPADM

## 2024-09-23 RX ORDER — HYDRALAZINE HYDROCHLORIDE 20 MG/ML
10 INJECTION INTRAMUSCULAR; INTRAVENOUS EVERY 4 HOURS PRN
Status: DISCONTINUED | OUTPATIENT
Start: 2024-09-23 | End: 2024-09-25 | Stop reason: HOSPADM

## 2024-09-23 RX ORDER — ACETAMINOPHEN 325 MG/1
650 TABLET ORAL ONCE
Status: COMPLETED | OUTPATIENT
Start: 2024-09-23 | End: 2024-09-23

## 2024-09-23 RX ORDER — METRONIDAZOLE 500 MG/1
500 TABLET ORAL 2 TIMES DAILY
Status: ON HOLD | COMMUNITY
Start: 2024-08-17 | End: 2024-09-25

## 2024-09-23 RX ORDER — ENOXAPARIN SODIUM 100 MG/ML
40 INJECTION SUBCUTANEOUS DAILY
Status: DISCONTINUED | OUTPATIENT
Start: 2024-09-23 | End: 2024-09-23

## 2024-09-23 RX ADMIN — ACETAMINOPHEN 650 MG: 325 TABLET ORAL at 14:26

## 2024-09-23 RX ADMIN — CYCLOBENZAPRINE 10 MG: 10 TABLET, FILM COATED ORAL at 16:43

## 2024-09-23 RX ADMIN — ACETAMINOPHEN 1000 MG: 500 TABLET ORAL at 20:39

## 2024-09-23 SDOH — ECONOMIC STABILITY: TRANSPORTATION INSECURITY
IN THE PAST 12 MONTHS, HAS LACK OF RELIABLE TRANSPORTATION KEPT YOU FROM MEDICAL APPOINTMENTS, MEETINGS, WORK OR FROM GETTING THINGS NEEDED FOR DAILY LIVING?: NO

## 2024-09-23 ASSESSMENT — SOCIAL DETERMINANTS OF HEALTH (SDOH)
WITHIN THE PAST 12 MONTHS, YOU WORRIED THAT YOUR FOOD WOULD RUN OUT BEFORE YOU GOT THE MONEY TO BUY MORE: NEVER TRUE
WITHIN THE LAST YEAR, HAVE YOU BEEN AFRAID OF YOUR PARTNER OR EX-PARTNER?: NO
WITHIN THE LAST YEAR, HAVE YOU BEEN HUMILIATED OR EMOTIONALLY ABUSED IN OTHER WAYS BY YOUR PARTNER OR EX-PARTNER?: NO
WITHIN THE LAST YEAR, HAVE TO BEEN RAPED OR FORCED TO HAVE ANY KIND OF SEXUAL ACTIVITY BY YOUR PARTNER OR EX-PARTNER?: NO
WITHIN THE LAST YEAR, HAVE YOU BEEN KICKED, HIT, SLAPPED, OR OTHERWISE PHYSICALLY HURT BY YOUR PARTNER OR EX-PARTNER?: NO
IN THE PAST 12 MONTHS, HAS THE ELECTRIC, GAS, OIL, OR WATER COMPANY THREATENED TO SHUT OFF SERVICE IN YOUR HOME?: NO
WITHIN THE PAST 12 MONTHS, THE FOOD YOU BOUGHT JUST DIDN'T LAST AND YOU DIDN'T HAVE MONEY TO GET MORE: NEVER TRUE

## 2024-09-23 ASSESSMENT — FIBROSIS 4 INDEX: FIB4 SCORE: 1.91

## 2024-09-23 ASSESSMENT — PATIENT HEALTH QUESTIONNAIRE - PHQ9
SUM OF ALL RESPONSES TO PHQ9 QUESTIONS 1 AND 2: 0
2. FEELING DOWN, DEPRESSED, IRRITABLE, OR HOPELESS: NOT AT ALL
1. LITTLE INTEREST OR PLEASURE IN DOING THINGS: NOT AT ALL

## 2024-09-23 ASSESSMENT — ENCOUNTER SYMPTOMS
PSYCHIATRIC NEGATIVE: 1
NEUROLOGICAL NEGATIVE: 1
GASTROINTESTINAL NEGATIVE: 1
CARDIOVASCULAR NEGATIVE: 1
EYES NEGATIVE: 1
RESPIRATORY NEGATIVE: 1
CONSTITUTIONAL NEGATIVE: 1

## 2024-09-23 ASSESSMENT — LIFESTYLE VARIABLES
HAVE PEOPLE ANNOYED YOU BY CRITICIZING YOUR DRINKING: NO
HAVE YOU EVER FELT YOU SHOULD CUT DOWN ON YOUR DRINKING: NO
ALCOHOL_USE: NO
CONSUMPTION TOTAL: NEGATIVE
EVER FELT BAD OR GUILTY ABOUT YOUR DRINKING: NO
ON A TYPICAL DAY WHEN YOU DRINK ALCOHOL HOW MANY DRINKS DO YOU HAVE: 0
TOTAL SCORE: 0
EVER HAD A DRINK FIRST THING IN THE MORNING TO STEADY YOUR NERVES TO GET RID OF A HANGOVER: NO
AVERAGE NUMBER OF DAYS PER WEEK YOU HAVE A DRINK CONTAINING ALCOHOL: 0
TOTAL SCORE: 0
DOES PATIENT WANT TO STOP DRINKING: NO
TOTAL SCORE: 0
HOW MANY TIMES IN THE PAST YEAR HAVE YOU HAD 5 OR MORE DRINKS IN A DAY: 0

## 2024-09-23 ASSESSMENT — PAIN DESCRIPTION - PAIN TYPE
TYPE: ACUTE PAIN
TYPE: ACUTE PAIN;CHRONIC PAIN
TYPE: ACUTE PAIN

## 2024-09-23 NOTE — ASSESSMENT & PLAN NOTE
Hip CT showing right sacral fracture as well as fracture of the anterior column of the right acetabular extending to the right suprapubic ramus and right pubic bone fracture.   Orthopedics recommended pain management and physical therapy/Occupational Therapy evaluation as well as outpatient follow-up.

## 2024-09-23 NOTE — H&P
Hospital Medicine History & Physical Note    Date of Service  9/23/2024    Primary Care Physician  DONOVAN Mathias    Consultants  Orthopedic surgery    Code Status  Full Code    Chief Complaint  Chief Complaint   Patient presents with    GLF     Pt to triage by wheelchair reports glf this morning then landed on right hip. -head strike. States she took advil pm last night that made her feel dizzy. Denies any dizziness/son/nausea upon arrival in ED. Gcs of 15. States able to bear weight on her right leg but makes her pain worse.       History of Presenting Illness  89-year-old female with a past medical history of dementia and insomnia presents emergency department on 9/23/2024 after ground-level fall.  Patient reports that overnight she took Tylenol PM and woke up to go to the bathroom and had a ground-level fall as she slipped.  Patient did not realize before she walked.  She had hip pain which prompted her to come to emergency department.    At the emergency department, SBP in the 180s.  Saturating well on room air.  CBC with leukocytosis at 17.3.  Chemistry without gross abnormality.  Hip CT showing right sacral fracture as well as fracture of the anterior column of the right acetabular extending to the right suprapubic ramus and right pubic bone fracture.  She received a dose of acetaminophen and Dilaudid.  EDP discussed case with orthopedic surgery (Dr. Batista), who recommended pain management and physical therapy/Occupational Therapy evaluation as well as outpatient follow-up.    I discussed the plan of care with patient and bedside RN.    Review of Systems  Review of Systems   Constitutional: Negative.    HENT: Negative.     Eyes: Negative.    Respiratory: Negative.     Cardiovascular: Negative.    Gastrointestinal: Negative.    Genitourinary: Negative.    Musculoskeletal:  Positive for joint pain.   Skin: Negative.    Neurological: Negative.    Endo/Heme/Allergies: Negative.     Psychiatric/Behavioral: Negative.         Past Medical History   has a past medical history of Age-related osteoporosis without current pathological fracture, Allergy, Anisocoria (06/06/2023), Anxiety, Arthritis, Back pain, Pisano's esophagus, Bladder infection, Candidiasis, Depression, Difficulty swallowing, Dusky feet (04/05/2023), Dyslipidemia (07/14/2016), Fall at home (09/07/2021), Family history of leukemia (06/03/2019), GERD (gastroesophageal reflux disease), Grief (02/24/2020), Health counseling (09/08/2023), Hearing loss, History of hemorrhoids, History of measles, History of pneumonia, total hysterectomy with removal of both tubes and ovaries (08/20/2020), Hypervitaminosis D, Hypothyroidism (acquired) (07/13/2015), Irritable bowel syndrome with constipation (04/30/2018), Left lower quadrant abdominal pain-now resolved (02/17/2023), Leg swelling (06/07/2023), Memory loss, Mild depression (08/13/2021), Neck pain, 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    Surgical History   has a past surgical history that includes tonsillectomy; hysterectomy radical; other; and other orthopedic surgery.     Family History  family history includes Cancer in her mother; Dementia in her father; Genetic Disorder in her mother and son.       Social History   reports that she has never smoked. She has never used smokeless tobacco. She reports that she does not drink alcohol and does not use drugs.    Allergies  Allergies   Allergen Reactions    Codeine Vomiting    Nexium Unspecified     Ringing in ears    Soy Allergy     Sulfa Drugs Nausea       Medications  Prior to Admission Medications    Prescriptions Last Dose Informant Patient Reported? Taking?   Ascorbic Acid (VITAMIN C) 1000 MG Tab   Yes No   Sig: Take 1,000 mg by mouth every day.   Calcium Carb-Cholecalciferol (CALCIUM 600 + D PO)   Yes No   Sig: Take  by mouth.   Cholecalciferol (VITAMIN D3 PO)   Yes No   Sig: Take  by mouth.   DEVILS CLAW PO   Yes No   Sig: Take  by mouth.   Multiple Vitamins-Minerals (MULTIVITAMIN ADULT PO)   Yes No   Sig: Take  by mouth.   Multiple Vitamins-Minerals (PRESERVISION/LUTEIN PO)   Yes No   Sig: Take  by mouth.   Probiotic Product (PROBIOTIC-10 PO)   Yes No   Sig: Take 10 mg by mouth every day.   Red Yeast Rice 600 MG Cap   Yes No   Sig: Take 1,200 mg by mouth 2 times a day.   Turmeric, Curcuma Longa, (CURCUMIN) Powder   Yes No   levothyroxine (SYNTHROID) 25 MCG Tab   No No   Sig: Take 1 Tablet by mouth every morning on an empty stomach.      Facility-Administered Medications: None       Physical Exam  Temp:  [36.1 °C (96.9 °F)] 36.1 °C (96.9 °F)  Pulse:  [59-76] 69  Resp:  [16-22] 20  BP: (117-183)/(58-85) 183/77  SpO2:  [93 %-98 %] 97 %  Blood Pressure : (!) 183/77   Temperature: 36.1 °C (96.9 °F)   Pulse: 69   Respiration: 20   Pulse Oximetry: 97 %       Physical Exam  Constitutional:       Appearance: Normal appearance.   HENT:      Head: Normocephalic and atraumatic.      Mouth/Throat:      Mouth: Mucous membranes are moist.   Eyes:      Extraocular Movements: Extraocular movements intact.      Pupils: Pupils are equal, round, and reactive to light.   Cardiovascular:      Rate and Rhythm: Normal rate and regular rhythm.      Pulses: Normal pulses.      Heart sounds: Normal heart sounds.   Pulmonary:      Effort: Pulmonary effort is normal.      Breath sounds: Normal breath sounds.   Abdominal:      General: Bowel sounds are normal.      Palpations: Abdomen is soft.   Musculoskeletal:         General: Tenderness present. No swelling. Normal range of motion.      Cervical back: Normal range of motion and  "neck supple.      Comments: Right hip tenderness to palpation   Skin:     General: Skin is warm.      Coloration: Skin is not jaundiced.   Neurological:      General: No focal deficit present.      Mental Status: She is alert and oriented to person, place, and time. Mental status is at baseline.      Cranial Nerves: No cranial nerve deficit.   Psychiatric:         Mood and Affect: Mood normal.         Behavior: Behavior normal.         Thought Content: Thought content normal.         Judgment: Judgment normal.         Laboratory:  Recent Labs     09/23/24  0925   WBC 17.3*   RBC 4.26   HEMOGLOBIN 13.2   HEMATOCRIT 40.5   MCV 95.1   MCH 31.0   MCHC 32.6   RDW 45.3   PLATELETCT 170   MPV 10.1     Recent Labs     09/23/24  0925   SODIUM 138   POTASSIUM 4.0   CHLORIDE 104   CO2 22   GLUCOSE 109*   BUN 17   CREATININE 0.92   CALCIUM 9.2     Recent Labs     09/23/24  0925   ALTSGPT 24   ASTSGOT 44   ALKPHOSPHAT 65   TBILIRUBIN 0.7   GLUCOSE 109*     Recent Labs     09/23/24  0925   APTT 30.3   INR 1.05     No results for input(s): \"NTPROBNP\" in the last 72 hours.      No results for input(s): \"TROPONINT\" in the last 72 hours.    Imaging:  CT-HIP W/O PLUS RECONS RIGHT   Final Result      1.  RIGHT sacral fracture   2.  Fracture of the anterior column of the RIGHT acetabulum extending to the RIGHT superior pubic ramus and RIGHT pubic bone fracture   3.  Osteopenia   4.  Osteoarthritis      DX-HIP-UNILATERAL-WITH PELVIS-1 VIEW RIGHT   Final Result      1.  No radiographic evidence of acute traumatic injury.   2.  Osteopenia   3.  Osteoarthritis          Assessment/Plan:  Justification for Admission Status  I anticipate this patient will require at least two midnights for appropriate medical management, necessitating inpatient admission because of below      * Closed nondisplaced fracture of right acetabulum, initial encounter (HCC)- (present on admission)  Assessment & Plan  None OCT imaging  Pain management  No OR " recommended per orthopedic surgery  PT/OT    Fracture of single ramus of right pubis, closed, initial encounter (HCC)- (present on admission)  Assessment & Plan  None OCT imaging  Pain management  No OR recommended per orthopedic surgery  PT/OT    Closed fracture of sacrum (HCC)- (present on admission)  Assessment & Plan  None OCT imaging  Pain management  No OR recommended per orthopedic surgery  PT/OT    Ground-level fall  Assessment & Plan  Secondary to Tylenol PM,   no sedatives  PT/OT    Leukocytosis- (present on admission)  Assessment & Plan  Likely secondary to trauma/fall  Labs in a.m.      VTE prophylaxis: enoxaparin ppx    Greater than 51 minutes spent prepping to see patient (e.g. review of tests) obtaining and/or reviewing separately obtained history. Performing a medically appropriate examination and/ evaluation.  Counseling and educating the patient/family/caregiver.  Ordering medications, tests, or procedures.  Referring and communicating with other health care professionals.  Documenting clinical information in EPIC.  Independently interpreting results and communicating results to patient/family/caregiver.  Care coordination

## 2024-09-23 NOTE — ED NOTES
Medication history reviewed with patient and son at bedside.   Med rec is complete  Allergies reviewed.     Patient was prescribed Metronidazole 500mg bid for 7 days on 8/17/24- this was completed per patient.   Anticoagulants: No    Delfino Baer

## 2024-09-23 NOTE — ED NOTES
Chief Complaint   Patient presents with    GLF     Pt to triage by wheelchair reports glf this morning then landed on right hip. -head strike. States she took advil pm last night that made her feel dizzy. Denies any dizziness/son/nausea upon arrival in ED. Gcs of 15. States able to bear weight on her right leg but makes her pain worse.     Educated on triage process. Instructed to notify staff for any worsening symptoms. Denies any recent travel. Denies exposure to known covid positive patients. Denies any respiratory symptoms.    Vitals:    09/23/24 0823   BP: 117/81   Pulse: 76   Resp: 16   Temp: 36.1 °C (96.9 °F)   SpO2: 95%

## 2024-09-23 NOTE — TREATMENT PLAN
Imaging reviewed, stable R sided lateral compression pelvic ring injury.  Recommend PT/OT, pain control, activity as tolerated, follow up outpatient.

## 2024-09-23 NOTE — ED NOTES
Pt wheeled to [facility] from the lobby with a steady gait. Pt changed into a gown and placed on the monitor for simple vitals and cardiac monitoring. Agree with triage note. Chart up for ERP.

## 2024-09-23 NOTE — ED PROVIDER NOTES
ED Provider Note    CHIEF COMPLAINT  Chief Complaint   Patient presents with    GLF     Pt to triage by wheelchair reports glf this morning then landed on right hip. -head strike. States she took advil pm last night that made her feel dizzy. Denies any dizziness/son/nausea upon arrival in ED. Gcs of 15. States able to bear weight on her right leg but makes her pain worse.       EXTERNAL RECORDS REVIEWED  Outpatient Notes Family medicine office visit 9/4/24 when the patient was evaluated for IBS, vaginal discomfort, osteoporosis, and Pisano's esophagus    HPI/ROS  LIMITATION TO HISTORY   Select: : None  OUTSIDE HISTORIAN(S):  None    Ratna Cline is a 89 y.o. female who presents to the emergency department for evaluation after a fall.  The patient states that she took an Advil PM last night.  She states that this morning she suspects it made her slightly dizzy.  She states that she had an episode of mild dizziness and fell down in the bathroom.  She landed on her right hip.  She has been unable to ambulate since then.  She denies hitting her head.  She denies taking any blood thinners.  She denies any neck, back, chest, or abdominal pain.  She has not had any nausea or vomiting.  She denies numbness or tingling.  She states that the dizziness has resolved completely.    PAST MEDICAL HISTORY   has a past medical history of Age-related osteoporosis without current pathological fracture, Allergy, Anisocoria (06/06/2023), Anxiety, Arthritis, Back pain, Pisano's esophagus, Bladder infection, Candidiasis, Depression, Difficulty swallowing, Dusky feet (04/05/2023), Dyslipidemia (07/14/2016), Fall at home (09/07/2021), Family history of leukemia (06/03/2019), GERD (gastroesophageal reflux disease), Grief (02/24/2020), Health counseling (09/08/2023), Hearing loss, History of hemorrhoids, History of measles, History of pneumonia, total hysterectomy with removal of both tubes and ovaries (08/20/2020),  Hypervitaminosis D, Hypothyroidism (acquired) (07/13/2015), Irritable bowel syndrome with constipation (04/30/2018), Left lower quadrant abdominal pain-now resolved (02/17/2023), Leg swelling (06/07/2023), Memory loss, Mild depression (08/13/2021), Neck pain, 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    SURGICAL HISTORY   has a past surgical history that includes tonsillectomy; hysterectomy radical; other; and other orthopedic surgery.    FAMILY HISTORY  Family History   Problem Relation Age of Onset    Cancer Mother         leukemia    Genetic Disorder Mother         osteochondroma    Dementia Father     Genetic Disorder Son     Stroke Neg Hx     Hyperlipidemia Neg Hx     Hypertension Neg Hx     Heart Disease Neg Hx     Diabetes Neg Hx     Lung Disease Neg Hx     Ovarian Cancer Neg Hx     Tubal Cancer Neg Hx     Peritoneal Cancer Neg Hx     Breast Cancer Neg Hx     Colorectal Cancer Neg Hx        SOCIAL HISTORY  Social History     Tobacco Use    Smoking status: Never    Smokeless tobacco: Never   Vaping Use    Vaping status: Never Used   Substance and Sexual Activity    Alcohol use: No     Alcohol/week: 0.0 oz    Drug use: No    Sexual activity: Not Currently     Partners: Male       CURRENT MEDICATIONS  Home Medications       Reviewed by Mary Beth Perez R.N. (Registered Nurse) on 09/23/24 at 0826  Med List Status: Partial     Medication Last Dose Status   Ascorbic Acid (VITAMIN C) 1000 MG Tab  Active   Calcium Carb-Cholecalciferol (CALCIUM 600 + D PO)  Active   Cholecalciferol (VITAMIN D3 PO)  Active   DEVILS CLAW PO  Active   levothyroxine (SYNTHROID) 25 MCG Tab  Active   Multiple  "Vitamins-Minerals (MULTIVITAMIN ADULT PO)  Active   Multiple Vitamins-Minerals (PRESERVISION/LUTEIN PO)  Active   Probiotic Product (PROBIOTIC-10 PO)  Active   Red Yeast Rice 600 MG Cap  Active   Turmeric, Curcuma Longa, (CURCUMIN) Powder  Active                  Audit from Redirected Encounters    **Home medications have not yet been reviewed for this encounter**         ALLERGIES  Allergies   Allergen Reactions    Codeine Vomiting    Nexium Unspecified     Ringing in ears    Soy Allergy     Sulfa Drugs Nausea       PHYSICAL EXAM  VITAL SIGNS: BP (!) 183/77   Pulse 69   Temp 36.1 °C (96.9 °F) (Temporal)   Resp 20   Ht 1.575 m (5' 2\")   Wt 44.5 kg (98 lb)   LMP  (LMP Unknown)   SpO2 97%   BMI 17.92 kg/m²   Constitutional: Alert and in no apparent distress.  HENT: Normocephalic atraumatic. Bilateral external ears normal. Nose normal. Mucous membranes are moist.  Eyes: Pupils are equal and reactive. Conjunctiva normal. Non-icteric sclera.   Neck: Normal range of motion without tenderness. Supple. No midline cervical spine tenderness.  Cardiovascular: Regular rate and rhythm. No murmurs, gallops or rubs.  Thorax & Lungs: No increased work of breathing. Breath sounds are clear to auscultation bilaterally. No wheezing, rhonchi or rales.  Abdomen: Soft, nontender and nondistended.   Skin: Warm and dry. No rashes are noted.  Back: No midline bony tenderness, No CVA tenderness.   Extremities: 2+ peripheral pulses. Cap refill is less than 2 seconds. No edema, cyanosis, or clubbing.  Musculoskeletal: Good range of motion in all major joints. No tenderness to palpation or major deformities noted.  The patient does have some discomfort with internal/external rotation of the hip on the right as well as flexion.  2+ posterior tibialis pulse.  Sensation is grossly intact.  She is able to wiggle her toes.  Neurologic: Alert and appropriate for age.  No facial asymmetry.  The patient moves all 4 extremities without obvious " deficits.    LABS  Results for orders placed or performed during the hospital encounter of 09/23/24   CBC WITH DIFFERENTIAL   Result Value Ref Range    WBC 17.3 (H) 4.8 - 10.8 K/uL    RBC 4.26 4.20 - 5.40 M/uL    Hemoglobin 13.2 12.0 - 16.0 g/dL    Hematocrit 40.5 37.0 - 47.0 %    MCV 95.1 81.4 - 97.8 fL    MCH 31.0 27.0 - 33.0 pg    MCHC 32.6 32.2 - 35.5 g/dL    RDW 45.3 35.9 - 50.0 fL    Platelet Count 170 164 - 446 K/uL    MPV 10.1 9.0 - 12.9 fL    Neutrophils-Polys 91.10 (H) 44.00 - 72.00 %    Lymphocytes 2.00 (L) 22.00 - 41.00 %    Monocytes 6.00 0.00 - 13.40 %    Eosinophils 0.10 0.00 - 6.90 %    Basophils 0.20 0.00 - 1.80 %    Immature Granulocytes 0.60 0.00 - 0.90 %    Nucleated RBC 0.00 0.00 - 0.20 /100 WBC    Neutrophils (Absolute) 15.74 (H) 1.82 - 7.42 K/uL    Lymphs (Absolute) 0.34 (L) 1.00 - 4.80 K/uL    Monos (Absolute) 1.04 (H) 0.00 - 0.85 K/uL    Eos (Absolute) 0.02 0.00 - 0.51 K/uL    Baso (Absolute) 0.04 0.00 - 0.12 K/uL    Immature Granulocytes (abs) 0.11 0.00 - 0.11 K/uL    NRBC (Absolute) 0.00 K/uL   COMP METABOLIC PANEL   Result Value Ref Range    Sodium 138 135 - 145 mmol/L    Potassium 4.0 3.6 - 5.5 mmol/L    Chloride 104 96 - 112 mmol/L    Co2 22 20 - 33 mmol/L    Anion Gap 12.0 7.0 - 16.0    Glucose 109 (H) 65 - 99 mg/dL    Bun 17 8 - 22 mg/dL    Creatinine 0.92 0.50 - 1.40 mg/dL    Calcium 9.2 8.5 - 10.5 mg/dL    Correct Calcium 9.3 8.5 - 10.5 mg/dL    AST(SGOT) 44 12 - 45 U/L    ALT(SGPT) 24 2 - 50 U/L    Alkaline Phosphatase 65 30 - 99 U/L    Total Bilirubin 0.7 0.1 - 1.5 mg/dL    Albumin 3.9 3.2 - 4.9 g/dL    Total Protein 6.3 6.0 - 8.2 g/dL    Globulin 2.4 1.9 - 3.5 g/dL    A-G Ratio 1.6 g/dL   PROTHROMBIN TIME (INR)   Result Value Ref Range    PT 13.7 12.0 - 14.6 sec    INR 1.05 0.87 - 1.13   APTT   Result Value Ref Range    APTT 30.3 24.7 - 36.0 sec   URINALYSIS CULTURE, IF INDICATED    Specimen: Urine, Clean Catch   Result Value Ref Range    Color Yellow     Character Clear      Specific Gravity 1.011 <1.035    Ph 6.0 5.0 - 8.0    Glucose Negative Negative mg/dL    Ketones 15 (A) Negative mg/dL    Protein Negative Negative mg/dL    Bilirubin Negative Negative    Urobilinogen, Urine 0.2 Negative    Nitrite Negative Negative    Leukocyte Esterase Negative Negative    Occult Blood Negative Negative    Micro Urine Req see below    TSH WITH REFLEX TO FT4   Result Value Ref Range    TSH 3.280 0.380 - 5.330 uIU/mL   ESTIMATED GFR   Result Value Ref Range    GFR (CKD-EPI) 60 >60 mL/min/1.73 m 2   EKG   Result Value Ref Range    Report       [facility] Emergency Dept.    Test Date:  2024  Pt Name:    GODFREY DUARTE              Department: ER  MRN:        [id number]                      Room:  Gender:     Female                       Technician: [id number]  :        [id number]                   Requested By:ER TRIAGE PROTOCOL  Order #:    [id number]                    Reading MD: Cyndie Naranjo    Measurements  Intervals                                Axis  Rate:       55                           P:          62  IL:         198                          QRS:        61  QRSD:       71                           T:          66  QT:         442  QTc:        423    Interpretive Statements  SINUS BRADYCARDIA  Artifact in lead(s) I,III,aVR,aVL  No previous ECG available for comparison  Electronically Signed On 2024 09:20:25 PDT by Cyndie Naranjo       I have independently interpreted this EKG    RADIOLOGY/PROCEDURES   I have independently interpreted the diagnostic imaging associated with this visit and am waiting the final reading from the radiologist.   My preliminary interpretation is as follows: No obvious hip fracture noted on plain films    Radiologist interpretation:  CT-HIP W/O PLUS RECONS RIGHT   Final Result      1.  RIGHT sacral fracture   2.  Fracture of the anterior column of the RIGHT acetabulum extending to the RIGHT superior pubic ramus and RIGHT pubic bone fracture   3.   Osteopenia   4.  Osteoarthritis      DX-HIP-UNILATERAL-WITH PELVIS-1 VIEW RIGHT   Final Result      1.  No radiographic evidence of acute traumatic injury.   2.  Osteopenia   3.  Osteoarthritis        COURSE & MEDICAL DECISION MAKING    ASSESSMENT, COURSE AND PLAN  Care Narrative: This is an 89-year-old female presenting to the emergency department for evaluation after a fall.  On initial evaluation, the patient did not appear to be in any acute distress.  Vital signs were reassuring.  Physical exam was notable for pain with range of motion of the right hip.  She was grossly neurovascular intact.  No additional evidence of traumatic injury was noted on exam.    She did not hit her head, her GCS is 15, she had no loss of consciousness, and she is grossly neurologically intact here.  She also does not take any blood thinners.  I do not think that we need to obtain a CT of the head at this point.    Plain films of the right hip were obtained and there was no evidence of acute traumatic injury.  However, she is unable to ambulate so a CT was ordered.  This was notable for a right sacral fracture in addition to a fracture of the anterior column of the right acetabulum extending to the right superior pubic ramus and right pubic bone.    Additional workup was notable for a leukocytosis of 17.3.  However, she had no history of fevers and her urine was clean with no evidence of occult UTI.  Electrolytes without derangement.  Renal function was normal.  LFTs and lipase were also within normal limits.    2:33 PM - I discussed the case with refugio Gómez.  He reviewed the CT images and recommended weightbearing as tolerated.  He agreed with the plan for admission for pain control and PT/OT.    3:00 PM - I discussed the case with Dr Fraser, hospitalist. He agreed with the plan and accepted the patient.     ADDITIONAL PROBLEMS MANAGED  Leukocytosis    DISPOSITION AND DISCUSSIONS  I have discussed management of the patient  with the following physicians and SANDIP's:  Dr Batista, ortho, Dr Fraser, hospitalist    Discussion of management with other [facility] or appropriate source(s): None     FINAL IMPRESSION  1. Closed fracture of sacrum, unspecified portion of sacrum, initial encounter (HCC)    2. Closed nondisplaced fracture of right acetabulum, unspecified portion of acetabulum, initial encounter (HCC)    3. Closed fracture of ramus of right pubis, initial encounter ([facility])    4. Leukocytosis, unspecified type      -ADMIT-    Electronically signed by: Cyndie Naranjo D.O., 9/23/2024 9:09 AM

## 2024-09-24 LAB
ALBUMIN SERPL BCP-MCNC: 3.6 G/DL (ref 3.2–4.9)
ALBUMIN/GLOB SERPL: 1.6 G/DL
ALP SERPL-CCNC: 62 U/L (ref 30–99)
ALT SERPL-CCNC: 22 U/L (ref 2–50)
ANION GAP SERPL CALC-SCNC: 13 MMOL/L (ref 7–16)
AST SERPL-CCNC: 30 U/L (ref 12–45)
BASOPHILS # BLD AUTO: 0.3 % (ref 0–1.8)
BASOPHILS # BLD: 0.03 K/UL (ref 0–0.12)
BILIRUB SERPL-MCNC: 0.7 MG/DL (ref 0.1–1.5)
BUN SERPL-MCNC: 17 MG/DL (ref 8–22)
CALCIUM ALBUM COR SERPL-MCNC: 9.6 MG/DL (ref 8.5–10.5)
CALCIUM SERPL-MCNC: 9.3 MG/DL (ref 8.5–10.5)
CHLORIDE SERPL-SCNC: 102 MMOL/L (ref 96–112)
CO2 SERPL-SCNC: 23 MMOL/L (ref 20–33)
CREAT SERPL-MCNC: 0.73 MG/DL (ref 0.5–1.4)
EOSINOPHIL # BLD AUTO: 0.06 K/UL (ref 0–0.51)
EOSINOPHIL NFR BLD: 0.7 % (ref 0–6.9)
ERYTHROCYTE [DISTWIDTH] IN BLOOD BY AUTOMATED COUNT: 44.6 FL (ref 35.9–50)
GFR SERPLBLD CREATININE-BSD FMLA CKD-EPI: 80 ML/MIN/1.73 M 2
GLOBULIN SER CALC-MCNC: 2.3 G/DL (ref 1.9–3.5)
GLUCOSE SERPL-MCNC: 94 MG/DL (ref 65–99)
HCT VFR BLD AUTO: 37.7 % (ref 37–47)
HGB BLD-MCNC: 12.5 G/DL (ref 12–16)
IMM GRANULOCYTES # BLD AUTO: 0.04 K/UL (ref 0–0.11)
IMM GRANULOCYTES NFR BLD AUTO: 0.4 % (ref 0–0.9)
LYMPHOCYTES # BLD AUTO: 0.6 K/UL (ref 1–4.8)
LYMPHOCYTES NFR BLD: 6.5 % (ref 22–41)
MCH RBC QN AUTO: 31.2 PG (ref 27–33)
MCHC RBC AUTO-ENTMCNC: 33.2 G/DL (ref 32.2–35.5)
MCV RBC AUTO: 94 FL (ref 81.4–97.8)
MONOCYTES # BLD AUTO: 0.77 K/UL (ref 0–0.85)
MONOCYTES NFR BLD AUTO: 8.3 % (ref 0–13.4)
NEUTROPHILS # BLD AUTO: 7.73 K/UL (ref 1.82–7.42)
NEUTROPHILS NFR BLD: 83.8 % (ref 44–72)
NRBC # BLD AUTO: 0 K/UL
NRBC BLD-RTO: 0 /100 WBC (ref 0–0.2)
PLATELET # BLD AUTO: 145 K/UL (ref 164–446)
PMV BLD AUTO: 10.3 FL (ref 9–12.9)
POTASSIUM SERPL-SCNC: 4.3 MMOL/L (ref 3.6–5.5)
PROT SERPL-MCNC: 5.9 G/DL (ref 6–8.2)
RBC # BLD AUTO: 4.01 M/UL (ref 4.2–5.4)
SODIUM SERPL-SCNC: 138 MMOL/L (ref 135–145)
WBC # BLD AUTO: 9.2 K/UL (ref 4.8–10.8)

## 2024-09-24 PROCEDURE — 97535 SELF CARE MNGMENT TRAINING: CPT

## 2024-09-24 PROCEDURE — 80053 COMPREHEN METABOLIC PANEL: CPT

## 2024-09-24 PROCEDURE — 97530 THERAPEUTIC ACTIVITIES: CPT

## 2024-09-24 PROCEDURE — A9270 NON-COVERED ITEM OR SERVICE: HCPCS | Performed by: STUDENT IN AN ORGANIZED HEALTH CARE EDUCATION/TRAINING PROGRAM

## 2024-09-24 PROCEDURE — 97166 OT EVAL MOD COMPLEX 45 MIN: CPT

## 2024-09-24 PROCEDURE — 85025 COMPLETE CBC W/AUTO DIFF WBC: CPT

## 2024-09-24 PROCEDURE — 97162 PT EVAL MOD COMPLEX 30 MIN: CPT

## 2024-09-24 PROCEDURE — 770001 HCHG ROOM/CARE - MED/SURG/GYN PRIV*

## 2024-09-24 PROCEDURE — 700111 HCHG RX REV CODE 636 W/ 250 OVERRIDE (IP): Mod: JZ | Performed by: STUDENT IN AN ORGANIZED HEALTH CARE EDUCATION/TRAINING PROGRAM

## 2024-09-24 PROCEDURE — 700102 HCHG RX REV CODE 250 W/ 637 OVERRIDE(OP): Performed by: STUDENT IN AN ORGANIZED HEALTH CARE EDUCATION/TRAINING PROGRAM

## 2024-09-24 PROCEDURE — 99233 SBSQ HOSP IP/OBS HIGH 50: CPT | Performed by: STUDENT IN AN ORGANIZED HEALTH CARE EDUCATION/TRAINING PROGRAM

## 2024-09-24 RX ORDER — ENOXAPARIN SODIUM 100 MG/ML
40 INJECTION SUBCUTANEOUS DAILY
Status: DISCONTINUED | OUTPATIENT
Start: 2024-09-24 | End: 2024-09-25 | Stop reason: HOSPADM

## 2024-09-24 RX ADMIN — ACETAMINOPHEN 1000 MG: 500 TABLET ORAL at 20:16

## 2024-09-24 RX ADMIN — SENNOSIDES AND DOCUSATE SODIUM 2 TABLET: 50; 8.6 TABLET ORAL at 05:22

## 2024-09-24 RX ADMIN — SENNOSIDES AND DOCUSATE SODIUM 2 TABLET: 50; 8.6 TABLET ORAL at 16:40

## 2024-09-24 RX ADMIN — ACETAMINOPHEN 1000 MG: 500 TABLET ORAL at 08:25

## 2024-09-24 RX ADMIN — ACETAMINOPHEN 1000 MG: 500 TABLET ORAL at 14:26

## 2024-09-24 RX ADMIN — CYCLOBENZAPRINE 10 MG: 10 TABLET, FILM COATED ORAL at 08:28

## 2024-09-24 RX ADMIN — CYCLOBENZAPRINE 10 MG: 10 TABLET, FILM COATED ORAL at 14:26

## 2024-09-24 RX ADMIN — ENOXAPARIN SODIUM 40 MG: 100 INJECTION SUBCUTANEOUS at 16:40

## 2024-09-24 RX ADMIN — LEVOTHYROXINE SODIUM 25 MCG: 0.03 TABLET ORAL at 05:22

## 2024-09-24 ASSESSMENT — ACTIVITIES OF DAILY LIVING (ADL): TOILETING: INDEPENDENT

## 2024-09-24 ASSESSMENT — COGNITIVE AND FUNCTIONAL STATUS - GENERAL
MOBILITY SCORE: 16
TOILETING: A LITTLE
HELP NEEDED FOR BATHING: A LOT
CLIMB 3 TO 5 STEPS WITH RAILING: A LITTLE
WALKING IN HOSPITAL ROOM: A LITTLE
MOVING TO AND FROM BED TO CHAIR: A LITTLE
STANDING UP FROM CHAIR USING ARMS: A LITTLE
PERSONAL GROOMING: A LITTLE
DRESSING REGULAR UPPER BODY CLOTHING: A LITTLE
TURNING FROM BACK TO SIDE WHILE IN FLAT BAD: A LOT
DAILY ACTIVITIY SCORE: 17
SUGGESTED CMS G CODE MODIFIER MOBILITY: CK
SUGGESTED CMS G CODE MODIFIER DAILY ACTIVITY: CK
MOVING FROM LYING ON BACK TO SITTING ON SIDE OF FLAT BED: A LOT
DRESSING REGULAR LOWER BODY CLOTHING: A LOT

## 2024-09-24 ASSESSMENT — FIBROSIS 4 INDEX: FIB4 SCORE: 5.39

## 2024-09-24 ASSESSMENT — PAIN DESCRIPTION - PAIN TYPE
TYPE: ACUTE PAIN
TYPE: ACUTE PAIN

## 2024-09-24 ASSESSMENT — GAIT ASSESSMENTS
GAIT LEVEL OF ASSIST: STANDBY ASSIST
DISTANCE (FEET): 20
DEVIATION: SHUFFLED GAIT;ANTALGIC
ASSISTIVE DEVICE: FRONT WHEEL WALKER

## 2024-09-24 NOTE — THERAPY
Physical Therapy   Initial Evaluation     Patient Name: Ratna Cline  Age:  89 y.o., Sex:  female  Medical Record #: [id number]  Today's Date: 9/24/2024     Precautions  Precautions: Fall Risk;Weight Bearing As Tolerated Left Lower Extremity;Weight Bearing As Tolerated Right Lower Extremity  Comments: dementia    Assessment  Patient is 89 y.o. female admitted post GLF with R sided lateral compression pelvic ring injury.  Orthopedic injuries are being managed non-op and WBAT. PMH includes dementia and insomnia. Pt is repetitive but cooperative with session. Cues provided throughout for compensatory strategies for pain and use of FWW. She reports she has assistance at home as needed from her son and has 2 other children local. She is currently functioning below her baseline but would benefit from a FWW, HHPT, and continued acute PT to address deficits.     Plan    Physical Therapy Initial Treatment Plan   Treatment Plan : Bed Mobility, Equipment, Family / Caregiver Training, Gait Training, Neuro Re-Education / Balance, Self Care / Home Evaluation, Stair Training, Therapeutic Activities, Therapeutic Exercise  Treatment Frequency: 5 Times per Week  Duration: Until Therapy Goals Met    DC Equipment Recommendations: Front-Wheel Walker  Discharge Recommendations: Recommend home health for continued physical therapy services          09/24/24 1013   Vitals   O2 Delivery Device None - Room Air   Pain 0 - 10 Group   Therapist Pain Assessment During Activity;Nurse Notified  (moderate R LE, L UE pain with WB)   Prior Living Situation   Prior Services Home-Independent   Housing / Facility Mobile Home   Steps Into Home 2   Steps In Home 0   Equipment Owned None   Lives with - Patient's Self Care Capacity Adult Children   Comments pts son lives with her and per pt report he is able to assist. she has two other children local that check in   Prior Level of Functional Mobility   Bed Mobility Independent   Transfer Status  Independent   Ambulation Independent   Ambulation Distance community   Assistive Devices Used None   Stairs Independent   Comments pt reports not requiring assistance of A D for mobility   History of Falls   History of Falls Yes   Cognition    Cognition / Consciousness X   Level of Consciousness Alert   Comments repetitive, cooperative, baseline dementia   Strength Upper Body   Upper Body Strength  X   Comments L soulder pain impacting strength   Active ROM Lower Body    Active ROM Lower Body  WDL   Strength Lower Body   Lower Body Strength  X   Comments generally weak with R weaker than L   Sensation Lower Body   Lower Extremity Sensation   WDL   Other Treatments   Other Treatments Provided cues provided to pt durnig bed mobility, transfer, and gait for proper use of FWW and compensatory strategies   Balance Assessment   Sitting Balance (Static) Fair   Sitting Balance (Dynamic) Fair   Standing Balance (Static) Fair   Standing Balance (Dynamic) Fair -   Weight Shift Sitting Good   Weight Shift Standing Fair   Comments FWW   Bed Mobility    Supine to Sit Minimal Assist   Scooting Contact Guard Assist   Comments HOB elevated, pt does have a couch she may sleep on   Gait Analysis   Gait Level Of Assist Standby Assist   Assistive Device Front Wheel Walker   Distance (Feet) 20   # of Times Distance was Traveled 2   Deviation Shuffled Gait;Antalgic   Weight Bearing Status WBAT B LE   Comments pt declined stairs today, reports she has her son who can assist her   Functional Mobility   Sit to Stand Standby Assist   Bed, Chair, Wheelchair Transfer Standby Assist   Transfer Method Stand Step   Mobility in room with FWW   Short Term Goals    Short Term Goal # 1 pt will be able to complete supine<>sitting from flat bed with SPV in 6tx in order to progress to prior level   Short Term Goal # 2 pt will be able to complete STS with FWW and SPV in 6tx in order to progress to prior level   Short Term Goal # 3 pt will be able to  ambulate 50ft with FWW and SPV in 6tx in order to decrease fall risk   Short Term Goal # 4 pt will be able to negotiate 2 steps with CGA in 6tx in order to enter and exit home   Education Group   Education Provided Role of Physical Therapist;Gait Training;Use of Assistive Device;Weight Bearing Status;Transfer Status   Role of Physical Therapist Patient Response Patient;Acceptance;Explanation;Demonstration;Action Demonstration;Verbal Demonstration   Gait Training Patient Response Patient;Acceptance;Demonstration;Explanation;Action Demonstration;Verbal Demonstration   Use of Assistive Device Patient Response Patient;Acceptance;Demonstration;Explanation;Verbal Demonstration;Action Demonstration   Transfer Status Patient Response Patient;Acceptance;Explanation;Demonstration   Weight Bearing Status Patient Response Patient;Acceptance;Explanation;Demonstration;Verbal Demonstration;Action Demonstration   Anticipated Discharge Equipment and Recommendations   DC Equipment Recommendations Front-Wheel Walker   Discharge Recommendations Recommend home health for continued physical therapy services

## 2024-09-24 NOTE — CARE PLAN
The patient is Stable - Low risk of patient condition declining or worsening    Shift Goals  Clinical Goals: Pain control, mobility, work with therapy  Patient Goals: pain control  Family Goals: not present    Progress made toward(s) clinical / shift goals: Pain control with prn medication, mobilize with FWW, await therapy.     Patient is not progressing towards the following goals:

## 2024-09-24 NOTE — DISCHARGE PLANNING
"RN CM met with patient at bedside to complete assessment. Patient pleasantly A&Ox4 and able to verify information on face sheet.   Lives in mobile home with her functional/unemployed son, Michael, with one step to enter.   She reports independence with ADLs and IADLs at baseline.   Does not use DME, including oxygen, at baseline.   She owns a 4WW and crutches.   She owns a vehicle and drives at baseline.   Denies mental health or substance abuse concerns.   She is retired and receives $1,750/month from [employer].   Her son is not currently working and she reports finances \"are very tough\" but she does not go without necessities.   PCP is Rachel WHITE with [facility].   Insured by [facility].   Orthopaedics recommending non-operative management. Patient requested assessment be done due to her pain, writer notified bedside RN.   Pending PT/OT and medical clearance.   [place] PASRR: [id number]    Case Management Discharge Planning    Admission Date: 9/23/2024  GMLOS: 4.6  ALOS: 1    6-Clicks ADL Score:    6-Clicks Mobility Score:        Anticipated Discharge Dispo: Discharge Disposition: Disch to IP rehab facility or distinct part unit (62)    DME Needed: Yes    DME Ordered: No    Action(s) Taken: Updated Provider/Nurse on Discharge Plan, Patient Conference, and DC Assessment Complete (See below)    Escalations Completed: None    Medically Clear: No    Next Steps: Pending medical clearance/pain control    Barriers to Discharge: Medical clearance, Pending Placement, and Pending PT Evaluation    Is the patient up for discharge tomorrow: Unknown.     Care Transition Team Assessment    Information Source  Orientation Level: Oriented X4  Information Given By: Patient  Informant's Name: Ratna  Who is responsible for making decisions for patient? : Patient    Readmission Evaluation  Is this a readmission?: No    Elopement Risk  Legal Hold: No  Ambulatory or Self Mobile in Wheelchair: No-Not an Elopement Risk  Elopement " Risk: Not at Risk for Elopement    Interdisciplinary Discharge Planning  Does Admitting Nurse Feel This Could be a Complex Discharge?: No  Primary Care Physician: Rachel WHITE with [facility]  Lives with - Patient's Self Care Capacity: Adult Children  Patient or legal guardian wants to designate a caregiver: No  Support Systems: Family Member(s)  Housing / Facility: [address]    Discharge Preparedness  What is your plan after discharge?: Uncertain - pending medical team collaboration  What are your discharge supports?: Child  Prior Functional Level: Ambulatory, Drives Self, Independent with Activities of Daily Living, Independent with Medication Management  Difficulity with ADLs: None  Difficulity with IADLs: None    Functional Assesment  Prior Functional Level: Ambulatory, Drives Self, Independent with Activities of Daily Living, Independent with Medication Management    Finances  Financial Barriers to Discharge: Yes  Average Monthly Income: 1750 $  Source of Income: Social Security  Prescription Coverage: Yes    Values / Beliefs / Concerns  Values / Beliefs Concerns : No    Advance Directive  Advance Directive?: Living Will    Domestic Abuse  Have you ever been the victim of abuse or violence?: No  Possible Abuse/Neglect Reported to:: Not Applicable    Psychological Assessment  History of Substance Abuse: None  History of Psychiatric Problems: No  Non-compliant with Treatment: No  Newly Diagnosed Illness: Yes    Discharge Risks or Barriers  Discharge risks or barriers?: Complex medical needs  Patient risk factors: Cognitive / sensory / physical deficit, Complex medical needs, Vulnerable adult    Anticipated Discharge Information  Discharge Disposition: Disch to  rehab facility or distinct part unit (62)

## 2024-09-24 NOTE — THERAPY
Occupational Therapy   Initial Evaluation     Patient Name: Ratna Cline  Age:  89 y.o., Sex:  female  Medical Record #: [id number]  Today's Date: 9/24/2024     Precautions: Fall Risk, Weight Bearing As Tolerated Right Lower Extremity, Weight Bearing As Tolerated Left Lower Extremity  Comments: dementia, L UE pain    Assessment    Patient is 89 y.o. female admitted following GLF, sustained R pelvic ring injury managed non-op, WBAT. Pt presents to OT eval able to tolerate household distance mobility with FWW, toileting and short standing grooming. Pt reports supportive son able to assist and additional local son and daughter in the area who can support upon DC as well. Acute OT to follow, recommend all toileting done in bathroom and pt OOB to chair for meals while admitted.     Plan    Occupational Therapy Initial Treatment Plan   Treatment Interventions: Self Care / Activities of Daily Living, Therapeutic Exercises, Therapeutic Activity  Treatment Frequency: 4 Times per Week  Duration: Until Therapy Goals Met    DC Equipment Recommendations: Tub / Shower Seat  Discharge Recommendations: Recommend home health for continued occupational therapy services      Objective       09/24/24 1015   Prior Living Situation   Prior Services Home-Independent   Housing / Facility Mobile Home   Steps Into Home 2   Steps In Home 0   Bathroom Set up Walk In Shower   Equipment Owned Front-Wheel Walker;Crutches  (that belong to her son)   Lives with - Patient's Self Care Capacity Adult Children   Comments pt lives with son and has two other children in the [place] area   Prior Level of ADL Function   Self Feeding Independent   Grooming / Hygiene Independent   Bathing Independent   Dressing Independent   Toileting Independent   Comments per pt report, confirmation with family may be beneficial   Precautions   Precautions Fall Risk;Weight Bearing As Tolerated Right Lower Extremity;Weight Bearing As Tolerated Left Lower Extremity    Comments dementia, L UE pain   Pain 0 - 10 Group   Therapist Pain Assessment During Activity;Nurse Notified  (tolerable but c/o increased RLE and LUE with weight bearing)   Balance Assessment   Sitting Balance (Static) Fair   Sitting Balance (Dynamic) Fair   Standing Balance (Static) Fair   Standing Balance (Dynamic) Fair -   Weight Shift Sitting Fair   Weight Shift Standing Fair   Comments FWW   Bed Mobility    Supine to Sit Minimal Assist   Scooting Contact Guard Assist   Comments HOB elevated, pt could sleep on her reclining couch if bed is difficult   ADL Assessment   Eating Independent   Grooming Standing;Contact Guard Assist  (handwashing at sink)   Upper Body Dressing Minimal Assist   Lower Body Dressing Maximal Assist   Toileting Contact Guard Assist   How much help from another person does the patient currently need...   Putting on and taking off regular lower body clothing? 2   Bathing (including washing, rinsing, and drying)? 2   Toileting, which includes using a toilet, bedpan, or urinal? 3   Putting on and taking off regular upper body clothing? 3   Taking care of personal grooming such as brushing teeth? 3   Eating meals? 4   6 Clicks Daily Activity Score 17   Functional Mobility   Sit to Stand Standby Assist   Bed, Chair, Wheelchair Transfer Standby Assist   Toilet Transfers Contact Guard Assist   Transfer Method Stand Step   Mobility FWW   Activity Tolerance   Sitting in Chair 10+min   Sitting Edge of Bed 2min   Standing 3minx2   Patient / Family Goals   Patient / Family Goal #1 home asap   Short Term Goals   Short Term Goal # 1 pt will complete LB dress with AE PRN and Lisa   Short Term Goal # 2 pt will tolerate >5min standing grooming at SPV level   Education Group   Education Provided Role of Occupational Therapist;Activities of Daily Living   Role of Occupational Therapist Patient Response Patient;Acceptance;Explanation;Verbal Demonstration   ADL Patient Response  Patient;Acceptance;Explanation;Verbal Demonstration   Occupational Therapy Initial Treatment Plan    Treatment Interventions Self Care / Activities of Daily Living;Therapeutic Exercises;Therapeutic Activity   Treatment Frequency 4 Times per Week   Duration Until Therapy Goals Met   Problem List   Problem List Decreased Homemaking Skills;Decreased Active Daily Living Skills;Decreased Functional Mobility;Decreased Activity Tolerance;Safety Awareness Deficits / Cognition;Impaired Cognitive Function;Impaired Postural Control / Balance   Anticipated Discharge Equipment and Recommendations   DC Equipment Recommendations Tub / Shower Seat   Discharge Recommendations Recommend home health for continued occupational therapy services

## 2024-09-24 NOTE — PROGRESS NOTES
"Virtual Nurse admission complete except bloodless screening. Patient stated \"I need to think about it\" education provided. Bedside RN notified.      "

## 2024-09-24 NOTE — CARE PLAN
The patient is Stable - Low risk of patient condition declining or worsening    Shift Goals  Clinical Goals: pain, safety, mobility  Patient Goals: rest and comfort  Family Goals: not present    Progress made toward(s) clinical / shift goals:      Patient is aox4, denies any SOB/Chest pain/N and V. Able to follow commands, Has hx of dementia, becoming forgetful and anxious. Refused to be connected to shai monitor.      Problem: Pain - Standard  Goal: Alleviation of pain or a reduction in pain to the patient’s comfort goal  Description: Target End Date:  Prior to discharge or change in level of care    Document on Vitals flowsheet    1.  Document pain using the appropriate pain scale per order or unit policy  2.  Educate and implement non-pharmacologic comfort measures (i.e. relaxation, distraction, massage, cold/heat therapy, etc.)  3.  Pain management medications as ordered  4.  Reassess pain after pain med administration per policy  5.  If opiods administered assess patient's response to pain medication is appropriate per POSS sedation scale  6.  Follow pain management plan developed in collaboration with patient and interdisciplinary team (including palliative care or pain specialists if applicable)  Outcome: Progressing     Denies any numbness or tingling sensation, non-pharmacological and pharmacological intervention in place. Instructed to call RN if pain is gradually increasing.    Problem: Fall Risk  Goal: Patient will remain free from falls  Description: Target End Date:  Prior to discharge or change in level of care    Document interventions on the Soriano Ruben Fall Risk Assessment    1.  Assess for fall risk factors  2.  Implement fall precautions  Outcome: Progressing   Bed alarm on, call light and belongings within reach, kept bed in lowest position. Instructed to call RN if getting OOB. SCDs and hourly rounds in place.    Patient is not progressing towards the following goals:

## 2024-09-24 NOTE — PROGRESS NOTES
4 Eyes Skin Assessment Completed by DORA Cole and DORA Marquis.    Head WDL  Ears WDL  Nose WDL  Mouth WDL  Neck WDL  Breast/Chest WDL  Shoulder Blades WDL  Spine WDL  (R) Arm/Elbow/Hand WDL  (L) Arm/Elbow/Hand WDL  Abdomen WDL  Groin WDL  Scrotum/Coccyx/Buttocks Redness and Blanching  (R) Leg WDL  (L) Leg WDL  (R) Heel/Foot/Toe WDL  (L) Heel/Foot/Toe WDL          Devices In Places Blood Pressure Cuff and Pulse Ox      Interventions In Place NC W/Ear Foams    Possible Skin Injury No    Pictures Uploaded Into Epic N/A  Wound Consult Placed N/A  RN Wound Prevention Protocol Ordered Yes

## 2024-09-25 ENCOUNTER — HOME HEALTH ADMISSION (OUTPATIENT)
Dept: HOME HEALTH SERVICES | Facility: HOME HEALTHCARE | Age: 89
End: 2024-09-25
Payer: MEDICARE

## 2024-09-25 ENCOUNTER — PATIENT OUTREACH (OUTPATIENT)
Dept: SCHEDULING | Facility: IMAGING CENTER | Age: 89
End: 2024-09-25
Payer: MEDICARE

## 2024-09-25 ENCOUNTER — PHARMACY VISIT (OUTPATIENT)
Dept: PHARMACY | Facility: MEDICAL CENTER | Age: 89
End: 2024-09-25
Payer: COMMERCIAL

## 2024-09-25 VITALS
RESPIRATION RATE: 17 BRPM | OXYGEN SATURATION: 93 % | HEIGHT: 62 IN | BODY MASS INDEX: 19.43 KG/M2 | SYSTOLIC BLOOD PRESSURE: 130 MMHG | TEMPERATURE: 98.1 F | WEIGHT: 105.6 LBS | HEART RATE: 74 BPM | DIASTOLIC BLOOD PRESSURE: 64 MMHG

## 2024-09-25 PROCEDURE — 700102 HCHG RX REV CODE 250 W/ 637 OVERRIDE(OP): Performed by: STUDENT IN AN ORGANIZED HEALTH CARE EDUCATION/TRAINING PROGRAM

## 2024-09-25 PROCEDURE — RXMED WILLOW AMBULATORY MEDICATION CHARGE: Performed by: STUDENT IN AN ORGANIZED HEALTH CARE EDUCATION/TRAINING PROGRAM

## 2024-09-25 PROCEDURE — A9270 NON-COVERED ITEM OR SERVICE: HCPCS | Performed by: STUDENT IN AN ORGANIZED HEALTH CARE EDUCATION/TRAINING PROGRAM

## 2024-09-25 PROCEDURE — 97116 GAIT TRAINING THERAPY: CPT

## 2024-09-25 PROCEDURE — 99239 HOSP IP/OBS DSCHRG MGMT >30: CPT | Performed by: STUDENT IN AN ORGANIZED HEALTH CARE EDUCATION/TRAINING PROGRAM

## 2024-09-25 PROCEDURE — 97530 THERAPEUTIC ACTIVITIES: CPT

## 2024-09-25 RX ORDER — OXYCODONE HYDROCHLORIDE 5 MG/1
5 TABLET ORAL EVERY 6 HOURS PRN
Qty: 10 TABLET | Refills: 0 | Status: SHIPPED | OUTPATIENT
Start: 2024-09-25 | End: 2024-09-28

## 2024-09-25 RX ADMIN — ACETAMINOPHEN 1000 MG: 500 TABLET ORAL at 09:30

## 2024-09-25 RX ADMIN — LEVOTHYROXINE SODIUM 25 MCG: 0.03 TABLET ORAL at 04:54

## 2024-09-25 RX ADMIN — CYCLOBENZAPRINE 10 MG: 10 TABLET, FILM COATED ORAL at 04:54

## 2024-09-25 RX ADMIN — SENNOSIDES AND DOCUSATE SODIUM 2 TABLET: 50; 8.6 TABLET ORAL at 04:54

## 2024-09-25 ASSESSMENT — COGNITIVE AND FUNCTIONAL STATUS - GENERAL
WALKING IN HOSPITAL ROOM: A LITTLE
TURNING FROM BACK TO SIDE WHILE IN FLAT BAD: A LITTLE
MOVING TO AND FROM BED TO CHAIR: A LITTLE
MOBILITY SCORE: 18
MOVING FROM LYING ON BACK TO SITTING ON SIDE OF FLAT BED: A LITTLE
SUGGESTED CMS G CODE MODIFIER MOBILITY: CK
STANDING UP FROM CHAIR USING ARMS: A LITTLE
CLIMB 3 TO 5 STEPS WITH RAILING: A LITTLE

## 2024-09-25 NOTE — THERAPY
Physical Therapy   Daily Treatment     Patient Name: Ratna Cline  Age:  89 y.o., Sex:  female  Medical Record #: [id number]  Today's Date: 9/25/2024     Precautions  Precautions: Fall Risk;Weight Bearing As Tolerated Right Lower Extremity;Weight Bearing As Tolerated Left Lower Extremity  Comments: dementia    Assessment  Pt was receptive to therapy despite moderate pain levels prior and during treatment session. Pt required verbal cueing and MIN assist for bed mobility. Pt ambulated 20 ft 2x demonstrating an antalgic and shuffled gait pattern. Additionally pt was able to negotiate 2 steps with MIN assist with increased time required. Pt states son is able to assist at home physically. PT recommends a FWW and HH after dc.     Plan    Treatment Plan Status: Continue Current Treatment Plan  Type of Treatment: Bed Mobility, Equipment, Family / Caregiver Training, Gait Training, Self Care / Home Evaluation, Therapeutic Exercise, Therapeutic Activities, Neuro Re-Education / Balance  Treatment Frequency: 5 Times per Week    DC Equipment Recommendations: Front-Wheel Walker  Discharge Recommendations: Recommend home health for continued physical therapy services         09/25/24 0844    Services   Is patient using  services for this encounter? No   Precautions   Precautions Fall Risk;Weight Bearing As Tolerated Right Lower Extremity;Weight Bearing As Tolerated Left Lower Extremity   Comments dementia   Vitals   O2 (LPM) 0   O2 Delivery Device None - Room Air   Pain 0 - 10 Group   Therapist Pain Assessment Prior to Activity;During Activity;Post Activity;Nurse Notified  (mod pain noted)   Cognition    Cognition / Consciousness X   Level of Consciousness Alert   Active ROM Lower Body    Active ROM Lower Body  WDL   Strength Lower Body   Lower Body Strength  X   Comments RLE> LLE weakness   Sensation Lower Body   Lower Extremity Sensation   Not Tested   Other Treatments   Other Treatments Provided  verbal cues provided for bed mobility and stair training   Balance   Sitting Balance (Static) Fair   Sitting Balance (Dynamic) Fair   Standing Balance (Static) Fair   Standing Balance (Dynamic) Fair -   Weight Shift Sitting Fair   Weight Shift Standing Fair   Skilled Intervention Verbal Cuing;Compensatory Strategies   Comments FWW   Bed Mobility    Supine to Sit Minimal Assist   Scooting Contact Guard Assist   Skilled Intervention Verbal Cuing;Compensatory Strategies;Sequencing   Comments HOB elevated, left in recliner   Gait Analysis   Gait Level Of Assist Standby Assist   Assistive Device Front Wheel Walker   Distance (Feet) 20   # of Times Distance was Traveled 2   Deviation Shuffled Gait;Antalgic   # of Stairs Climbed 2   Level of Assist with Stairs Minimal Assist   Weight Bearing Status WBAT Bilateral LE   Skilled Intervention Compensatory Strategies;Sequencing;Verbal Cuing   Comments pt confirmed that son can assist with stairs to enter home   Functional Mobility   Sit to Stand Standby Assist   Bed, Chair, Wheelchair Transfer Standby Assist   Toilet Transfers Standby Assist   Transfer Method Stand Step   Mobility FWW   Skilled Intervention Sequencing;Verbal Cuing;Compensatory Strategies   6 Clicks Assessment - How much HELP from from another person do you currently need... (If the patient hasn't done an activity recently, how much help from another person do you think he/she would need if he/she tried?)   Turning from your back to your side while in a flat bed without using bedrails? 3   Moving from lying on your back to sitting on the side of a flat bed without using bedrails? 3   Moving to and from a bed to a chair (including a wheelchair)? 3   Standing up from a chair using your arms (e.g., wheelchair, or bedside chair)? 3   Walking in hospital room? 3   Climbing 3-5 steps with a railing? 3   6 clicks Mobility Score 18   Short Term Goals    Short Term Goal # 1 pt will be able to complete supine<>sitting  from flat bed with SPV in 6tx in order to progress to prior level   Goal Outcome # 1 Progressing as expected   Short Term Goal # 2 pt will be able to complete STS with FWW and SPV in 6tx in order to progress to prior level   Goal Outcome # 2 Progressing as expected   Short Term Goal # 3 pt will be able to ambulate 50ft with FWW and SPV in 6tx in order to decrease fall risk   Goal Outcome # 3 Goal not met   Short Term Goal # 4 pt will be able to negotiate 2 steps with CGA in 6tx in order to enter and exit home   Goal Outcome # 4 Goal not met   Education Group   Education Provided Role of Physical Therapist;Gait Training;Stair Training;Use of Assistive Device;Transfer Status;Weight Bearing Status   Role of Physical Therapist Patient Response Patient;Acceptance;Explanation;Verbal Demonstration   Gait Training Patient Response Patient;Acceptance;Explanation;Verbal Demonstration   Stair Training Patient Response Patient;Acceptance;Explanation;Verbal Demonstration   Use of Assistive Device Patient Response Patient;Acceptance;Explanation;Verbal Demonstration   Transfer Status Patient Response Patient;Acceptance;Explanation;Verbal Demonstration   Weight Bearing Status Patient Response Patient;Acceptance;Explanation;Verbal Demonstration   Physical Therapy Treatment Plan   Physical Therapy Treatment Plan Continue Current Treatment Plan   Treatment Plan  Bed Mobility;Equipment;Family / Caregiver Training;Gait Training;Self Care / Home Evaluation;Therapeutic Exercise;Therapeutic Activities;Neuro Re-Education / Balance   Treatment Frequency 5 Times per Week   Duration Discharge Needs Only   Anticipated Discharge Equipment and Recommendations   DC Equipment Recommendations Front-Wheel Walker   Discharge Recommendations Recommend home health for continued physical therapy services   Interdisciplinary Plan of Care Collaboration   IDT Collaboration with  Nursing   Patient Position at End of Therapy Seated;Chair Alarm On;Call Light  within Reach;Tray Table within Reach;Phone within Reach   Collaboration Comments RN updated.   Session Information   Date / Session Number  9/25-2(2/5, 9/30)

## 2024-09-25 NOTE — PROGRESS NOTES
Hospital Medicine Daily Progress Note    Date of Service  9/24/2024    Chief Complaint  Ratna Cline is a 89 y.o. female admitted 9/23/2024 with fall    Hospital Course  89-year-old female with a past medical history of dementia and insomnia presents emergency department on 9/23/2024 after ground-level fall.     At the emergency department, SBP in the 180s.  Saturating well on room air.  CBC with leukocytosis at 17.3.  Chemistry without gross abnormality.  Hip CT showing right sacral fracture as well as fracture of the anterior column of the right acetabular extending to the right suprapubic ramus and right pubic bone fracture. EDP discussed case with orthopedic surgery (Dr. Batista), who recommended pain management and physical therapy/Occupational Therapy evaluation as well as outpatient follow-up.     Interval Problem Update  Seen patient at bedside  Patient is noted somolent, easily arsouable, but went back to sleep easily  I have reviewed CT hip independent  PT/OT  HHC ordered   Labs reviewed    I have discussed this patient's plan of care and discharge plan at IDT rounds today with Case Management, Nursing, Nursing leadership, and other members of the IDT team.    Consultants/Specialty  orthopedics    Code Status  Full Code    Disposition  The patient is not medically cleared for discharge to home or a post-acute facility.      I have placed the appropriate orders for post-discharge needs.    Review of Systems  Review of Systems   Unable to perform ROS: Mental acuity        Physical Exam  Temp:  [36.5 °C (97.7 °F)-36.7 °C (98.1 °F)] 36.5 °C (97.7 °F)  Pulse:  [69-73] 71  Resp:  [16-22] 16  BP: (130-155)/(64-69) 132/64  SpO2:  [93 %-97 %] 93 %    Physical Exam  Vitals reviewed.   Constitutional:       Appearance: Normal appearance. She is ill-appearing.   HENT:      Head: Normocephalic and atraumatic.      Nose: Nose normal.      Mouth/Throat:      Pharynx: Oropharynx is clear.   Eyes:       Extraocular Movements: Extraocular movements intact.      Conjunctiva/sclera: Conjunctivae normal.      Pupils: Pupils are equal, round, and reactive to light.   Cardiovascular:      Rate and Rhythm: Normal rate and regular rhythm.      Pulses: Normal pulses.      Heart sounds: Normal heart sounds.   Pulmonary:      Effort: Pulmonary effort is normal.      Breath sounds: Normal breath sounds.   Abdominal:      General: Abdomen is flat. Bowel sounds are normal.      Palpations: Abdomen is soft.   Musculoskeletal:         General: Tenderness present.      Cervical back: Normal range of motion and neck supple.      Comments: Right hip tenderness to palpation  Limited ROM of R hip due to pain   Skin:     General: Skin is warm and dry.   Neurological:      General: No focal deficit present.      Mental Status: She is alert and oriented to person, place, and time. Mental status is at baseline.   Psychiatric:         Mood and Affect: Mood normal.         Behavior: Behavior normal.         Fluids    Intake/Output Summary (Last 24 hours) at 9/24/2024 2042  Last data filed at 9/24/2024 0831  Gross per 24 hour   Intake 240 ml   Output 0 ml   Net 240 ml        Laboratory  Recent Labs     09/23/24  0925 09/24/24  0322   WBC 17.3* 9.2   RBC 4.26 4.01*   HEMOGLOBIN 13.2 12.5   HEMATOCRIT 40.5 37.7   MCV 95.1 94.0   MCH 31.0 31.2   MCHC 32.6 33.2   RDW 45.3 44.6   PLATELETCT 170 145*   MPV 10.1 10.3     Recent Labs     09/23/24  0925 09/24/24  0322   SODIUM 138 138   POTASSIUM 4.0 4.3   CHLORIDE 104 102   CO2 22 23   GLUCOSE 109* 94   BUN 17 17   CREATININE 0.92 0.73   CALCIUM 9.2 9.3     Recent Labs     09/23/24  0925   APTT 30.3   INR 1.05               Imaging  CT-HIP W/O PLUS RECONS RIGHT   Final Result      1.  RIGHT sacral fracture   2.  Fracture of the anterior column of the RIGHT acetabulum extending to the RIGHT superior pubic ramus and RIGHT pubic bone fracture   3.  Osteopenia   4.  Osteoarthritis       DX-HIP-UNILATERAL-WITH PELVIS-1 VIEW RIGHT   Final Result      1.  No radiographic evidence of acute traumatic injury.   2.  Osteopenia   3.  Osteoarthritis           Assessment/Plan  * Closed nondisplaced fracture of right acetabulum, initial encounter (HCC)- (present on admission)  Assessment & Plan  Nonoperative managements  PT/OT  Outpatient orthopedics follow up    Ground-level fall  Assessment & Plan  Secondary to Tylenol PM,   no sedatives  PT/OT    Leukocytosis- (present on admission)  Assessment & Plan  Likely secondary to trauma/fall  Labs in a.m.    Fracture of single ramus of right pubis, closed, initial encounter (HCC)- (present on admission)  Assessment & Plan  None OCT imaging  Pain management  No OR recommended per orthopedic surgery  PT/OT    Closed fracture of sacrum (HCC)- (present on admission)  Assessment & Plan  Hip CT showing right sacral fracture as well as fracture of the anterior column of the right acetabular extending to the right suprapubic ramus and right pubic bone fracture.   Orthopedics recommended pain management and physical therapy/Occupational Therapy evaluation as well as outpatient follow-up.     Hypothyroidism due to acquired atrophy of thyroid- (present on admission)  Assessment & Plan  Continue levothyroxine         VTE prophylaxis: lovenox    I have performed a physical exam and reviewed and updated ROS and Plan today (9/24/2024). In review of yesterday's note (9/23/2024), there are no changes except as documented above.    Patient is has a high medical complexity, complex decision making and is at high risk for complication, morbidity, and mortality.  I spent 51 minutes, reviewing the chart, obtaining and/or reviewing separately obtained history. Performing a medically appropriate examination and evaluation.  Counseling and educating the patient. Ordering and reviewing medications, tests, or procedures.  Discussing the case with ortho.  Documenting clinical information  in EPIC. Independently interpreting results and communicating results to patient. Discussing future disposition of care with patient, RN and case management.  This does not include time spent on separately billable procedures/tests.

## 2024-09-25 NOTE — DISCHARGE PLANNING
Good morning,  This referral has been escalated to a Clinical Supervisor for review in order to determine Home Health appropriateness.  This issue will be resolved as quickly as possible.  Thank you!

## 2024-09-25 NOTE — CARE PLAN
The patient is Stable - Low risk of patient condition declining or worsening    Shift Goals  Clinical Goals: Discharge Home  Patient Goals: Discharge Home  Family Goals: Updates, Plan of care    Progress made toward(s) clinical / shift goals:       Problem: Pain - Standard  Goal: Alleviation of pain or a reduction in pain to the patient’s comfort goal  Description: Target End Date:  Prior to discharge or change in level of care    Document on Vitals flowsheet    1.  Document pain using the appropriate pain scale per order or unit policy  2.  Educate and implement non-pharmacologic comfort measures (i.e. relaxation, distraction, massage, cold/heat therapy, etc.)  3.  Pain management medications as ordered  4.  Reassess pain after pain med administration per policy  5.  If opiods administered assess patient's response to pain medication is appropriate per POSS sedation scale  6.  Follow pain management plan developed in collaboration with patient and interdisciplinary team (including palliative care or pain specialists if applicable)  Outcome: Progressing     Problem: Knowledge Deficit - Standard  Goal: Patient and family/care givers will demonstrate understanding of plan of care, disease process/condition, diagnostic tests and medications  Description: Target End Date:  1-3 days or as soon as patient condition allows    Document in Patient Education    1.  Patient and family/caregiver oriented to unit, equipment, visitation policy and means for communicating concern  2.  Complete/review Learning Assessment  3.  Assess knowledge level of disease process/condition, treatment plan, diagnostic tests and medications  4.  Explain disease process/condition, treatment plan, diagnostic tests and medications  Outcome: Progressing     Problem: Fall Risk  Goal: Patient will remain free from falls  Description: Target End Date:  Prior to discharge or change in level of care    Document interventions on the [facility] Fall Risk  Assessment    1.  Assess for fall risk factors  2.  Implement fall precautions  Outcome: Progressing     Problem: Mobility  Goal: Patient's capacity to carry out activities will improve  Description: Target End Date:  Prior to discharge or change in level of care    1.  Assess for barriers to mobility/activity  2.  Implement activity per interdisciplinary team recommendations  3.  Target activity level identified and patient/family/caregiver aware of goal  4.  Provide assistive devices  5.  Instruct patient/caregiver on proper use of assistive/adaptive devices  6.  Schedule activities and rest periods to decrease effects of fatigue  7.  Encourage mobilization to extent of ability  8.  Maintain proper body alignment  9.  Provide adequate pain management to allow progressive mobilization  10. Implement pace maker precautions as needed  Outcome: Progressing

## 2024-09-25 NOTE — DOCUMENTATION QUERY
[facility]                                                                       Query Response Note      PATIENT:               GOFDREY DUARTE  ACCT #:                  [id number]  MRN:                     [id number]  :                      1935  ADMIT DATE:       2024 8:34 AM  DISCH DATE:          RESPONDING  PROVIDER #:        [id number]           QUERY TEXT:    Patient had ground level fall resulting in fractures of right sacrum, right acetabulum, & right pubis.  Osteopenia is noted in right hip CT.  Please clarify relationship between osteopenia and fracture(s):    The patient's Clinical Indicators include:  H&P: Hip CT showing right sacral fracture as well as fracture of the anterior column of the right acetabular extending to the right suprapubic ramus and right pubic bone fracture   Hip CT: osteopenia  Risk Factors:  ground level fall, osteopenia  Treatment: non operative management, PT/OT, Vitamin D3    Important Note:  if new diagnosis or change to documentation made via query please include in daily documentation and/or DC Summary    Thank you,  Dariela Ahn RN, BSN, CCDS  Clinical   Connect via [employer] Messenger  Options provided:   -- Fractures are related to Osteopenia   -- Fractures are not related to Osteopenia   -- Other explanation (please specify), Please specify   -- Unable to determine      Query created by: Dariela Ahn on 2024 7:41 AM    RESPONSE TEXT:    Fractures are related to Osteopenia          Electronically signed by:  SARA CHAMPAGNE MD 2024 7:47 AM

## 2024-09-25 NOTE — CARE PLAN
The patient is Stable - Low risk of patient condition declining or worsening    Shift Goals  Clinical Goals: Pain control, reorientation to call light, mobility  Patient Goals: Rest, Pain control  Family Goals: Updates, Plan of care    Progress made toward(s) clinical / shift goals:  Yes       Problem: Pain - Standard  Goal: Alleviation of pain or a reduction in pain to the patient’s comfort goal  9/25/2024 0228 by Antonieta Sung R.N.  Outcome: Progressing  Flowsheets (Taken 9/24/2024 2000)  Pain Rating Scale (NPRS): 6  9/25/2024 0227 by Antonieta Sung R.N.  Outcome: Progressing  Flowsheets (Taken 9/24/2024 2000)  Pain Rating Scale (NPRS): 6     Problem: Knowledge Deficit - Standard  Goal: Patient and family/care givers will demonstrate understanding of plan of care, disease process/condition, diagnostic tests and medications  9/25/2024 0228 by Antonieta Sung R.N.  Outcome: Progressing  9/25/2024 0227 by Antonieta Sung R.N.  Outcome: Progressing     Problem: Fall Risk  Goal: Patient will remain free from falls  Outcome: Progressing     Problem: Mobility  Goal: Patient's capacity to carry out activities will improve  Outcome: Progressing  Flowsheets  Taken 9/25/2024 0228 by Antonieta Sung, R.N.  Mobility:   Encouraged mobilization per interdisciplinary team recommendations   Provided assistive devices   Monitored for signs of activity intolerance   Provided rest periods between activities  Taken 9/24/2024 2313 by Aj Alves, C.N.A.  Level of Mobility: Level IV  Activity Performed:   Ambulate   Up to bathroom   Back to bed  Time Activity Tolerated: 10 min  Distance Per Occurrence (ft.): 25 feet  # of Times Distance was Traveled: 2  Assistance: Assistance of One     Problem: Self Care  Goal: Patient will have the ability to perform ADLs independently or with assistance (bathe, groom, dress, toilet and feed)  Outcome: Progressing     Problem: Infection - Standard  Goal: Patient will remain free from  infection  Outcome: Progressing  Flowsheets (Taken 9/25/2024 0228)  Standard Infection Interventions:   Implemented standard precautions   Assessed for signs and symptoms of infection   Instructed patient/family on signs and symptoms of infection   Assessed for removal IV, central lines, intra-arterial or urinary catheters   Provided education on proper hand hygiene and infection prevention measures     Problem: Wound/ / Incision Healing  Goal: Patient's wound/surgical incision will decrease in size and heals properly  Outcome: Progressing

## 2024-09-25 NOTE — DISCHARGE PLANNING
ATTN: Case Management  RE: Referral for Home Health    As of 9/25/24, we have accepted the Home Health referral for the patient listed above.    A [facility] clinician will be out to see the patient within 48 hours. If you have any questions or concerns regarding the patient’s transition to Home Health, please do not hesitate to contact us at [phone].      We look forward to collaborating with you,  [facility] Home Health Team

## 2024-09-25 NOTE — DISCHARGE SUMMARY
Discharge Summary    CHIEF COMPLAINT ON ADMISSION  Chief Complaint   Patient presents with    GLF     Pt to triage by wheelchair reports glf this morning then landed on right hip. -head strike. States she took advil pm last night that made her feel dizzy. Denies any dizziness/son/nausea upon arrival in ED. Gcs of 15. States able to bear weight on her right leg but makes her pain worse.       Reason for Admission  GLF     Admission Date  9/23/2024    CODE STATUS  Full Code    HPI & HOSPITAL COURSE  This is a 89-year-old female with a past medical history of dementia and insomnia presented emergency department on 9/23/2024 after ground-level fall.      At the emergency department, SBP in the 180s.  Saturating well on room air.  Hip CT showing right sacral fracture as well as fracture of the anterior column of the right acetabular extending to the right suprapubic ramus and right pubic bone fracture. EDP discussed case with orthopedic surgery (Dr. Batista), who recommended pain management and physical therapy/Occupational Therapy evaluation as well as outpatient follow-up.  Patient was evaluated PT/OT who recommended home health care.  Home health care has been arranged prior to discharge.  She is advised to follow-up with PCP and orthopedics as outpatient.    Patient blood pressure has been significantly improved on the day of discharge.  The patient reports her pain has been improving.    Therefore, she is discharged in fair and stable condition to home with organized home healthcare and close outpatient follow-up.    The patient met 2-midnight criteria for an inpatient stay at the time of discharge.    Discharge Date  9/25/24    FOLLOW UP ITEMS POST DISCHARGE  PCP  Orthopedics [facility]    DISCHARGE DIAGNOSES  Principal Problem:    Closed nondisplaced fracture of right acetabulum, initial encounter ([facility]) (POA: Yes)  Active Problems:    Hypothyroidism due to acquired atrophy of thyroid (POA: Yes)      Overview:         Ref. Range 5/13/2021 08:16 7/15/2021 13:17 10/11/2021 09:50 12/20/2021       09:00       TSH Latest Ref Range: 0.380 - 5.330 uIU/mL 35.700 (H) 6.460 (H) 3.280       3.720       Free T-4 Latest Ref Range: 0.93 - 1.70 ng/dL 0.72 (L) 1.23 1.39 1.33             She has had subclinical hypothyroidism since at least 2012 in varying       degrees, became overt hypothyroidism in May 2021. She recalls previous       treatment with levothyroxine but did not feel comfortable with it and has       been on desiccated/natural formulations since that time.  Of note,       previously she used biotin intermittently which may have altered her labs.            Current regimen: NP thyroid 15 mg daily      Previous regimen: OTC thyroid drops    Closed fracture of sacrum (HCC) (POA: Yes)    Fracture of single ramus of right pubis, closed, initial encounter (HCC) (POA: Yes)    Leukocytosis (POA: Yes)    Ground-level fall (POA: No)  Resolved Problems:    * No resolved hospital problems. *      FOLLOW UP  Future Appointments   Date Time Provider Department Center   10/18/2024  9:40 AM David Smart M.D. [facility] None   12/6/2024  9:40 AM JUDSON MathiasRYAEL. [facility]     MAGDALENA Mathias.  [address] [place] [id number] [phone]    Follow up  Please call your primary care provider to schedule a hospital follow up. Thank you.    [facility]  [address] [place] [id number]  [phone]  Follow up in 2 week(s)        MEDICATIONS ON DISCHARGE     Medication List        START taking these medications        Instructions   oxyCODONE immediate-release 5 MG Tabs  Commonly known as: Roxicodone   Take 1 Tablet by mouth every 6 hours as needed for Severe Pain for up to 3 days.  Dose: 5 mg            CONTINUE taking these medications        Instructions   levothyroxine 25 MCG Tabs  Commonly known as: Synthroid   Take 1 Tablet by mouth every morning on an empty stomach.  Dose: 25  mcg            STOP taking these medications      Ibuprofen -38 MG Tabs  Generic drug: Ibuprofen-diphenhydrAMINE Cit     metroNIDAZOLE 500 MG Tabs  Commonly known as: Flagyl              Allergies  Allergies   Allergen Reactions    Codeine Vomiting    Nexium Unspecified     Ringing in ears    Soy Allergy     Sulfa Drugs Nausea       DIET  Orders Placed This Encounter   Procedures    Diet Order Diet: Regular; Miscellaneous modifications: (optional): Gluten Free     Standing Status:   Standing     Number of Occurrences:   1     Order Specific Question:   Diet:     Answer:   Regular [1]     Order Specific Question:   Miscellaneous modifications: (optional)     Answer:   Gluten Free [9]       ACTIVITY  As tolerated.  Weight bearing as tolerated    CONSULTATIONS  Orthopedics    PROCEDURES  na    LABORATORY  Lab Results   Component Value Date    SODIUM 138 09/24/2024    POTASSIUM 4.3 09/24/2024    CHLORIDE 102 09/24/2024    CO2 23 09/24/2024    GLUCOSE 94 09/24/2024    BUN 17 09/24/2024    CREATININE 0.73 09/24/2024        Lab Results   Component Value Date    WBC 9.2 09/24/2024    HEMOGLOBIN 12.5 09/24/2024    HEMATOCRIT 37.7 09/24/2024    PLATELETCT 145 (L) 09/24/2024      CT-HIP W/O PLUS RECONS RIGHT   Final Result      1.  RIGHT sacral fracture   2.  Fracture of the anterior column of the RIGHT acetabulum extending to the RIGHT superior pubic ramus and RIGHT pubic bone fracture   3.  Osteopenia   4.  Osteoarthritis      DX-HIP-UNILATERAL-WITH PELVIS-1 VIEW RIGHT   Final Result      1.  No radiographic evidence of acute traumatic injury.   2.  Osteopenia   3.  Osteoarthritis          Total time of the discharge process 35 minutes.

## 2024-09-25 NOTE — PROGRESS NOTES
Bedside report received, assessment completed    A&O x  4, pt calls appropriately, can be confused   Mobility: Up with x1 assist, Assistive Devices: FWW/ HH  6 Clicks: 18 Mobility/ PT ordered following  17 ADL/ OT ordered, following   Weight Bearing Restrictions: no restrictions  Fall Risk Assessment: HIGH, 16   Fall Precautions Include: + Fall Risk Bracelet, + Bed Alarm, + Personal Items at bedside, + Bed in low position, + Door Notifications in place   Pain Assessment / Reassessment completed, medication provided per MAR  Diet: Regular   - Tolerating  LDA:   IV Access: 20g LFA, CDI/ flushed/ SL/ Infusing per MAR     GI/: restroom void, + flatus, 9/24 BM    - Bowel protocols in place: yes  DVT Prophylaxis: Lovenox , SCD +   Hector Score: 18, Interventions per flow sheet   Skin Assessment: fragile   Procedures:    - n/a  D/C Plan:    - Home with son    - HH Needs: HH set up   - Meds to beds to be delivered to d/c lounge   - PIV removed, orders for d/c lounge pending transportation arrival      Reviewed plan of care with patient, bed in lowest position and locked, pt resting comfortably now, call light within reach, all needs met at this time. Interventions will be executed per plan of care

## 2024-09-25 NOTE — DISCHARGE INSTRUCTIONS
Discharge Instructions per Curtis Bone M.D.  Continue weight bearing as tolerated  Continue PT/OT   Follow up with [facility] as outpatient  Please follow-up with PCP as outpatient.    Return to ER in the event of new or worsening symptoms. Please note importance of compliance and the patient has agreed to proceed with all medical recommendations and follow up plan indicated above. All medications come with benefits and risks. Risks may include permanent injury or death and these risks can be minimized with close reassessment and monitoring. Please make it to your scheduled follow ups with PCP and orthopedics

## 2024-09-25 NOTE — PROGRESS NOTES
Virtual Nurse rounding complete.    Round Needs: Other: Abdominal discomfort. and Notified of Patient Needs: Primary RN.

## 2024-09-25 NOTE — DISCHARGE PLANNING
HTH/SCP TCN chart review completed. Collaborated with YEIMI Small[place].  The most current review of medical record, knowledge of pt's PLOF and social support, LACE+ score of 22 was considered.  No 6 clicks scores.      Pt seen at bedside. Introduced TCN program. Provided education regarding post acute levels of care. Education provided regarding case management policy for blanket SNF referrals. Discussed HTH/SCP plan benefits. Pt verbalizes understanding.     Patient states she lives in a one level condo with one step in with her son and was independent with ADL's, IADL's, mobility (no AD) and some driving. She has a 4WW she does not use.  She reports her son can provide transportation home at discharge and provides transportation as needed.  She states that she is not at her baseline level of function and is agreeable with HH if recommended by therapy.      Choice proactively obtained for HH (1. [facility], 2. [facility]) & DME ([facility]) if indicated, faxed to D[facility]  and given to CM.   In collaboration with YEIMI, current discharge considerations are noted to be for home with possible HH, AD and close outpatient f/u when medically cleared.      TCN will continue to follow and collaborate with discharge planning team as additional post acute needs arise. Thank you.     Completed today:  PT/OT orders in chart.    Choice obtained: HH (1. [facility], 2. [facility]) & DME ([facility]) if indicated.     Pt aware of [facility] blanket referral policy  SCP with [facility] PCP. Discussed possible outpatient transitional PCP follow up if indicated and pt in agreement; sent information to assist in scheduling.      Addendum:  1431-  Per chart review, PT/OT recommends Home Health.  PT recommends a FWW and OT recommends tub/shower seat.  6 clicks scores are now 17 ADL's and 16 mobility.

## 2024-09-25 NOTE — PROGRESS NOTES
Ratna Cline has been provided discharge instructions, to include follow up care, home medications, and activity/diet reviewed. Understanding verbalized. Arm band removed. Medications to be given from pharmacy. Pt ride present. Pt out of DCL at this time with personal belongings.

## 2024-09-25 NOTE — DISCHARGE PLANNING
Medically cleared for home with .   Choice obtained for and accepted with [facility].   FWW delivered to patient by traction.   Discharge order placed by provider.   Her son, Michael, will drive her home upon discharge and provide her support during her recovery.   2nd IMM delivered to and signed by patient today, 9/25/2024, at 1011.   No further needs from case management identified at this time.     Case Management Discharge Planning    Admission Date: 9/23/2024  GMLOS: 3.9  ALOS: 2    6-Clicks ADL Score: 17  6-Clicks Mobility Score: 18  PT and/or OT Eval ordered: Yes  Post-acute Referrals Ordered: Yes  Post-acute Choice Obtained: Yes  Has referral(s) been sent to post-acute provider:  Yes      Anticipated Discharge Dispo: Discharge Disposition: Disch to  rehab facility or distinct part unit (62)    DME Needed: Yes    DME Ordered: Yes    Action(s) Taken: Updated Provider/Nurse on Discharge Plan, Patient Conference, and Family Conference    Escalations Completed: None    Medically Clear: Yes    Next Steps: No further needs from case management.     Barriers to Discharge: None    Is the patient up for discharge tomorrow: Today, 9/25/2024, via transportation from her son, Michael.

## 2024-09-26 ENCOUNTER — PATIENT OUTREACH (OUTPATIENT)
Dept: HEALTH INFORMATION MANAGEMENT | Facility: OTHER | Age: 89
End: 2024-09-26
Payer: MEDICARE

## 2024-09-26 NOTE — DISCHARGE PLANNING
HTH/SCP TCN chart review completed. Collaborated with YEIMI Amador.  Per chart review, PT/OT recommend Home with HH.  Current discharge considerations are for Home with possible HH and close outpatient f/u when medically cleared.  Patient seen at bedside and states her son can proved transportation home today at 4:30 PM.  TCN will continue to follow and collaborate with discharge planning team as additional post acute needs arise. Thank you.    Completed:  PT recommends HH and a FWW.   OT recommends HH and a tub/shower seat.    Choice obtained: HH (1. [facility], 2. [facility]) & DME ([facility]) if indicated.     Pt aware of [facility] blanket referral policy  SCP with [facility] P[facility]. Discussed possible outpatient transitional PCP follow up if indicated and pt in agreement; sent information to assist in scheduling.      Addendum:  1424- Per chart review, [facility] has accepted and FWW has been delivered to patients room.

## 2024-09-26 NOTE — PROGRESS NOTES
Transitional Care Management  TCM Outreach Date and Time: Filed (9/26/2024  9:17 AM)    Discharge Questions  Actual Discharge Date: 09/25/24  Now that you are home, how are you feeling?: Good  Did you receive any new prescriptions?: Yes  Were you able to get them filled?: Yes  Meds to Bed or Pharmacy filled?: Meds to Bed  Do you have any questions about your current medications or new medications (Review Med Rec)?: Yes (Please explain) (I advised pt on suplementing her oxycodone with otc tyelnol for better pain control)  Did you have any durable medical equipment ordered?: Yes  Did you receive it?: Yes ([facility])  Do you have a follow up appointment scheduled with your PCP?: No  Was an appointment scheduled for the patient?: Yes  Appointment Date: 10/04/24  Appointment Time: 0900  Any issues or paperwork you wish to discuss with your PCP?: No  Are you (patient) able to get to the appointment?: Yes  If Home Health was ordered, have they contacted you (Patient): Yes ([facility])  Did you have enough support after your last discharge?: Yes (Pts son gives full time help)  Does this patient qualify for the CCM program?: Yes (pt's chart routed to ccm rn)    Transitional Care  Number of attempts made to contact patient: 1  Current or previous attempts completed within two business days of discharge? : Yes  Provided education regarding treatment plan, medications, self-management, ADLs?: No  Has patient completed an Advanced Directive?: No  Has the Care Manager's phone number provided?: No  Is there anything else I can help you with?: No    Discharge Summary  Chief Complaint: GLF  Admitting Diagnosis: GLF  Discharge Diagnosis: Closed nondisplaced fracture of right acetabulum, initial encounter

## 2024-10-03 ENCOUNTER — TELEPHONE (OUTPATIENT)
Dept: HEALTH INFORMATION MANAGEMENT | Facility: OTHER | Age: 89
End: 2024-10-03
Payer: MEDICARE

## 2024-10-04 ENCOUNTER — OFFICE VISIT (OUTPATIENT)
Dept: MEDICAL GROUP | Facility: PHYSICIAN GROUP | Age: 89
End: 2024-10-04
Payer: MEDICARE

## 2024-10-04 ENCOUNTER — APPOINTMENT (OUTPATIENT)
Dept: RADIOLOGY | Facility: IMAGING CENTER | Age: 89
End: 2024-10-04
Payer: MEDICARE

## 2024-10-04 VITALS
WEIGHT: 97.4 LBS | BODY MASS INDEX: 17.92 KG/M2 | TEMPERATURE: 98.8 F | HEART RATE: 90 BPM | OXYGEN SATURATION: 95 % | SYSTOLIC BLOOD PRESSURE: 118 MMHG | HEIGHT: 62 IN | RESPIRATION RATE: 12 BRPM | DIASTOLIC BLOOD PRESSURE: 58 MMHG

## 2024-10-04 DIAGNOSIS — S32.401A: ICD-10-CM

## 2024-10-04 DIAGNOSIS — M80.00XD AGE-RELATED OSTEOPOROSIS WITH CURRENT PATHOLOGICAL FRACTURE WITH ROUTINE HEALING, SUBSEQUENT ENCOUNTER: ICD-10-CM

## 2024-10-04 DIAGNOSIS — S32.591A: ICD-10-CM

## 2024-10-04 DIAGNOSIS — G44.319 ACUTE POST-TRAUMATIC HEADACHE, NOT INTRACTABLE: ICD-10-CM

## 2024-10-04 DIAGNOSIS — Z23 NEED FOR VACCINATION: ICD-10-CM

## 2024-10-04 DIAGNOSIS — Z09 HOSPITAL DISCHARGE FOLLOW-UP: Primary | ICD-10-CM

## 2024-10-04 DIAGNOSIS — M79.605 LEFT LEG PAIN: ICD-10-CM
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
## 2024-10-04 PROBLEM — M80.00XA AGE-RELATED OSTEOPOROSIS WITH CURRENT PATHOLOGICAL FRACTURE: Status: ACTIVE | Noted: 2018-05-08

## 2024-10-04 PROCEDURE — 70250 X-RAY EXAM OF SKULL: CPT | Mod: TC

## 2024-10-04 PROCEDURE — 73551 X-RAY EXAM OF FEMUR 1: CPT | Mod: TC,LT

## 2024-10-04 RX ORDER — DIPHENHYDRAMINE HYDROCHLORIDE 50 MG/ML
50 INJECTION INTRAMUSCULAR; INTRAVENOUS PRN
OUTPATIENT
Start: 2025-04-02

## 2024-10-04 RX ORDER — EPINEPHRINE 1 MG/ML(1)
0.5 AMPUL (ML) INJECTION PRN
OUTPATIENT
Start: 2025-04-02

## 2024-10-04 RX ORDER — METHYLPREDNISOLONE SODIUM SUCCINATE 125 MG/2ML
125 INJECTION, POWDER, LYOPHILIZED, FOR SOLUTION INTRAMUSCULAR; INTRAVENOUS PRN
OUTPATIENT
Start: 2025-04-02

## 2024-10-04 ASSESSMENT — ENCOUNTER SYMPTOMS
HEADACHES: 1
FALLS: 1

## 2024-10-04 ASSESSMENT — FIBROSIS 4 INDEX: FIB4 SCORE: 3.837612894400987817

## 2024-10-18 ENCOUNTER — HOSPITAL ENCOUNTER (OUTPATIENT)
Dept: LAB | Facility: MEDICAL CENTER | Age: 89
End: 2024-10-18
Attending: PSYCHIATRY & NEUROLOGY
Payer: MEDICARE

## 2024-10-18 ENCOUNTER — OFFICE VISIT (OUTPATIENT)
Dept: NEUROLOGY | Facility: MEDICAL CENTER | Age: 89
End: 2024-10-18
Attending: PSYCHIATRY & NEUROLOGY
Payer: MEDICARE

## 2024-10-18 VITALS
DIASTOLIC BLOOD PRESSURE: 60 MMHG | TEMPERATURE: 97.4 F | WEIGHT: 95.46 LBS | BODY MASS INDEX: 16.91 KG/M2 | HEIGHT: 63 IN | HEART RATE: 89 BPM | SYSTOLIC BLOOD PRESSURE: 116 MMHG | OXYGEN SATURATION: 98 %

## 2024-10-18 DIAGNOSIS — R41.3 MEMORY CHANGES: ICD-10-CM

## 2024-10-18 DIAGNOSIS — H91.8X3 OTHER SPECIFIED HEARING LOSS OF BOTH EARS: ICD-10-CM

## 2024-10-18 DIAGNOSIS — G31.84 MILD COGNITIVE IMPAIRMENT WITH MEMORY LOSS: Primary | ICD-10-CM

## 2024-10-18 LAB — VIT B12 SERPL-MCNC: 489 PG/ML (ref 211–911)

## 2024-10-18 PROCEDURE — 82746 ASSAY OF FOLIC ACID SERUM: CPT

## 2024-10-18 PROCEDURE — 84425 ASSAY OF VITAMIN B-1: CPT

## 2024-10-18 PROCEDURE — 99205 OFFICE O/P NEW HI 60 MIN: CPT | Performed by: PSYCHIATRY & NEUROLOGY

## 2024-10-18 PROCEDURE — 82607 VITAMIN B-12: CPT

## 2024-10-18 PROCEDURE — 3078F DIAST BP <80 MM HG: CPT | Performed by: PSYCHIATRY & NEUROLOGY

## 2024-10-18 PROCEDURE — 3074F SYST BP LT 130 MM HG: CPT | Performed by: PSYCHIATRY & NEUROLOGY

## 2024-10-18 PROCEDURE — 36415 COLL VENOUS BLD VENIPUNCTURE: CPT

## 2024-10-18 ASSESSMENT — PATIENT HEALTH QUESTIONNAIRE - PHQ9
SUM OF ALL RESPONSES TO PHQ QUESTIONS 1-9: 3
5. POOR APPETITE OR OVEREATING: 0 - NOT AT ALL
CLINICAL INTERPRETATION OF PHQ2 SCORE: 1

## 2024-10-18 ASSESSMENT — FIBROSIS 4 INDEX: FIB4 SCORE: 3.837612894400987817

## 2024-10-19 LAB — FOLATE SERPL-MCNC: 21.8 NG/ML

## 2024-10-20 ENCOUNTER — HOSPITAL ENCOUNTER (OUTPATIENT)
Facility: MEDICAL CENTER | Age: 89
End: 2024-10-20
Attending: NURSE PRACTITIONER
Payer: MEDICARE

## 2024-10-20 ENCOUNTER — HOSPITAL ENCOUNTER (EMERGENCY)
Facility: MEDICAL CENTER | Age: 89
End: 2024-10-20
Payer: MEDICARE

## 2024-10-20 ENCOUNTER — OFFICE VISIT (OUTPATIENT)
Dept: URGENT CARE | Facility: PHYSICIAN GROUP | Age: 89
End: 2024-10-20
Payer: MEDICARE

## 2024-10-20 VITALS
HEART RATE: 81 BPM | OXYGEN SATURATION: 97 % | DIASTOLIC BLOOD PRESSURE: 75 MMHG | SYSTOLIC BLOOD PRESSURE: 146 MMHG | BODY MASS INDEX: 17.77 KG/M2 | HEIGHT: 62 IN | WEIGHT: 96.56 LBS | TEMPERATURE: 98 F | RESPIRATION RATE: 18 BRPM

## 2024-10-20 VITALS
DIASTOLIC BLOOD PRESSURE: 68 MMHG | TEMPERATURE: 97.9 F | HEART RATE: 88 BPM | SYSTOLIC BLOOD PRESSURE: 100 MMHG | HEIGHT: 62 IN | OXYGEN SATURATION: 94 % | BODY MASS INDEX: 17.57 KG/M2 | RESPIRATION RATE: 12 BRPM | WEIGHT: 95.5 LBS

## 2024-10-20 DIAGNOSIS — N30.91 CYSTITIS WITH HEMATURIA: ICD-10-CM

## 2024-10-20 DIAGNOSIS — R30.0 DYSURIA: ICD-10-CM

## 2024-10-20 LAB
APPEARANCE UR: NORMAL
BILIRUB UR STRIP-MCNC: NORMAL MG/DL
COLOR UR AUTO: NORMAL
GLUCOSE UR STRIP.AUTO-MCNC: NORMAL MG/DL
KETONES UR STRIP.AUTO-MCNC: NORMAL MG/DL
LEUKOCYTE ESTERASE UR QL STRIP.AUTO: NORMAL
NITRITE UR QL STRIP.AUTO: NORMAL
PH UR STRIP.AUTO: 6.5 [PH] (ref 5–8)
PROT UR QL STRIP: NORMAL MG/DL
RBC UR QL AUTO: NORMAL
SP GR UR STRIP.AUTO: 1.02
UROBILINOGEN UR STRIP-MCNC: 0.2 MG/DL

## 2024-10-20 PROCEDURE — 3078F DIAST BP <80 MM HG: CPT | Performed by: NURSE PRACTITIONER

## 2024-10-20 PROCEDURE — 81002 URINALYSIS NONAUTO W/O SCOPE: CPT | Performed by: NURSE PRACTITIONER

## 2024-10-20 PROCEDURE — 99213 OFFICE O/P EST LOW 20 MIN: CPT | Performed by: NURSE PRACTITIONER

## 2024-10-20 PROCEDURE — 87086 URINE CULTURE/COLONY COUNT: CPT

## 2024-10-20 PROCEDURE — 3074F SYST BP LT 130 MM HG: CPT | Performed by: NURSE PRACTITIONER

## 2024-10-20 PROCEDURE — 87186 SC STD MICRODIL/AGAR DIL: CPT

## 2024-10-20 PROCEDURE — 87077 CULTURE AEROBIC IDENTIFY: CPT

## 2024-10-20 PROCEDURE — 302449 STATCHG TRIAGE ONLY (STATISTIC)

## 2024-10-20 PROCEDURE — 1125F AMNT PAIN NOTED PAIN PRSNT: CPT | Performed by: NURSE PRACTITIONER

## 2024-10-20 RX ORDER — CEFUROXIME AXETIL 250 MG/1
250 TABLET ORAL 2 TIMES DAILY
Qty: 14 TABLET | Refills: 0 | Status: SHIPPED | OUTPATIENT
Start: 2024-10-20 | End: 2024-10-27

## 2024-10-20 RX ORDER — CEFUROXIME AXETIL 250 MG/1
250 TABLET ORAL 2 TIMES DAILY
Qty: 14 TABLET | Refills: 0 | Status: SHIPPED
Start: 2024-10-20 | End: 2024-10-20

## 2024-10-20 ASSESSMENT — FIBROSIS 4 INDEX
FIB4 SCORE: 3.837612894400987817
FIB4 SCORE: 3.837612894400987817

## 2024-10-20 ASSESSMENT — ENCOUNTER SYMPTOMS
FEVER: 0
CHILLS: 0
FLANK PAIN: 0

## 2024-10-20 ASSESSMENT — PAIN SCALES - GENERAL: PAINLEVEL: 2=MINIMAL-SLIGHT

## 2024-10-22 LAB
BACTERIA UR CULT: ABNORMAL
BACTERIA UR CULT: ABNORMAL
SIGNIFICANT IND 70042: ABNORMAL
SITE SITE: ABNORMAL
SOURCE SOURCE: ABNORMAL

## 2024-10-23 LAB — VIT B1 BLD-MCNC: 87 NMOL/L (ref 70–180)

## 2024-12-06 ENCOUNTER — OFFICE VISIT (OUTPATIENT)
Dept: MEDICAL GROUP | Facility: PHYSICIAN GROUP | Age: 89
End: 2024-12-06
Payer: MEDICARE

## 2024-12-06 VITALS
HEIGHT: 62 IN | WEIGHT: 96 LBS | HEART RATE: 77 BPM | TEMPERATURE: 97.8 F | OXYGEN SATURATION: 98 % | BODY MASS INDEX: 17.66 KG/M2 | DIASTOLIC BLOOD PRESSURE: 70 MMHG | SYSTOLIC BLOOD PRESSURE: 126 MMHG | RESPIRATION RATE: 12 BRPM

## 2024-12-06 DIAGNOSIS — K21.9 GASTRO-ESOPHAGEAL REFLUX DISEASE WITHOUT ESOPHAGITIS: ICD-10-CM

## 2024-12-06 DIAGNOSIS — Z87.81 HISTORY OF PELVIC FRACTURE: ICD-10-CM

## 2024-12-06 DIAGNOSIS — G31.84 MILD COGNITIVE IMPAIRMENT WITH MEMORY LOSS: ICD-10-CM

## 2024-12-06 DIAGNOSIS — E03.4 HYPOTHYROIDISM DUE TO ACQUIRED ATROPHY OF THYROID: ICD-10-CM

## 2024-12-06 DIAGNOSIS — Z91.81 HISTORY OF FALL: ICD-10-CM

## 2024-12-06 PROBLEM — K57.30 DIVERTICULOSIS OF COLON: Status: ACTIVE | Noted: 2024-12-06

## 2024-12-06 PROBLEM — Q39.1 CONGENITAL TRACHEOESOPHAGEAL FISTULA, ESOPHAGEAL ATRESIA AND STENOSIS: Status: ACTIVE | Noted: 2024-12-06

## 2024-12-06 PROBLEM — Q39.3 CONGENITAL TRACHEOESOPHAGEAL FISTULA, ESOPHAGEAL ATRESIA AND STENOSIS: Status: ACTIVE | Noted: 2024-12-06

## 2024-12-06 PROBLEM — S32.401A: Status: RESOLVED | Noted: 2024-09-23 | Resolved: 2024-12-06

## 2024-12-06 PROBLEM — S32.10XA CLOSED FRACTURE OF SACRUM (HCC): Status: RESOLVED | Noted: 2024-09-23 | Resolved: 2024-12-06

## 2024-12-06 PROCEDURE — 3074F SYST BP LT 130 MM HG: CPT

## 2024-12-06 PROCEDURE — 99214 OFFICE O/P EST MOD 30 MIN: CPT

## 2024-12-06 PROCEDURE — 3078F DIAST BP <80 MM HG: CPT

## 2024-12-06 RX ORDER — VIT C/E/CUPERIC/ZINC/LUTEIN 226-90-0.8
CAPSULE ORAL
COMMUNITY

## 2024-12-06 RX ORDER — FAMOTIDINE 20 MG/1
20 TABLET, FILM COATED ORAL NIGHTLY
Qty: 100 TABLET | Refills: 3 | Status: SHIPPED | OUTPATIENT
Start: 2024-12-06

## 2024-12-06 RX ORDER — LEVOTHYROXINE SODIUM 25 UG/1
25 TABLET ORAL
Qty: 100 TABLET | Refills: 3 | Status: SHIPPED | OUTPATIENT
Start: 2024-12-06

## 2024-12-06 ASSESSMENT — ENCOUNTER SYMPTOMS
HEARTBURN: 1
CONSTIPATION: 0

## 2024-12-06 ASSESSMENT — FIBROSIS 4 INDEX: FIB4 SCORE: 3.837612894400987817

## 2024-12-06 NOTE — PROGRESS NOTES
"Subjective:     CC: Diagnoses of Hypothyroidism due to acquired atrophy of thyroid, History of pelvic fracture, History of fall, and Gastro-esophageal reflux disease without esophagitis were pertinent to this visit.    Pt presents today for follow up. Reports she continues to feel fatigued/tired.  Enjoys square dancing and driving again after cleared from recent pelvic fracture.    Reports her heater has been out at her house for the past week, has repair scheduled for tomorrow.    HPI:   Ratna presents today with    Problem   Congenital Tracheoesophageal Fistula, Esophageal Atresia and Stenosis   Diverticulosis of Colon   History of Pelvic Fracture   History of Fall   Hypothyroidism Due to Acquired Atrophy of Thyroid   Gastro-Esophageal Reflux Disease Without Esophagitis   Irritable Colon   Closed Nondisplaced Fracture of Right Acetabulum, Initial Encounter ([facility]) (Resolved)   Closed Fracture of Sacrum (Hcc) (Resolved)     ROS:  Review of Systems   Constitutional:  Positive for malaise/fatigue.   HENT:          Ear itching   Gastrointestinal:  Positive for heartburn (intermittently). Negative for constipation.   Musculoskeletal:  Negative for joint pain.   All other systems reviewed and are negative.      Objective:     Exam:  /70 (BP Location: Left arm, Patient Position: Sitting, BP Cuff Size: Adult)   Pulse 77   Temp 36.6 °C (97.8 °F) (Temporal)   Resp 12   Ht 1.575 m (5' 2\")   Wt 43.5 kg (96 lb)   LMP  (LMP Unknown)   SpO2 98%   BMI 17.56 kg/m²  Body mass index is 17.56 kg/m².    Physical Exam  Vitals reviewed.   Constitutional:       General: She is not in acute distress.     Appearance: She is well-groomed and underweight. She is not ill-appearing.   HENT:      Head: Normocephalic and atraumatic.      Right Ear: Tympanic membrane, ear canal and external ear normal.      Left Ear: Tympanic membrane, ear canal and external ear normal.   Cardiovascular:      Rate and Rhythm: Normal rate and regular " rhythm.      Pulses: Normal pulses.      Heart sounds: Normal heart sounds.   Pulmonary:      Effort: Pulmonary effort is normal. No respiratory distress.      Breath sounds: Normal breath sounds.   Abdominal:      General: Bowel sounds are normal.      Palpations: Abdomen is soft.   Musculoskeletal:         General: Normal range of motion.   Lymphadenopathy:      Cervical: No cervical adenopathy.   Skin:     General: Skin is warm and dry.      Findings: No rash.   Neurological:      General: No focal deficit present.      Mental Status: She is alert and oriented to person, place, and time.   Psychiatric:         Mood and Affect: Mood normal.         Behavior: Behavior normal.         Labs:    Latest Reference Range & Units 09/24/24 03:22   WBC 4.8 - 10.8 K/uL 9.2   RBC 4.20 - 5.40 M/uL 4.01 (L)   Hemoglobin 12.0 - 16.0 g/dL 12.5   Hematocrit 37.0 - 47.0 % 37.7   MCV 81.4 - 97.8 fL 94.0   MCH 27.0 - 33.0 pg 31.2   MCHC 32.2 - 35.5 g/dL 33.2   RDW 35.9 - 50.0 fL 44.6   Platelet Count 164 - 446 K/uL 145 (L)   MPV 9.0 - 12.9 fL 10.3   Neutrophils-Polys 44.00 - 72.00 % 83.80 (H)   Neutrophils (Absolute) 1.82 - 7.42 K/uL 7.73 (H)   Lymphocytes 22.00 - 41.00 % 6.50 (L)   Lymphs (Absolute) 1.00 - 4.80 K/uL 0.60 (L)   Monocytes 0.00 - 13.40 % 8.30   Monos (Absolute) 0.00 - 0.85 K/uL 0.77   Eosinophils 0.00 - 6.90 % 0.70   Eos (Absolute) 0.00 - 0.51 K/uL 0.06   Basophils 0.00 - 1.80 % 0.30   Baso (Absolute) 0.00 - 0.12 K/uL 0.03   Immature Granulocytes 0.00 - 0.90 % 0.40   Immature Granulocytes (abs) 0.00 - 0.11 K/uL 0.04   Nucleated RBC 0.00 - 0.20 /100 WBC 0.00   NRBC (Absolute) K/uL 0.00   Sodium 135 - 145 mmol/L 138   Potassium 3.6 - 5.5 mmol/L 4.3   Chloride 96 - 112 mmol/L 102   Co2 20 - 33 mmol/L 23   Anion Gap 7.0 - 16.0  13.0   Glucose 65 - 99 mg/dL 94   Bun 8 - 22 mg/dL 17   Creatinine 0.50 - 1.40 mg/dL 0.73   GFR (CKD-EPI) >60 mL/min/1.73 m 2 80   Calcium 8.5 - 10.5 mg/dL 9.3   Correct Calcium 8.5 - 10.5 mg/dL 9.6    AST(SGOT) 12 - 45 U/L 30   ALT(SGPT) 2 - 50 U/L 22   Alkaline Phosphatase 30 - 99 U/L 62   Total Bilirubin 0.1 - 1.5 mg/dL 0.7   Albumin 3.2 - 4.9 g/dL 3.6   Total Protein 6.0 - 8.2 g/dL 5.9 (L)   Globulin 1.9 - 3.5 g/dL 2.3   A-G Ratio g/dL 1.6   (L): Data is abnormally low  (H): Data is abnormally high    Assessment & Plan:     89 y.o. female with the following -     Assessment & Plan  Hypothyroidism due to acquired atrophy of thyroid  Chronic, stable. Continue levothyroxine 25 mcg daily on an empty stomach    Orders:    levothyroxine (SYNTHROID) 25 MCG Tab; Take 1 Tablet by mouth every morning on an empty stomach.    History of pelvic fracture  Healed fracture per x-ray 11/20/24         History of fall  No recurrent fall.          Gastro-esophageal reflux disease without esophagitis    Orders:    famotidine (PEPCID) 20 MG Tab; Take 1 Tablet by mouth every evening.    Mild cognitive impairment with memory loss  Has visit scheduled with neurology 4/22/24            Patient was educated in proper administration of medication(s) ordered today including safety, possible SE, risks, benefits, rationale and alternatives to therapy.   Supportive care, differential diagnoses, and indications for immediate follow-up discussed with patient.    Pathogenesis of diagnosis discussed including typical length and natural progression.    Instructed to return to clinic or nearest emergency department for any change in condition, further concerns, or worsening of symptoms.  Patient states understanding of the plan of care and discharge instructions.    Return in about 3 months (around 3/6/2025), or if symptoms worsen or fail to improve, for Annual Wellness.    Please note that this dictation was created using voice recognition software. I have made every reasonable attempt to correct obvious errors, but I expect that there are errors of grammar and possibly content that I did not discover before finalizing the note.

## 2024-12-06 NOTE — ASSESSMENT & PLAN NOTE
Chronic, stable. Continue levothyroxine 25 mcg daily on an empty stomach    Orders:    levothyroxine (SYNTHROID) 25 MCG Tab; Take 1 Tablet by mouth every morning on an empty stomach.

## 2024-12-23 ENCOUNTER — HOSPITAL ENCOUNTER (OUTPATIENT)
Dept: RADIOLOGY | Facility: MEDICAL CENTER | Age: 89
End: 2024-12-23
Attending: PSYCHIATRY & NEUROLOGY
Payer: MEDICARE

## 2024-12-23 DIAGNOSIS — G31.84 MILD COGNITIVE IMPAIRMENT WITH MEMORY LOSS: ICD-10-CM

## 2024-12-23 DIAGNOSIS — R41.3 MEMORY CHANGES: ICD-10-CM

## 2024-12-23 PROCEDURE — 70551 MRI BRAIN STEM W/O DYE: CPT

## 2024-12-27 DIAGNOSIS — R79.89 HIGH SERUM LOW-DENSITY LIPOPROTEIN (LDL): ICD-10-CM

## 2024-12-27 DIAGNOSIS — G31.84 MILD COGNITIVE IMPAIRMENT WITH MEMORY LOSS: ICD-10-CM

## 2024-12-27 RX ORDER — ASPIRIN 81 MG/1
81 TABLET ORAL DAILY
Qty: 100 TABLET | Refills: 3 | Status: SHIPPED | OUTPATIENT
Start: 2024-12-27

## 2024-12-27 RX ORDER — ROSUVASTATIN CALCIUM 20 MG/1
20 TABLET, COATED ORAL EVERY EVENING
Qty: 100 TABLET | Refills: 3 | Status: SHIPPED | OUTPATIENT
Start: 2024-12-27

## 2025-01-07 ENCOUNTER — APPOINTMENT (OUTPATIENT)
Dept: MEDICAL GROUP | Facility: PHYSICIAN GROUP | Age: 89
End: 2025-01-07
Payer: MEDICARE

## 2025-01-14 ENCOUNTER — OFFICE VISIT (OUTPATIENT)
Dept: MEDICAL GROUP | Facility: PHYSICIAN GROUP | Age: 89
End: 2025-01-14
Payer: MEDICARE

## 2025-01-14 VITALS
SYSTOLIC BLOOD PRESSURE: 110 MMHG | HEART RATE: 65 BPM | HEIGHT: 62 IN | OXYGEN SATURATION: 96 % | TEMPERATURE: 97.8 F | RESPIRATION RATE: 18 BRPM | DIASTOLIC BLOOD PRESSURE: 62 MMHG | WEIGHT: 96 LBS | BODY MASS INDEX: 17.66 KG/M2

## 2025-01-14 DIAGNOSIS — G45.9 TIA (TRANSIENT ISCHEMIC ATTACK): ICD-10-CM

## 2025-01-14 DIAGNOSIS — R79.89 HIGH SERUM LOW-DENSITY LIPOPROTEIN (LDL): ICD-10-CM

## 2025-01-14 DIAGNOSIS — G31.9 CEREBRAL ATROPHY (HCC): ICD-10-CM

## 2025-01-14 DIAGNOSIS — G31.84 MILD COGNITIVE IMPAIRMENT WITH MEMORY LOSS: ICD-10-CM

## 2025-01-14 DIAGNOSIS — E03.4 HYPOTHYROIDISM DUE TO ACQUIRED ATROPHY OF THYROID: ICD-10-CM

## 2025-01-14 PROCEDURE — 3078F DIAST BP <80 MM HG: CPT

## 2025-01-14 PROCEDURE — 3074F SYST BP LT 130 MM HG: CPT

## 2025-01-14 PROCEDURE — 99214 OFFICE O/P EST MOD 30 MIN: CPT

## 2025-01-14 RX ORDER — B-COMPLEX WITH VITAMIN C
1 TABLET ORAL DAILY
COMMUNITY

## 2025-01-14 RX ORDER — ATORVASTATIN CALCIUM 40 MG/1
40 TABLET, FILM COATED ORAL EVERY EVENING
Qty: 90 TABLET | Refills: 1 | Status: SHIPPED | OUTPATIENT
Start: 2025-01-14 | End: 2026-02-18

## 2025-01-14 ASSESSMENT — ENCOUNTER SYMPTOMS: DIZZINESS: 1

## 2025-01-14 ASSESSMENT — PATIENT HEALTH QUESTIONNAIRE - PHQ9: CLINICAL INTERPRETATION OF PHQ2 SCORE: 0

## 2025-01-14 ASSESSMENT — FIBROSIS 4 INDEX: FIB4 SCORE: 3.837612894400987817

## 2025-01-14 NOTE — PROGRESS NOTES
Verbal consent was acquired by the patient to use [employer] ambient listening note generation during this visit     Subjective:     CC: Diagnoses of High serum low-density lipoprotein (LDL), Hypothyroidism due to acquired atrophy of thyroid, Mild cognitive impairment with memory loss, TIA (transient ischemic attack), and Cerebral atrophy (HCC) were pertinent to this visit.    HPI:   Ratna presents today with    History of Present Illness  The patient is an 89-year-old female who presents for evaluation of dizziness, memory loss, and cognitive decline.    She reports experiencing dizziness after approximately 20 minutes of waking up each morning for 5 consecutive days, coinciding with the initiation of rosuvastatin therapy. The dizziness resolved upon discontinuation of the medication. She has been tolerating aspirin well. She is open to trying a different cholesterol-lowering medication. She is currently on a regimen of calcium, vitamin D, PreserVision, vitamin B, K2, fish oil, D3, and B1 supplements. She has discontinued her multivitamin and red yeast rice supplements. She is not taking famotidine for reflux or heartburn. She is still taking levothyroxine, which she takes at 5:00 AM when she gets up to go to the bathroom. She has enough of it for 6 months. 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    She also reports a decline in her cognitive function compared to her previous state. She has been experiencing rhinorrhea and intermittent sneezing, symptoms that typically manifest in January. She has a scheduled appointment with Dr. Smart, a neurologist, in 04/2025.    MEDICATIONS  Current: Aspirin, levothyroxine, calcium, vitamin D, PreserVision, vitamin B, K2, fish oil, D3, and B1 supplements.  Discontinued: Rosuvastatin, multivitamin, red yeast rice, famotidine.    Current Outpatient Medications   Medication Sig Dispense Refill   • B Complex-C  (VITAMIN B + C COMPLEX) Tab Take 1 Tablet by mouth every day.     • Menaquinone-7 (K2 PO) Take  by mouth.     • atorvastatin (LIPITOR) 40 MG Tab Take 1 Tablet by mouth every evening. 90 Tablet 1   • levothyroxine (SYNTHROID) 25 MCG Tab Take 1 Tablet by mouth every morning on an empty stomach. 100 Tablet 3   • Multiple Vitamins-Minerals (PRESERVISION/LUTEIN) Cap Take  by mouth.     • Calcium Carb-Cholecalciferol (CALCIUM 500 + D PO) Take  by mouth.     • Vitamin D-Vitamin K (D3 + K2 PO) Take  by mouth.       No current facility-administered medications for this visit.       Problem   Tia (Transient Ischemic Attack)   Cerebral Atrophy (Hcc)    MRI 12/23/25 Age-related volume loss and chronic microvascular ischemic changes.   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    Trial of rosuvastatin not tolerated due to dizziness, will try atorvastatin for stroke prevention. Continue asa 81 mg daily.     Mild Cognitive Impairment With Memory Loss    He has subjective concerns about her memory.  She is independent and a very good historian regarding her medical care.  She walks with her son 3 days/week and says sometimes he will quit as her ask her questions but he has not made any comments to her about concern regarding the memory.  She will note sometimes walking into a room and forgetting what she wanted and therefore.  She does not have any concerns with completing ADLs or IADLs.     High Serum Low-Density Lipoprotein (Ldl)    Prefers natural treatment over medications.     Currently taking red yeast rice 1200 mg twice daily.     Hypothyroidism Due to Acquired Atrophy of Thyroid     ROS:  Review of Systems   Neurological:  Positive for dizziness (daily x 5 days with rosuvastatin).   All other systems reviewed and are negative.      Objective:     Exam:  /62 (BP Location: Left arm, Patient Position: Sitting)   Pulse 65   Temp 36.6 °C (97.8 °F)  "(Temporal)   Resp 18   Ht 1.575 m (5' 2\")   Wt 43.5 kg (96 lb)   LMP  (LMP Unknown)   SpO2 96%   BMI 17.56 kg/m²  Body mass index is 17.56 kg/m².    Physical Exam  Vitals reviewed.   Constitutional:       General: She is not in acute distress.     Appearance: Normal appearance. She is not ill-appearing.   HENT:      Head: Normocephalic and atraumatic.   Cardiovascular:      Rate and Rhythm: Normal rate.      Pulses: Normal pulses.   Pulmonary:      Effort: Pulmonary effort is normal. No respiratory distress.   Skin:     General: Skin is warm and dry.      Findings: No rash.   Neurological:      General: No focal deficit present.      Mental Status: She is alert and oriented to person, place, and time.   Psychiatric:         Mood and Affect: Mood normal.         Behavior: Behavior normal.     Assessment & Plan:     89 y.o. female with the following -     Assessment & Plan  1. Dizziness.  She experienced dizziness after taking rosuvastatin for 5 days, which resolved upon discontinuation. A different cholesterol-lowering medication will be prescribed to prevent future strokes. She is advised to monitor for any adverse effects such as dizziness, weakness, or generalized body aches. If these symptoms occur, she should discontinue the medication and inform the provider.    2. Memory loss and cognitive decline.  Her brain MRI revealed several areas of small strokes, which can lead to memory loss and cognitive decline over time. Dr. Smart, the neurologist, recommended starting a cholesterol-lowering medication and baby aspirin to reduce the risk of future strokes. She will continue taking baby aspirin. She is advised to take only one low-dose aspirin daily to minimize the risk of bruising, bleeding, and stomach upset. A different cholesterol-lowering medication will be tried to prevent future strokes. She is advised to monitor for any adverse effects such as dizziness, weakness, or generalized body aches. If these " symptoms occur, she should discontinue the medication and inform the provider.    Follow-up  The patient will follow up in 03/2025.    Problem List Items Addressed This Visit       Hypothyroidism due to acquired atrophy of thyroid     Chronic, stable. Continue levothyroxine 25 mcg daily on an empty stomach.          High serum low-density lipoprotein (LDL)     Chronic, ongoing. Reports unable to tolerate rosuvastatin 20 mg nightly, reports experienced dizziness with this medication. Stopped taking asa 81 mg dialy         Relevant Medications    atorvastatin (LIPITOR) 40 MG Tab    Mild cognitive impairment with memory loss     Chronic, ongoing. Reports noticing memory deficit. Denies safety concerns.          TIA (transient ischemic attack)     MRI 12/23/24 Brain: Age-related volume loss and chronic microvascular ischemic changes.   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    Noticing memory decline.  Attempted rosuvastatin, unable to tolerate due to dizziness.  Will try atorvastatin   Recommended continue asa 81 mg daily.          Relevant Medications    atorvastatin (LIPITOR) 40 MG Tab    Cerebral atrophy (HCC)     Patient was educated in proper administration of medication(s) ordered today including safety, possible SE, risks, benefits, rationale and alternatives to therapy.   Supportive care, differential diagnoses, and indications for immediate follow-up discussed with patient.    Pathogenesis of diagnosis discussed including typical length and natural progression.    Instructed to return to clinic or nearest emergency department for any change in condition, further concerns, or worsening of symptoms.  Patient states understanding of the plan of care and discharge instructions.    Return in about 7 weeks (around 3/7/2025).    Please note that this dictation was created using voice recognition software. I have made every reasonable attempt to  correct obvious errors, but I expect that there are errors of grammar and possibly content that I did not discover before finalizing the note.

## 2025-01-14 NOTE — ASSESSMENT & PLAN NOTE
Chronic, ongoing. Reports unable to tolerate rosuvastatin 20 mg nightly, reports experienced dizziness with this medication. Stopped taking asa 81 mg sandy

## 2025-01-14 NOTE — ASSESSMENT & PLAN NOTE
MRI 12/23/24 Brain: Age-related volume loss and chronic microvascular ischemic changes.   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    Noticing memory decline.  Attempted rosuvastatin, unable to tolerate due to dizziness.  Will try atorvastatin   Recommended continue asa 81 mg daily.

## 2025-03-04 SDOH — HEALTH STABILITY: PHYSICAL HEALTH: ON AVERAGE, HOW MANY DAYS PER WEEK DO YOU ENGAGE IN MODERATE TO STRENUOUS EXERCISE (LIKE A BRISK WALK)?: 3 DAYS

## 2025-03-04 SDOH — HEALTH STABILITY: MENTAL HEALTH

## 2025-03-04 SDOH — ECONOMIC STABILITY: TRANSPORTATION INSECURITY

## 2025-03-04 SDOH — ECONOMIC STABILITY: INCOME INSECURITY: HOW HARD IS IT FOR YOU TO PAY FOR THE VERY BASICS LIKE FOOD, HOUSING, MEDICAL CARE, AND HEATING?: PATIENT DECLINED

## 2025-03-04 SDOH — HEALTH STABILITY: PHYSICAL HEALTH: ON AVERAGE, HOW MANY MINUTES DO YOU ENGAGE IN EXERCISE AT THIS LEVEL?: 40 MIN

## 2025-03-04 SDOH — ECONOMIC STABILITY: HOUSING INSECURITY

## 2025-03-04 ASSESSMENT — SOCIAL DETERMINANTS OF HEALTH (SDOH)
DO YOU BELONG TO ANY CLUBS OR ORGANIZATIONS SUCH AS CHURCH GROUPS UNIONS, FRATERNAL OR ATHLETIC GROUPS, OR SCHOOL GROUPS?: YES
HOW OFTEN DO YOU ATTENT MEETINGS OF THE CLUB OR ORGANIZATION YOU BELONG TO?: MORE THAN 4 TIMES PER YEAR
HOW OFTEN DO YOU ATTEND CHURCH OR RELIGIOUS SERVICES?: PATIENT DECLINED
DO YOU BELONG TO ANY CLUBS OR ORGANIZATIONS SUCH AS CHURCH GROUPS UNIONS, FRATERNAL OR ATHLETIC GROUPS, OR SCHOOL GROUPS?: YES
IN A TYPICAL WEEK, HOW MANY TIMES DO YOU TALK ON THE PHONE WITH FAMILY, FRIENDS, OR NEIGHBORS?: MORE THAN THREE TIMES A WEEK
HOW OFTEN DO YOU ATTENT MEETINGS OF THE CLUB OR ORGANIZATION YOU BELONG TO?: MORE THAN 4 TIMES PER YEAR
HOW HARD IS IT FOR YOU TO PAY FOR THE VERY BASICS LIKE FOOD, HOUSING, MEDICAL CARE, AND HEATING?: PATIENT DECLINED
IN A TYPICAL WEEK, HOW MANY TIMES DO YOU TALK ON THE PHONE WITH FAMILY, FRIENDS, OR NEIGHBORS?: MORE THAN THREE TIMES A WEEK
HOW OFTEN DO YOU GET TOGETHER WITH FRIENDS OR RELATIVES?: THREE TIMES A WEEK
HOW OFTEN DO YOU GET TOGETHER WITH FRIENDS OR RELATIVES?: THREE TIMES A WEEK
HOW OFTEN DO YOU ATTEND CHURCH OR RELIGIOUS SERVICES?: PATIENT DECLINED

## 2025-03-07 ENCOUNTER — OFFICE VISIT (OUTPATIENT)
Dept: MEDICAL GROUP | Facility: PHYSICIAN GROUP | Age: 89
End: 2025-03-07
Payer: MEDICARE

## 2025-03-07 VITALS
SYSTOLIC BLOOD PRESSURE: 100 MMHG | TEMPERATURE: 97.7 F | HEART RATE: 68 BPM | OXYGEN SATURATION: 98 % | HEIGHT: 63 IN | BODY MASS INDEX: 16.66 KG/M2 | RESPIRATION RATE: 20 BRPM | DIASTOLIC BLOOD PRESSURE: 60 MMHG | WEIGHT: 94 LBS

## 2025-03-07 DIAGNOSIS — E03.4 HYPOTHYROIDISM DUE TO ACQUIRED ATROPHY OF THYROID: ICD-10-CM

## 2025-03-07 DIAGNOSIS — H91.93 HEARING REDUCED, BILATERAL: ICD-10-CM

## 2025-03-07 DIAGNOSIS — L98.9 LESION OF FACE: ICD-10-CM

## 2025-03-07 DIAGNOSIS — Z00.00 ENCOUNTER FOR ANNUAL WELLNESS EXAM IN MEDICARE PATIENT: ICD-10-CM

## 2025-03-07 DIAGNOSIS — Z91.81 HISTORY OF FALL: ICD-10-CM

## 2025-03-07 DIAGNOSIS — M80.00XD AGE-RELATED OSTEOPOROSIS WITH CURRENT PATHOLOGICAL FRACTURE WITH ROUTINE HEALING, SUBSEQUENT ENCOUNTER: ICD-10-CM

## 2025-03-07 DIAGNOSIS — R79.89 HIGH SERUM LOW-DENSITY LIPOPROTEIN (LDL): ICD-10-CM

## 2025-03-07 PROCEDURE — 3074F SYST BP LT 130 MM HG: CPT

## 2025-03-07 PROCEDURE — G0439 PPPS, SUBSEQ VISIT: HCPCS

## 2025-03-07 PROCEDURE — 3078F DIAST BP <80 MM HG: CPT

## 2025-03-07 ASSESSMENT — PATIENT HEALTH QUESTIONNAIRE - PHQ9: CLINICAL INTERPRETATION OF PHQ2 SCORE: 0

## 2025-03-07 ASSESSMENT — FIBROSIS 4 INDEX: FIB4 SCORE: 3.837612894400987817

## 2025-03-07 ASSESSMENT — ENCOUNTER SYMPTOMS: GENERAL WELL-BEING: GOOD

## 2025-03-07 ASSESSMENT — ACTIVITIES OF DAILY LIVING (ADL): BATHING_REQUIRES_ASSISTANCE: 0

## 2025-03-07 NOTE — ASSESSMENT & PLAN NOTE
Chronic, ongoing. Following audiology, was provided hearing aids- poor fit, has seen alternate audiologist (phill), ongoing.

## 2025-03-07 NOTE — PROGRESS NOTES
Chief Complaint   Patient presents with    Annual Exam       HPI:  Ratna Cline is a 89 y.o. here for Medicare Annual Wellness Visit   She presents independently, generally feeling well.      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    Diverticulosis of colon 12/06/2024    History of pelvic fracture 09/23/2024    Leukocytosis 09/23/2024    History of fall 04/17/2024    Hyponatremia 02/21/2024    Generalized abdominal pain 02/21/2024    History of diverticulitis 02/21/2024    Chronic constipation 02/21/2024    Allergic rhinitis 02/21/2024    Subjective hearing loss 01/30/2024    Arthritis 10/20/2023    Biceps tendonitis on right 06/06/2023    Asymptomatic varicose veins of both lower extremities 04/05/2023    Nocturia 02/07/2023    Tension headache 12/14/2022    Irritable bowel syndrome with diarrhea 10/11/2021    Varicose veins of right lower extremity with pain 08/10/2021    Vaginal discomfort 07/15/2021    Mild cognitive impairment with memory loss 06/08/2021    Dermatitis 06/08/2021    Left hand pain- between 2nd and 3rd digits due to barbed wire fence 05/10/2021    Palpable mass of soft tissue of shoulder 05/10/2021    Osteochondroma 06/03/2019    Pisano's esophagus 06/03/2019    Age-related osteoporosis with current pathological fracture 05/08/2018    High serum low-density lipoprotein (LDL) 07/14/2016    Hypothyroidism due to acquired atrophy of thyroid 07/13/2015    Tinnitus 07/13/2015    Gastro-esophageal reflux disease without esophagitis 03/15/2013    Irritable colon 03/15/2013       Current Outpatient Medications   Medication Sig Dispense Refill    B Complex-C (VITAMIN B + C COMPLEX) Tab Take 1 Tablet by mouth every day.      Menaquinone-7 (K2 PO) Take  by mouth.      atorvastatin  (LIPITOR) 40 MG Tab Take 1 Tablet by mouth every evening. 90 Tablet 1    levothyroxine (SYNTHROID) 25 MCG Tab Take 1 Tablet by mouth every morning on an empty stomach. 100 Tablet 3    Multiple Vitamins-Minerals (PRESERVISION/LUTEIN) Cap Take  by mouth.      Calcium Carb-Cholecalciferol (CALCIUM 500 + D PO) Take  by mouth.      Vitamin D-Vitamin K (D3 + K2 PO) Take  by mouth.       No current facility-administered medications for this visit.          Current supplements as per medication list.     Allergies: Soy allergy, Codeine, Nexium, and Sulfa drugs    Current social contact/activities: [employer], Thursday mornings square dancing, lives with son.     She  reports that she has never smoked. She has never used smokeless tobacco. She reports that she does not drink alcohol and does not use drugs.  Counseling given: Not Answered      ROS:    Gait: Uses a cane  Ostomy: No  Other tubes: No  Amputations: no.  Chronic oxygen use: No  Last eye exam: 1x year   Wears hearing aids: No _ Pt has an experience where she got some hearing aids but they didn't fit. Still figuring  out if she wants to go back and get some more   : Denies any urinary leakage during the last 6 months    Screening:    Depression Screening  Little interest or pleasure in doing things?  0 - not at all  Feeling down, depressed , or hopeless? 0 - not at all  Trouble falling or staying asleep, or sleeping too much?     Feeling tired or having little energy?     Poor appetite or overeating?     Feeling bad about yourself - or that you are a failure or have let yourself or your family down?    Trouble concentrating on things, such as reading the newspaper or watching television?    Moving or speaking so slowly that other people could have noticed.  Or the opposite - being so fidgety or restless that you have been moving around a lot more than usual?     Thoughts that you would be better off dead, or of hurting yourself?     Patient Health Questionnaire  Score:      If depressive symptoms identified deferred to follow up visit unless specifically addressed in assessment and plan.    Interpretation of PHQ-9 Total Score   Score Severity   1-4 No Depression   5-9 Mild Depression   10-14 Moderate Depression   15-19 Moderately Severe Depression   20-27 Severe Depression    Screening for Cognitive Impairment  Do you or any of your friends or family members have any concern about your memory? No  Three Minute Recall (Village, Kitchen, Baby) 1/3    Anton clock face with all 12 numbers and set the hands to show 10 minutes past 11.  No    Cognitive concerns identified deferred for follow up unless specifically addressed in assessment and plan.    Fall Risk Assessment  Has the patient had two or more falls in the last year or any fall with injury in the last year?  Yes    Safety Assessment  Do you always wear your seatbelt?  Yes  Any changes to home needed to function safely? No  Difficulty hearing.  Yes  Patient counseled about all safety risks that were identified.    Functional Assessment ADLs  Are there any barriers preventing you from cooking for yourself or meeting nutritional needs?  No.    Are there any barriers preventing you from driving safely or obtaining transportation?  No.    Are there any barriers preventing you from using a telephone or calling for help?  No    Are there any barriers preventing you from shopping?  No.    Are there any barriers preventing you from taking care of your own finances?  No    Are there any barriers preventing you from managing your medications?  No    Are there any barriers preventing you from showering, bathing or dressing yourself? No    Are there any barriers preventing you from doing housework or laundry? No    Are there any barriers preventing you from using the toilet?No    Are you currently engaging in any exercise or physical activity?  Yes.      Self-Assessment of Health  What is your perception of your health? Good    Do  you sleep more than six hours a night? Yes    In the past 7 days, how much did pain keep you from doing your normal work? Some    Do you spend quality time with family or friends (virtually or in person)? Yes    Do you usually eat a heart healthy diet that constists of a variety of fruits, vegetables, whole grains and fiber? No    Do you eat foods high in fat and/or Fast Food more than three times per week? No    How concerned are you that your medical conditions are not being well managed? a little    Are you worried that in the next 2 months, you may not have stable housing that you own, rent, or stay in as part of a household? No        Advance Care Planning  Do you have an Advance Directive, Living Will, Durable Power of , or POLST? No                 Health Maintenance Summary            Upcoming       Zoster (Shingles) Vaccines (1 of 2) Postponed until 8/7/2025     No completion history exists for this topic.              COVID-19 Vaccine (5 - 2024-25 season) Postponed until 9/4/2025      10/24/2022  Imm Admin: PFIZER BIVALENT SARS-COV-2 VACCINE (12+)    11/04/2021  Imm Admin: PFIZER PURPLE CAP SARS-COV-2 VACCINATION (12+)    03/10/2021  Imm Admin: PFIZER PURPLE CAP SARS-COV-2 VACCINATION (12+)    02/03/2021  Imm Admin: PFIZER PURPLE CAP SARS-COV-2 VACCINATION (12+)              Annual Wellness Visit (Yearly) Next due on 3/7/2026      03/07/2025  Level of Service: ND ANNUAL WELLNESS VISIT-INCLUDES PPPS SUBSEQUE*    09/07/2023  Done    08/13/2021  Level of Service: ND ANNUAL WELLNESS VISIT-INCLUDES PPPS SUBSEQUE*    02/24/2020  Visit Dx: Medicare annual wellness visit, subsequent    08/26/2019  Level of Service: ND ANNUAL WELLNESS VISIT-INCLUDES PPPS SUBSEQUE*     Only the first 5 history entries have been loaded, but more history exists.            Bone Density Scan (Every 2 Years) Next due on 3/18/2026      03/18/2024  DS-BONE DENSITY STUDY (DEXA)    10/27/2021  DS-BONE DENSITY STUDY (DEXA)     09/24/2012  DS-BONE DENSITY STUDY (DEXA)    01/11/2007  DS-BONE DENSITY STUDY (DEXA)              IMM DTaP/Tdap/Td Vaccine (2 - Td or Tdap) Next due on 10/4/2027      10/04/2017  Imm Admin: Tdap Vaccine                      Completed or No Longer Recommended       Influenza Vaccine (Series Information) Completed      10/04/2024  Imm Admin: Influenza high-dose trivalent (PF)    09/22/2023  Imm Admin: Influenza Vaccine, Quadrivalent, Adjuvanted (Pf)    09/28/2022  Imm Admin: Influenza Vaccine Adult HD    10/11/2021  Imm Admin: Influenza Vaccine Adult HD    10/01/2020  Imm Admin: Influenza Vaccine Adult HD      Only the first 5 history entries have been loaded, but more history exists.              Pneumococcal Vaccine: 50+ Years (Series Information) Completed      09/25/2017  Imm Admin: Pneumococcal polysaccharide vaccine (PPSV-23)    10/21/2015  Imm Admin: Pneumococcal Conjugate Vaccine (Prevnar/PCV-13)              Hepatitis A Vaccine (Hep A) (Series Information) Aged Out      No completion history exists for this topic.              Hepatitis B Vaccine (Hep B) (Series Information) Aged Out     No completion history exists for this topic.              HPV Vaccines (Series Information) Aged Out     No completion history exists for this topic.              Polio Vaccine (Inactivated Polio) (Series Information) Aged Out     No completion history exists for this topic.              Meningococcal Immunization (Series Information) Aged Out     No completion history exists for this topic.              Mammogram  Discontinued        Frequency changed to Never automatically (Topic No Longer Applies)    07/14/2016  Patient Declined - declined                            Patient Care Team:  JUDSON MathiasRMichelleNMichelle as PCP - General (Nurse Practitioner Family)  Rj Boston M.D. as Consulting Physician (Ophthalmology)  Luciano Kerr D.P.M. as Consulting Physician (Podiatry)  Nazanin Tejeda M.D. as Consulting  Physician (Dermatology)  Schuyler Radford M.D. as Consulting Physician (Otolaryngology)  Luciano Eubanks, PT (Inactive) as Physical Therapist (Physical Therapy)  Celine Rivera P.A.-C. (Vascular Surgery)  [facility] ([facility])  Andre Mitchell M.D. (Inactive) (Ophthalmology)  Raji Prajapati (Optometry)      Social History     Tobacco Use    Smoking status: Never    Smokeless tobacco: Never   Vaping Use    Vaping status: Never Used   Substance Use Topics    Alcohol use: No     Alcohol/week: 0.0 oz    Drug use: No     Family History   Problem Relation Age of Onset    Cancer Mother         leukemia    Genetic Disorder Mother         osteochondroma    Dementia Father     Genetic Disorder Son     Stroke Neg Hx     Hyperlipidemia Neg Hx     Hypertension Neg Hx     Heart Disease Neg Hx     Diabetes Neg Hx     Lung Disease Neg Hx     Ovarian Cancer Neg Hx     Tubal Cancer Neg Hx     Peritoneal Cancer Neg Hx     Breast Cancer Neg Hx     Colorectal Cancer Neg Hx      She  has a past medical history of Age-related osteoporosis without current pathological fracture, Allergy, Anisocoria (06/06/2023), Anxiety, Arthritis, Back pain, Pisano's esophagus, Bladder infection, Candidiasis, Closed fracture of sacrum ([facility]) (09/23/2024), Closed nondisplaced fracture of right acetabulum, initial encounter ([facility]) (09/23/2024), Depression, Difficulty swallowing, Dusky feet (04/05/2023), Dyslipidemia (07/14/2016), Fall at home (09/07/2021), Family history of leukemia (06/03/2019), GERD (gastroesophageal reflux disease), Grief (02/24/2020), Health counseling (09/08/2023), Hearing loss, History of hemorrhoids, History of measles, History of pneumonia, total hysterectomy with removal of both tubes and ovaries (08/20/2020), Hypervitaminosis D (2/17/2023), Hypothyroidism (acquired) (07/13/2015), Irritable bowel syndrome with constipation (04/30/2018), Left lower quadrant abdominal pain-now resolved (02/17/2023), Leg swelling  "(06/07/2023), Memory loss, Mild depression (08/13/2021), Neck pain, 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    She has no past medical history of Addisons disease ([facility]), Adrenal disorder ([facility]), Anemia, Arrhythmia, ASTHMA, Cancer ([facility]), Cataract, CHF (congestive heart failure) ([facility]), Clotting disorder ([facility]), COPD (chronic obstructive pulmonary disease) ([facility]), Cushings syndrome ([facility]), Diabetes, Diabetic neuropathy ([facility]), Glaucoma, Goiter, Head ache, Heart attack ([facility]), Heart murmur, HIV (human immunodeficiency virus infection) ([facility]), Hypertension, IBD (inflammatory bowel disease), Kidney disease, Meningitis, Migraine, Muscle disorder, Parathyroid disorder ([facility]), Pituitary disease ([facility]), Pulmonary emphysema ([facility]), Seizure ([facility]), Sickle cell disease ([facility]), Stroke ([facility]), Substance abuse ([facility]), or Tuberculosis.   Past Surgical History:   Procedure Laterality Date    HYSTERECTOMY RADICAL      OTHER      jaw surgery to correct overbite    OTHER ORTHOPEDIC SURGERY      multiple bone tumor excision    TONSILLECTOMY         Exam:   /60 (BP Location: Left arm, Patient Position: Sitting, BP Cuff Size: Adult)   Pulse 68   Temp 36.5 °C (97.7 °F) (Temporal)   Resp 20   Ht 1.588 m (5' 2.5\")   Wt 42.6 kg (94 lb)   SpO2 98%  Body mass index is 16.92 kg/m².    Hearing poor.    Dentition fair  Alert, oriented in no acute distress.  Eye contact is good, speech goal directed, affect calm    Assessment and Plan. The following treatment and monitoring plan is recommended:    1. High serum low-density lipoprotein (LDL)    2. Hypothyroidism due to acquired atrophy of thyroid    3. Age-related " osteoporosis with current pathological fracture with routine healing, subsequent encounter    4. Hearing reduced, bilateral    5. Lesion of face  - Referral to Dermatology    6. Encounter for annual wellness exam in Medicare patient    7. History of fall    Problem List Items Addressed This Visit       Hypothyroidism due to acquired atrophy of thyroid    Chronic, stable. Continue levothyroxine 25 mcg daily          High serum low-density lipoprotein (LDL)    Chronic, stable. Prescribed atorvastatin 40 mg nightly, reports she took one tab and noticed increased dizziness.         Age-related osteoporosis with current pathological fracture    Continue healthy lifestyle recommendations. Continue vit D, calcium daily         History of fall    Denies fall since summer 2023. Discussed fall precaution/prevention with pt         Hearing reduced, bilateral    Chronic, ongoing. Following audiology, was provided hearing aids- poor fit, has seen alternate audiologist (phill), ongoing.          Other Visit Diagnoses         Lesion of face        Relevant Orders    Referral to Dermatology      Encounter for annual wellness exam in Medicare patient                Services suggested: No services needed at this time  Health Care Screening: Age-appropriate preventive services recommended by USPTF and ACIP covered by Medicare were discussed today. Services ordered if indicated and agreed upon by the patient.  Referrals offered: Community-based lifestyle interventions to reduce health risks and promote self-management and wellness, fall prevention, nutrition, physical activity, tobacco-use cessation, weight loss, and mental health services as per orders if indicated.    Discussion today about general wellness and lifestyle habits:    Prevent falls and reduce trip hazards; Cautioned about securing or removing rugs.  Have a working fire alarm and carbon monoxide detector;   Engage in regular physical activity and social activities      Follow-up: Return in about 3 months (around 6/7/2025), or if symptoms worsen or fail to improve.

## 2025-03-07 NOTE — ASSESSMENT & PLAN NOTE
Chronic, stable. Prescribed atorvastatin 40 mg nightly, reports she took one tab and noticed increased dizziness.

## 2025-03-25 ENCOUNTER — OFFICE VISIT (OUTPATIENT)
Dept: MEDICAL GROUP | Facility: MEDICAL CENTER | Age: 89
End: 2025-03-25
Payer: MEDICARE

## 2025-03-25 VITALS
OXYGEN SATURATION: 97 % | DIASTOLIC BLOOD PRESSURE: 66 MMHG | HEART RATE: 76 BPM | RESPIRATION RATE: 16 BRPM | HEIGHT: 63 IN | TEMPERATURE: 98.2 F | BODY MASS INDEX: 16.16 KG/M2 | SYSTOLIC BLOOD PRESSURE: 128 MMHG | WEIGHT: 91.2 LBS

## 2025-03-25 DIAGNOSIS — L98.9 SKIN LESION: ICD-10-CM

## 2025-03-25 DIAGNOSIS — Z86.73 HISTORY OF STROKE: ICD-10-CM

## 2025-03-25 DIAGNOSIS — M80.00XD AGE-RELATED OSTEOPOROSIS WITH CURRENT PATHOLOGICAL FRACTURE WITH ROUTINE HEALING, SUBSEQUENT ENCOUNTER: ICD-10-CM

## 2025-03-25 DIAGNOSIS — R63.6 UNDERWEIGHT (BMI < 18.5): ICD-10-CM

## 2025-03-25 DIAGNOSIS — G31.84 MILD COGNITIVE IMPAIRMENT WITH MEMORY LOSS: ICD-10-CM

## 2025-03-25 DIAGNOSIS — R35.1 NOCTURIA: ICD-10-CM

## 2025-03-25 DIAGNOSIS — E03.4 HYPOTHYROIDISM DUE TO ACQUIRED ATROPHY OF THYROID: ICD-10-CM

## 2025-03-25 PROBLEM — H91.90 SUBJECTIVE HEARING LOSS: Status: RESOLVED | Noted: 2024-01-30 | Resolved: 2025-03-25

## 2025-03-25 PROBLEM — D72.829 LEUKOCYTOSIS: Status: RESOLVED | Noted: 2024-09-23 | Resolved: 2025-03-25

## 2025-03-25 PROBLEM — E87.1 HYPONATREMIA: Status: RESOLVED | Noted: 2024-02-21 | Resolved: 2025-03-25

## 2025-03-25 PROBLEM — N94.9 VAGINAL DISCOMFORT: Status: RESOLVED | Noted: 2021-07-15 | Resolved: 2025-03-25

## 2025-03-25 PROCEDURE — 3078F DIAST BP <80 MM HG: CPT | Performed by: STUDENT IN AN ORGANIZED HEALTH CARE EDUCATION/TRAINING PROGRAM

## 2025-03-25 PROCEDURE — G2212 PROLONG OUTPT/OFFICE VIS: HCPCS | Performed by: STUDENT IN AN ORGANIZED HEALTH CARE EDUCATION/TRAINING PROGRAM

## 2025-03-25 PROCEDURE — 99215 OFFICE O/P EST HI 40 MIN: CPT | Performed by: STUDENT IN AN ORGANIZED HEALTH CARE EDUCATION/TRAINING PROGRAM

## 2025-03-25 PROCEDURE — 3074F SYST BP LT 130 MM HG: CPT | Performed by: STUDENT IN AN ORGANIZED HEALTH CARE EDUCATION/TRAINING PROGRAM

## 2025-03-25 RX ORDER — ROSUVASTATIN CALCIUM 5 MG/1
5 TABLET, COATED ORAL EVERY EVENING
Qty: 100 TABLET | Refills: 3 | Status: SHIPPED | OUTPATIENT
Start: 2025-03-25 | End: 2026-04-29

## 2025-03-25 RX ORDER — ASPIRIN 81 MG/1
81 TABLET, CHEWABLE ORAL DAILY
Qty: 100 TABLET | Refills: 3 | Status: SHIPPED | OUTPATIENT
Start: 2025-03-25

## 2025-03-25 ASSESSMENT — ENCOUNTER SYMPTOMS
SHORTNESS OF BREATH: 0
CHILLS: 0
PALPITATIONS: 0
WHEEZING: 0
WEIGHT LOSS: 0
DIZZINESS: 0
FEVER: 0
NAUSEA: 0
HEADACHES: 0
VOMITING: 0

## 2025-03-25 ASSESSMENT — FIBROSIS 4 INDEX: FIB4 SCORE: 3.837612894400987817

## 2025-03-25 NOTE — PROGRESS NOTES
Subjective:     CC:     HPI:   Ratna presents today with    Patient of Rachel WHITE  PMH HLD, hypothyroidism, osteoporosis ( DEXA 2024, L femur T -3.0), hx of stroke, osteochondroma  Specialist  Neurology     Patient presents to establish primary care.    She is unclear why this appointment was made as she already has a primary care.   She does have a follow-up with her pcp Rachel WHITE in June.    I did discuss with patient she will need to make a decision on who she would like to follow up with.  Things to consider include ease of travel to her appointment.  The main focus of this visit was to discuss statin therapy/ asa indications    # hx of stroke noted on MRI for work up for mild cognitive impairment   MR brain - Remote infarcts with encephalomalacia noted in the medial left parietal region and in the left cerebellum.  She was previously started on rosuvastatin 20 mg atorvastatin 40 mg however she does have associated disequilibrium to the medications therefore patient reports she has not been taking her statins or aspirin.  Follow-up with further counseling patient is agreeable to take a low-dose rosuvastatin 5 mg in the p.m. medication sent to her pharmacy    # hypothyroidism    # Osteoporosis   - prolia was ordered 10/2024 I do not believe patient received doses  Recheck vitamin D before therapy  May consider repeat referral for Prolia.  History of Pisano's esophagus/esophageal atresia noted on chart. GFR is fine    # history for fall - last fall last year  # fraility  # weight  CT abdomen pelvis with contrast - diverticulosis, no acute mass or findings      # history Barretts esophagitis-previously declines PPI    # skin lesion was referred to dermatology  -Referral information was provided for the patient      Health Maintenance:     ROS:  Review of Systems   Constitutional:  Negative for chills, fever and weight loss.   HENT:  Negative for hearing loss.    Respiratory:  Negative  "for shortness of breath and wheezing.    Cardiovascular:  Negative for chest pain and palpitations.   Gastrointestinal:  Negative for nausea and vomiting.   Genitourinary:  Negative for frequency and urgency.   Skin:  Negative for rash.   Neurological:  Negative for dizziness and headaches.       Objective:     Exam:  /66 (BP Location: Right arm, Patient Position: Sitting, BP Cuff Size: Small adult)   Pulse 76   Temp 36.8 °C (98.2 °F) (Temporal)   Resp 16   Ht 1.588 m (5' 2.5\")   Wt 41.4 kg (91 lb 3.2 oz)   LMP  (LMP Unknown)   SpO2 97%   BMI 16.41 kg/m²  Body mass index is 16.41 kg/m².    Physical Exam  Constitutional:       Appearance: Normal appearance.   Cardiovascular:      Rate and Rhythm: Normal rate and regular rhythm.      Heart sounds: No murmur heard.  Pulmonary:      Effort: Pulmonary effort is normal.      Breath sounds: Normal breath sounds. No wheezing.   Musculoskeletal:      Cervical back: Normal range of motion and neck supple.   Neurological:      Mental Status: She is alert.         Labs:     Assessment & Plan:     89 y.o. female with the following -     1. History of stroke  Remote history of stroke noted on MRI for workup of mild cognitive changes.  Was recommended high intensity rosuvastatin/atorvastatin however feels medication does cause reported disequilibrium which resolved with cessation.  Patient is willing to retry low-dose rosuvastatin.  Patient also counseled to take aspirin daily as per recommendation from neurology    - rosuvastatin (CRESTOR) 5 MG Tab; Take 1 Tablet by mouth every evening.  Dispense: 100 Tablet; Refill: 3  - aspirin (ASA) 81 MG Chew Tab chewable tablet; Chew 1 Tablet every day.  Dispense: 100 Tablet; Refill: 3  - CBC WITHOUT DIFFERENTIAL; Future  - Lipid Profile; Future  - TSH WITH REFLEX TO FT4; Future  - Comp Metabolic Panel; Future    2. Hypothyroidism due to acquired atrophy of thyroid  Chronic, stable on Synthroid 25 mcg we will recheck labs  - " CBC WITHOUT DIFFERENTIAL; Future  - Lipid Profile; Future  - TSH WITH REFLEX TO FT4; Future  - Comp Metabolic Panel; Future    3. Age-related osteoporosis with current pathological fracture with routine healing, subsequent encounter  Chronic, stable report taking OTC replacement  Recheck vitamin D  Reconsider prescription for Prolia  - CBC WITHOUT DIFFERENTIAL; Future  - Lipid Profile; Future  - TSH WITH REFLEX TO FT4; Future  - Comp Metabolic Panel; Future  - VITAMIN D,25 HYDROXY (DEFICIENCY); Future    4. Mild cognitive impairment with memory loss  Prior labs including vitamins within normal limit  - CBC WITHOUT DIFFERENTIAL; Future  - Lipid Profile; Future  - TSH WITH REFLEX TO FT4; Future  - Comp Metabolic Panel; Future    5. Skin lesion  Referral information for dermatology was provided    6. Nocturia  Patient does report report history of nocturia 2-3 times per night however does report drinking soup, milk, liquids after 6 PM.  Discussed working to reduce liquid after 5 PM    7.BMI less than 18.5, underweight  Discussed use of protein supplementation, last CT abdomen pelvis was done in 2024 without acute findings  Patient is overall asymptomatic  Like weight is gradually decreasing all possible weight loss of 3 to 5 pounds in the past 6 months    I spent 60 minutes obtaining history, reviewing prior documentation reviewing prior imaging, speaking with son to gather more history and to discuss recommendations are given today    Return in about 3 months (around 6/25/2025) for Lab review, Med check.    Please note that this dictation was created using voice recognition software. I have made every reasonable attempt to correct obvious errors, but I expect that there are errors of grammar and possibly content that I did not discover before finalizing the note.

## 2025-04-15 NOTE — Clinical Note
REFERRAL APPROVAL NOTICE         Sent on April 15, 2025                   Ratna Cline  [address] [address]                   Dear Ms. Cline,    After a careful review of the medical information and benefit coverage, [facility] has processed your referral. See below for additional details.    If applicable, you must be actively enrolled with your insurance for coverage of the authorized service. If you have any questions regarding your coverage, please contact your insurance directly.    REFERRAL INFORMATION   Referral #:  [id number]  Referred-To Department    Referred-By Provider:  Dermatology    DONOVAN Mathias   [facility]      [address]  [address]  [place] [id number] [phone] [address]  [place] [id number] [phone]    Referral Start Date:  03/07/2025  Referral End Date:   03/07/2026             SCHEDULING  If you do not already have an appointment, please call [phone] to make an appointment.     MORE INFORMATION  If you do not already have a [facility] account, sign up at: [email or web address]  You can access your medical information, make appointments, see lab results, billing information, and more.  If you have questions regarding this referral, please contact  the [facility] Referrals department at:             [phone]. Monday - Friday 8:00AM - 5:00PM.     Sincerely,    [facility]

## 2025-04-17 ENCOUNTER — HOSPITAL ENCOUNTER (OUTPATIENT)
Dept: LAB | Facility: MEDICAL CENTER | Age: 89
End: 2025-04-17
Attending: STUDENT IN AN ORGANIZED HEALTH CARE EDUCATION/TRAINING PROGRAM
Payer: MEDICARE

## 2025-04-17 DIAGNOSIS — Z86.73 HISTORY OF STROKE: ICD-10-CM

## 2025-04-17 DIAGNOSIS — M80.00XD AGE-RELATED OSTEOPOROSIS WITH CURRENT PATHOLOGICAL FRACTURE WITH ROUTINE HEALING, SUBSEQUENT ENCOUNTER: ICD-10-CM

## 2025-04-17 DIAGNOSIS — G31.84 MILD COGNITIVE IMPAIRMENT WITH MEMORY LOSS: ICD-10-CM

## 2025-04-17 DIAGNOSIS — E03.4 HYPOTHYROIDISM DUE TO ACQUIRED ATROPHY OF THYROID: ICD-10-CM

## 2025-04-17 LAB
25(OH)D3 SERPL-MCNC: 71 NG/ML (ref 30–100)
ALBUMIN SERPL BCP-MCNC: 4 G/DL (ref 3.2–4.9)
ALBUMIN/GLOB SERPL: 1.6 G/DL
ALP SERPL-CCNC: 66 U/L (ref 30–99)
ALT SERPL-CCNC: 16 U/L (ref 2–50)
ANION GAP SERPL CALC-SCNC: 10 MMOL/L (ref 7–16)
AST SERPL-CCNC: 26 U/L (ref 12–45)
BILIRUB SERPL-MCNC: 0.3 MG/DL (ref 0.1–1.5)
BUN SERPL-MCNC: 17 MG/DL (ref 8–22)
CALCIUM ALBUM COR SERPL-MCNC: 9.7 MG/DL (ref 8.5–10.5)
CALCIUM SERPL-MCNC: 9.7 MG/DL (ref 8.5–10.5)
CHLORIDE SERPL-SCNC: 101 MMOL/L (ref 96–112)
CHOLEST SERPL-MCNC: 169 MG/DL (ref 100–199)
CO2 SERPL-SCNC: 27 MMOL/L (ref 20–33)
CREAT SERPL-MCNC: 0.95 MG/DL (ref 0.5–1.4)
ERYTHROCYTE [DISTWIDTH] IN BLOOD BY AUTOMATED COUNT: 49.3 FL (ref 35.9–50)
GFR SERPLBLD CREATININE-BSD FMLA CKD-EPI: 58 ML/MIN/1.73 M 2
GLOBULIN SER CALC-MCNC: 2.5 G/DL (ref 1.9–3.5)
GLUCOSE SERPL-MCNC: 185 MG/DL (ref 65–99)
HCT VFR BLD AUTO: 40.9 % (ref 37–47)
HDLC SERPL-MCNC: 58 MG/DL
HGB BLD-MCNC: 13.5 G/DL (ref 12–16)
LDLC SERPL CALC-MCNC: 95 MG/DL
MCH RBC QN AUTO: 32 PG (ref 27–33)
MCHC RBC AUTO-ENTMCNC: 33 G/DL (ref 32.2–35.5)
MCV RBC AUTO: 96.9 FL (ref 81.4–97.8)
PLATELET # BLD AUTO: 232 K/UL (ref 164–446)
PMV BLD AUTO: 10.6 FL (ref 9–12.9)
POTASSIUM SERPL-SCNC: 4.4 MMOL/L (ref 3.6–5.5)
PROT SERPL-MCNC: 6.5 G/DL (ref 6–8.2)
RBC # BLD AUTO: 4.22 M/UL (ref 4.2–5.4)
SODIUM SERPL-SCNC: 138 MMOL/L (ref 135–145)
TRIGL SERPL-MCNC: 79 MG/DL (ref 0–149)
TSH SERPL DL<=0.005 MIU/L-ACNC: 2.97 UIU/ML (ref 0.38–5.33)
WBC # BLD AUTO: 6.4 K/UL (ref 4.8–10.8)

## 2025-04-17 PROCEDURE — 80061 LIPID PANEL: CPT

## 2025-04-17 PROCEDURE — 80053 COMPREHEN METABOLIC PANEL: CPT

## 2025-04-17 PROCEDURE — 85027 COMPLETE CBC AUTOMATED: CPT

## 2025-04-17 PROCEDURE — 36415 COLL VENOUS BLD VENIPUNCTURE: CPT

## 2025-04-17 PROCEDURE — 82306 VITAMIN D 25 HYDROXY: CPT

## 2025-04-17 PROCEDURE — 84443 ASSAY THYROID STIM HORMONE: CPT

## 2025-04-21 ENCOUNTER — RESULTS FOLLOW-UP (OUTPATIENT)
Dept: MEDICAL GROUP | Facility: MEDICAL CENTER | Age: 89
End: 2025-04-21

## 2025-04-22 ENCOUNTER — OFFICE VISIT (OUTPATIENT)
Dept: NEUROLOGY | Facility: MEDICAL CENTER | Age: 89
End: 2025-04-22
Attending: PSYCHIATRY & NEUROLOGY
Payer: MEDICARE

## 2025-04-22 VITALS
BODY MASS INDEX: 17.4 KG/M2 | TEMPERATURE: 97 F | RESPIRATION RATE: 14 BRPM | WEIGHT: 94.58 LBS | OXYGEN SATURATION: 97 % | HEART RATE: 66 BPM | DIASTOLIC BLOOD PRESSURE: 60 MMHG | HEIGHT: 62 IN | SYSTOLIC BLOOD PRESSURE: 102 MMHG

## 2025-04-22 DIAGNOSIS — Z86.73 HISTORY OF ISCHEMIC STROKE: ICD-10-CM

## 2025-04-22 DIAGNOSIS — H90.5 SENSORY HEARING LOSS: ICD-10-CM

## 2025-04-22 DIAGNOSIS — G31.84 AMNESTIC MCI (MILD COGNITIVE IMPAIRMENT WITH MEMORY LOSS): Primary | ICD-10-CM

## 2025-04-22 PROCEDURE — 99214 OFFICE O/P EST MOD 30 MIN: CPT | Performed by: PSYCHIATRY & NEUROLOGY

## 2025-04-22 ASSESSMENT — FIBROSIS 4 INDEX: FIB4 SCORE: 2.4375

## 2025-04-22 NOTE — PROGRESS NOTES
Neurology:    Follow up visit- last saw me on 10/18/2024.    Ratna Cline 89 y.o. right handed woman.    I spoke with her son today by phone as he was at work.    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     She has been  for about 5  years and lives with her son.    She moved from [facility] to [place] over 50 years.     Problem List Reviewed.     Ratna is aware of her memory changing over the course of 5-6 years or soon after her   in 2019. She is more specific about her short term memory not being as good as what it was. She described for example to go to her bedroom to do or put something away and she will notice about waiting for 20-30 seconds that she can usually recall what she wanted to get.     Son feels that she is independent and remains very active including square dancing. She has been driving in the last 6-12 months and the son feels confidently     She can use her cell phone but has not used a computer in many years.     Her son adamantly states she is able to live alone and he also feels comfortable leaving her alone for days at a time.     There is no history of concussion(s),seizure(s) or stroke events known.     There has been  no sundowning,evolving behavioral or personality changes in the last 6-12 months or so.    Sleep- averages 7-8 hours most nights in the recent months; no REM Sleep Behavioral symptoms endorsed. Son has not noticed Ratna snorting,grunting,gasping self awake having any episodes of not breathing in the recent months.     There has been no evolving sensory disturbances of the limbs x 4, involuntary movements of the body or limbs or softening of her voice in the last 6-12 months.     There has been no  evolving or ongoing headache(s), visual disturbances, changes in speech-language abilities in the last 3-6 months.        She had an accidental fall in the middle of th night on to her back about 3 weeks ago around 5 am and in retrospect she was going to the bathroom and it was dark. The best estimate is that she may tripped on a cord and  had taken 2 advil the night before and she have slightly dizzy upon standing and before falling. She did not hit her head directly. She did not lose consciousness.  She had a mild fracture of her pubic bone (right high pubic ramus).     There has been no ongoing or evolving paranoia,delusions or hallucinations in the recent months.     None smoker in adult life.  Rare alcohol use in adult life.        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    Diverticulosis of colon 12/06/2024    History of pelvic fracture 09/23/2024    History of fall 04/17/2024    Generalized abdominal pain 02/21/2024    History of diverticulitis 02/21/2024    Chronic constipation 02/21/2024    Allergic rhinitis 02/21/2024    Arthritis 10/20/2023    Biceps tendonitis on right 06/06/2023    Asymptomatic varicose veins of both lower extremities 04/05/2023    Tension headache 12/14/2022    Irritable bowel syndrome with diarrhea 10/11/2021    Varicose veins of right lower extremity with pain 08/10/2021    Mild cognitive impairment with memory loss 06/08/2021    Dermatitis 06/08/2021    Left hand pain- between 2nd and 3rd digits due to barbed wire fence 05/10/2021    Palpable mass of soft tissue of shoulder 05/10/2021    Osteochondroma 06/03/2019    Pisano's esophagus 06/03/2019    Age-related osteoporosis with current pathological fracture 05/08/2018    High serum low-density lipoprotein (LDL)  07/14/2016    Hypothyroidism due to acquired atrophy of thyroid 07/13/2015    Tinnitus 07/13/2015    Gastro-esophageal reflux disease without esophagitis 03/15/2013       Past medical history:   Past Medical History:   Diagnosis Date    Age-related osteoporosis without current pathological fracture     Allergy     Anisocoria 06/06/2023    Anxiety     Arthritis     Back pain     Pisano's esophagus     Dr. Anaya, in late 1990's    Bladder infection     Candidiasis     Closed fracture of sacrum ([facility]) 09/23/2024    Closed nondisplaced fracture of right acetabulum, initial encounter ([facility]) 09/23/2024    Depression     Difficulty swallowing     Dusky feet 04/05/2023    Dyslipidemia 07/14/2016    Fall at home 09/07/2021    Family history of leukemia 06/03/2019    GERD (gastroesophageal reflux disease)     Grief 02/24/2020    Health counseling 09/08/2023    Hearing loss     History of hemorrhoids     History of measles     History of pneumonia     Hx of total hysterectomy with removal of both tubes and ovaries 08/20/2020    Hypervitaminosis D 2/17/2023    Hypothyroidism (acquired) 07/13/2015    Irritable bowel syndrome with constipation 04/30/2018    Left lower quadrant abdominal pain-now resolved 02/17/2023    Leg swelling 06/07/2023    Memory loss     Mild depression 08/13/2021    Neck pain     Noise-induced hearing loss of both ears 02/01/2021    Non-celiac gluten sensitivity 06/03/2019    Osteochondroma     Osteochondroma     Peripheral arterial disease (HCC)     Postmenopausal 08/20/2020    Prediabetes 02/01/2021    Seasonal allergies 06/03/2019    Sinusitis     Skin lesion 06/07/2023    Thyroid disease     Tinnitus 07/13/2015    Vaginal discharge 08/13/2021    Vision loss        Past surgical history:   Past Surgical History:   Procedure Laterality Date    HYSTERECTOMY RADICAL      OTHER      jaw surgery to correct overbite    OTHER ORTHOPEDIC SURGERY      multiple bone tumor excision    TONSILLECTOMY            Social history:   Social History     Socioeconomic History    Marital status:      Spouse name: Not on file    Number of children: 1    Years of education: Not on file    Highest education level: Not on file   Occupational History    Not on file   Tobacco Use    Smoking status: Never    Smokeless tobacco: Never   Vaping Use    Vaping status: Never Used   Substance and Sexual Activity    Alcohol use: No     Alcohol/week: 0.0 oz    Drug use: No    Sexual activity: Not Currently     Partners: Male   Other Topics Concern    Not on file   Social History Narrative    She is a retired , 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  He was  for over 60 years, her partner passed away around [id number].  She has a son who she goes hiking with in town.  She lives alone and is independent in all ADLs at this time.  She continues to drive.  She is not using a gait aid.     Social Drivers of Health     Financial Resource Strain: Patient Declined (3/4/2025)    Overall Financial Resource Strain (CARDIA)     Difficulty of Paying Living Expenses: Patient declined   Food Insecurity: No Food Insecurity (9/23/2024)    Hunger Vital Sign     Worried About Running Out of Food in the Last Year: Never true     Ran Out of Food in the Last Year: Never true   Transportation Needs: No Transportation Needs (9/23/2024)    PRAPARE - Transportation     Lack of Transportation (Medical): No     Lack of Transportation (Non-Medical): No   Physical Activity: Insufficiently Active (3/4/2025)    Exercise Vital Sign     Days of Exercise per Week: 3 days     Minutes of Exercise per Session: 40 min   Stress: Patient Unable To Answer (5/9/2024)    [place] [employer] - Occupational Stress Questionnaire     Feeling of Stress : Patient unable to answer   Social Connections: Unknown (3/4/2025)    Social Connection and Isolation Panel [NHANES]     Frequency of Communication with Friends and  Family: More than three times a week     Frequency of Social Gatherings with Friends and Family: Three times a week     Attends Sabianism Services: Patient declined     Active Member of Clubs or Organizations: Yes     Attends Club or Organization Meetings: More than 4 times per year     Marital Status:    Intimate Partner Violence: Not At Risk (9/23/2024)    Humiliation, Afraid, Rape, and Kick questionnaire     Fear of Current or Ex-Partner: No     Emotionally Abused: No     Physically Abused: No     Sexually Abused: No   Housing Stability: Low Risk  (9/23/2024)    Housing Stability Vital Sign     Unable to Pay for Housing in the Last Year: No     Number of Times Moved in the Last Year: 0     Homeless in the Last Year: No       Family history:   Family History   Problem Relation Age of Onset    Cancer Mother         leukemia    Genetic Disorder Mother         osteochondroma    Dementia Father     Genetic Disorder Son     Stroke Neg Hx     Hyperlipidemia Neg Hx     Hypertension Neg Hx     Heart Disease Neg Hx     Diabetes Neg Hx     Lung Disease Neg Hx     Ovarian Cancer Neg Hx     Tubal Cancer Neg Hx     Peritoneal Cancer Neg Hx     Breast Cancer Neg Hx     Colorectal Cancer Neg Hx          Current medications:   Current Outpatient Medications   Medication    rosuvastatin (CRESTOR) 5 MG Tab    aspirin (ASA) 81 MG Chew Tab chewable tablet    B Complex-C (VITAMIN B + C COMPLEX) Tab    Menaquinone-7 (K2 PO)    levothyroxine (SYNTHROID) 25 MCG Tab    Multiple Vitamins-Minerals (PRESERVISION/LUTEIN) Cap    Calcium Carb-Cholecalciferol (CALCIUM 500 + D PO)    Vitamin D-Vitamin K (D3 + K2 PO)     No current facility-administered medications for this visit.       Medication Allergy:  Allergies   Allergen Reactions    Soy Allergy     Codeine Vomiting    Nexium Unspecified     Ringing in ears    Sulfa Drugs Nausea           Physical examination:   Vitals:    04/22/25 1210   Weight: 42.9 kg (94 lb 9.2 oz)   Height:  "1.575 m (5' 2\")       Normal cephalic atraumatic.  There is full range of movement around the neck in all directions without restrictions or discrete pain evoked triggers.  No lower extremity edema.  Purple discoloration of the left lateral foreleg- sparing the ankle and foot region proper.      Neurological  Exam:      [place] Cognitive Assessment (MOCA) Version 7.1    Years of Education: [employer]    TOTAL SCORE: 19/30  (to be scanned into the MEDIA section in the E.M.R.)          Mental status: Awake, alert and fully oriented to person, place, time, and situation. Normal attention and concentration.  Did not appear/act combative,irritable,anxious,paranoid/delusional or aggressive to or with me.    Speech and language: Speech is fluent without errors, clear, intact to repetition, and intact to naming.     Follows 3 step motor commands in sequence without significant delay and correctly.    Cranial nerve exam:  II: Pupils are equally round and reactive to light. Visual fields are intact by confrontation.  III, IV, VI: EOMI, no diplopia, no ptosis.  V: Sensation to light touch is normal over V1-3 distributions bilaterally.  .  VII: Facial movements are symmetrical. There is no facial droop. .  VIII: Hearing intact to soft speech and finger rub bilaterally  IX: Palate elevates symmetrically, uvula is midline. Dysarthria is not present.  XI: Shoulder shrug are symmetrical and strong.   XII: Tongue protrudes midline.      Motor exam:  Muscle tone is normal in all 4 limbs. and No abnormal movements appreciated.    Muscle strength:    Neck Flexors/Extensors: 5/5       Right  Left  Deltoid   5/5  5/5      Biceps   5/5  5/5  Triceps             5/5  5/5   Wrist extensors 5/5  5/5  Wrist flexors  5/5  5/5     5/5  5/5  Interossei  5/5  5/5  Thenar (APB)  5/5  5/5   Hip flexors  5/5  5/5  Quadriceps  5/5  5/5    Hamstrings  5/5  5/5  Dorsiflexors  5/5  5/5  Plantarflexors  5/5  5/5  Toe " extension  5/5  5/5      Sensory exam:     Vibratory: 6-8 seconds at the great toes, 8-10 seconds at the ankles, 10-12 seconds at the knees and symmetrical.    Proprioception : normal at the great toes.    Reflexes:       Right  Left  Biceps   2/4  2/4  Triceps              2/4  2/4  Brachioradialis 2/4  2/4  Knee jerk  2/4  2/4  Ankle jerk  2/4  2/4     Frontal release signs are absent    bilaterally toes are downgoing to plantar stimulation..    Coordination (finger-to-nose, heel/knee/shin, rapid alternating movements) was normal.     There was no ataxia, no tremors, and no dysmetria.     Station and gait > easily stands up from exam chair without retropulsion,veering,leaning,swaying (to either side).     Negative Rombergism.    Labs and Tests:     NEUROIMAGIN2024 8:30 AM     HISTORY/REASON FOR EXAM: memory disturbances for over 3 years; memory disturbances for over 3 years        TECHNIQUE/EXAM DESCRIPTION:     T1 sagittal, T2 axial, flair coronal, T1 coronal, and diffusion-weighted axial images were obtained of the brain.     COMPARISON: None.     FINDINGS:     Age-related volume loss noted with prominent sulci, cisterns and ventricles. Ventricular dilatation is proportionate to the degree of cerebral volume loss. Nonspecific T2 hyperintensities are noted in the periventricular and deep white matter, most   likely related to chronic microvascular ischemia.  Encephalomalacia from remote infarction noted in the left medial anterior parietal cortex and subcortical region.  Multiple remote cortical infarcts noted in the left cerebellum. There is a small calcified lesion in the pineal region without mass effect.  Diffusion-weighted images are within normal limits. No acute infarction is seen.  Included portions of the paranasal sinuses are within normal limits.  Included portions of the mastoid air cells are within normal limits.  Included portions of the orbits are within normal limits     IMPRESSION:         Age-related volume loss and chronic microvascular ischemic changes.     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        Exam Ended: 12/23/24  8:53 AM Last Resulted: 12/23/24     Component  Ref Range & Units (hover) 5 d ago  (4/17/25) 1 yr ago  (2/16/24) 3 yr ago  (5/13/21) 4 yr ago  (7/9/20) 6 yr ago  (12/20/18) 6 yr ago  (5/4/18) 8 yr ago  (10/13/16)   Cholesterol,Tot 169 181 205 High  222 High  219 High  199 225 High    Triglycerides 79 64 75 100 89 100 89   HDL 58 56 59 53 52 56 51   LDL 95 112 High  131 High  149 High  149 High  123 High  156 High    Resulting Agency M M M M M       VITAMIN B12  Order: [id number]   Status: Final result       Next appt: 06/10/2025 at 09:40 AM in [facility] ([facility]achel Valente, A.P.R.N.)    Test Result Released: Yes (not seen)    0 Result Notes            Component  Ref Range & Units (hover) 6 mo ago  (10/18/24) 1 yr ago  (2/16/24) 3 yr ago  (5/13/21) 4 yr ago  (2/24/21) 5 yr ago  (12/17/19) 11 yr ago  (2/14/14) 11 yr ago  (6/19/13)   Vitamin B12 -True Cobalamin  High  1535 High  1107 High  988 High  855   Resulting Agency M M M M M M M             Specimen Collected: 10/18/24 11:23 AM Last Resulted: 10/18/24  3:57 PM     ITAMIN B1  Order: [id number]   Status: Final result       Next appt: 06/10/2025 at 09:40 AM in [facility] ([facility]       Dx: Memory changes    Test Result Released: Yes (not seen)    0 Result Notes      Component  Ref Range & Units (hover) 6 mo ago   Vitamin B1 87   Comment: INTERPRETIVE INFORMATION: Vitamin B1, Whole Blood  This assay measures the concentration of thiamine diphosphate  (TDP), the primary active form of vitamin B1. Approximately 90  percent of vitamin B1 present in whole blood is TDP. Thiamine and  thiamine monophosphate, which comprise the remaining 10 percent,  are not measured.  This test was developed and its performance  characteristics  determined by [facility]. It has not been cleared or  approved by the US Food and Drug Administration. This test was  performed in a CLIA certified laboratory and is intended for  clinical purposes.  Performed By: [facility]  [address] [place] [id number]  : David Verma MD, PhD  CLIA Number: [id number]        FOLATE SERUM/PLASMA  Order: [id number] - Reflex for Order [id number]   Status: Final result       Next appt: 06/10/2025 at 09:40 AM in [facility] ([facility], A[facility]    Test Result Released: Yes (not seen)    0 Result Notes       Component  Ref Range & Units (hover) 6 mo ago 5 yr ago   Folate -Folic Acid 21.8 >24.0   Comment: The hemolysis index of the specimen exceeds the allowed tolerance for the  test.  Result may be affected.  Specimen recollection is recommended to  confirm the result.  Results obtained by dilution.   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    At this time I do not feel that Ratna meets strict criteria for dementia based on my conversation today with her son Glen.    There are no parkinsonian features or subacute encephalopathy has been observed based my discussion with her son today who lives with Ratna.     MOCA score of 19/30 today- see media section for specifics     I spoke with her son (Glen) who concurred that Ratna's memory,thinking,behavioral and personality in the last 6 months or so.  Functional Activity Questionnaire of 2 per son at prior visit - see media for specifics.     Alzheimer's Questionnaire score of 5 per son at prior visit - see media for specifics.        2.   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     Labs:     A. Formal Neuropsychological testing to be done at baseline.     B.  Baby ASA and a High Intensity Rosuvastatin (20 mg a day) recommended for long  term stroke prevention.     C.  We reviewed brain health issues today and importance of remaining hydrated in the summer months.    D.  At this time will not pursue Donepezil or Memantine after discussion today of the utility of such medications and risks involved.    E.  We discrete stated to me that she would not want to use Leqembi or Kisunla (IV anti amyloid medications) if her present situation was Alz Disease which can not be excluded today.    F.  Brain (FDG) Pet scan will not be done at this point after discussion today of this type of test.      I have performed  a history and physical exam and a directed /focused  ROS today.    Total time spent today or this patient's care was 37 minutes and included reviewing  the diagnostic workup to date (such as labs and imaging as well as interpreting such tests relevant to this patient's neurological condition),  reviewing/obtaining separately obtained history from Ratna and her son (by phone)  for today's neurological problem(s) ,counseling and educating the patient and family member on issues related to cognition/memory and cognitive health factors and documenting  the clinical information in the EMR.    Follow up in about 6 months or so.        David Smart MD  [place] [facility].   [facility]

## 2025-04-24 NOTE — Clinical Note
REFERRAL APPROVAL NOTICE         Sent on April 23, 2025                   Ratna Cline  [address] [facility] [address]                   Dear MsMichelle Cline,    After a careful review of the medical information and benefit coverage, [facility] has processed your referral. See below for additional details.    If applicable, you must be actively enrolled with your insurance for coverage of the authorized service. If you have any questions regarding your coverage, please contact your insurance directly.    REFERRAL INFORMATION   Referral #:  [id number]  Referred-To Department    Referred-By Provider:  Behavioral Health    David Smart M.D.   Behavioral Health Outpatient      [address] [facility] [address]  [facility] [id number] [phone] [address] [facility] [address]  [facility] [id number] [phone]    Referral Start Date:  04/22/2025  Referral End Date:   04/22/2026             SCHEDULING  If you do not already have an appointment, please call [phone] to make an appointment.     MORE INFORMATION  If you do not already have a [employer] account, sign up at: [email or web address]  You can access your medical information, make appointments, see lab results, billing information, and more.  If you have questions regarding this referral, please contact  the [facility] Referrals department at:             [phone]. Monday - Friday 8:00AM - 5:00PM.     Sincerely,    [facility]

## 2025-04-28 ENCOUNTER — OFFICE VISIT (OUTPATIENT)
Dept: URGENT CARE | Facility: PHYSICIAN GROUP | Age: 89
End: 2025-04-28
Payer: MEDICARE

## 2025-04-28 VITALS
BODY MASS INDEX: 17.3 KG/M2 | OXYGEN SATURATION: 98 % | HEIGHT: 62 IN | DIASTOLIC BLOOD PRESSURE: 68 MMHG | SYSTOLIC BLOOD PRESSURE: 98 MMHG | RESPIRATION RATE: 16 BRPM | TEMPERATURE: 98 F | HEART RATE: 74 BPM | WEIGHT: 94 LBS

## 2025-04-28 DIAGNOSIS — S80.12XA CONTUSION OF LEFT LOWER EXTREMITY, INITIAL ENCOUNTER: ICD-10-CM

## 2025-04-28 DIAGNOSIS — L03.116 CELLULITIS OF LEFT LOWER EXTREMITY: ICD-10-CM

## 2025-04-28 RX ORDER — MUPIROCIN 20 MG/G
1 OINTMENT TOPICAL 2 TIMES DAILY
Qty: 22 G | Refills: 0 | Status: SHIPPED | OUTPATIENT
Start: 2025-04-28

## 2025-04-28 RX ORDER — CEPHALEXIN 500 MG/1
500 CAPSULE ORAL 4 TIMES DAILY
Qty: 20 CAPSULE | Refills: 0 | Status: SHIPPED | OUTPATIENT
Start: 2025-04-28 | End: 2025-05-03

## 2025-04-28 ASSESSMENT — ENCOUNTER SYMPTOMS
NECK PAIN: 0
FALLS: 0
BACK PAIN: 0
FEVER: 0
COUGH: 0
CHILLS: 0

## 2025-04-28 ASSESSMENT — FIBROSIS 4 INDEX: FIB4 SCORE: 2.4375

## 2025-04-28 NOTE — PROGRESS NOTES
"  Subjective:   CHIEF COMPLAINT  Chief Complaint   Patient presents with    Abrasion     Lt Left lower leg Pt states she fell sometime last week it's causing her a lot of pain.         Ratna Cline is a very pleasant 89 y.o. female who presents for Abrasion (Lt Left lower leg Pt states she fell sometime last week it's causing her a lot of pain.)      Patient presents with complaints of left lower leg pain and bruising.  States that about 5 days ago she was walking to get on her treadmill when she accidentally stumbled subsequently bumping her left leg into the edge of a table.  She denies any falls, no other traumas, and states that she \"did not think much of it.\"  Over the last few days she has noticed that she will have some pain particularly after walking in the affected area.  She denies any ankle pain, no knee pain, and denies any joint instability.  She denies any blood thinner use.  Of note patient does state about a year ago something similar happened in the same area and states that he had gotten infected and she is worried that this might as well.  She denies any fever chills body aches no drainage or discharge from the area    Abrasion  Pertinent negatives include no chest pain, chills, coughing, fever, neck pain or rash.       Review of Systems   Constitutional:  Negative for chills and fever.   Respiratory:  Negative for cough.    Cardiovascular:  Negative for chest pain.   Musculoskeletal:  Negative for back pain, falls, joint pain and neck pain.   Skin:  Negative for itching and rash.        Ecchymosis erythema and swelling in the left lower extremity     Refer to HPI for additional details.    During this visit, appropriate PPE was worn, and hand hygiene was performed.    PMH:  has a past medical history of Age-related osteoporosis without current pathological fracture, Allergy, Anisocoria (06/06/2023), Anxiety, Arthritis, Back pain, Pisano's esophagus, Bladder infection, Candidiasis, " Closed fracture of sacrum ([facility]) (09/23/2024), Closed nondisplaced fracture of right acetabulum, initial encounter ([facility]) (09/23/2024), Depression, Difficulty swallowing, Dusky feet (04/05/2023), Dyslipidemia (07/14/2016), Fall at home (09/07/2021), Family history of leukemia (06/03/2019), GERD (gastroesophageal reflux disease), Grief (02/24/2020), Health counseling (09/08/2023), Hearing loss, History of hemorrhoids, History of measles, History of pneumonia, total hysterectomy with removal of both tubes and ovaries (08/20/2020), Hypervitaminosis D (2/17/2023), Hypothyroidism (acquired) (07/13/2015), Irritable bowel syndrome with constipation (04/30/2018), Left lower quadrant abdominal pain-now resolved (02/17/2023), Leg swelling (06/07/2023), Memory loss, Mild depression (08/13/2021), Neck pain, 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    She has no past medical history of Addisons disease ([facility]), Adrenal disorder ([facility]), Anemia, Arrhythmia, ASTHMA, Cancer ([facility]), Cataract, CHF (congestive heart failure) ([facility]), Clotting disorder ([facility]), COPD (chronic obstructive pulmonary disease) ([facility]), Cushings syndrome ([facility]), Diabetes, Diabetic neuropathy ([facility]), Glaucoma, Goiter, Head ache, Heart attack ([facility]), Heart murmur, HIV (human immunodeficiency virus infection) ([facility]), Hypertension, IBD (inflammatory bowel disease), Kidney disease, Meningitis, Migraine, Muscle disorder, Parathyroid disorder ([facility]), Pituitary disease ([facility]), Pulmonary emphysema ([facility]), Seizure ([facility]), Sickle cell disease (HCC), Stroke (HCC), Substance abuse (HCC), or Tuberculosis.    MEDS:   Current Outpatient Medications:     mupirocin  "(BACTROBAN) 2 % Ointment, Apply 1 Application topically 2 times a day., Disp: 22 g, Rfl: 0    cephALEXin (KEFLEX) 500 MG Cap, Take 1 Capsule by mouth 4 times a day for 5 days., Disp: 20 Capsule, Rfl: 0    rosuvastatin (CRESTOR) 5 MG Tab, Take 1 Tablet by mouth every evening., Disp: 100 Tablet, Rfl: 3    aspirin (ASA) 81 MG Chew Tab chewable tablet, Chew 1 Tablet every day., Disp: 100 Tablet, Rfl: 3    B Complex-C (VITAMIN B + C COMPLEX) Tab, Take 1 Tablet by mouth every day., Disp: , Rfl:     Menaquinone-7 (K2 PO), Take  by mouth., Disp: , Rfl:     levothyroxine (SYNTHROID) 25 MCG Tab, Take 1 Tablet by mouth every morning on an empty stomach., Disp: 100 Tablet, Rfl: 3    Multiple Vitamins-Minerals (PRESERVISION/LUTEIN) Cap, Take  by mouth., Disp: , Rfl:     Calcium Carb-Cholecalciferol (CALCIUM 500 + D PO), Take  by mouth., Disp: , Rfl:     Vitamin D-Vitamin K (D3 + K2 PO), Take  by mouth., Disp: , Rfl:     ALLERGIES:   Allergies   Allergen Reactions    Soy Allergy     Codeine Vomiting    Nexium Unspecified     Ringing in ears    Sulfa Drugs Nausea     SURGHX:   Past Surgical History:   Procedure Laterality Date    HYSTERECTOMY RADICAL      OTHER      jaw surgery to correct overbite    OTHER ORTHOPEDIC SURGERY      multiple bone tumor excision    TONSILLECTOMY       SOCHX:  reports that she has never smoked. She has never used smokeless tobacco. She reports that she does not drink alcohol and does not use drugs.    FH: Per HPI as applicable/pertinent.    Medications, Allergies, and current problem list reviewed today in Epic.     Objective:     BP 98/68 (BP Location: Left arm, Patient Position: Sitting, BP Cuff Size: Small adult)   Pulse 74   Temp 36.7 °C (98 °F) (Temporal)   Resp 16   Ht 1.575 m (5' 2\")   Wt 42.6 kg (94 lb)   SpO2 98%     Physical Exam  Vitals reviewed.   Constitutional:       General: She is not in acute distress.     Appearance: Normal appearance. She is not ill-appearing (Chronically " ill-appearing).   HENT:      Head: Normocephalic and atraumatic.   Musculoskeletal:      Right lower leg: Normal.      Left lower leg: Swelling and tenderness present. No deformity or lacerations. 1+ Edema present.        Legs:       Comments: Slight swelling, ecchymosis, and warmth noted in the above marked area.  No calf tenderness or swelling, both lower extremities are equal size   Neurological:      Mental Status: She is alert.          Media Information  File Link    Clinical Picture - Scan on 4/28/2025 8:31 AM: [facility]        Key Information    Document ID File Type Document Type Description   [id number] Image Clinical Picture [facility]     Import Information    Attached At Date Time User Dept   Encounter Level 4/28/2025  8:31 AM DONOVAN Prakash [facility]     Encounter    Office Visit on 4/28/25 with DONOVAN Prakash       Document Information    Photographic Image: Clinical Picture   [facility]   04/28/2025 8:31 AM   Attached To:   Office Visit on 4/28/25 with DONOVAN Prakash     Source Information    DONOVAN Prakash  [facility]   Document History        Assessment/Plan:     Diagnosis and associated orders:     1. Cellulitis of left lower extremity  - mupirocin (BACTROBAN) 2 % Ointment; Apply 1 Application topically 2 times a day.  Dispense: 22 g; Refill: 0  - cephALEXin (KEFLEX) 500 MG Cap; Take 1 Capsule by mouth 4 times a day for 5 days.  Dispense: 20 Capsule; Refill: 0    2. Contusion of left lower extremity, initial encounter     Comments/MDM:     Patient history and physical exam consistent with acute left lower extremity contusion with concomitant cellulitis of the left lower extremity.  No red flags or acute distress noted  I discussed HPI, past medical history, and physical exam with patient.  I discussed with patient that symptoms of pain are likely due to contusion and not another occult etiology such as DVT or any osseous  involvement.  Her physical exam is overall reassuring low suspicion for any serious infection or sepsis at this point.  Given patient's multiple high risk factors as well as previous similar injury that seem to get infected, I did advise patient that we should treat for likely cellulitis.  Will use empiric mupirocin as well as 5-day course cephalexin.  Advised patient also have relative rest but not sedentary, keep area elevated to help with the pain, Tylenol as needed for pain, close monitoring of symptoms.  If symptoms do not improve in the next 3 to 5 days advised patient to come back in for further evaluation.    ED precautions discussed.  Patient or stands RTC and ED precautions  Discussion and collaborative decision making used with the patient myself to develop treatment plan.  Patient understands the treatment plan of care, and further follow-up if needed.  No further questions           Differential diagnosis, natural history, supportive care, and indications for immediate follow-up discussed.    Advised the patient to follow-up with the primary care physician for recheck, reevaluation, and consideration of further management.    Please note that this dictation was created using voice recognition software. I have made a reasonable attempt to correct obvious errors, but I expect that there are errors of grammar and possibly content that I did not discover before finalizing the note.    This note was electronically signed by DONOVAN Prakash

## 2025-05-05 ENCOUNTER — OFFICE VISIT (OUTPATIENT)
Dept: MEDICAL GROUP | Facility: PHYSICIAN GROUP | Age: 89
End: 2025-05-05
Payer: MEDICARE

## 2025-05-05 VITALS
HEIGHT: 62 IN | HEART RATE: 65 BPM | WEIGHT: 94 LBS | SYSTOLIC BLOOD PRESSURE: 128 MMHG | BODY MASS INDEX: 17.3 KG/M2 | DIASTOLIC BLOOD PRESSURE: 68 MMHG | OXYGEN SATURATION: 98 % | RESPIRATION RATE: 16 BRPM | TEMPERATURE: 97.6 F

## 2025-05-05 DIAGNOSIS — Z91.81 HISTORY OF FALL: ICD-10-CM

## 2025-05-05 DIAGNOSIS — E03.4 HYPOTHYROIDISM DUE TO ACQUIRED ATROPHY OF THYROID: ICD-10-CM

## 2025-05-05 DIAGNOSIS — L03.116 CELLULITIS OF LEFT LOWER LEG: ICD-10-CM

## 2025-05-05 DIAGNOSIS — R94.4 DECREASED GFR: ICD-10-CM

## 2025-05-05 DIAGNOSIS — R73.9 ELEVATED BLOOD SUGAR: ICD-10-CM

## 2025-05-05 DIAGNOSIS — G31.84 MILD COGNITIVE IMPAIRMENT WITH MEMORY LOSS: ICD-10-CM

## 2025-05-05 PROCEDURE — 99214 OFFICE O/P EST MOD 30 MIN: CPT

## 2025-05-05 PROCEDURE — 3078F DIAST BP <80 MM HG: CPT

## 2025-05-05 PROCEDURE — 3074F SYST BP LT 130 MM HG: CPT

## 2025-05-05 RX ORDER — DOXYCYCLINE HYCLATE 100 MG
100 TABLET ORAL 2 TIMES DAILY
Qty: 10 TABLET | Refills: 0 | Status: SHIPPED | OUTPATIENT
Start: 2025-05-05

## 2025-05-05 ASSESSMENT — FIBROSIS 4 INDEX: FIB4 SCORE: 2.4375

## 2025-05-05 ASSESSMENT — ENCOUNTER SYMPTOMS: FALLS: 1

## 2025-05-05 NOTE — PROGRESS NOTES
Verbal consent was acquired by the patient to use [employer] ambient listening note generation during this visit     Subjective:     CC: Diagnoses of History of fall, Cellulitis of left lower leg, Mild cognitive impairment with memory loss, Hypothyroidism due to acquired atrophy of thyroid, Elevated blood sugar, and Decreased GFR were pertinent to this visit.    HPI:   Ratna presents today with    History of Present Illness  The patient presents for follow-up regarding their recent health concerns.    Left Leg Cellulitis  - Follow-up for left leg cellulitis after a recent fall causing leg injury.  - Discomfort is minimal during the day but worsens to an ache later.    Underweight Status  - Consulted Dr. Talley 3 weeks ago regarding underweight status.  - No treatment plan was initiated.    Supplemental information: Appointment scheduled for 06/10/2025.    Current Outpatient Medications   Medication Sig Dispense Refill    doxycycline (VIBRAMYCIN) 100 MG Tab Take 1 Tablet by mouth 2 times a day. 10 Tablet 0    mupirocin (BACTROBAN) 2 % Ointment Apply 1 Application topically 2 times a day. 22 g 0    rosuvastatin (CRESTOR) 5 MG Tab Take 1 Tablet by mouth every evening. 100 Tablet 3    aspirin (ASA) 81 MG Chew Tab chewable tablet Chew 1 Tablet every day. 100 Tablet 3    B Complex-C (VITAMIN B + C COMPLEX) Tab Take 1 Tablet by mouth every day.      Menaquinone-7 (K2 PO) Take  by mouth.      levothyroxine (SYNTHROID) 25 MCG Tab Take 1 Tablet by mouth every morning on an empty stomach. 100 Tablet 3    Multiple Vitamins-Minerals (PRESERVISION/LUTEIN) Cap Take  by mouth.      Calcium Carb-Cholecalciferol (CALCIUM 500 + D PO) Take  by mouth.      Vitamin D-Vitamin K (D3 + K2 PO) Take  by mouth.       No current facility-administered medications for this visit.       Problem   History of Fall   Mild Cognitive Impairment With Memory Loss    He has subjective concerns about her memory.  She is independent and a very good  "historian regarding her medical care.  She walks with her son 3 days/week and says sometimes he will quit as her ask her questions but he has not made any comments to her about concern regarding the memory.  She will note sometimes walking into a room and forgetting what she wanted and therefore.  She does not have any concerns with completing ADLs or IADLs.     Hypothyroidism Due to Acquired Atrophy of Thyroid     ROS:  Review of Systems   Musculoskeletal:  Positive for falls (4/28/25).   Skin:         Leg wound delayed healing, soreness   All other systems reviewed and are negative.      Objective:     Exam:  /68 (BP Location: Left arm, Patient Position: Sitting, BP Cuff Size: Adult)   Pulse 65   Temp 36.4 °C (97.6 °F) (Temporal)   Resp 16   Ht 1.575 m (5' 2\")   Wt 42.6 kg (94 lb)   LMP  (LMP Unknown)   SpO2 98%   BMI 17.19 kg/m²  Body mass index is 17.19 kg/m².    Physical Exam  Vitals reviewed.   Constitutional:       General: She is not in acute distress.     Appearance: Normal appearance. She is not ill-appearing.   HENT:      Head: Normocephalic and atraumatic.   Cardiovascular:      Rate and Rhythm: Normal rate.      Pulses: Normal pulses.   Pulmonary:      Effort: Pulmonary effort is normal. No respiratory distress.   Skin:     General: Skin is warm and dry.      Findings: Erythema and lesion present. No rash.   Neurological:      General: No focal deficit present.      Mental Status: She is alert and oriented to person, place, and time.   Psychiatric:         Mood and Affect: Mood normal.         Behavior: Behavior normal.         Labs:    Latest Reference Range & Units 04/17/25 13:13   WBC 4.8 - 10.8 K/uL 6.4   RBC 4.20 - 5.40 M/uL 4.22   Hemoglobin 12.0 - 16.0 g/dL 13.5   Hematocrit 37.0 - 47.0 % 40.9   MCV 81.4 - 97.8 fL 96.9   MCH 27.0 - 33.0 pg 32.0   MCHC 32.2 - 35.5 g/dL 33.0   RDW 35.9 - 50.0 fL 49.3   Platelet Count 164 - 446 K/uL 232   MPV 9.0 - 12.9 fL 10.6   Sodium 135 - 145 " mmol/L 138   Potassium 3.6 - 5.5 mmol/L 4.4   Chloride 96 - 112 mmol/L 101   Co2 20 - 33 mmol/L 27   Anion Gap 7.0 - 16.0  10.0   Glucose 65 - 99 mg/dL 185 (H)   Bun 8 - 22 mg/dL 17   Creatinine 0.50 - 1.40 mg/dL 0.95   GFR (CKD-EPI) >60 mL/min/1.73 m 2 58 !   Calcium 8.5 - 10.5 mg/dL 9.7   Correct Calcium 8.5 - 10.5 mg/dL 9.7   AST(SGOT) 12 - 45 U/L 26   ALT(SGPT) 2 - 50 U/L 16   Alkaline Phosphatase 30 - 99 U/L 66   Total Bilirubin 0.1 - 1.5 mg/dL 0.3   Albumin 3.2 - 4.9 g/dL 4.0   Total Protein 6.0 - 8.2 g/dL 6.5   Globulin 1.9 - 3.5 g/dL 2.5   A-G Ratio g/dL 1.6   Cholesterol,Tot 100 - 199 mg/dL 169   Triglycerides 0 - 149 mg/dL 79   HDL >=40 mg/dL 58   LDL <100 mg/dL 95   25-Hydroxy   Vitamin D 25 30 - 100 ng/mL 71   TSH 0.380 - 5.330 uIU/mL 2.970   (H): Data is abnormally high  !: Data is abnormal    Assessment & Plan:     89 y.o. female with the following -     Assessment & Plan  1. Left lower extremity cellulitis: Tender, erythematous, swollen leg on exam.  - Continue antibiotic therapy as prescribed.  - Monitor for signs of worsening infection, including increased redness, swelling, or pain.  - Educate on proper wound care and signs of complications.  -consider wound care if not healing     2. Underweight: Long-standing thinness without recent weight loss.  - Provide counseling on weight maintenance and nutritional monitoring.  - Discuss dietary strategies to optimize caloric intake and overall nutrition.  - No pharmacologic intervention recommended at this time.    Follow-up  - Scheduled appointment on Susana 10.    Problem List Items Addressed This Visit       Hypothyroidism due to acquired atrophy of thyroid    Chronic, stable. Continue levothyroxine 25 mcg daily         Mild cognitive impairment with memory loss    Chronic, stable. Has established with neurology.          History of fall    Fell 4/28, hit leg on treadmill. Reports her feet stumbled.           Other Visit Diagnoses         Cellulitis of  left lower leg        Relevant Medications    doxycycline (VIBRAMYCIN) 100 MG Tab      Elevated blood sugar        Relevant Orders    HEMOGLOBIN A1C      Decreased GFR        Relevant Orders    Basic Metabolic Panel          Patient was educated in proper administration of medication(s) ordered today including safety, possible SE, risks, benefits, rationale and alternatives to therapy.   Supportive care, differential diagnoses, and indications for immediate follow-up discussed with patient.    Pathogenesis of diagnosis discussed including typical length and natural progression.    Instructed to return to clinic or nearest emergency department for any change in condition, further concerns, or worsening of symptoms.  Patient states understanding of the plan of care and discharge instructions.    Return in about 5 weeks (around 6/10/2025), or if symptoms worsen or fail to improve.    Please note that this dictation was created using voice recognition software. I have made every reasonable attempt to correct obvious errors, but I expect that there are errors of grammar and possibly content that I did not discover before finalizing the note.

## 2025-05-12 DIAGNOSIS — L03.116 CELLULITIS OF LEFT LOWER EXTREMITY: ICD-10-CM

## 2025-05-12 RX ORDER — MUPIROCIN 20 MG/G
1 OINTMENT TOPICAL 2 TIMES DAILY
Qty: 22 G | Refills: 0 | Status: SHIPPED | OUTPATIENT
Start: 2025-05-12

## 2025-05-12 NOTE — TELEPHONE ENCOUNTER
Received request via: Patient    Was the patient seen in the last year in this department? Yes    Does the patient have an active prescription (recently filled or refills available) for medication(s) requested? No    Pharmacy Name: [facility]    Does the patient have custodial Plus and need 100-day supply? (This applies to ALL medications) Patient does not have SCP

## 2025-05-12 NOTE — TELEPHONE ENCOUNTER
1. Name: Ratna Thompson    Call Back Number: [phone]        How would the patient prefer to be contacted with a response: Phone call OK to leave a detailed message    2. Which medication(s) is being requested?   Mupirocin    3. What is the preferred Pharmacy?   [facility], [place]    Patient was informed they may receive a return phone call from our office with any additional questions before processing this request.

## 2025-05-14 ENCOUNTER — RESULTS FOLLOW-UP (OUTPATIENT)
Dept: MEDICAL GROUP | Facility: PHYSICIAN GROUP | Age: 89
End: 2025-05-14

## 2025-05-14 ENCOUNTER — HOSPITAL ENCOUNTER (OUTPATIENT)
Dept: LAB | Facility: MEDICAL CENTER | Age: 89
End: 2025-05-14
Payer: MEDICARE

## 2025-05-14 DIAGNOSIS — R73.9 ELEVATED BLOOD SUGAR: ICD-10-CM

## 2025-05-14 DIAGNOSIS — R94.4 DECREASED GFR: ICD-10-CM

## 2025-05-14 LAB
ANION GAP SERPL CALC-SCNC: 10 MMOL/L (ref 7–16)
BUN SERPL-MCNC: 16 MG/DL (ref 8–22)
CALCIUM SERPL-MCNC: 9.9 MG/DL (ref 8.5–10.5)
CHLORIDE SERPL-SCNC: 102 MMOL/L (ref 96–112)
CO2 SERPL-SCNC: 27 MMOL/L (ref 20–33)
CREAT SERPL-MCNC: 0.78 MG/DL (ref 0.5–1.4)
EST. AVERAGE GLUCOSE BLD GHB EST-MCNC: 105 MG/DL
GFR SERPLBLD CREATININE-BSD FMLA CKD-EPI: 73 ML/MIN/1.73 M 2
GLUCOSE SERPL-MCNC: 92 MG/DL (ref 65–99)
HBA1C MFR BLD: 5.3 % (ref 4–5.6)
POTASSIUM SERPL-SCNC: 4.3 MMOL/L (ref 3.6–5.5)
SODIUM SERPL-SCNC: 139 MMOL/L (ref 135–145)

## 2025-05-14 PROCEDURE — 83036 HEMOGLOBIN GLYCOSYLATED A1C: CPT

## 2025-05-14 PROCEDURE — 36415 COLL VENOUS BLD VENIPUNCTURE: CPT

## 2025-05-14 PROCEDURE — 80048 BASIC METABOLIC PNL TOTAL CA: CPT

## 2025-05-15 ENCOUNTER — TELEPHONE (OUTPATIENT)
Dept: HEALTH INFORMATION MANAGEMENT | Facility: OTHER | Age: 89
End: 2025-05-15

## 2025-05-28 ENCOUNTER — APPOINTMENT (OUTPATIENT)
Dept: RADIOLOGY | Facility: IMAGING CENTER | Age: 89
End: 2025-05-28
Attending: STUDENT IN AN ORGANIZED HEALTH CARE EDUCATION/TRAINING PROGRAM
Payer: MEDICARE

## 2025-05-28 ENCOUNTER — OFFICE VISIT (OUTPATIENT)
Dept: URGENT CARE | Facility: PHYSICIAN GROUP | Age: 89
End: 2025-05-28
Payer: MEDICARE

## 2025-05-28 VITALS
HEART RATE: 70 BPM | WEIGHT: 92.5 LBS | RESPIRATION RATE: 14 BRPM | HEIGHT: 62 IN | TEMPERATURE: 98.9 F | OXYGEN SATURATION: 97 % | DIASTOLIC BLOOD PRESSURE: 66 MMHG | BODY MASS INDEX: 17.02 KG/M2 | SYSTOLIC BLOOD PRESSURE: 110 MMHG

## 2025-05-28 DIAGNOSIS — S51.831A PUNCTURE WOUND OF RIGHT FOREARM, INITIAL ENCOUNTER: ICD-10-CM

## 2025-05-28 PROCEDURE — 3074F SYST BP LT 130 MM HG: CPT | Performed by: STUDENT IN AN ORGANIZED HEALTH CARE EDUCATION/TRAINING PROGRAM

## 2025-05-28 PROCEDURE — 3078F DIAST BP <80 MM HG: CPT | Performed by: STUDENT IN AN ORGANIZED HEALTH CARE EDUCATION/TRAINING PROGRAM

## 2025-05-28 PROCEDURE — 99214 OFFICE O/P EST MOD 30 MIN: CPT | Performed by: STUDENT IN AN ORGANIZED HEALTH CARE EDUCATION/TRAINING PROGRAM

## 2025-05-28 RX ORDER — MUPIROCIN CALCIUM 20 MG/G
1 CREAM TOPICAL 2 TIMES DAILY
Qty: 15 G | Refills: 0 | Status: SHIPPED | OUTPATIENT
Start: 2025-05-28

## 2025-05-28 RX ORDER — AMOXICILLIN 500 MG/1
500 CAPSULE ORAL 3 TIMES DAILY
Qty: 15 CAPSULE | Refills: 0 | Status: SHIPPED | OUTPATIENT
Start: 2025-05-28 | End: 2025-06-02

## 2025-05-28 ASSESSMENT — ENCOUNTER SYMPTOMS
CHILLS: 0
SENSORY CHANGE: 0
FEVER: 0
TINGLING: 0

## 2025-05-28 ASSESSMENT — FIBROSIS 4 INDEX: FIB4 SCORE: 2.4375

## 2025-05-28 NOTE — PROGRESS NOTES
"Subjective     Ratna Cline is a 89 y.o. female who presents with Rash (R forearm, woke up with it, r side, x1d )            Ratna is a 89 y.o. female who presents to urgent care with a puncture wound to her right arm.  Patient states she was gardening yesterday and got poked by some sort of weed.  She did not think much of it and was not causing her pain yesterday.  This morning she woke up and noticed the area was red.  States that she also started experiencing some pain especially when the area is bumped or touched accidentally.  No discharge or drainage.  Doesn't know if there is a splinter in her forearm or if she is developing an infection. But she is concerned as it started hurting today.        Review of Systems   Constitutional:  Negative for chills and fever.   Neurological:  Negative for tingling and sensory change.   All other systems reviewed and are negative.             Objective     /66 (BP Location: Left arm, Patient Position: Sitting, BP Cuff Size: Adult)   Pulse 70   Temp 37.2 °C (98.9 °F) (Temporal)   Resp 14   Ht 1.575 m (5' 2\")   Wt 42 kg (92 lb 8 oz)   LMP  (LMP Unknown)   SpO2 97%   BMI 16.92 kg/m²      Physical Exam  Vitals reviewed.   Constitutional:       Appearance: Normal appearance.   HENT:      Head: Normocephalic and atraumatic.      Nose: Nose normal.   Eyes:      Extraocular Movements: Extraocular movements intact.      Conjunctiva/sclera: Conjunctivae normal.      Pupils: Pupils are equal, round, and reactive to light.   Cardiovascular:      Rate and Rhythm: Normal rate.   Pulmonary:      Effort: Pulmonary effort is normal.   Musculoskeletal:        Arms:       Comments: Puncture wound to right forearm with some scabbing/crusting present. No foreign body seen or palpated. Area is tender to palpation. There is some surrounding swelling and erythema. FROM of right arm. Neurovascularly intact.   Skin:     General: Skin is warm.      Capillary Refill: Capillary " refill takes less than 2 seconds.   Neurological:      General: No focal deficit present.      Mental Status: She is alert and oriented to person, place, and time.                                  Assessment & Plan  Puncture wound of right forearm, initial encounter  - Patient declined Tdap in clinic.    Orders:    DX-FOREARM RIGHT; Future    mupirocin calcium (BACTROBAN) 2 % Cream; Apply 1 Application topically 2 times a day.    amoxicillin (AMOXIL) 500 MG Cap; Take 1 Capsule by mouth 3 times a day for 5 days.             RADIOLOGY RESULTS   DX-FOREARM RIGHT  Result Date: 5/28/2025 5/28/2025 9:50 AM HISTORY/REASON FOR EXAM:  Pain/Deformity Following Trauma. RIGHT forearm puncture wound. TECHNIQUE/EXAM DESCRIPTION AND NUMBER OF VIEWS:  2 views of the RIGHT forearm. COMPARISON: None FINDINGS: No focal soft tissue swelling or gas. No radiopaque foreign body. Diffuse osteopenia. No fracture of the radius or ulna. Apparent mild expansion of the distal radius and ulna with slight distortion suggesting sequela of old trauma. Degenerative change of the wrist.     1.  No fracture or radiopaque foreign body of the RIGHT forearm. 2.  Apparent mild expansion of the distal radius and ulna with slight distortion suggesting sequela of old trauma. 3.  Diffuse osteopenia.             Differential diagnoses, supportive care measures and indications for immediate follow-up discussed with patient. Pathogenesis of diagnosis discussed including typical length and natural progression.  Follow up with PCP.    Instructed to return to urgent care or nearest emergency department if symptoms fail to improve, for any change in condition, further concerns, or new concerning symptoms.    Patient states understanding and agrees with the plan of care and discharge instructions.               My total time spent caring for the patient on the day of the encounter was 30 minutes including obtain patient history, physical exam, reviewing imaging  results, discussing plan of care, supportive care, appropriate follow-up indications for immediate follow-up. This does not include time spent on separately billable procedures/tests.

## 2025-06-10 ENCOUNTER — OFFICE VISIT (OUTPATIENT)
Dept: MEDICAL GROUP | Facility: PHYSICIAN GROUP | Age: 89
End: 2025-06-10
Payer: MEDICARE

## 2025-06-10 VITALS
BODY MASS INDEX: 17.85 KG/M2 | HEART RATE: 67 BPM | RESPIRATION RATE: 16 BRPM | OXYGEN SATURATION: 97 % | SYSTOLIC BLOOD PRESSURE: 114 MMHG | TEMPERATURE: 97.7 F | HEIGHT: 62 IN | WEIGHT: 97 LBS | DIASTOLIC BLOOD PRESSURE: 62 MMHG

## 2025-06-10 DIAGNOSIS — Z86.73 HISTORY OF ISCHEMIC STROKE: Primary | ICD-10-CM

## 2025-06-10 DIAGNOSIS — L98.9 SKIN LESION OF FACE: ICD-10-CM

## 2025-06-10 DIAGNOSIS — G31.9 CEREBRAL ATROPHY (HCC): ICD-10-CM

## 2025-06-10 DIAGNOSIS — E03.4 HYPOTHYROIDISM DUE TO ACQUIRED ATROPHY OF THYROID: ICD-10-CM

## 2025-06-10 PROBLEM — M75.21 BICEPS TENDONITIS ON RIGHT: Chronic | Status: RESOLVED | Noted: 2023-06-06 | Resolved: 2025-06-10

## 2025-06-10 PROBLEM — M79.642 LEFT HAND PAIN: Status: RESOLVED | Noted: 2021-05-10 | Resolved: 2025-06-10

## 2025-06-10 PROCEDURE — 3074F SYST BP LT 130 MM HG: CPT

## 2025-06-10 PROCEDURE — 3078F DIAST BP <80 MM HG: CPT

## 2025-06-10 PROCEDURE — 99214 OFFICE O/P EST MOD 30 MIN: CPT

## 2025-06-10 RX ORDER — ATORVASTATIN CALCIUM 20 MG/1
40 TABLET, FILM COATED ORAL NIGHTLY
COMMUNITY
End: 2025-06-10 | Stop reason: SDUPTHER

## 2025-06-10 RX ORDER — ATORVASTATIN CALCIUM 20 MG/1
20 TABLET, FILM COATED ORAL NIGHTLY
Qty: 100 TABLET | Refills: 3 | Status: SHIPPED | OUTPATIENT
Start: 2025-06-10

## 2025-06-10 ASSESSMENT — FIBROSIS 4 INDEX: FIB4 SCORE: 2.4375

## 2025-06-10 NOTE — Clinical Note
REFERRAL APPROVAL NOTICE         Sent on Susana 10, 2025                   Ratna Cline  [address] [facility] [address]                   Dear Ms. Cline,    After a careful review of the medical information and benefit coverage, [facility] has processed your referral. See below for additional details.    If applicable, you must be actively enrolled with your insurance for coverage of the authorized service. If you have any questions regarding your coverage, please contact your insurance directly.    REFERRAL INFORMATION   Referral #:  [id number]  Referred-To Department    Referred-By Provider:  Dermatology    DONOVAN Mathias   [facility]      [address]  [address]  [place] [id number] [phone] [address]  [place] [id number] [phone]    Referral Start Date:  06/10/2025  Referral End Date:   06/10/2026             SCHEDULING  If you do not already have an appointment, please call [phone] to make an appointment.     MORE INFORMATION  If you do not already have a [facility] account, sign up at: [email or web address]  You can access your medical information, make appointments, see lab results, billing information, and more.  If you have questions regarding this referral, please contact  the [facility] Referrals department at:             [phone]. Monday - Friday 8:00AM - 5:00PM.     Sincerely,    [facility]

## 2025-06-10 NOTE — PROGRESS NOTES
Subjective:     CC: The primary encounter diagnosis was History of ischemic stroke. Diagnoses of Hypothyroidism due to acquired atrophy of thyroid, Skin lesion of face, and Cerebral atrophy (HCC) were also pertinent to this visit.    HPI:   Ratna presents today with    Problem   History of Ischemic Stroke   Cerebral Atrophy (Hcc)    MRI 12/23/25 Age-related volume loss and chronic microvascular ischemic changes.   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    Trial of rosuvastatin not tolerated due to dizziness, will try atorvastatin for stroke prevention. Continue asa 81 mg daily.     Skin Lesion of Face   Varicose Veins of Right Lower Extremity With Pain    She reports longstanding history of enlarged vein on the right lower extremity.  She recalls a vein procedure when she was around 21 years old, unclear exactly what they did.  She also has longstanding history of osteochondroma with multiple soft tissue masses removed over the years.  More recently she has developed pain and bulging of a vein in the right lower extremity on the posteromedial aspect.  The pain is not constant but has become more of a nuisance.  She would like to avoid any procedures moving forward.  She wonders if electrical stimulation therapy or compression therapy would be helpful in this situation.     Hypothyroidism Due to Acquired Atrophy of Thyroid   Biceps Tendonitis On Right (Resolved)   Left hand pain- between 2nd and 3rd digits due to barbed wire fence (Resolved)    She reports while hiking with her son in early April 2021 they had to pass through a barbed wire fence and she injured the left hand.  Specifically in the crevice between the second and third digit the barbed wire caused a penetrating injury.  She noticed on the palmar aspect just distal to the space between the second and third digit she developed pain.  She does not think there it was a foreign body but is  "not sure.  Her tetanus was updated in 2017.  The pain present for a few weeks after the injury, improved for a few days, and then the pain returned.  She has tried topical Neosporin with some improvement.         Current Medications[1]    ROS:  Review of Systems   Skin:         Skin lesions to face- red, itching, not healing   All other systems reviewed and are negative.      Objective:     Exam:  /62 (BP Location: Left arm, Patient Position: Sitting, BP Cuff Size: Adult)   Pulse 67   Temp 36.5 °C (97.7 °F) (Temporal)   Resp 16   Ht 1.575 m (5' 2\")   Wt 44 kg (97 lb)   LMP  (LMP Unknown)   SpO2 97%   BMI 17.74 kg/m²  Body mass index is 17.74 kg/m².    Physical Exam  Vitals reviewed.   Constitutional:       General: She is not in acute distress.     Appearance: Normal appearance. She is not ill-appearing.   HENT:      Head: Normocephalic and atraumatic.        Comments: Erythematous, pruritic lesions with scale  Cardiovascular:      Rate and Rhythm: Normal rate.      Pulses: Normal pulses.   Pulmonary:      Effort: Pulmonary effort is normal. No respiratory distress.   Skin:     General: Skin is warm and dry.      Findings: No rash.   Neurological:      General: No focal deficit present.      Mental Status: She is alert and oriented to person, place, and time.   Psychiatric:         Mood and Affect: Mood normal.         Behavior: Behavior normal.       Assessment & Plan:     89 y.o. female with the following -     Problem List Items Addressed This Visit       Hypothyroidism due to acquired atrophy of thyroid    Chronic, stable continue levothyroxine 25 mcg daily         Skin lesion of face    Noticed skin lesions which are irritating, itching at times, scale, erythema. Lesion to cheek and temple right side face  Ref derm         Relevant Orders    Referral to Dermatology    Cerebral atrophy (HCC)    Chronic, stable. Seeing neurology for ongoing evaluation.         History of ischemic stroke - Primary    " Continue atorvastatin 20 mg nightly, asa 81 mg daily.          Relevant Medications    atorvastatin (LIPITOR) 20 MG Tab       Patient was educated in proper administration of medication(s) ordered today including safety, possible SE, risks, benefits, rationale and alternatives to therapy.   Supportive care, differential diagnoses, and indications for immediate follow-up discussed with patient.    Pathogenesis of diagnosis discussed including typical length and natural progression.    Instructed to return to clinic or nearest emergency department for any change in condition, further concerns, or worsening of symptoms.  Patient states understanding of the plan of care and discharge instructions.    Return in about 2 months (around 8/18/2025).    Please note that this dictation was created using voice recognition software. I have made every reasonable attempt to correct obvious errors, but I expect that there are errors of grammar and possibly content that I did not discover before finalizing the note.             [1]   Current Outpatient Medications   Medication Sig Dispense Refill    atorvastatin (LIPITOR) 20 MG Tab Take 1 Tablet by mouth every evening. 100 Tablet 3    mupirocin calcium (BACTROBAN) 2 % Cream Apply 1 Application topically 2 times a day. 15 g 0    mupirocin (BACTROBAN) 2 % Ointment Apply 1 Application topically 2 times a day. 22 g 0    aspirin (ASA) 81 MG Chew Tab chewable tablet Chew 1 Tablet every day. 100 Tablet 3    B Complex-C (VITAMIN B + C COMPLEX) Tab Take 1 Tablet by mouth every day.      Menaquinone-7 (K2 PO) Take  by mouth.      levothyroxine (SYNTHROID) 25 MCG Tab Take 1 Tablet by mouth every morning on an empty stomach. 100 Tablet 3    Multiple Vitamins-Minerals (PRESERVISION/LUTEIN) Cap Take  by mouth.      Calcium Carb-Cholecalciferol (CALCIUM 500 + D PO) Take  by mouth.      Vitamin D-Vitamin K (D3 + K2 PO) Take  by mouth.       No current facility-administered medications for this  visit.

## 2025-06-10 NOTE — ASSESSMENT & PLAN NOTE
Noticed skin lesions which are irritating, itching at times, scale, erythema. Lesion to cheek and temple right side face  Ref derm

## 2025-06-27 ENCOUNTER — OFFICE VISIT (OUTPATIENT)
Dept: URGENT CARE | Facility: PHYSICIAN GROUP | Age: 89
End: 2025-06-27
Payer: MEDICARE

## 2025-06-27 ENCOUNTER — RESULTS FOLLOW-UP (OUTPATIENT)
Dept: URGENT CARE | Facility: PHYSICIAN GROUP | Age: 89
End: 2025-06-27

## 2025-06-27 ENCOUNTER — HOSPITAL ENCOUNTER (OUTPATIENT)
Dept: LAB | Facility: MEDICAL CENTER | Age: 89
End: 2025-06-27
Attending: STUDENT IN AN ORGANIZED HEALTH CARE EDUCATION/TRAINING PROGRAM
Payer: MEDICARE

## 2025-06-27 VITALS
SYSTOLIC BLOOD PRESSURE: 110 MMHG | BODY MASS INDEX: 16.38 KG/M2 | RESPIRATION RATE: 20 BRPM | DIASTOLIC BLOOD PRESSURE: 80 MMHG | HEART RATE: 82 BPM | TEMPERATURE: 98.3 F | HEIGHT: 62 IN | OXYGEN SATURATION: 97 % | WEIGHT: 89 LBS

## 2025-06-27 DIAGNOSIS — R19.7 DIARRHEA, UNSPECIFIED TYPE: Primary | ICD-10-CM

## 2025-06-27 DIAGNOSIS — R19.7 DIARRHEA, UNSPECIFIED TYPE: ICD-10-CM

## 2025-06-27 LAB
ALBUMIN SERPL BCP-MCNC: 4.4 G/DL (ref 3.2–4.9)
ALBUMIN/GLOB SERPL: 1.8 G/DL
ALP SERPL-CCNC: 69 U/L (ref 30–99)
ALT SERPL-CCNC: 13 U/L (ref 2–50)
ANION GAP SERPL CALC-SCNC: 12 MMOL/L (ref 7–16)
AST SERPL-CCNC: 24 U/L (ref 12–45)
BASOPHILS # BLD AUTO: 0.5 % (ref 0–1.8)
BASOPHILS # BLD: 0.04 K/UL (ref 0–0.12)
BILIRUB SERPL-MCNC: 0.7 MG/DL (ref 0.1–1.5)
BUN SERPL-MCNC: 19 MG/DL (ref 8–22)
CALCIUM ALBUM COR SERPL-MCNC: 9.6 MG/DL (ref 8.5–10.5)
CALCIUM SERPL-MCNC: 9.9 MG/DL (ref 8.5–10.5)
CHLORIDE SERPL-SCNC: 101 MMOL/L (ref 96–112)
CO2 SERPL-SCNC: 23 MMOL/L (ref 20–33)
CREAT SERPL-MCNC: 0.85 MG/DL (ref 0.5–1.4)
EOSINOPHIL # BLD AUTO: 0.15 K/UL (ref 0–0.51)
EOSINOPHIL NFR BLD: 1.7 % (ref 0–6.9)
ERYTHROCYTE [DISTWIDTH] IN BLOOD BY AUTOMATED COUNT: 45 FL (ref 35.9–50)
GFR SERPLBLD CREATININE-BSD FMLA CKD-EPI: 66 ML/MIN/1.73 M 2
GLOBULIN SER CALC-MCNC: 2.4 G/DL (ref 1.9–3.5)
GLUCOSE SERPL-MCNC: 95 MG/DL (ref 65–99)
HCT VFR BLD AUTO: 42.7 % (ref 37–47)
HGB BLD-MCNC: 14.1 G/DL (ref 12–16)
IMM GRANULOCYTES # BLD AUTO: 0.03 K/UL (ref 0–0.11)
IMM GRANULOCYTES NFR BLD AUTO: 0.3 % (ref 0–0.9)
LYMPHOCYTES # BLD AUTO: 0.82 K/UL (ref 1–4.8)
LYMPHOCYTES NFR BLD: 9.3 % (ref 22–41)
MCH RBC QN AUTO: 31.1 PG (ref 27–33)
MCHC RBC AUTO-ENTMCNC: 33 G/DL (ref 32.2–35.5)
MCV RBC AUTO: 94.3 FL (ref 81.4–97.8)
MONOCYTES # BLD AUTO: 0.99 K/UL (ref 0–0.85)
MONOCYTES NFR BLD AUTO: 11.2 % (ref 0–13.4)
NEUTROPHILS # BLD AUTO: 6.79 K/UL (ref 1.82–7.42)
NEUTROPHILS NFR BLD: 77 % (ref 44–72)
NRBC # BLD AUTO: 0 K/UL
NRBC BLD-RTO: 0 /100 WBC (ref 0–0.2)
PLATELET # BLD AUTO: 203 K/UL (ref 164–446)
PMV BLD AUTO: 10.2 FL (ref 9–12.9)
POTASSIUM SERPL-SCNC: 4.3 MMOL/L (ref 3.6–5.5)
PROT SERPL-MCNC: 6.8 G/DL (ref 6–8.2)
RBC # BLD AUTO: 4.53 M/UL (ref 4.2–5.4)
SODIUM SERPL-SCNC: 136 MMOL/L (ref 135–145)
TSH SERPL DL<=0.005 MIU/L-ACNC: 3.16 UIU/ML (ref 0.38–5.33)
WBC # BLD AUTO: 8.8 K/UL (ref 4.8–10.8)

## 2025-06-27 PROCEDURE — 85025 COMPLETE CBC W/AUTO DIFF WBC: CPT

## 2025-06-27 PROCEDURE — 36415 COLL VENOUS BLD VENIPUNCTURE: CPT

## 2025-06-27 PROCEDURE — 3079F DIAST BP 80-89 MM HG: CPT | Performed by: STUDENT IN AN ORGANIZED HEALTH CARE EDUCATION/TRAINING PROGRAM

## 2025-06-27 PROCEDURE — 80053 COMPREHEN METABOLIC PANEL: CPT

## 2025-06-27 PROCEDURE — 3074F SYST BP LT 130 MM HG: CPT | Performed by: STUDENT IN AN ORGANIZED HEALTH CARE EDUCATION/TRAINING PROGRAM

## 2025-06-27 PROCEDURE — 84443 ASSAY THYROID STIM HORMONE: CPT

## 2025-06-27 PROCEDURE — 99214 OFFICE O/P EST MOD 30 MIN: CPT | Performed by: STUDENT IN AN ORGANIZED HEALTH CARE EDUCATION/TRAINING PROGRAM

## 2025-06-27 ASSESSMENT — FIBROSIS 4 INDEX: FIB4 SCORE: 2.4375

## 2025-06-27 NOTE — PROGRESS NOTES
Subjective:   Ratna Cline is a 89 y.o. female who presents for Abdominal Pain (Diarrhea X 1 DAY )      HPI:  89-year-old female presents to urgent care for intermittent diarrhea over the past 3 days.  States that 3 days ago she had 3 episodes of watery diarrhea and then symptoms fully resolved.  She did have some abdominal cramping at that time that got better after her bowel movements.  She then had 2 days of no symptoms and normal bowel movements.  This morning she started experiencing watery diarrhea again has had 2 episodes since 2 AM.  Not having any blood or melena in her stool.  No abdominal cramping or abdominal pain with this new round of diarrhea.  She is not having any nausea or vomiting.  No fever or upper respiratory symptoms.  She does report a history of IBS but this has been relatively well-controlled.  Her IBS is a mix between constipation and diarrhea.  Denies any constipation prior to her diarrhea.    Medications:    aspirin Chew  atorvastatin Tabs  CALCIUM 500 + D PO  D3 + K2 PO  K2 PO  levothyroxine Tabs  mupirocin calcium Crea  mupirocin Oint  PreserVision/Lutein Caps  vitamin B + C complex Tabs    Allergies: Soy allergy, Codeine, Nexium, and Sulfa drugs    Problem List: Ratna Cline does not have any pertinent problems on file.    Surgical History:  Past Surgical History:   Procedure Laterality Date    HYSTERECTOMY RADICAL      OTHER      jaw surgery to correct overbite    OTHER ORTHOPEDIC SURGERY      multiple bone tumor excision    TONSILLECTOMY         Past Social Hx: Ratna Cline  reports that she has never smoked. She has never used smokeless tobacco. She reports that she does not drink alcohol and does not use drugs.     Past Family Hx:  Ratna Cline family history includes Cancer in her mother; Dementia in her father; Genetic Disorder in her mother and son.     Problem list, medications, and allergies reviewed by myself today in Epic.     Objective:  "    /80 (BP Location: Right arm, Patient Position: Sitting, BP Cuff Size: Adult)   Pulse 82   Temp 36.8 °C (98.3 °F) (Temporal)   Resp 20   Ht 1.575 m (5' 2\")   Wt 40.4 kg (89 lb)   LMP  (LMP Unknown)   SpO2 97%   BMI 16.28 kg/m²     Physical Exam  Vitals reviewed.   Constitutional:       General: She is not in acute distress.     Appearance: She is not toxic-appearing.   HENT:      Head: Normocephalic.   Pulmonary:      Effort: Pulmonary effort is normal.   Abdominal:      General: Abdomen is flat. There is no distension.      Palpations: Abdomen is soft.      Tenderness: There is no abdominal tenderness. There is no right CVA tenderness, left CVA tenderness, guarding or rebound.   Neurological:      Mental Status: She is alert.         Assessment/Plan:     Diagnosis and associated orders:     1. Diarrhea, unspecified type  Comp Metabolic Panel    CBC WITH DIFFERENTIAL    TSH WITH REFLEX TO FT4    C Diff by PCR rflx Toxin    CULTURE STOOL         Comments/MDM:     Patient presents with intermittent diarrhea for the past 3 days.  Initially had 3 episodes of diarrhea 3 days ago and then for 8 hours of complete resolution of her symptoms with normal bowel movements.  This morning around 2 AM started having diarrhea again and has had 2 episodes of watery diarrhea without blood or melena.  She is not having any abdominal pain.  Her abdominal exam is benign and shows no guarding, tenderness, rebound, or rigidity.  She is not having any nausea or vomiting.  She does have a history of IBS which I did discuss with her that this could be a flare of her IBS.  Potentially food related.  She has a history of hypothyroidism but her TSH has been relatively stable.  Last value taken just under a year ago.  Did order a CBC, CMP, and TSH for further evaluation.  No recent antibiotics but did order a C. difficile and stool culture for further evaluation.  Discussed that if she starts experiencing abdominal pain, fever, " vomiting, or significant worsening of her symptoms that she should present to the emergency department.         Differential diagnosis, natural history, supportive care, and indications for immediate follow-up discussed.    Advised the patient to follow-up with the primary care physician for recheck, reevaluation, and consideration of further management.    Please note that this dictation was created using voice recognition software. I have made a reasonable attempt to correct obvious errors, but I expect that there are errors of grammar and possibly content that I did not discover before finalizing the note.    Electronically signed by Markell Long PA-C.

## 2025-06-28 ENCOUNTER — HOSPITAL ENCOUNTER (OUTPATIENT)
Facility: MEDICAL CENTER | Age: 89
End: 2025-06-28
Attending: STUDENT IN AN ORGANIZED HEALTH CARE EDUCATION/TRAINING PROGRAM
Payer: MEDICARE

## 2025-06-28 DIAGNOSIS — R19.7 DIARRHEA, UNSPECIFIED TYPE: ICD-10-CM

## 2025-06-28 PROCEDURE — 87899 AGENT NOS ASSAY W/OPTIC: CPT

## 2025-06-28 PROCEDURE — 87045 FECES CULTURE AEROBIC BACT: CPT

## 2025-06-28 PROCEDURE — 87046 STOOL CULTR AEROBIC BACT EA: CPT

## 2025-06-29 LAB
BACTERIA STL CULT: NORMAL
E COLI SXT1+2 STL IA: NORMAL
E COLI SXT1+2 STL IA: NORMAL
SIGNIFICANT IND 70042: NORMAL
SIGNIFICANT IND 70042: NORMAL
SITE SITE: NORMAL
SITE SITE: NORMAL
SOURCE SOURCE: NORMAL
SOURCE SOURCE: NORMAL

## 2025-06-30 ENCOUNTER — HOSPITAL ENCOUNTER (OUTPATIENT)
Facility: MEDICAL CENTER | Age: 89
End: 2025-06-30
Attending: STUDENT IN AN ORGANIZED HEALTH CARE EDUCATION/TRAINING PROGRAM
Payer: MEDICARE

## 2025-06-30 DIAGNOSIS — R19.7 DIARRHEA, UNSPECIFIED TYPE: ICD-10-CM

## 2025-06-30 LAB — C DIFF TOX GENS STL QL NAA+PROBE: NEGATIVE

## 2025-06-30 PROCEDURE — 87493 C DIFF AMPLIFIED PROBE: CPT

## 2025-07-01 PROBLEM — M85.89 OSTEOPENIA OF MULTIPLE SITES: Status: ACTIVE | Noted: 2018-05-08

## 2025-07-01 LAB
BACTERIA STL CULT: NORMAL
E COLI SXT1+2 STL IA: NORMAL
SIGNIFICANT IND 70042: NORMAL
SITE SITE: NORMAL
SOURCE SOURCE: NORMAL

## 2025-07-01 NOTE — ASSESSMENT & PLAN NOTE
Chronic, stable. Currently taking levothyroxine 25 mcg daily as prescribed. Denies fatigue, palpitations, hair and skin changes, temperature intolerance, changes in bowel habits, and weight loss or weight gain. Managed by primary care provider.

## 2025-07-01 NOTE — ASSESSMENT & PLAN NOTE
Stable. Reports history of transient ischemic attack in  Reviewed MRI of the brain from December 2024. Continues on aspirin and atorvastatin 20 mg nightly. Monitored by neurology, Dr. Smart.

## 2025-07-01 NOTE — ASSESSMENT & PLAN NOTE
Chronic, ongoing. Reviewed MRI of the brain from December 2024 with age-related volume loss. Reports progressive changes in short-term memory, specifically with word recollection and retrieval. Unable to complete in-office mini-cog screening. Discussed use of memory games and word puzzles. Monitored by primary care provider.

## 2025-07-01 NOTE — ASSESSMENT & PLAN NOTE
Chronic, stable. Reviewed DEXA scan from March 2024. Continues to supplement with calcium and vitamin D3 daily. Encourage weight-bearing activity. Denies recent fractures. Monitored by primary care provider.

## 2025-07-04 ENCOUNTER — OFFICE VISIT (OUTPATIENT)
Dept: URGENT CARE | Facility: PHYSICIAN GROUP | Age: 89
End: 2025-07-04
Payer: MEDICARE

## 2025-07-04 VITALS
DIASTOLIC BLOOD PRESSURE: 52 MMHG | BODY MASS INDEX: 16.64 KG/M2 | HEART RATE: 68 BPM | RESPIRATION RATE: 12 BRPM | OXYGEN SATURATION: 95 % | SYSTOLIC BLOOD PRESSURE: 102 MMHG | TEMPERATURE: 98.3 F | WEIGHT: 91 LBS

## 2025-07-04 DIAGNOSIS — T14.8XXA WOUND INFECTION: Primary | ICD-10-CM

## 2025-07-04 DIAGNOSIS — L08.9 WOUND INFECTION: Primary | ICD-10-CM

## 2025-07-04 PROCEDURE — 3078F DIAST BP <80 MM HG: CPT | Performed by: STUDENT IN AN ORGANIZED HEALTH CARE EDUCATION/TRAINING PROGRAM

## 2025-07-04 PROCEDURE — 99213 OFFICE O/P EST LOW 20 MIN: CPT | Mod: 25 | Performed by: STUDENT IN AN ORGANIZED HEALTH CARE EDUCATION/TRAINING PROGRAM

## 2025-07-04 PROCEDURE — 90471 IMMUNIZATION ADMIN: CPT | Performed by: STUDENT IN AN ORGANIZED HEALTH CARE EDUCATION/TRAINING PROGRAM

## 2025-07-04 PROCEDURE — 3074F SYST BP LT 130 MM HG: CPT | Performed by: STUDENT IN AN ORGANIZED HEALTH CARE EDUCATION/TRAINING PROGRAM

## 2025-07-04 PROCEDURE — 90715 TDAP VACCINE 7 YRS/> IM: CPT | Performed by: STUDENT IN AN ORGANIZED HEALTH CARE EDUCATION/TRAINING PROGRAM

## 2025-07-04 RX ORDER — AMOXICILLIN 500 MG/1
500 CAPSULE ORAL 3 TIMES DAILY
Qty: 15 CAPSULE | Refills: 0 | Status: SHIPPED | OUTPATIENT
Start: 2025-07-04 | End: 2025-07-09

## 2025-07-04 RX ORDER — MUPIROCIN 2 %
1 OINTMENT (GRAM) TOPICAL 2 TIMES DAILY
Qty: 22 G | Refills: 0 | Status: SHIPPED | OUTPATIENT
Start: 2025-07-04

## 2025-07-04 RX ORDER — AMOXICILLIN 500 MG/1
500 CAPSULE ORAL 3 TIMES DAILY
Qty: 15 CAPSULE | Refills: 0 | Status: SHIPPED
Start: 2025-07-04 | End: 2025-07-04

## 2025-07-04 RX ORDER — MUPIROCIN 2 %
1 OINTMENT (GRAM) TOPICAL 2 TIMES DAILY
Qty: 22 G | Refills: 0 | Status: SHIPPED
Start: 2025-07-04 | End: 2025-07-04

## 2025-07-04 ASSESSMENT — ENCOUNTER SYMPTOMS
CHILLS: 0
FEVER: 0
WEAKNESS: 0
TINGLING: 0
SENSORY CHANGE: 0

## 2025-07-04 ASSESSMENT — FIBROSIS 4 INDEX: FIB4 SCORE: 2.85

## 2025-07-04 NOTE — PROGRESS NOTES
Subjective     Ratna Cline is a 89 y.o. female who presents with Wound Infection (Right forearm, knife cut x4days ago)            Ratna is a 89 y.o. female who presents to urgent care with what she believes is an infection of her right forearm.  States she cut her right forearm with a knife accidentally about 4 days ago.  States knife was hanging on a wall.  Wind was blowing and she went to remove the knife from the wall and knife fell cutting her forearm.  Patient noticed over the last couple days it has become increasingly more tender and is also noticed redness and swelling which made her concerned for infection.        Review of Systems   Constitutional:  Negative for chills and fever.   Musculoskeletal:  Negative for joint pain.   Neurological:  Negative for tingling, sensory change and weakness.   All other systems reviewed and are negative.             Objective     /52 (BP Location: Left arm, Patient Position: Sitting, BP Cuff Size: Small adult)   Pulse 68   Temp 36.8 °C (98.3 °F) (Temporal)   Resp 12   Wt 41.3 kg (91 lb)   LMP  (LMP Unknown)   SpO2 95%   BMI 16.64 kg/m²      Physical Exam  Vitals reviewed.   HENT:      Head: Normocephalic and atraumatic.   Skin:     General: Skin is warm and dry.      Capillary Refill: Capillary refill takes less than 2 seconds.             Comments: Healing superficial abrasion with scabbing. Minimal surrounding erythema with swelling.   Neurological:      General: No focal deficit present.      Mental Status: She is alert and oriented to person, place, and time.                                  Assessment & Plan  Wound infection    Orders:    Tdap =>8yo IM    amoxicillin (AMOXIL) 500 MG Cap; Take 1 Capsule by mouth 3 times a day for 5 days.    mupirocin (BACTROBAN) 2 % Ointment; Apply 1 Application topically 2 times a day.             Differential diagnoses, supportive care measures and indications for immediate follow-up discussed with patient.  Pathogenesis of diagnosis discussed including typical length and natural progression.      Instructed to return to urgent care or nearest emergency department if symptoms fail to improve, for any change in condition, further concerns, or new concerning symptoms.    Patient states understanding and agrees with the plan of care and discharge instructions.

## 2025-07-07 ENCOUNTER — OFFICE VISIT (OUTPATIENT)
Dept: MEDICAL GROUP | Facility: MEDICAL CENTER | Age: 89
End: 2025-07-07
Payer: MEDICARE

## 2025-07-07 VITALS
SYSTOLIC BLOOD PRESSURE: 100 MMHG | HEART RATE: 73 BPM | DIASTOLIC BLOOD PRESSURE: 60 MMHG | BODY MASS INDEX: 15.98 KG/M2 | WEIGHT: 90.2 LBS | HEIGHT: 63 IN | TEMPERATURE: 97.1 F | OXYGEN SATURATION: 96 %

## 2025-07-07 DIAGNOSIS — E03.4 HYPOTHYROIDISM DUE TO ACQUIRED ATROPHY OF THYROID: ICD-10-CM

## 2025-07-07 DIAGNOSIS — M81.0 AGE-RELATED OSTEOPOROSIS WITHOUT CURRENT PATHOLOGICAL FRACTURE: ICD-10-CM

## 2025-07-07 DIAGNOSIS — G31.84 AMNESTIC MCI (MILD COGNITIVE IMPAIRMENT WITH MEMORY LOSS): ICD-10-CM

## 2025-07-07 DIAGNOSIS — G31.9 CEREBRAL ATROPHY (HCC): ICD-10-CM

## 2025-07-07 DIAGNOSIS — Z91.81 RISK FOR FALLS: ICD-10-CM

## 2025-07-07 DIAGNOSIS — R54 FRAILTY SYNDROME IN GERIATRIC PATIENT: ICD-10-CM

## 2025-07-07 PROCEDURE — 3074F SYST BP LT 130 MM HG: CPT

## 2025-07-07 PROCEDURE — 3078F DIAST BP <80 MM HG: CPT

## 2025-07-07 PROCEDURE — G0439 PPPS, SUBSEQ VISIT: HCPCS

## 2025-07-07 PROCEDURE — 1126F AMNT PAIN NOTED NONE PRSNT: CPT

## 2025-07-07 SDOH — ECONOMIC STABILITY: FOOD INSECURITY: WITHIN THE PAST 12 MONTHS, YOU WORRIED THAT YOUR FOOD WOULD RUN OUT BEFORE YOU GOT THE MONEY TO BUY MORE.: NEVER TRUE

## 2025-07-07 SDOH — ECONOMIC STABILITY: FOOD INSECURITY: WITHIN THE PAST 12 MONTHS, THE FOOD YOU BOUGHT JUST DIDN'T LAST AND YOU DIDN'T HAVE MONEY TO GET MORE.: NEVER TRUE

## 2025-07-07 SDOH — ECONOMIC STABILITY: TRANSPORTATION INSECURITY: IN THE PAST 12 MONTHS, HAS LACK OF TRANSPORTATION KEPT YOU FROM MEDICAL APPOINTMENTS OR FROM GETTING MEDICATIONS?: NO

## 2025-07-07 SDOH — ECONOMIC STABILITY: FOOD INSECURITY: HOW HARD IS IT FOR YOU TO PAY FOR THE VERY BASICS LIKE FOOD, HOUSING, MEDICAL CARE, AND HEATING?: NOT HARD AT ALL

## 2025-07-07 ASSESSMENT — ENCOUNTER SYMPTOMS: GENERAL WELL-BEING: GOOD

## 2025-07-07 ASSESSMENT — PAIN SCALES - GENERAL: PAINLEVEL_OUTOF10: NO PAIN

## 2025-07-07 ASSESSMENT — PATIENT HEALTH QUESTIONNAIRE - PHQ9: CLINICAL INTERPRETATION OF PHQ2 SCORE: 0

## 2025-07-07 ASSESSMENT — ACTIVITIES OF DAILY LIVING (ADL)
LACK_OF_TRANSPORTATION: NO
BATHING_REQUIRES_ASSISTANCE: 0

## 2025-07-07 ASSESSMENT — FIBROSIS 4 INDEX: FIB4 SCORE: 2.85

## 2025-07-07 NOTE — ASSESSMENT & PLAN NOTE
Chronic, ongoing. Reports that she has had ongoing issues with balance and goes to an exercise class 3 times a week at the [facility] which she found helpful. Provided education on removing hazards from home including rugs and carpets, adequate lighting, and use of treaded slippers and socks. Discussed use of a cane with ambulation. Followed by primary care provider.

## 2025-07-07 NOTE — PROGRESS NOTES
Comprehensive Health Assessment Program     Ratna Cline is a 89 y.o. here for her comprehensive health assessment.    Patient Active Problem List    Diagnosis Date Noted    Risk for falls 07/07/2025    Frailty syndrome in geriatric patient 07/07/2025    Amnestic MCI (mild cognitive impairment with memory loss) 04/22/2025    History of ischemic stroke 04/22/2025    Sensory hearing loss 04/22/2025    Hearing reduced, bilateral 03/07/2025    TIA (transient ischemic attack) 01/14/2025    Cerebral atrophy (HCC) 01/14/2025    Congenital tracheoesophageal fistula, esophageal atresia and stenosis 12/06/2024    Diverticulosis of colon 12/06/2024    History of pelvic fracture 09/23/2024    History of fall 04/17/2024    Generalized abdominal pain 02/21/2024    History of diverticulitis 02/21/2024    Chronic constipation 02/21/2024    Allergic rhinitis 02/21/2024    Arthritis 10/20/2023    Skin lesion of face 06/07/2023    Tension headache 12/14/2022    Irritable bowel syndrome with diarrhea 10/11/2021    Varicose veins of right lower extremity with pain 08/10/2021    Mild cognitive impairment with memory loss 06/08/2021    Dermatitis 06/08/2021    Palpable mass of soft tissue of shoulder 05/10/2021    Osteochondroma 06/03/2019    Pisano's esophagus 06/03/2019    Age-related osteoporosis without current pathological fracture 05/08/2018    High serum low-density lipoprotein (LDL) 07/14/2016    Hypothyroidism due to acquired atrophy of thyroid 07/13/2015    Tinnitus 07/13/2015    Gastro-esophageal reflux disease without esophagitis 03/15/2013       Current Medications[1]       Current supplements as per medication list.     Allergies:   Soy allergy, Codeine, Nexium, and Sulfa drugs  Social History[2]  Family History   Problem Relation Age of Onset    Cancer Mother         leukemia    Genetic Disorder Mother         osteochondroma    Dementia Father     Genetic Disorder Son     Stroke Neg Hx     Hyperlipidemia Neg  Hx     Hypertension Neg Hx     Heart Disease Neg Hx     Diabetes Neg Hx     Lung Disease Neg Hx     Ovarian Cancer Neg Hx     Tubal Cancer Neg Hx     Peritoneal Cancer Neg Hx     Breast Cancer Neg Hx     Colorectal Cancer Neg Hx      Ratna  has a past medical history of Age-related osteoporosis without current pathological fracture, Allergy, Anisocoria (06/06/2023), Anxiety, Arthritis, Back pain, Pisano's esophagus, Biceps tendonitis on right (06/06/2023), Bladder infection, Candidiasis, Closed fracture of sacrum ([facility]) (09/23/2024), Closed nondisplaced fracture of right acetabulum, initial encounter ([facility]) (09/23/2024), Depression, Difficulty swallowing, Dusky feet (04/05/2023), Dyslipidemia (07/14/2016), Fall at home (09/07/2021), Family history of leukemia (06/03/2019), GERD (gastroesophageal reflux disease), Grief (02/24/2020), Health counseling (09/08/2023), Hearing loss, History of hemorrhoids, History of measles, History of pneumonia, total hysterectomy with removal of both tubes and ovaries (08/20/2020), Hypervitaminosis D (02/17/2023), Hypothyroidism (acquired) (07/13/2015), Irritable bowel syndrome with constipation (04/30/2018), Left hand pain- between 2nd and 3rd digits due to barbed wire fence (05/10/2021), Left lower quadrant abdominal pain-now resolved (02/17/2023), Leg swelling (06/07/2023), Memory loss, Mild depression (08/13/2021), Neck pain, Noise-induced hearing loss of both ears (02/01/2021), Non-celiac gluten sensitivity (06/03/2019), Osteochondroma, Osteochondroma, Peripheral arterial disease ([facility]), Postmenopausal (08/20/2020), Prediabetes (02/01/2021), Seasonal allergies (06/03/2019), Sinusitis, Skin lesion (06/07/2023), Thyroid disease, Tinnitus (07/13/2015), Vaginal discharge (08/13/2021), and Vision loss.    She has no past medical history of Addisons disease ([facility]), Adrenal disorder ([facility]), Anemia, Arrhythmia, ASTHMA, Cancer (HCC), Cataract, CHF (congestive heart failure) (HCC), Clotting  disorder (HCC), COPD (chronic obstructive pulmonary disease) (HCC), Cushings syndrome (HCC), Diabetes, Diabetic neuropathy (HCC), Glaucoma, Goiter, Head ache, Heart attack (HCC), Heart murmur, HIV (human immunodeficiency virus infection) (HCC), Hypertension, IBD (inflammatory bowel disease), Kidney disease, Meningitis, Migraine, Muscle disorder, Parathyroid disorder (HCC), Pituitary disease (HCC), Pulmonary emphysema (HCC), Seizure (HCC), Sickle cell disease (HCC), Stroke (HCC), Substance abuse (HCC), or Tuberculosis.   Past Surgical History[3]    Screening:  In the last six months have you experienced any leakage of urine? Yes, reports occasional leakage with use of absorbent pads.    Depression Screening  Little interest or pleasure in doing things?  0 - not at all  Feeling down, depressed , or hopeless? 0 - not at all  Patient Health Questionnaire Score:  0    If depressive symptoms identified deferred to follow up visit unless specifically addressed in assessment and plan.    Interpretation of PHQ-9 Total Score   Score Severity   1-4 No Depression   5-9 Mild Depression   10-14 Moderate Depression   15-19 Moderately Severe Depression   20-27 Severe Depression    Screening for Cognitive Impairment  Do you or any of your friends or family members have any concern about your memory? Yes  Three Minute Recall (Village, Kitchen, Baby) 0/3    Anton clock face with all 12 numbers and set the hands to show 10 minutes past 11.  No    Cognitive concerns identified deferred for follow up unless specifically addressed in assessment and plan.    Fall Risk Assessment  Has the patient had two or more falls in the last year or any fall with injury in the last year?  Yes    Safety Assessment  Do you always wear your seatbelt?  Yes  Any changes to home needed to function safely? No  Difficulty hearing.  Yes  Patient counseled about all safety risks that were identified.    Functional Assessment ADLs  Are there any barriers  preventing you from cooking for yourself or meeting nutritional needs?  No.    Are there any barriers preventing you from driving safely or obtaining transportation?  No.    Are there any barriers preventing you from using a telephone or calling for help?  No    Are there any barriers preventing you from shopping?  No.    Are there any barriers preventing you from taking care of your own finances?  No    Are there any barriers preventing you from managing your medications?  No    Are there any barriers preventing you from showering, bathing or dressing yourself? No    Are there any barriers preventing you from doing housework or laundry? No    Are there any barriers preventing you from using the toilet?No    Are you currently engaging in any exercise or physical activity?  Yes. Square dance    Self-Assessment of Health  What is your perception of your health? Good    Do you sleep more than six hours a night? No    In the past 7 days, how much did pain keep you from doing your normal work? None    Do you spend quality time with family or friends (virtually or in person)? Yes    Do you usually eat a heart healthy diet that constists of a variety of fruits, vegetables, whole grains and fiber? Yes    Do you eat foods high in fat and/or Fast Food more than three times per week? No    How concerned are you that your medical conditions are not being well managed? Not at all    Are you worried that in the next 2 months, you may not have stable housing that you own, rent, or stay in as part of a household? No        Advance Care Planning  Do you have an Advance Directive, Living Will, Durable Power of , or POLST? No   Provided patient with educational brochure regarding Advance Care Planning.                  Health Maintenance Summary            Upcoming       Zoster (Shingles) Vaccines (1 of 2) Postponed until 8/7/2025     No completion history exists for this topic.              COVID-19 Vaccine (5 - 2024-25  season) Postponed until 9/4/2025      10/24/2022  Imm Admin: PFIZER BIVALENT SARS-COV-2 VACCINE (12+)    11/04/2021  Imm Admin: PFIZER PURPLE CAP SARS-COV-2 VACCINATION (12+)    03/10/2021  Imm Admin: PFIZER PURPLE CAP SARS-COV-2 VACCINATION (12+)    02/03/2021  Imm Admin: PFIZER PURPLE CAP SARS-COV-2 VACCINATION (12+)              Influenza Vaccine (1) Next due on 9/1/2025      10/04/2024  Imm Admin: Influenza high-dose trivalent (PF)    09/22/2023  Imm Admin: Influenza Vaccine, Quadrivalent, Adjuvanted (Pf)    09/28/2022  Imm Admin: Influenza Vaccine Adult HD    10/11/2021  Imm Admin: Influenza Vaccine Adult HD    10/01/2020  Imm Admin: Influenza Vaccine Adult HD     Only the first 5 history entries have been loaded, but more history exists.            Bone Density Scan (Every 2 Years) Next due on 3/18/2026      03/18/2024  DS-BONE DENSITY STUDY (DEXA)    10/27/2021  DS-BONE DENSITY STUDY (DEXA)    09/24/2012  DS-BONE DENSITY STUDY (DEXA)    01/11/2007  DS-BONE DENSITY STUDY (DEXA)              Annual Wellness Visit (Yearly) Next due on 7/7/2026 07/07/2025  Level of Service: [place] ANNUAL WELLNESS VISIT-INCLUDES PPPS SUBSEQUE*    03/07/2025  Level of Service: [place] ANNUAL WELLNESS VISIT-INCLUDES PPPS SUBSEQUE*    09/07/2023  Done    08/13/2021  Level of Service: [place] ANNUAL WELLNESS VISIT-INCLUDES PPPS SUBSEQUE*    02/24/2020  Visit Dx: Medicare annual wellness visit, subsequent      Only the first 5 history entries have been loaded, but more history exists.              IMM DTaP/Tdap/Td Vaccine (3 - Td or Tdap) Next due on 7/4/2035 07/04/2025  Imm Admin: Tdap Vaccine    10/04/2017  Imm Admin: Tdap Vaccine                      Completed or No Longer Recommended       Pneumococcal Vaccine: 50+ Years (Series Information) Completed      09/25/2017  Imm Admin: Pneumococcal polysaccharide vaccine (PPSV-23)    10/21/2015  Imm Admin: Pneumococcal Conjugate Vaccine (Prevnar/PCV-13)              Hepatitis A Vaccine (Hep  A) (Series Information) Aged Out      No completion history exists for this topic.              Hepatitis B Vaccine (Hep B) (Series Information) Aged Out     No completion history exists for this topic.              HPV Vaccines (Series Information) Aged Out     No completion history exists for this topic.              Polio Vaccine (Inactivated Polio) (Series Information) Aged Out     No completion history exists for this topic.              Meningococcal Immunization (Series Information) Aged Out     No completion history exists for this topic.              Meningococcal B Vaccine (Series Information) Aged Out     No completion history exists for this topic.              Mammogram  Discontinued        Frequency changed to Never automatically (Topic No Longer Applies)    07/14/2016  Patient Declined - declined                            Patient Care Team:  DONOVAN Mathias as PCP - General (Nurse Practitioner Family)  Rj Boston M.D. as Consulting Physician (Ophthalmology)  Luciano Kerr D.P.M. as Consulting Physician (Podiatry)  Nazanin Tejeda M.D. as Consulting Physician (Dermatology)  Schuyler Radford M.D. as Consulting Physician (Otolaryngology)  Luciano Eubanks, PT (Inactive) as Physical Therapist (Physical Therapy)  Celine Rivera P.A.-C. (Vascular Surgery)  [facility] (Travel Clinic)  Andre Mitchell M.D. (Inactive) (Ophthalmology)  Raji Prajapati (Optometry)    Financial Resource Strain: Low Risk  (7/7/2025)    Overall Financial Resource Strain (CARDIA)     Difficulty of Paying Living Expenses: Not hard at all      Transportation Needs: No Transportation Needs (7/7/2025)    PRAPARE - Transportation     Lack of Transportation (Medical): No     Lack of Transportation (Non-Medical): No      Food Insecurity: No Food Insecurity (7/7/2025)    Hunger Vital Sign     Worried About Running Out of Food in the Last Year: Never true     Ran Out of Food in the Last Year: Never true  "       Encounter Vitals  Temperature: 36.2 °C (97.1 °F)  Blood Pressure : 100/60  Pulse: 73  Pulse Oximetry: 96 %  Weight: 40.9 kg (90 lb 3.2 oz)  Height: 158.8 cm (5' 2.5\")  BMI (Calculated): 16.23  Pain Score: No pain     ROS:  No fever, chills, nausea, vomiting, diarrhea, chest pain or shortness of breath. See HPI.    Physical Exam  Vitals reviewed.   Constitutional:       Appearance: Normal appearance.   Cardiovascular:      Rate and Rhythm: Normal rate and regular rhythm.      Pulses: Normal pulses.      Heart sounds: Normal heart sounds.   Pulmonary:      Effort: Pulmonary effort is normal.      Breath sounds: Normal breath sounds.   Skin:     General: Skin is warm and dry.      Capillary Refill: Capillary refill takes less than 2 seconds.   Neurological:      General: No focal deficit present.      Mental Status: She is alert and oriented to person, place, and time.   Psychiatric:         Mood and Affect: Mood normal.       Assessment and Plan. The following treatment and monitoring plan is recommended:    Age-related osteoporosis without current pathological fracture  Chronic, stable. Reviewed DEXA scan from March 2024. Continues to supplement with calcium and vitamin D3 daily. Encourage weight-bearing activity. Denies recent fractures. Followed by primary care provider.    Amnestic MCI (mild cognitive impairment with memory loss)  Cerebral atrophy (HCC)  Chronic, ongoing. Reviewed MRI of the brain from December 2024 with age-related volume loss. Reports progressive changes in short-term memory, specifically with word recollection and word retrieval. Unable to complete in-office mini-cog screening. Discussed use of memory games and word puzzles. Monitored by neurology, Dr. Smart.    Hypothyroidism due to acquired atrophy of thyroid  Chronic, stable. Currently taking levothyroxine 25 mcg daily as prescribed. Denies palpitations, hair and skin changes, temperature intolerance, changes in bowel habits, and " weight loss or weight gain. Managed by primary care provider.    Frailty syndrome in geriatric patient  Risk for falls  Chronic, ongoing. Reports that she has had ongoing issues with balance and goes to an exercise class 3 times a week at the [facility] which she has found helpful. Provided education on removing hazards from home including rugs and carpets, adequate lighting, and use of treaded slippers and socks. Discussed using a cane with ambulation. Monitored by primary care provider.      Services suggested: No services needed at this time  Health Care Screening: Age-appropriate preventive services recommended by USPTF and ACIP covered by Medicare were discussed today. Services ordered if indicated and agreed upon by the patient.  Referrals offered: Community-based lifestyle interventions to reduce health risks and promote self-management and wellness, fall prevention, nutrition, physical activity, tobacco-use cessation, weight loss, and mental health services as per orders if indicated.    Discussion today about general wellness and lifestyle habits:    Prevent falls and reduce trip hazards; Cautioned about securing or removing rugs.  Have a working fire alarm and carbon monoxide detector.  Engage in regular physical activity and social activities.    Follow-up: Return for appointment with Primary Care Provider as needed.              [1]   Current Outpatient Medications   Medication Sig Dispense Refill    amoxicillin (AMOXIL) 500 MG Cap Take 1 Capsule by mouth 3 times a day for 5 days. 15 Capsule 0    mupirocin (BACTROBAN) 2 % Ointment Apply 1 Application topically 2 times a day. 22 g 0    B Complex-C (VITAMIN B + C COMPLEX) Tab Take 1 Tablet by mouth every day.      Menaquinone-7 (K2 PO) Take  by mouth.      levothyroxine (SYNTHROID) 25 MCG Tab Take 1 Tablet by mouth every morning on an empty stomach. 100 Tablet 3    Multiple Vitamins-Minerals (PRESERVISION/LUTEIN) Cap Take  by mouth.      Calcium  Carb-Cholecalciferol (CALCIUM 500 + D PO) Take  by mouth.      Vitamin D-Vitamin K (D3 + K2 PO) Take  by mouth.      mupirocin (BACTROBAN) 2 % Ointment Apply 1 Application topically 2 times a day. (Patient not taking: Reported on 7/7/2025) 22 g 0    atorvastatin (LIPITOR) 20 MG Tab Take 1 Tablet by mouth every evening. (Patient not taking: Reported on 7/7/2025) 100 Tablet 3    mupirocin calcium (BACTROBAN) 2 % Cream Apply 1 Application topically 2 times a day. (Patient not taking: Reported on 7/7/2025) 15 g 0    aspirin (ASA) 81 MG Chew Tab chewable tablet Chew 1 Tablet every day. (Patient not taking: Reported on 7/7/2025) 100 Tablet 3     No current facility-administered medications for this visit.   [2]   Social History  Tobacco Use    Smoking status: Never    Smokeless tobacco: Never   Vaping Use    Vaping status: Never Used   Substance Use Topics    Alcohol use: No     Alcohol/week: 0.0 oz    Drug use: No   [3]   Past Surgical History:  Procedure Laterality Date    HYSTERECTOMY RADICAL      OTHER      jaw surgery to correct overbite    OTHER ORTHOPEDIC SURGERY      multiple bone tumor excision    TONSILLECTOMY

## 2025-07-07 NOTE — ASSESSMENT & PLAN NOTE
Chronic, ongoing. Reports that she has had ongoing issues with balance and goes to an exercise class 3 times a week at the [facility] which she found helpful. Provided education on removing hazards from home including rugs and carpets, adequate lighting, and use of treaded slippers and socks. Discussed use of a cane with ambulation. Followed by primary care provider.

## 2025-07-09 ENCOUNTER — OFFICE VISIT (OUTPATIENT)
Dept: MEDICAL GROUP | Facility: MEDICAL CENTER | Age: 89
End: 2025-07-09
Payer: MEDICARE

## 2025-07-09 VITALS
HEART RATE: 66 BPM | WEIGHT: 90.4 LBS | HEIGHT: 63 IN | OXYGEN SATURATION: 94 % | DIASTOLIC BLOOD PRESSURE: 54 MMHG | TEMPERATURE: 97.8 F | SYSTOLIC BLOOD PRESSURE: 106 MMHG | BODY MASS INDEX: 16.02 KG/M2 | RESPIRATION RATE: 14 BRPM

## 2025-07-09 DIAGNOSIS — L08.9 WOUND INFECTION: ICD-10-CM

## 2025-07-09 DIAGNOSIS — T14.8XXA WOUND INFECTION: ICD-10-CM

## 2025-07-09 DIAGNOSIS — Z86.73 HISTORY OF ISCHEMIC STROKE: ICD-10-CM

## 2025-07-09 PROCEDURE — 99213 OFFICE O/P EST LOW 20 MIN: CPT | Performed by: FAMILY MEDICINE

## 2025-07-09 PROCEDURE — 3074F SYST BP LT 130 MM HG: CPT | Performed by: FAMILY MEDICINE

## 2025-07-09 PROCEDURE — 3078F DIAST BP <80 MM HG: CPT | Performed by: FAMILY MEDICINE

## 2025-07-09 RX ORDER — ATORVASTATIN CALCIUM 20 MG/1
20 TABLET, FILM COATED ORAL NIGHTLY
Qty: 100 TABLET | Refills: 3 | Status: SHIPPED | OUTPATIENT
Start: 2025-07-09

## 2025-07-09 RX ORDER — CEPHALEXIN 500 MG/1
500 CAPSULE ORAL 3 TIMES DAILY
Qty: 15 CAPSULE | Refills: 0 | Status: SHIPPED | OUTPATIENT
Start: 2025-07-09 | End: 2025-07-14

## 2025-07-09 ASSESSMENT — FIBROSIS 4 INDEX: FIB4 SCORE: 2.85

## 2025-07-09 NOTE — PROGRESS NOTES
Chief Complaint   Patient presents with    Other     Patient stated she cut her right arm with a knife about a week ago. Went to  and received antibiotic and a cream to apply. Cut has not healed and patient states its been uncomfortable and sore.   Patient also states she received a shot at  on her left arm and it is still sore and there is a bump at the injection site.        Subjective:     HPI:   Ratna Cline presents today with the followin. Wound infection  Patient had an accidental knife cut in her kitchen .  She was seen in urgent care  and the cut was found to be infected.  She was given an updated Tdap vaccine.  She states initially the cuts seem to do a little better but now it is puffy and inflamed again and getting more tender.  She finished the amoxicillin antibiotic.  The area is definitely inflamed.  There is no open wound anymore.  I do not see any purulence or streaking.  Discussed with patient.  Will change to cephalexin for her antibiotic.  She will start using warm compresses at least twice a day.    2. History of ischemic stroke  Patient is out of her atorvastatin.  She has a history of stroke and requests a refill.  Blood pressure is excellent here today.  She states she is compliant with her regimen.  She is not on blood pressure lowering medication.  Renewal is sent.  Last lipid profile in April was favorable with LDL less than 100.    There is slight swelling and tenderness of the left deltoid muscle in the region of her Tdap vaccine.  There is no erythema.  I feel this is a normal immunization reaction.  She can use warm compresses on this also.    Patient Active Problem List    Diagnosis Date Noted    Risk for falls 2025    Frailty syndrome in geriatric patient 2025    Amnestic MCI (mild cognitive impairment with memory loss) 2025    History of ischemic stroke 2025    Sensory hearing loss 2025    Hearing reduced, bilateral  "03/07/2025    TIA (transient ischemic attack) 01/14/2025    Cerebral atrophy (HCC) 01/14/2025    Congenital tracheoesophageal fistula, esophageal atresia and stenosis 12/06/2024    Diverticulosis of colon 12/06/2024    History of pelvic fracture 09/23/2024    History of fall 04/17/2024    Generalized abdominal pain 02/21/2024    History of diverticulitis 02/21/2024    Chronic constipation 02/21/2024    Allergic rhinitis 02/21/2024    Arthritis 10/20/2023    Skin lesion of face 06/07/2023    Tension headache 12/14/2022    Irritable bowel syndrome with diarrhea 10/11/2021    Varicose veins of right lower extremity with pain 08/10/2021    Mild cognitive impairment with memory loss 06/08/2021    Dermatitis 06/08/2021    Palpable mass of soft tissue of shoulder 05/10/2021    Osteochondroma 06/03/2019    Pisano's esophagus 06/03/2019    Age-related osteoporosis without current pathological fracture 05/08/2018    High serum low-density lipoprotein (LDL) 07/14/2016    Hypothyroidism due to acquired atrophy of thyroid 07/13/2015    Tinnitus 07/13/2015    Gastro-esophageal reflux disease without esophagitis 03/15/2013       Current medicines (including changes today)  Current Medications[1]    Allergies[2]    ROS: As per HPI       Objective:     /54   Pulse 66   Temp 36.6 °C (97.8 °F) (Temporal)   Resp 14   Ht 1.588 m (5' 2.5\")   Wt 41 kg (90 lb 6.4 oz)   SpO2 94%  Body mass index is 16.27 kg/m².    Physical Exam:  Constitutional: Well-developed and well-nourished. Not diaphoretic. No distress. Lucid and fluent.  Skin: Skin is warm and dry. No rash noted.  Left forearm healing 3 cm incision.  There is violaceous swelling and tenderness.  The area is warm.  There is no streaking or drainage currently.  Patient is able to move the elbow and shoulder normally.  Head: Atraumatic without lesions.  Eyes: Conjunctivae and extraocular motions are normal. Pupils are equal, round, and reactive to light. No scleral icterus. "   Ears:  External ears unremarkable.   Neck: Supple, trachea midline. No thyromegaly present. No cervical or supraclavicular lymphadenopathy. No JVD or carotid bruits appreciated  Cardiovascular: Regular rate and rhythm.  Normal S1, S2 without murmur appreciated.  Chest: Effort normal. Clear to auscultation throughout. No adventitious sounds.   Extremities: No cyanosis, clubbing, erythema, nor edema.   Neurological: Alert and oriented x 3.  No significant tremor.  Psychiatric:  Behavior, mood, and affect are appropriate.       Assessment and Plan:     89 y.o. female with the following issues:    1. Wound infection  cephALEXin (KEFLEX) 500 MG Cap      2. History of ischemic stroke  atorvastatin (LIPITOR) 20 MG Tab            Followup: No follow-ups on file.         [1]   Current Outpatient Medications   Medication Sig Dispense Refill    cephALEXin (KEFLEX) 500 MG Cap Take 1 Capsule by mouth 3 times a day for 5 days. 15 Capsule 0    atorvastatin (LIPITOR) 20 MG Tab Take 1 Tablet by mouth every evening. 100 Tablet 3    mupirocin (BACTROBAN) 2 % Ointment Apply 1 Application topically 2 times a day. 22 g 0    aspirin (ASA) 81 MG Chew Tab chewable tablet Chew 1 Tablet every day. 100 Tablet 3    B Complex-C (VITAMIN B + C COMPLEX) Tab Take 1 Tablet by mouth every day.      Menaquinone-7 (K2 PO) Take  by mouth.      levothyroxine (SYNTHROID) 25 MCG Tab Take 1 Tablet by mouth every morning on an empty stomach. 100 Tablet 3    Multiple Vitamins-Minerals (PRESERVISION/LUTEIN) Cap Take  by mouth.      Calcium Carb-Cholecalciferol (CALCIUM 500 + D PO) Take  by mouth.      Vitamin D-Vitamin K (D3 + K2 PO) Take  by mouth.      mupirocin (BACTROBAN) 2 % Ointment Apply 1 Application topically 2 times a day. (Patient not taking: Reported on 7/7/2025) 22 g 0     No current facility-administered medications for this visit.   [2]   Allergies  Allergen Reactions    Soy Allergy     Codeine Vomiting    Nexium Unspecified     Ringing in  ears    Sulfa Drugs Nausea

## 2025-08-18 ENCOUNTER — OFFICE VISIT (OUTPATIENT)
Dept: MEDICAL GROUP | Facility: PHYSICIAN GROUP | Age: 89
End: 2025-08-18
Payer: MEDICARE

## 2025-08-18 VITALS
BODY MASS INDEX: 16.41 KG/M2 | HEART RATE: 76 BPM | SYSTOLIC BLOOD PRESSURE: 110 MMHG | DIASTOLIC BLOOD PRESSURE: 60 MMHG | RESPIRATION RATE: 12 BRPM | TEMPERATURE: 97.3 F | OXYGEN SATURATION: 95 % | WEIGHT: 92.6 LBS | HEIGHT: 63 IN

## 2025-08-18 DIAGNOSIS — M81.0 AGE-RELATED OSTEOPOROSIS WITHOUT CURRENT PATHOLOGICAL FRACTURE: ICD-10-CM

## 2025-08-18 DIAGNOSIS — R79.89 HIGH SERUM LOW-DENSITY LIPOPROTEIN (LDL): ICD-10-CM

## 2025-08-18 DIAGNOSIS — R54 FRAILTY SYNDROME IN GERIATRIC PATIENT: ICD-10-CM

## 2025-08-18 DIAGNOSIS — G31.84 MILD COGNITIVE IMPAIRMENT WITH MEMORY LOSS: ICD-10-CM

## 2025-08-18 DIAGNOSIS — D16.9 OSTEOCHONDROMA: ICD-10-CM

## 2025-08-18 DIAGNOSIS — K58.0 IRRITABLE BOWEL SYNDROME WITH DIARRHEA: ICD-10-CM

## 2025-08-18 DIAGNOSIS — Z91.81 RISK FOR FALLS: ICD-10-CM

## 2025-08-18 DIAGNOSIS — J30.9 ALLERGIC RHINITIS, UNSPECIFIED SEASONALITY, UNSPECIFIED TRIGGER: Primary | ICD-10-CM

## 2025-08-18 DIAGNOSIS — E03.4 HYPOTHYROIDISM DUE TO ACQUIRED ATROPHY OF THYROID: ICD-10-CM

## 2025-08-18 PROCEDURE — 99214 OFFICE O/P EST MOD 30 MIN: CPT

## 2025-08-18 PROCEDURE — 3074F SYST BP LT 130 MM HG: CPT

## 2025-08-18 PROCEDURE — 3078F DIAST BP <80 MM HG: CPT

## 2025-08-18 RX ORDER — FEXOFENADINE HCL 180 MG/1
180 TABLET ORAL DAILY
Qty: 100 TABLET | Refills: 1 | Status: SHIPPED | OUTPATIENT
Start: 2025-08-18

## 2025-08-18 RX ORDER — THYROID 15 MG/1
TABLET ORAL
COMMUNITY
End: 2025-08-18

## 2025-08-18 ASSESSMENT — FIBROSIS 4 INDEX: FIB4 SCORE: 2.85

## 2025-08-18 ASSESSMENT — ENCOUNTER SYMPTOMS: MEMORY LOSS: 1
